# Patient Record
Sex: FEMALE | Race: WHITE | Employment: UNEMPLOYED | URBAN - METROPOLITAN AREA
[De-identification: names, ages, dates, MRNs, and addresses within clinical notes are randomized per-mention and may not be internally consistent; named-entity substitution may affect disease eponyms.]

---

## 2017-08-15 ENCOUNTER — APPOINTMENT (OUTPATIENT)
Dept: CT IMAGING | Age: 22
End: 2017-08-15
Payer: COMMERCIAL

## 2017-08-15 ENCOUNTER — HOSPITAL ENCOUNTER (EMERGENCY)
Age: 22
Discharge: HOME | End: 2017-08-16
Attending: EMERGENCY MEDICINE
Payer: COMMERCIAL

## 2017-08-15 VITALS
WEIGHT: 150 LBS | RESPIRATION RATE: 12 BRPM | BODY MASS INDEX: 26.58 KG/M2 | SYSTOLIC BLOOD PRESSURE: 108 MMHG | OXYGEN SATURATION: 100 % | DIASTOLIC BLOOD PRESSURE: 72 MMHG | HEIGHT: 63 IN | TEMPERATURE: 98.7 F | HEART RATE: 102 BPM

## 2017-08-15 DIAGNOSIS — N39.0 URINARY TRACT INFECTION WITHOUT HEMATURIA, SITE UNSPECIFIED: Primary | ICD-10-CM

## 2017-08-15 LAB
-: ABNORMAL
ABSOLUTE EOS #: 0.5 K/UL (ref 0–0.4)
ABSOLUTE LYMPH #: 3 K/UL (ref 1–4.8)
ABSOLUTE MONO #: 1 K/UL (ref 0.2–0.8)
AMORPHOUS: ABNORMAL
ANION GAP SERPL CALCULATED.3IONS-SCNC: 15 MMOL/L (ref 9–17)
BACTERIA: ABNORMAL
BASOPHILS # BLD: 1 %
BASOPHILS ABSOLUTE: 0.1 K/UL (ref 0–0.2)
BILIRUBIN URINE: NEGATIVE
BUN BLDV-MCNC: 18 MG/DL (ref 6–20)
BUN/CREAT BLD: 21 (ref 9–20)
CALCIUM SERPL-MCNC: 9.2 MG/DL (ref 8.6–10.4)
CASTS UA: ABNORMAL /LPF
CHLORIDE BLD-SCNC: 96 MMOL/L (ref 98–107)
CO2: 26 MMOL/L (ref 20–31)
COLOR: YELLOW
COMMENT UA: ABNORMAL
CREAT SERPL-MCNC: 0.86 MG/DL (ref 0.5–0.9)
CRYSTALS, UA: ABNORMAL /HPF
DIFFERENTIAL TYPE: ABNORMAL
EOSINOPHILS RELATIVE PERCENT: 3 %
EPITHELIAL CELLS UA: ABNORMAL /HPF
GFR AFRICAN AMERICAN: >60 ML/MIN
GFR NON-AFRICAN AMERICAN: >60 ML/MIN
GFR SERPL CREATININE-BSD FRML MDRD: ABNORMAL ML/MIN/{1.73_M2}
GFR SERPL CREATININE-BSD FRML MDRD: ABNORMAL ML/MIN/{1.73_M2}
GLUCOSE BLD-MCNC: 101 MG/DL (ref 70–99)
GLUCOSE URINE: NEGATIVE
HCT VFR BLD CALC: 40.4 % (ref 36–46)
HEMOGLOBIN: 13.5 G/DL (ref 12–16)
KETONES, URINE: NEGATIVE
LEUKOCYTE ESTERASE, URINE: ABNORMAL
LYMPHOCYTES # BLD: 15 %
MCH RBC QN AUTO: 27.8 PG (ref 26–34)
MCHC RBC AUTO-ENTMCNC: 33.5 G/DL (ref 31–37)
MCV RBC AUTO: 83 FL (ref 80–100)
MONOCYTES # BLD: 5 %
MUCUS: ABNORMAL
NITRITE, URINE: NEGATIVE
OTHER OBSERVATIONS UA: ABNORMAL
PDW BLD-RTO: 13.4 % (ref 11.5–14.5)
PH UA: 5 (ref 5–8)
PLATELET # BLD: 209 K/UL (ref 130–400)
PLATELET ESTIMATE: ABNORMAL
PMV BLD AUTO: 9.5 FL (ref 6–12)
POTASSIUM SERPL-SCNC: 3.8 MMOL/L (ref 3.7–5.3)
PROTEIN UA: NEGATIVE
RBC # BLD: 4.87 M/UL (ref 4–5.2)
RBC # BLD: ABNORMAL 10*6/UL
RBC UA: ABNORMAL /HPF (ref 0–2)
RENAL EPITHELIAL, UA: ABNORMAL /HPF
SEG NEUTROPHILS: 76 %
SEGMENTED NEUTROPHILS ABSOLUTE COUNT: 15 K/UL (ref 1.8–7.7)
SODIUM BLD-SCNC: 137 MMOL/L (ref 135–144)
SPECIFIC GRAVITY UA: 1.01 (ref 1–1.03)
TRICHOMONAS: ABNORMAL
TURBIDITY: ABNORMAL
URINE HGB: ABNORMAL
UROBILINOGEN, URINE: NORMAL
WBC # BLD: 19.5 K/UL (ref 3.5–11)
WBC # BLD: ABNORMAL 10*3/UL
WBC UA: ABNORMAL /HPF (ref 0–5)
YEAST: ABNORMAL

## 2017-08-15 PROCEDURE — 85025 COMPLETE CBC W/AUTO DIFF WBC: CPT

## 2017-08-15 PROCEDURE — 99283 EMERGENCY DEPT VISIT LOW MDM: CPT

## 2017-08-15 PROCEDURE — 87086 URINE CULTURE/COLONY COUNT: CPT

## 2017-08-15 PROCEDURE — 80048 BASIC METABOLIC PNL TOTAL CA: CPT

## 2017-08-15 PROCEDURE — 6360000004 HC RX CONTRAST MEDICATION: Performed by: EMERGENCY MEDICINE

## 2017-08-15 PROCEDURE — 81001 URINALYSIS AUTO W/SCOPE: CPT

## 2017-08-15 PROCEDURE — 74177 CT ABD & PELVIS W/CONTRAST: CPT

## 2017-08-15 RX ORDER — ARIPIPRAZOLE 5 MG/1
5 TABLET ORAL DAILY
Status: ON HOLD | COMMUNITY
End: 2019-02-28

## 2017-08-15 RX ORDER — VENLAFAXINE HYDROCHLORIDE 150 MG/1
150 CAPSULE, EXTENDED RELEASE ORAL DAILY
COMMUNITY
End: 2019-02-26

## 2017-08-15 RX ADMIN — IOVERSOL 125 ML: 741 INJECTION INTRA-ARTERIAL; INTRAVENOUS at 23:46

## 2017-08-15 ASSESSMENT — PAIN DESCRIPTION - LOCATION: LOCATION: ABDOMEN;FLANK

## 2017-08-15 ASSESSMENT — PAIN SCALES - GENERAL: PAINLEVEL_OUTOF10: 10

## 2017-08-15 ASSESSMENT — PAIN DESCRIPTION - PAIN TYPE: TYPE: ACUTE PAIN

## 2017-08-15 ASSESSMENT — PAIN DESCRIPTION - ORIENTATION: ORIENTATION: RIGHT

## 2017-08-16 PROCEDURE — 2580000003 HC RX 258: Performed by: EMERGENCY MEDICINE

## 2017-08-16 PROCEDURE — 6360000002 HC RX W HCPCS: Performed by: EMERGENCY MEDICINE

## 2017-08-16 PROCEDURE — 96365 THER/PROPH/DIAG IV INF INIT: CPT

## 2017-08-16 RX ORDER — CEPHALEXIN 500 MG/1
500 CAPSULE ORAL 4 TIMES DAILY
Qty: 40 CAPSULE | Refills: 0 | Status: SHIPPED | OUTPATIENT
Start: 2017-08-16 | End: 2017-08-26

## 2017-08-16 RX ORDER — ONDANSETRON 4 MG/1
4 TABLET, FILM COATED ORAL EVERY 6 HOURS PRN
Qty: 10 TABLET | Refills: 0 | Status: SHIPPED | OUTPATIENT
Start: 2017-08-16 | End: 2021-07-20

## 2017-08-16 RX ORDER — 0.9 % SODIUM CHLORIDE 0.9 %
1000 INTRAVENOUS SOLUTION INTRAVENOUS ONCE
Status: COMPLETED | OUTPATIENT
Start: 2017-08-16 | End: 2017-08-16

## 2017-08-16 RX ADMIN — SODIUM CHLORIDE 1000 ML: 9 INJECTION, SOLUTION INTRAVENOUS at 01:07

## 2017-08-16 RX ADMIN — CEFTRIAXONE 1 G: 1 INJECTION, POWDER, FOR SOLUTION INTRAMUSCULAR; INTRAVENOUS at 01:07

## 2017-08-17 LAB
CULTURE: NORMAL
CULTURE: NORMAL
Lab: NORMAL
SPECIMEN DESCRIPTION: NORMAL
SPECIMEN DESCRIPTION: NORMAL
STATUS: NORMAL

## 2019-02-26 ENCOUNTER — HOSPITAL ENCOUNTER (INPATIENT)
Age: 24
LOS: 2 days | Discharge: HOME | DRG: 885 | End: 2019-02-28
Attending: EMERGENCY MEDICINE | Admitting: PSYCHIATRY & NEUROLOGY
Payer: COMMERCIAL

## 2019-02-26 DIAGNOSIS — R45.851 SUICIDAL IDEATION: Primary | ICD-10-CM

## 2019-02-26 PROBLEM — F33.9 RECURRENT DEPRESSION (HCC): Status: ACTIVE | Noted: 2019-02-26

## 2019-02-26 PROBLEM — F31.9 BIPOLAR 1 DISORDER (HCC): Status: ACTIVE | Noted: 2019-02-26

## 2019-02-26 LAB
ACETAMINOPHEN LEVEL: <5 UG/ML (ref 10–30)
AMPHETAMINE SCREEN URINE: NEGATIVE
BARBITURATE SCREEN URINE: NEGATIVE
BENZODIAZEPINE SCREEN, URINE: NEGATIVE
BUPRENORPHINE URINE: ABNORMAL
CANNABINOID SCREEN URINE: POSITIVE
COCAINE METABOLITE, URINE: NEGATIVE
MDMA URINE: ABNORMAL
METHADONE SCREEN, URINE: NEGATIVE
METHAMPHETAMINE, URINE: ABNORMAL
OPIATES, URINE: NEGATIVE
OXYCODONE SCREEN URINE: POSITIVE
PHENCYCLIDINE, URINE: NEGATIVE
PROPOXYPHENE, URINE: ABNORMAL
SALICYLATE LEVEL: <1 MG/DL (ref 3–10)
TEST INFORMATION: ABNORMAL
TRICYCLIC ANTIDEPRESSANTS, UR: ABNORMAL

## 2019-02-26 PROCEDURE — 99284 EMERGENCY DEPT VISIT MOD MDM: CPT

## 2019-02-26 PROCEDURE — 80307 DRUG TEST PRSMV CHEM ANLYZR: CPT

## 2019-02-26 PROCEDURE — 1240000000 HC EMOTIONAL WELLNESS R&B

## 2019-02-26 PROCEDURE — 6370000000 HC RX 637 (ALT 250 FOR IP): Performed by: PSYCHIATRY & NEUROLOGY

## 2019-02-26 PROCEDURE — 36415 COLL VENOUS BLD VENIPUNCTURE: CPT

## 2019-02-26 RX ORDER — IBUPROFEN 800 MG/1
800 TABLET ORAL EVERY 6 HOURS PRN
Status: DISCONTINUED | OUTPATIENT
Start: 2019-02-26 | End: 2019-02-28 | Stop reason: HOSPADM

## 2019-02-26 RX ORDER — OXYCODONE HYDROCHLORIDE AND ACETAMINOPHEN 5; 325 MG/1; MG/1
1 TABLET ORAL EVERY 8 HOURS PRN
Status: ON HOLD | COMMUNITY
End: 2020-01-10 | Stop reason: HOSPADM

## 2019-02-26 RX ORDER — DIPHENHYDRAMINE HCL 25 MG
25 TABLET ORAL NIGHTLY PRN
Status: DISCONTINUED | OUTPATIENT
Start: 2019-02-26 | End: 2019-02-28 | Stop reason: HOSPADM

## 2019-02-26 RX ORDER — MAGNESIUM HYDROXIDE/ALUMINUM HYDROXICE/SIMETHICONE 120; 1200; 1200 MG/30ML; MG/30ML; MG/30ML
30 SUSPENSION ORAL PRN
Status: DISCONTINUED | OUTPATIENT
Start: 2019-02-26 | End: 2019-02-28 | Stop reason: HOSPADM

## 2019-02-26 RX ORDER — ONDANSETRON 4 MG/1
4 TABLET, FILM COATED ORAL EVERY 6 HOURS PRN
Status: DISCONTINUED | OUTPATIENT
Start: 2019-02-26 | End: 2019-02-28 | Stop reason: HOSPADM

## 2019-02-26 RX ORDER — OXYCODONE HYDROCHLORIDE AND ACETAMINOPHEN 5; 325 MG/1; MG/1
1 TABLET ORAL EVERY 8 HOURS PRN
Status: DISCONTINUED | OUTPATIENT
Start: 2019-02-26 | End: 2019-02-28 | Stop reason: HOSPADM

## 2019-02-26 RX ORDER — SULFAMETHOXAZOLE AND TRIMETHOPRIM 800; 160 MG/1; MG/1
1 TABLET ORAL 2 TIMES DAILY
COMMUNITY
Start: 2019-02-22 | End: 2019-02-28

## 2019-02-26 RX ORDER — ACETAMINOPHEN 325 MG/1
650 TABLET ORAL EVERY 4 HOURS PRN
Status: DISCONTINUED | OUTPATIENT
Start: 2019-02-26 | End: 2019-02-28 | Stop reason: HOSPADM

## 2019-02-26 RX ORDER — BENZTROPINE MESYLATE 1 MG/ML
2 INJECTION INTRAMUSCULAR; INTRAVENOUS 2 TIMES DAILY PRN
Status: DISCONTINUED | OUTPATIENT
Start: 2019-02-26 | End: 2019-02-28 | Stop reason: HOSPADM

## 2019-02-26 RX ORDER — SULFAMETHOXAZOLE AND TRIMETHOPRIM 800; 160 MG/1; MG/1
1 TABLET ORAL 2 TIMES DAILY
Status: DISCONTINUED | OUTPATIENT
Start: 2019-02-26 | End: 2019-02-27

## 2019-02-26 RX ORDER — TRAZODONE HYDROCHLORIDE 50 MG/1
50 TABLET ORAL NIGHTLY PRN
Status: DISCONTINUED | OUTPATIENT
Start: 2019-02-26 | End: 2019-02-28 | Stop reason: HOSPADM

## 2019-02-26 RX ORDER — ARIPIPRAZOLE 5 MG/1
5 TABLET ORAL DAILY
Status: DISCONTINUED | OUTPATIENT
Start: 2019-02-26 | End: 2019-02-27

## 2019-02-26 RX ORDER — DOCUSATE SODIUM 100 MG/1
100 CAPSULE, LIQUID FILLED ORAL 2 TIMES DAILY PRN
Status: DISCONTINUED | OUTPATIENT
Start: 2019-02-26 | End: 2019-02-28 | Stop reason: HOSPADM

## 2019-02-26 RX ORDER — DOCUSATE SODIUM 100 MG/1
100 CAPSULE, LIQUID FILLED ORAL 2 TIMES DAILY PRN
Status: ON HOLD | COMMUNITY
End: 2022-06-21

## 2019-02-26 RX ORDER — IBUPROFEN 800 MG/1
800 TABLET ORAL EVERY 6 HOURS PRN
Status: ON HOLD | COMMUNITY
End: 2020-01-10

## 2019-02-26 RX ORDER — HYDROXYZINE HYDROCHLORIDE 25 MG/1
25 TABLET, FILM COATED ORAL 3 TIMES DAILY PRN
Status: DISCONTINUED | OUTPATIENT
Start: 2019-02-26 | End: 2019-02-28 | Stop reason: HOSPADM

## 2019-02-26 RX ADMIN — TRAZODONE HYDROCHLORIDE 50 MG: 50 TABLET ORAL at 23:03

## 2019-02-26 RX ADMIN — ARIPIPRAZOLE 5 MG: 5 TABLET ORAL at 20:45

## 2019-02-26 RX ADMIN — OXYCODONE HYDROCHLORIDE AND ACETAMINOPHEN 1 TABLET: 5; 325 TABLET ORAL at 20:45

## 2019-02-26 RX ADMIN — NICOTINE POLACRILEX 2 MG: 2 GUM, CHEWING BUCCAL at 21:22

## 2019-02-26 RX ADMIN — DIPHENHYDRAMINE HCL 25 MG: 25 TABLET ORAL at 20:44

## 2019-02-26 RX ADMIN — HYDROXYZINE HYDROCHLORIDE 25 MG: 25 TABLET, FILM COATED ORAL at 20:45

## 2019-02-26 RX ADMIN — SULFAMETHOXAZOLE AND TRIMETHOPRIM 1 TABLET: 800; 160 TABLET ORAL at 20:48

## 2019-02-26 ASSESSMENT — PAIN DESCRIPTION - LOCATION: LOCATION: ABDOMEN

## 2019-02-26 ASSESSMENT — SLEEP AND FATIGUE QUESTIONNAIRES
AVERAGE NUMBER OF SLEEP HOURS: 4
DIFFICULTY STAYING ASLEEP: YES
SLEEP PATTERN: DIFFICULTY FALLING ASLEEP
DIFFICULTY ARISING: NO
DIFFICULTY FALLING ASLEEP: YES
DO YOU USE A SLEEP AID: NO
RESTFUL SLEEP: NO
DO YOU HAVE DIFFICULTY SLEEPING: YES

## 2019-02-26 ASSESSMENT — ENCOUNTER SYMPTOMS
DIARRHEA: 0
NAUSEA: 0
CONSTIPATION: 0
SHORTNESS OF BREATH: 0
CHEST TIGHTNESS: 0
VOMITING: 0
EYE PAIN: 0
COUGH: 0
SORE THROAT: 0
BACK PAIN: 0
ABDOMINAL PAIN: 0
EYE REDNESS: 0

## 2019-02-26 ASSESSMENT — PAIN DESCRIPTION - PAIN TYPE: TYPE: ACUTE PAIN;SURGICAL PAIN

## 2019-02-26 ASSESSMENT — LIFESTYLE VARIABLES: HISTORY_ALCOHOL_USE: NO

## 2019-02-26 ASSESSMENT — PAIN SCALES - GENERAL
PAINLEVEL_OUTOF10: 10
PAINLEVEL_OUTOF10: 0

## 2019-02-26 ASSESSMENT — PATIENT HEALTH QUESTIONNAIRE - PHQ9: SUM OF ALL RESPONSES TO PHQ QUESTIONS 1-9: 10

## 2019-02-27 LAB
ABSOLUTE EOS #: 0.12 K/UL (ref 0–0.4)
ABSOLUTE IMMATURE GRANULOCYTE: ABNORMAL K/UL (ref 0–0.3)
ABSOLUTE LYMPH #: 5.12 K/UL (ref 1–4.8)
ABSOLUTE MONO #: 0.83 K/UL (ref 0.1–1.3)
ALBUMIN SERPL-MCNC: 4.2 G/DL (ref 3.5–5.2)
ALBUMIN/GLOBULIN RATIO: ABNORMAL (ref 1–2.5)
ALP BLD-CCNC: 245 U/L (ref 35–104)
ALT SERPL-CCNC: 75 U/L (ref 5–33)
ANION GAP SERPL CALCULATED.3IONS-SCNC: 13 MMOL/L (ref 9–17)
AST SERPL-CCNC: 31 U/L
BASOPHILS # BLD: 0 % (ref 0–2)
BASOPHILS ABSOLUTE: 0 K/UL (ref 0–0.2)
BILIRUB SERPL-MCNC: 0.21 MG/DL (ref 0.3–1.2)
BUN BLDV-MCNC: 24 MG/DL (ref 6–20)
BUN/CREAT BLD: ABNORMAL (ref 9–20)
CALCIUM SERPL-MCNC: 9.9 MG/DL (ref 8.6–10.4)
CHLORIDE BLD-SCNC: 102 MMOL/L (ref 98–107)
CHOLESTEROL/HDL RATIO: 6.7
CHOLESTEROL: 228 MG/DL
CO2: 25 MMOL/L (ref 20–31)
CREAT SERPL-MCNC: 1.21 MG/DL (ref 0.5–0.9)
DIFFERENTIAL TYPE: ABNORMAL
EOSINOPHILS RELATIVE PERCENT: 1 % (ref 0–4)
GFR AFRICAN AMERICAN: >60 ML/MIN
GFR NON-AFRICAN AMERICAN: 54 ML/MIN
GFR SERPL CREATININE-BSD FRML MDRD: ABNORMAL ML/MIN/{1.73_M2}
GFR SERPL CREATININE-BSD FRML MDRD: ABNORMAL ML/MIN/{1.73_M2}
GLUCOSE BLD-MCNC: 124 MG/DL (ref 70–99)
HCG(URINE) PREGNANCY TEST: NEGATIVE
HCT VFR BLD CALC: 41.7 % (ref 36–46)
HDLC SERPL-MCNC: 34 MG/DL
HEMOGLOBIN: 13.7 G/DL (ref 12–16)
IMMATURE GRANULOCYTES: ABNORMAL %
LDL CHOLESTEROL: 132 MG/DL (ref 0–130)
LYMPHOCYTES # BLD: 43 % (ref 24–44)
MCH RBC QN AUTO: 27 PG (ref 26–34)
MCHC RBC AUTO-ENTMCNC: 32.8 G/DL (ref 31–37)
MCV RBC AUTO: 82.5 FL (ref 80–100)
MONOCYTES # BLD: 7 % (ref 1–7)
MORPHOLOGY: NORMAL
NRBC AUTOMATED: ABNORMAL PER 100 WBC
PDW BLD-RTO: 14 % (ref 11.5–14.9)
PLATELET # BLD: 271 K/UL (ref 150–450)
PLATELET ESTIMATE: ABNORMAL
PMV BLD AUTO: 9.1 FL (ref 6–12)
POTASSIUM SERPL-SCNC: 4 MMOL/L (ref 3.7–5.3)
RBC # BLD: 5.06 M/UL (ref 4–5.2)
RBC # BLD: ABNORMAL 10*6/UL
SEG NEUTROPHILS: 49 % (ref 36–66)
SEGMENTED NEUTROPHILS ABSOLUTE COUNT: 5.83 K/UL (ref 1.3–9.1)
SODIUM BLD-SCNC: 140 MMOL/L (ref 135–144)
TOTAL PROTEIN: 7.4 G/DL (ref 6.4–8.3)
TRIGL SERPL-MCNC: 312 MG/DL
VLDLC SERPL CALC-MCNC: ABNORMAL MG/DL (ref 1–30)
WBC # BLD: 11.9 K/UL (ref 3.5–11)
WBC # BLD: ABNORMAL 10*3/UL

## 2019-02-27 PROCEDURE — 6370000000 HC RX 637 (ALT 250 FOR IP): Performed by: PSYCHIATRY & NEUROLOGY

## 2019-02-27 PROCEDURE — 80061 LIPID PANEL: CPT

## 2019-02-27 PROCEDURE — 80053 COMPREHEN METABOLIC PANEL: CPT

## 2019-02-27 PROCEDURE — 36415 COLL VENOUS BLD VENIPUNCTURE: CPT

## 2019-02-27 PROCEDURE — 1240000000 HC EMOTIONAL WELLNESS R&B

## 2019-02-27 PROCEDURE — 85025 COMPLETE CBC W/AUTO DIFF WBC: CPT

## 2019-02-27 PROCEDURE — 84703 CHORIONIC GONADOTROPIN ASSAY: CPT

## 2019-02-27 RX ORDER — ARIPIPRAZOLE 5 MG/1
5 TABLET ORAL ONCE
Status: COMPLETED | OUTPATIENT
Start: 2019-02-27 | End: 2019-02-27

## 2019-02-27 RX ORDER — ARIPIPRAZOLE 10 MG/1
10 TABLET ORAL DAILY
Status: DISCONTINUED | OUTPATIENT
Start: 2019-02-28 | End: 2019-02-28 | Stop reason: HOSPADM

## 2019-02-27 RX ORDER — SULFAMETHOXAZOLE AND TRIMETHOPRIM 800; 160 MG/1; MG/1
1 TABLET ORAL 2 TIMES DAILY
Status: DISCONTINUED | OUTPATIENT
Start: 2019-02-27 | End: 2019-02-28 | Stop reason: HOSPADM

## 2019-02-27 RX ADMIN — TRAZODONE HYDROCHLORIDE 50 MG: 50 TABLET ORAL at 21:49

## 2019-02-27 RX ADMIN — OXYCODONE HYDROCHLORIDE AND ACETAMINOPHEN 1 TABLET: 5; 325 TABLET ORAL at 20:14

## 2019-02-27 RX ADMIN — ARIPIPRAZOLE 5 MG: 5 TABLET ORAL at 17:02

## 2019-02-27 RX ADMIN — SULFAMETHOXAZOLE AND TRIMETHOPRIM 1 TABLET: 800; 160 TABLET ORAL at 08:03

## 2019-02-27 RX ADMIN — HYDROXYZINE HYDROCHLORIDE 25 MG: 25 TABLET, FILM COATED ORAL at 12:09

## 2019-02-27 RX ADMIN — SULFAMETHOXAZOLE AND TRIMETHOPRIM 1 TABLET: 800; 160 TABLET ORAL at 21:49

## 2019-02-27 RX ADMIN — ARIPIPRAZOLE 5 MG: 5 TABLET ORAL at 08:03

## 2019-02-27 RX ADMIN — DIPHENHYDRAMINE HCL 25 MG: 25 TABLET ORAL at 21:49

## 2019-02-27 RX ADMIN — OXYCODONE HYDROCHLORIDE AND ACETAMINOPHEN 1 TABLET: 5; 325 TABLET ORAL at 12:09

## 2019-02-27 RX ADMIN — HYDROXYZINE HYDROCHLORIDE 25 MG: 25 TABLET, FILM COATED ORAL at 21:49

## 2019-02-27 ASSESSMENT — SLEEP AND FATIGUE QUESTIONNAIRES
DO YOU HAVE DIFFICULTY SLEEPING: YES
AVERAGE NUMBER OF SLEEP HOURS: 4
SLEEP PATTERN: DIFFICULTY FALLING ASLEEP;DISTURBED/INTERRUPTED SLEEP;RESTLESSNESS
RESTFUL SLEEP: NO
DIFFICULTY FALLING ASLEEP: YES
DIFFICULTY ARISING: NO
DO YOU USE A SLEEP AID: NO
DIFFICULTY STAYING ASLEEP: YES

## 2019-02-27 ASSESSMENT — PAIN SCALES - GENERAL
PAINLEVEL_OUTOF10: 9
PAINLEVEL_OUTOF10: 10
PAINLEVEL_OUTOF10: 0
PAINLEVEL_OUTOF10: 1

## 2019-02-27 ASSESSMENT — PATIENT HEALTH QUESTIONNAIRE - PHQ9: SUM OF ALL RESPONSES TO PHQ QUESTIONS 1-9: 7

## 2019-02-27 ASSESSMENT — LIFESTYLE VARIABLES: HISTORY_ALCOHOL_USE: NO

## 2019-02-28 VITALS
WEIGHT: 162 LBS | RESPIRATION RATE: 14 BRPM | HEART RATE: 94 BPM | HEIGHT: 63 IN | BODY MASS INDEX: 28.7 KG/M2 | DIASTOLIC BLOOD PRESSURE: 75 MMHG | OXYGEN SATURATION: 99 % | TEMPERATURE: 97.9 F | SYSTOLIC BLOOD PRESSURE: 123 MMHG

## 2019-02-28 PROCEDURE — 6370000000 HC RX 637 (ALT 250 FOR IP): Performed by: PSYCHIATRY & NEUROLOGY

## 2019-02-28 PROCEDURE — 5130000000 HC BRIDGE APPOINTMENT

## 2019-02-28 RX ORDER — ARIPIPRAZOLE 10 MG/1
10 TABLET ORAL DAILY
Qty: 30 TABLET | Refills: 0 | Status: ON HOLD | OUTPATIENT
Start: 2019-02-28 | End: 2020-01-10

## 2019-02-28 RX ORDER — DIPHENHYDRAMINE HCL 25 MG
25 TABLET ORAL NIGHTLY PRN
Qty: 30 TABLET | Refills: 0 | Status: SHIPPED | OUTPATIENT
Start: 2019-02-28 | End: 2019-03-30

## 2019-02-28 RX ORDER — HYDROXYZINE HYDROCHLORIDE 25 MG/1
25 TABLET, FILM COATED ORAL 2 TIMES DAILY PRN
Qty: 60 TABLET | Refills: 0 | Status: ON HOLD | OUTPATIENT
Start: 2019-02-28 | End: 2020-01-10 | Stop reason: ALTCHOICE

## 2019-02-28 RX ORDER — TRAZODONE HYDROCHLORIDE 50 MG/1
50 TABLET ORAL NIGHTLY PRN
Qty: 30 TABLET | Refills: 0 | Status: ON HOLD | OUTPATIENT
Start: 2019-02-28 | End: 2020-01-10 | Stop reason: ALTCHOICE

## 2019-02-28 RX ADMIN — SULFAMETHOXAZOLE AND TRIMETHOPRIM 1 TABLET: 800; 160 TABLET ORAL at 08:58

## 2019-02-28 RX ADMIN — OXYCODONE HYDROCHLORIDE AND ACETAMINOPHEN 1 TABLET: 5; 325 TABLET ORAL at 15:46

## 2019-02-28 RX ADMIN — ARIPIPRAZOLE 10 MG: 10 TABLET ORAL at 08:58

## 2019-02-28 RX ADMIN — HYDROXYZINE HYDROCHLORIDE 25 MG: 25 TABLET, FILM COATED ORAL at 15:47

## 2019-02-28 ASSESSMENT — PAIN DESCRIPTION - LOCATION: LOCATION: ABDOMEN

## 2019-02-28 ASSESSMENT — PAIN DESCRIPTION - PAIN TYPE: TYPE: ACUTE PAIN

## 2019-02-28 ASSESSMENT — PAIN SCALES - GENERAL
PAINLEVEL_OUTOF10: 9
PAINLEVEL_OUTOF10: 0

## 2019-02-28 ASSESSMENT — PAIN DESCRIPTION - DESCRIPTORS: DESCRIPTORS: ACHING

## 2020-01-04 ENCOUNTER — APPOINTMENT (OUTPATIENT)
Dept: CT IMAGING | Age: 25
DRG: 021 | End: 2020-01-04
Payer: MEDICARE

## 2020-01-04 ENCOUNTER — HOSPITAL ENCOUNTER (INPATIENT)
Age: 25
LOS: 6 days | Discharge: HOME HEALTH | DRG: 021 | End: 2020-01-10
Attending: EMERGENCY MEDICINE | Admitting: PSYCHIATRY & NEUROLOGY
Payer: MEDICARE

## 2020-01-04 ENCOUNTER — APPOINTMENT (OUTPATIENT)
Dept: GENERAL RADIOLOGY | Age: 25
DRG: 021 | End: 2020-01-04
Payer: MEDICARE

## 2020-01-04 PROBLEM — I60.9 SAH (SUBARACHNOID HEMORRHAGE) (HCC): Status: ACTIVE | Noted: 2020-01-04

## 2020-01-04 LAB
-: ABNORMAL
ABSOLUTE EOS #: 0.58 K/UL (ref 0–0.44)
ABSOLUTE IMMATURE GRANULOCYTE: 0.03 K/UL (ref 0–0.3)
ABSOLUTE LYMPH #: 3.94 K/UL (ref 1.1–3.7)
ABSOLUTE MONO #: 0.73 K/UL (ref 0.1–1.2)
AMORPHOUS: ABNORMAL
AMPHETAMINE SCREEN URINE: NEGATIVE
ANION GAP SERPL CALCULATED.3IONS-SCNC: 17 MMOL/L (ref 9–17)
BACTERIA: ABNORMAL
BARBITURATE SCREEN URINE: NEGATIVE
BASOPHILS # BLD: 1 % (ref 0–2)
BASOPHILS ABSOLUTE: 0.09 K/UL (ref 0–0.2)
BENZODIAZEPINE SCREEN, URINE: NEGATIVE
BILIRUBIN URINE: NEGATIVE
BUN BLDV-MCNC: 22 MG/DL (ref 6–20)
BUN/CREAT BLD: ABNORMAL (ref 9–20)
BUPRENORPHINE URINE: ABNORMAL
CALCIUM SERPL-MCNC: 10.5 MG/DL (ref 8.6–10.4)
CANNABINOID SCREEN URINE: POSITIVE
CASTS UA: ABNORMAL /LPF (ref 0–8)
CHLORIDE BLD-SCNC: 96 MMOL/L (ref 98–107)
CO2: 24 MMOL/L (ref 20–31)
COCAINE METABOLITE, URINE: NEGATIVE
COLOR: YELLOW
COMMENT UA: ABNORMAL
CREAT SERPL-MCNC: 0.73 MG/DL (ref 0.5–0.9)
CRYSTALS, UA: ABNORMAL /HPF
DIFFERENTIAL TYPE: ABNORMAL
DIRECT EXAM: NORMAL
EOSINOPHILS RELATIVE PERCENT: 5 % (ref 1–4)
EPITHELIAL CELLS UA: ABNORMAL /HPF (ref 0–5)
GFR AFRICAN AMERICAN: >60 ML/MIN
GFR NON-AFRICAN AMERICAN: >60 ML/MIN
GFR SERPL CREATININE-BSD FRML MDRD: ABNORMAL ML/MIN/{1.73_M2}
GFR SERPL CREATININE-BSD FRML MDRD: ABNORMAL ML/MIN/{1.73_M2}
GLUCOSE BLD-MCNC: 164 MG/DL (ref 70–99)
GLUCOSE URINE: ABNORMAL
HCT VFR BLD CALC: 45.4 % (ref 36.3–47.1)
HEMOGLOBIN: 15 G/DL (ref 11.9–15.1)
IMMATURE GRANULOCYTES: 0 %
KETONES, URINE: NEGATIVE
LEUKOCYTE ESTERASE, URINE: ABNORMAL
LYMPHOCYTES # BLD: 31 % (ref 24–43)
Lab: NORMAL
MCH RBC QN AUTO: 27.1 PG (ref 25.2–33.5)
MCHC RBC AUTO-ENTMCNC: 33 G/DL (ref 28.4–34.8)
MCV RBC AUTO: 81.9 FL (ref 82.6–102.9)
MDMA URINE: ABNORMAL
METHADONE SCREEN, URINE: NEGATIVE
METHAMPHETAMINE, URINE: ABNORMAL
MONOCYTES # BLD: 6 % (ref 3–12)
MUCUS: ABNORMAL
NITRITE, URINE: NEGATIVE
NRBC AUTOMATED: 0 PER 100 WBC
OPIATES, URINE: NEGATIVE
OTHER OBSERVATIONS UA: ABNORMAL
OXYCODONE SCREEN URINE: NEGATIVE
PDW BLD-RTO: 12.4 % (ref 11.8–14.4)
PH UA: 5.5 (ref 5–8)
PHENCYCLIDINE, URINE: NEGATIVE
PLATELET # BLD: 202 K/UL (ref 138–453)
PLATELET ESTIMATE: ABNORMAL
PMV BLD AUTO: 11.3 FL (ref 8.1–13.5)
POTASSIUM SERPL-SCNC: 3.8 MMOL/L (ref 3.7–5.3)
PROPOXYPHENE, URINE: ABNORMAL
PROTEIN UA: ABNORMAL
RBC # BLD: 5.54 M/UL (ref 3.95–5.11)
RBC # BLD: ABNORMAL 10*6/UL
RBC UA: ABNORMAL /HPF (ref 0–4)
RENAL EPITHELIAL, UA: ABNORMAL /HPF
SEG NEUTROPHILS: 57 % (ref 36–65)
SEGMENTED NEUTROPHILS ABSOLUTE COUNT: 7.4 K/UL (ref 1.5–8.1)
SODIUM BLD-SCNC: 137 MMOL/L (ref 135–144)
SPECIFIC GRAVITY UA: 1.02 (ref 1–1.03)
SPECIMEN DESCRIPTION: NORMAL
TEST INFORMATION: ABNORMAL
TRICHOMONAS: ABNORMAL
TRICYCLIC ANTIDEPRESSANTS, UR: ABNORMAL
TURBIDITY: ABNORMAL
URINE HGB: ABNORMAL
UROBILINOGEN, URINE: NORMAL
WBC # BLD: 12.8 K/UL (ref 3.5–11.3)
WBC # BLD: ABNORMAL 10*3/UL
WBC UA: ABNORMAL /HPF (ref 0–5)
YEAST: ABNORMAL

## 2020-01-04 PROCEDURE — 93005 ELECTROCARDIOGRAM TRACING: CPT | Performed by: STUDENT IN AN ORGANIZED HEALTH CARE EDUCATION/TRAINING PROGRAM

## 2020-01-04 PROCEDURE — 87804 INFLUENZA ASSAY W/OPTIC: CPT

## 2020-01-04 PROCEDURE — 2580000003 HC RX 258: Performed by: STUDENT IN AN ORGANIZED HEALTH CARE EDUCATION/TRAINING PROGRAM

## 2020-01-04 PROCEDURE — 70498 CT ANGIOGRAPHY NECK: CPT

## 2020-01-04 PROCEDURE — 6360000004 HC RX CONTRAST MEDICATION: Performed by: STUDENT IN AN ORGANIZED HEALTH CARE EDUCATION/TRAINING PROGRAM

## 2020-01-04 PROCEDURE — 6370000000 HC RX 637 (ALT 250 FOR IP): Performed by: STUDENT IN AN ORGANIZED HEALTH CARE EDUCATION/TRAINING PROGRAM

## 2020-01-04 PROCEDURE — 81001 URINALYSIS AUTO W/SCOPE: CPT

## 2020-01-04 PROCEDURE — 99222 1ST HOSP IP/OBS MODERATE 55: CPT | Performed by: PSYCHIATRY & NEUROLOGY

## 2020-01-04 PROCEDURE — 96375 TX/PRO/DX INJ NEW DRUG ADDON: CPT

## 2020-01-04 PROCEDURE — 96374 THER/PROPH/DIAG INJ IV PUSH: CPT

## 2020-01-04 PROCEDURE — 80307 DRUG TEST PRSMV CHEM ANLYZR: CPT

## 2020-01-04 PROCEDURE — 71046 X-RAY EXAM CHEST 2 VIEWS: CPT

## 2020-01-04 PROCEDURE — 6360000002 HC RX W HCPCS: Performed by: STUDENT IN AN ORGANIZED HEALTH CARE EDUCATION/TRAINING PROGRAM

## 2020-01-04 PROCEDURE — 99285 EMERGENCY DEPT VISIT HI MDM: CPT

## 2020-01-04 PROCEDURE — 70450 CT HEAD/BRAIN W/O DYE: CPT

## 2020-01-04 PROCEDURE — 80048 BASIC METABOLIC PNL TOTAL CA: CPT

## 2020-01-04 PROCEDURE — 85025 COMPLETE CBC W/AUTO DIFF WBC: CPT

## 2020-01-04 PROCEDURE — 2000000003 HC NEURO ICU R&B

## 2020-01-04 PROCEDURE — 87641 MR-STAPH DNA AMP PROBE: CPT

## 2020-01-04 PROCEDURE — 87086 URINE CULTURE/COLONY COUNT: CPT

## 2020-01-04 RX ORDER — LABETALOL 20 MG/4 ML (5 MG/ML) INTRAVENOUS SYRINGE
10 EVERY 10 MIN PRN
Status: DISCONTINUED | OUTPATIENT
Start: 2020-01-04 | End: 2020-01-08

## 2020-01-04 RX ORDER — PROCHLORPERAZINE EDISYLATE 5 MG/ML
10 INJECTION INTRAMUSCULAR; INTRAVENOUS ONCE
Status: COMPLETED | OUTPATIENT
Start: 2020-01-04 | End: 2020-01-04

## 2020-01-04 RX ORDER — MORPHINE SULFATE 2 MG/ML
1 INJECTION, SOLUTION INTRAMUSCULAR; INTRAVENOUS EVERY 4 HOURS PRN
Status: DISCONTINUED | OUTPATIENT
Start: 2020-01-04 | End: 2020-01-05

## 2020-01-04 RX ORDER — SODIUM CHLORIDE 0.9 % (FLUSH) 0.9 %
10 SYRINGE (ML) INJECTION PRN
Status: DISCONTINUED | OUTPATIENT
Start: 2020-01-04 | End: 2020-01-08

## 2020-01-04 RX ORDER — ACETAMINOPHEN 325 MG/1
650 TABLET ORAL EVERY 4 HOURS PRN
Status: DISCONTINUED | OUTPATIENT
Start: 2020-01-04 | End: 2020-01-08

## 2020-01-04 RX ORDER — SENNA AND DOCUSATE SODIUM 50; 8.6 MG/1; MG/1
1 TABLET, FILM COATED ORAL 2 TIMES DAILY
Status: DISCONTINUED | OUTPATIENT
Start: 2020-01-04 | End: 2020-01-08

## 2020-01-04 RX ORDER — MAGNESIUM SULFATE 1 G/100ML
1 INJECTION INTRAVENOUS PRN
Status: DISCONTINUED | OUTPATIENT
Start: 2020-01-04 | End: 2020-01-10 | Stop reason: HOSPADM

## 2020-01-04 RX ORDER — BUSPIRONE HYDROCHLORIDE 10 MG/1
10 TABLET ORAL 3 TIMES DAILY
Status: DISCONTINUED | OUTPATIENT
Start: 2020-01-04 | End: 2020-01-08

## 2020-01-04 RX ORDER — DIPHENHYDRAMINE HYDROCHLORIDE 50 MG/ML
25 INJECTION INTRAMUSCULAR; INTRAVENOUS ONCE
Status: COMPLETED | OUTPATIENT
Start: 2020-01-04 | End: 2020-01-04

## 2020-01-04 RX ORDER — DOCUSATE SODIUM 100 MG/1
100 CAPSULE, LIQUID FILLED ORAL 3 TIMES DAILY
Status: DISCONTINUED | OUTPATIENT
Start: 2020-01-04 | End: 2020-01-08

## 2020-01-04 RX ORDER — ONDANSETRON 4 MG/1
4 TABLET, ORALLY DISINTEGRATING ORAL ONCE
Status: COMPLETED | OUTPATIENT
Start: 2020-01-04 | End: 2020-01-04

## 2020-01-04 RX ORDER — NIMODIPINE 30 MG/1
60 CAPSULE, LIQUID FILLED ORAL
Status: DISCONTINUED | OUTPATIENT
Start: 2020-01-05 | End: 2020-01-04

## 2020-01-04 RX ORDER — ACETAMINOPHEN 325 MG/1
650 TABLET ORAL ONCE
Status: COMPLETED | OUTPATIENT
Start: 2020-01-04 | End: 2020-01-04

## 2020-01-04 RX ORDER — SODIUM CHLORIDE 9 MG/ML
INJECTION, SOLUTION INTRAVENOUS CONTINUOUS
Status: DISCONTINUED | OUTPATIENT
Start: 2020-01-04 | End: 2020-01-06

## 2020-01-04 RX ORDER — 0.9 % SODIUM CHLORIDE 0.9 %
1000 INTRAVENOUS SOLUTION INTRAVENOUS ONCE
Status: COMPLETED | OUTPATIENT
Start: 2020-01-04 | End: 2020-01-04

## 2020-01-04 RX ORDER — SIMVASTATIN 40 MG
80 TABLET ORAL NIGHTLY
Status: DISCONTINUED | OUTPATIENT
Start: 2020-01-04 | End: 2020-01-05

## 2020-01-04 RX ORDER — BUSPIRONE HYDROCHLORIDE 10 MG/1
10 TABLET ORAL 3 TIMES DAILY
Status: ON HOLD | COMMUNITY
End: 2022-06-21

## 2020-01-04 RX ORDER — SODIUM CHLORIDE 0.9 % (FLUSH) 0.9 %
10 SYRINGE (ML) INJECTION EVERY 12 HOURS SCHEDULED
Status: DISCONTINUED | OUTPATIENT
Start: 2020-01-04 | End: 2020-01-08

## 2020-01-04 RX ORDER — LEVETIRACETAM 500 MG/1
500 TABLET ORAL 2 TIMES DAILY
Status: DISCONTINUED | OUTPATIENT
Start: 2020-01-04 | End: 2020-01-08

## 2020-01-04 RX ORDER — LAMOTRIGINE 25 MG/1
25 TABLET ORAL DAILY
Status: ON HOLD | COMMUNITY
End: 2022-06-25 | Stop reason: HOSPADM

## 2020-01-04 RX ORDER — ROSUVASTATIN CALCIUM 20 MG/1
40 TABLET, COATED ORAL NIGHTLY
Status: CANCELLED | OUTPATIENT
Start: 2020-01-04

## 2020-01-04 RX ORDER — LEVETIRACETAM 10 MG/ML
1000 INJECTION INTRAVASCULAR ONCE
Status: COMPLETED | OUTPATIENT
Start: 2020-01-04 | End: 2020-01-04

## 2020-01-04 RX ORDER — ONDANSETRON 2 MG/ML
4 INJECTION INTRAMUSCULAR; INTRAVENOUS EVERY 6 HOURS PRN
Status: DISCONTINUED | OUTPATIENT
Start: 2020-01-04 | End: 2020-01-08

## 2020-01-04 RX ORDER — LAMOTRIGINE 25 MG/1
25 TABLET ORAL DAILY
Status: DISCONTINUED | OUTPATIENT
Start: 2020-01-05 | End: 2020-01-08

## 2020-01-04 RX ADMIN — SODIUM CHLORIDE 1000 ML: 9 INJECTION, SOLUTION INTRAVENOUS at 16:53

## 2020-01-04 RX ADMIN — IOHEXOL 90 ML: 350 INJECTION, SOLUTION INTRAVENOUS at 20:28

## 2020-01-04 RX ADMIN — ACETAMINOPHEN 650 MG: 325 TABLET ORAL at 16:51

## 2020-01-04 RX ADMIN — BUSPIRONE HYDROCHLORIDE 10 MG: 10 TABLET ORAL at 23:18

## 2020-01-04 RX ADMIN — ONDANSETRON 4 MG: 4 TABLET, ORALLY DISINTEGRATING ORAL at 16:52

## 2020-01-04 RX ADMIN — MORPHINE SULFATE 1 MG: 2 INJECTION, SOLUTION INTRAMUSCULAR; INTRAVENOUS at 23:11

## 2020-01-04 RX ADMIN — DIPHENHYDRAMINE HYDROCHLORIDE 25 MG: 50 INJECTION, SOLUTION INTRAMUSCULAR; INTRAVENOUS at 17:24

## 2020-01-04 RX ADMIN — SIMVASTATIN 80 MG: 40 TABLET, FILM COATED ORAL at 23:18

## 2020-01-04 RX ADMIN — SODIUM CHLORIDE: 9 INJECTION, SOLUTION INTRAVENOUS at 23:11

## 2020-01-04 RX ADMIN — LEVETIRACETAM 500 MG: 500 TABLET, FILM COATED ORAL at 23:18

## 2020-01-04 RX ADMIN — SODIUM CHLORIDE, PRESERVATIVE FREE 10 ML: 5 INJECTION INTRAVENOUS at 23:18

## 2020-01-04 RX ADMIN — LEVETIRACETAM 1000 MG: 10 INJECTION INTRAVENOUS at 20:55

## 2020-01-04 RX ADMIN — PROCHLORPERAZINE EDISYLATE 10 MG: 5 INJECTION INTRAMUSCULAR; INTRAVENOUS at 17:24

## 2020-01-04 ASSESSMENT — PAIN DESCRIPTION - DESCRIPTORS
DESCRIPTORS: THROBBING
DESCRIPTORS: ACHING;CONSTANT;DISCOMFORT;HEADACHE

## 2020-01-04 ASSESSMENT — PAIN DESCRIPTION - PROGRESSION: CLINICAL_PROGRESSION: NOT CHANGED

## 2020-01-04 ASSESSMENT — PAIN SCALES - GENERAL
PAINLEVEL_OUTOF10: 4
PAINLEVEL_OUTOF10: 9
PAINLEVEL_OUTOF10: 10
PAINLEVEL_OUTOF10: 10

## 2020-01-04 ASSESSMENT — ENCOUNTER SYMPTOMS
COUGH: 1
VOMITING: 1
SHORTNESS OF BREATH: 1
SORE THROAT: 0
ABDOMINAL PAIN: 0
DIARRHEA: 0
RHINORRHEA: 1
NAUSEA: 1

## 2020-01-04 ASSESSMENT — PAIN DESCRIPTION - FREQUENCY
FREQUENCY: CONTINUOUS

## 2020-01-04 ASSESSMENT — PAIN DESCRIPTION - LOCATION
LOCATION: HEAD;ABDOMEN
LOCATION: HEAD
LOCATION: HEAD

## 2020-01-04 ASSESSMENT — PAIN DESCRIPTION - ONSET: ONSET: ON-GOING

## 2020-01-04 ASSESSMENT — PAIN DESCRIPTION - ORIENTATION: ORIENTATION: LOWER;POSTERIOR

## 2020-01-04 NOTE — ED PROVIDER NOTES
[address]  ED  Emergency Department Encounter  EmergencyMedicine Resident     Pt Meena Saavedra  MRN: [id number]  [place] 1995  Date of evaluation: 1/4/20  PCP:  Ana Ch       Chief Complaint   Patient presents with    Headache    Generalized Body Aches    Emesis       HISTORY OF PRESENT ILLNESS  (Location/Symptom, Timing/Onset, Context/Setting, Quality, Duration, Modifying Factors, Severity.)      Pratibha Garrido is a 30 y.o. female who presents with headaches, body aches, subjective fevers, chills, nausea, vomiting for the past day. Patient's headache started this morning when she awoke was 10 out of 10 maximal at onset associated with dizziness, nausea and vomiting. She denies any focal weakness sensory changes or visual changes. She does remark that she feels globally weak. No known sick contacts, she did not receive her flu vaccine this year. She is not been able to keep down solids or liquids for the past day. Also complains of neck stiffness and pain. She denies any rashes. Denies history of meningitis. 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 PAST MEDICAL / SURGICAL / SOCIAL / FAMILY HISTORY      has a past medical history of Cerebral vascular malformation, Diabetes mellitus ([facility] [address] Seizure ([facility] [address] and Traumatic hemorrhage of left cerebrum without loss of consciousness ([facility] [address] has no past surgical history on file.     Social History     Socioeconomic History    Marital status: Single     Spouse name: Not on file    Number of children: Not on file    Years of education: Not on file    Highest education level: Not on file   Occupational History    Not on file   Social Needs    Financial resource strain: Not on file    Food insecurity:     Worry: Not on file     Inability: Not on file    Transportation needs:     Medical: Not on file Non-medical: Not on file   Tobacco Use    Smoking status: Current Some Day Smoker     Packs/day: 0.30     Types: Cigarettes    Smokeless tobacco: Never Used   Substance and Sexual Activity    Alcohol use: No    Drug use: Yes     Types: Marijuana    Sexual activity: Yes     Partners: Male   Lifestyle    Physical activity:     Days per week: Not on file     Minutes per session: Not on file    Stress: Not on file   Relationships    Social connections:     Talks on phone: Not on file     Gets together: Not on file     Attends Caodaism service: Not on file     Active member of club or organization: Not on file     Attends meetings of clubs or organizations: Not on file     Relationship status: Not on file    Intimate partner violence:     Fear of current or ex partner: Not on file     Emotionally abused: Not on file     Physically abused: Not on file     Forced sexual activity: Not on file   Other Topics Concern    Not on file   Social History Narrative    Not on file       History reviewed. No pertinent family history. Allergies:  Latex and Prozac [fluoxetine hcl]    Home Medications:  Prior to Admission medications    Medication Sig Start Date End Date Taking?  Authorizing Provider   busPIRone (BUSPAR) 10 MG tablet Take 10 mg by mouth 3 times daily   Yes Historical Provider, MD   lamoTRIgine (LAMICTAL) 25 MG tablet Take 25 mg by mouth daily   Yes Historical Provider, MD   hydrOXYzine (ATARAX) 25 MG tablet Take 1 tablet by mouth 2 times daily as needed for Anxiety (Can have sooner than every 8 hours as long as max 3 doses per day) 2/28/19   Gi Pastrana MD   traZODone (DESYREL) 50 MG tablet Take 1 tablet by mouth nightly as needed (Difficulty staying asleep) 2/28/19   Gi Pastrana MD   ARIPiprazole (ABILIFY) 10 MG tablet Take 1 tablet by mouth daily 2/28/19   Gi Pastrana MD   oxyCODONE-acetaminophen (PERCOCET) 5-325 MG per tablet Take 1 tablet by mouth every 8 hours as needed for Pain (max of 3 tablets per day). .    Historical Provider, MD   ibuprofen (ADVIL;MOTRIN) 800 MG tablet Take 800 mg by mouth every 6 hours as needed for Pain     Historical Provider, MD   docusate sodium (COLACE) 100 MG capsule Take 100 mg by mouth 2 times daily as needed for Constipation    Historical Provider, MD   etonogestrel (NEXPLANON) 68 MG implant 68 mg by Subdermal route once    Historical Provider, MD   ondansetron (ZOFRAN) 4 MG tablet Take 1 tablet by mouth every 6 hours as needed for Nausea or Vomiting 8/16/17   Boone Stevens MD       REVIEW OF SYSTEMS    (2-9 systems for level 4, 10 or more for level 5)      Review of Systems   Constitutional: Positive for appetite change, chills, fatigue and fever. HENT: Positive for congestion and rhinorrhea. Negative for sore throat. Respiratory: Positive for cough and shortness of breath. Cardiovascular: Positive for chest pain. Gastrointestinal: Positive for nausea and vomiting. Negative for abdominal pain and diarrhea. Genitourinary: Negative for dysuria, frequency, vaginal discharge and vaginal pain. Musculoskeletal: Positive for myalgias, neck pain and neck stiffness. Skin: Negative for rash. Neurological: Positive for dizziness and headaches. Negative for weakness. Psychiatric/Behavioral: Negative for self-injury and suicidal ideas. PHYSICAL EXAM   (up to 7 for level 4, 8 or more for level 5)      INITIAL VITALS:   /74   Pulse 94   Temp 98.6 °F (37 °C) (Oral)   Resp 18   Ht 5' 1\" (1.549 m)   Wt 168 lb (76.2 kg)   SpO2 98%   BMI 31.74 kg/m²     Physical Exam  Constitutional:       General: She is not in acute distress. Appearance: She is well-developed. She is not ill-appearing or toxic-appearing. HENT:      Head: Normocephalic and atraumatic. Mouth/Throat:      Mouth: Mucous membranes are moist.      Pharynx: Posterior oropharyngeal erythema present. No oropharyngeal exudate.    Eyes:      Pupils: Pupils are equal, round, and reactive to light. Neck:      Musculoskeletal: Normal range of motion and neck supple. Muscular tenderness present. No neck rigidity. Comments: Kernig positive. Cardiovascular:      Rate and Rhythm: Regular rhythm. Tachycardia present. Pulmonary:      Effort: Pulmonary effort is normal.      Breath sounds: Normal breath sounds. Abdominal:      General: Bowel sounds are normal. There is no distension. Palpations: Abdomen is soft. Tenderness: There is no tenderness. Musculoskeletal: Normal range of motion. Right lower leg: No edema. Left lower leg: No edema. Skin:     General: Skin is warm and dry. Capillary Refill: Capillary refill takes less than 2 seconds. Findings: No rash. Neurological:      General: No focal deficit present. Mental Status: She is alert and oriented to person, place, and time. Cranial Nerves: No cranial nerve deficit. Sensory: No sensory deficit. Motor: No weakness. DIFFERENTIAL  DIAGNOSIS     PLAN (LABS / IMAGING / EKG):  Orders Placed This Encounter   Procedures    Rapid influenza A/B antigens    Urine Culture    CT HEAD WO CONTRAST    XR CHEST STANDARD (2 VW)    CBC Auto Differential    BASIC METABOLIC PANEL    Urinalysis Reflex to Culture    Microscopic Urinalysis    Misc nursing order (specify)    Inpatient consult to Neurosurgery    EKG 12 Lead       MEDICATIONS ORDERED:  Orders Placed This Encounter   Medications    0.9 % sodium chloride bolus    acetaminophen (TYLENOL) tablet 650 mg    ondansetron (ZOFRAN-ODT) disintegrating tablet 4 mg    prochlorperazine (COMPAZINE) injection 10 mg    diphenhydrAMINE (BENADRYL) injection 25 mg       DDX: Influenza, viral illness, subarachnoid hemorrhage, meningitis, arrhythmia, pneumothorax, pneumonia, MI, ACS, cerebral aneurysm, AV malformation, UTI.     DIAGNOSTIC RESULTS / EMERGENCY DEPARTMENT COURSE / MDM     LABS:  Results for orders placed or performed during the hospital encounter of 01/04/20   Rapid influenza A/B antigens   Result Value Ref Range    Specimen Description . NASOPHARYNGEAL SWAB     Special Requests NOT REPORTED     Direct Exam       PRESUMPTIVE NEGATIVE for Influenza A + B antigens. PCR testing to confirm this result is available upon request.  Specimen will be saved in the laboratory for 7 days. Please call [phone] if PCR testing is indicated.    CBC Auto Differential   Result Value Ref Range    WBC 12.8 (H) 3.5 - 11.3 k/uL    RBC 5.54 (H) 3.95 - 5.11 m/uL    Hemoglobin 15.0 11.9 - 15.1 g/dL    Hematocrit 45.4 36.3 - 47.1 %    MCV 81.9 (L) 82.6 - 102.9 fL    MCH 27.1 25.2 - 33.5 pg    MCHC 33.0 28.4 - 34.8 g/dL    RDW 12.4 11.8 - 14.4 %    Platelets 060 746 - 110 k/uL    MPV 11.3 8.1 - 13.5 fL    NRBC Automated 0.0 0.0 per 100 WBC    Differential Type NOT REPORTED     Seg Neutrophils 57 36 - 65 %    Lymphocytes 31 24 - 43 %    Monocytes 6 3 - 12 %    Eosinophils % 5 (H) 1 - 4 %    Basophils 1 0 - 2 %    Immature Granulocytes 0 0 %    Segs Absolute 7.40 1.50 - 8.10 k/uL    Absolute Lymph # 3.94 (H) 1.10 - 3.70 k/uL    Absolute Mono # 0.73 0.10 - 1.20 k/uL    Absolute Eos # 0.58 (H) 0.00 - 0.44 k/uL    Basophils Absolute 0.09 0.00 - 0.20 k/uL    Absolute Immature Granulocyte 0.03 0.00 - 0.30 k/uL    WBC Morphology NOT REPORTED     RBC Morphology MICROCYTOSIS PRESENT     Platelet Estimate NOT REPORTED    BASIC METABOLIC PANEL   Result Value Ref Range    Glucose 164 (H) 70 - 99 mg/dL    BUN 22 (H) 6 - 20 mg/dL    CREATININE 0.73 0.50 - 0.90 mg/dL    Bun/Cre Ratio NOT REPORTED 9 - 20    Calcium 10.5 (H) 8.6 - 10.4 mg/dL    Sodium 137 135 - 144 mmol/L    Potassium 3.8 3.7 - 5.3 mmol/L    Chloride 96 (L) 98 - 107 mmol/L    CO2 24 20 - 31 mmol/L    Anion Gap 17 9 - 17 mmol/L    GFR Non-African American >60 >60 mL/min    GFR African American >60 >60 mL/min    GFR Comment          GFR Staging NOT REPORTED    Urinalysis Reflex to Culture   Result Value Ref Range    Color, UA YELLOW YELLOW    Turbidity UA CLOUDY (A) CLEAR    Glucose, Ur 2+ (A) NEGATIVE    Bilirubin Urine NEGATIVE NEGATIVE    Ketones, Urine NEGATIVE NEGATIVE    Specific Gravity, UA 1.017 1.005 - 1.030    Urine Hgb SMALL (A) NEGATIVE    pH, UA 5.5 5.0 - 8.0    Protein, UA TRACE (A) NEGATIVE    Urobilinogen, Urine Normal Normal    Nitrite, Urine NEGATIVE NEGATIVE    Leukocyte Esterase, Urine SMALL (A) NEGATIVE    Urinalysis Comments NOT REPORTED    Microscopic Urinalysis   Result Value Ref Range    -          WBC, UA 10 TO 20 0 - 5 /HPF    RBC, UA 0 TO 2 0 - 4 /HPF    Casts UA  0 - 8 /LPF     2 TO 5 HYALINE Reference range defined for non-centrifuged specimen. Crystals UA NOT REPORTED None /HPF    Epithelial Cells UA 10 TO 20 0 - 5 /HPF    Renal Epithelial, Urine NOT REPORTED 0 /HPF    Bacteria, UA FEW (A) None    Mucus, UA NOT REPORTED None    Trichomonas, UA NOT REPORTED None    Amorphous, UA NOT REPORTED None    Other Observations UA NOT REPORTED NOT REQ. Yeast, UA NOT REPORTED None         RADIOLOGY:    Xr Chest Standard (2 Vw)    Result Date: 1/4/2020  EXAMINATION: TWO XRAY VIEWS OF THE CHEST 1/4/2020 5:12 pm COMPARISON: None. HISTORY: ORDERING SYSTEM PROVIDED HISTORY: CP; cough, PNA? TECHNOLOGIST PROVIDED HISTORY: CP; cough, PNA? Reason for Exam: rapid heart rate,nausea,cough,body aches,high bp, FINDINGS: The lungs are without acute focal process. There is no effusion or pneumothorax. The cardiomediastinal silhouette is without acute process. The osseous structures are without acute process. No acute process.      Ct Head Wo Contrast    Result Date: 1/4/2020  EXAMINATION: CT OF THE HEAD WITHOUT CONTRAST  1/4/2020 6:48 pm TECHNIQUE: CT of the head was performed without the administration of intravenous contrast. Dose modulation, iterative reconstruction, and/or weight based adjustment of the mA/kV was utilized to reduce the radiation dose to as low as

## 2020-01-05 ENCOUNTER — APPOINTMENT (OUTPATIENT)
Dept: MRI IMAGING | Age: 25
DRG: 021 | End: 2020-01-05
Payer: MEDICARE

## 2020-01-05 ENCOUNTER — APPOINTMENT (OUTPATIENT)
Dept: CT IMAGING | Age: 25
DRG: 021 | End: 2020-01-05
Payer: MEDICARE

## 2020-01-05 LAB
ALBUMIN SERPL-MCNC: 3.8 G/DL (ref 3.5–5.2)
ALBUMIN/GLOBULIN RATIO: 1.5 (ref 1–2.5)
ALP BLD-CCNC: 139 U/L (ref 35–104)
ALT SERPL-CCNC: 71 U/L (ref 5–33)
ANION GAP SERPL CALCULATED.3IONS-SCNC: 15 MMOL/L (ref 9–17)
AST SERPL-CCNC: 29 U/L
BILIRUB SERPL-MCNC: 0.35 MG/DL (ref 0.3–1.2)
BILIRUBIN DIRECT: 0.12 MG/DL
BILIRUBIN, INDIRECT: 0.23 MG/DL (ref 0–1)
BUN BLDV-MCNC: 17 MG/DL (ref 6–20)
BUN/CREAT BLD: ABNORMAL (ref 9–20)
CALCIUM SERPL-MCNC: 8.6 MG/DL (ref 8.6–10.4)
CHLORIDE BLD-SCNC: 100 MMOL/L (ref 98–107)
CHOLESTEROL/HDL RATIO: 10.2
CHOLESTEROL: 245 MG/DL
CO2: 22 MMOL/L (ref 20–31)
CREAT SERPL-MCNC: 0.77 MG/DL (ref 0.5–0.9)
CULTURE: NORMAL
ESTIMATED AVERAGE GLUCOSE: 203 MG/DL
GFR AFRICAN AMERICAN: >60 ML/MIN
GFR NON-AFRICAN AMERICAN: >60 ML/MIN
GFR SERPL CREATININE-BSD FRML MDRD: ABNORMAL ML/MIN/{1.73_M2}
GFR SERPL CREATININE-BSD FRML MDRD: ABNORMAL ML/MIN/{1.73_M2}
GLOBULIN: ABNORMAL G/DL (ref 1.5–3.8)
GLUCOSE BLD-MCNC: 148 MG/DL (ref 70–99)
HBA1C MFR BLD: 8.7 % (ref 4–6)
HCT VFR BLD CALC: 41.4 % (ref 36.3–47.1)
HDLC SERPL-MCNC: 24 MG/DL
HEMOGLOBIN: 13.6 G/DL (ref 11.9–15.1)
LDL CHOLESTEROL DIRECT: 104 MG/DL
LDL CHOLESTEROL: ABNORMAL MG/DL (ref 0–130)
Lab: NORMAL
MAGNESIUM: 1 MG/DL (ref 1.6–2.6)
MCH RBC QN AUTO: 27.3 PG (ref 25.2–33.5)
MCHC RBC AUTO-ENTMCNC: 32.9 G/DL (ref 28.4–34.8)
MCV RBC AUTO: 83 FL (ref 82.6–102.9)
MRSA, DNA, NASAL: NORMAL
NRBC AUTOMATED: 0 PER 100 WBC
PDW BLD-RTO: 12.7 % (ref 11.8–14.4)
PHOSPHORUS: 3.9 MG/DL (ref 2.6–4.5)
PLATELET # BLD: 201 K/UL (ref 138–453)
PMV BLD AUTO: 11.3 FL (ref 8.1–13.5)
POTASSIUM SERPL-SCNC: 3.8 MMOL/L (ref 3.7–5.3)
RBC # BLD: 4.99 M/UL (ref 3.95–5.11)
SODIUM BLD-SCNC: 137 MMOL/L (ref 135–144)
SPECIMEN DESCRIPTION: NORMAL
SPECIMEN DESCRIPTION: NORMAL
TOTAL PROTEIN: 6.4 G/DL (ref 6.4–8.3)
TRIGL SERPL-MCNC: 1015 MG/DL
TSH SERPL DL<=0.05 MIU/L-ACNC: 3.19 MIU/L (ref 0.3–5)
VLDLC SERPL CALC-MCNC: ABNORMAL MG/DL (ref 1–30)
WBC # BLD: 13.2 K/UL (ref 3.5–11.3)

## 2020-01-05 PROCEDURE — 99291 CRITICAL CARE FIRST HOUR: CPT | Performed by: PSYCHIATRY & NEUROLOGY

## 2020-01-05 PROCEDURE — APPNB180 APP NON BILLABLE TIME > 60 MINS: Performed by: NURSE PRACTITIONER

## 2020-01-05 PROCEDURE — 6360000004 HC RX CONTRAST MEDICATION: Performed by: STUDENT IN AN ORGANIZED HEALTH CARE EDUCATION/TRAINING PROGRAM

## 2020-01-05 PROCEDURE — 99233 SBSQ HOSP IP/OBS HIGH 50: CPT | Performed by: PSYCHIATRY & NEUROLOGY

## 2020-01-05 PROCEDURE — 80048 BASIC METABOLIC PNL TOTAL CA: CPT

## 2020-01-05 PROCEDURE — 2500000003 HC RX 250 WO HCPCS: Performed by: STUDENT IN AN ORGANIZED HEALTH CARE EDUCATION/TRAINING PROGRAM

## 2020-01-05 PROCEDURE — 83036 HEMOGLOBIN GLYCOSYLATED A1C: CPT

## 2020-01-05 PROCEDURE — 6370000000 HC RX 637 (ALT 250 FOR IP): Performed by: NURSE PRACTITIONER

## 2020-01-05 PROCEDURE — 6360000002 HC RX W HCPCS: Performed by: NURSE PRACTITIONER

## 2020-01-05 PROCEDURE — A9576 INJ PROHANCE MULTIPACK: HCPCS | Performed by: STUDENT IN AN ORGANIZED HEALTH CARE EDUCATION/TRAINING PROGRAM

## 2020-01-05 PROCEDURE — 2000000003 HC NEURO ICU R&B

## 2020-01-05 PROCEDURE — 83735 ASSAY OF MAGNESIUM: CPT

## 2020-01-05 PROCEDURE — 80076 HEPATIC FUNCTION PANEL: CPT

## 2020-01-05 PROCEDURE — 6370000000 HC RX 637 (ALT 250 FOR IP): Performed by: STUDENT IN AN ORGANIZED HEALTH CARE EDUCATION/TRAINING PROGRAM

## 2020-01-05 PROCEDURE — 70553 MRI BRAIN STEM W/O & W/DYE: CPT

## 2020-01-05 PROCEDURE — 36415 COLL VENOUS BLD VENIPUNCTURE: CPT

## 2020-01-05 PROCEDURE — 2580000003 HC RX 258: Performed by: STUDENT IN AN ORGANIZED HEALTH CARE EDUCATION/TRAINING PROGRAM

## 2020-01-05 PROCEDURE — 80061 LIPID PANEL: CPT

## 2020-01-05 PROCEDURE — 83721 ASSAY OF BLOOD LIPOPROTEIN: CPT

## 2020-01-05 PROCEDURE — 70450 CT HEAD/BRAIN W/O DYE: CPT

## 2020-01-05 PROCEDURE — 99222 1ST HOSP IP/OBS MODERATE 55: CPT | Performed by: NEUROLOGICAL SURGERY

## 2020-01-05 PROCEDURE — 84100 ASSAY OF PHOSPHORUS: CPT

## 2020-01-05 PROCEDURE — 6360000002 HC RX W HCPCS: Performed by: STUDENT IN AN ORGANIZED HEALTH CARE EDUCATION/TRAINING PROGRAM

## 2020-01-05 PROCEDURE — 85027 COMPLETE CBC AUTOMATED: CPT

## 2020-01-05 PROCEDURE — 70546 MR ANGIOGRAPH HEAD W/O&W/DYE: CPT

## 2020-01-05 PROCEDURE — 84443 ASSAY THYROID STIM HORMONE: CPT

## 2020-01-05 RX ORDER — SODIUM CHLORIDE 0.9 % (FLUSH) 0.9 %
10 SYRINGE (ML) INJECTION PRN
Status: DISCONTINUED | OUTPATIENT
Start: 2020-01-05 | End: 2020-01-08

## 2020-01-05 RX ORDER — MAGNESIUM SULFATE 1 G/100ML
1 INJECTION INTRAVENOUS
Status: DISPENSED | OUTPATIENT
Start: 2020-01-05 | End: 2020-01-05

## 2020-01-05 RX ORDER — ARIPIPRAZOLE 10 MG/1
10 TABLET, ORALLY DISINTEGRATING ORAL DAILY
Status: DISCONTINUED | OUTPATIENT
Start: 2020-01-05 | End: 2020-01-06

## 2020-01-05 RX ORDER — OXYCODONE HYDROCHLORIDE AND ACETAMINOPHEN 5; 325 MG/1; MG/1
1 TABLET ORAL EVERY 4 HOURS PRN
Status: DISCONTINUED | OUTPATIENT
Start: 2020-01-05 | End: 2020-01-08

## 2020-01-05 RX ORDER — FENOFIBRATE 160 MG/1
160 TABLET ORAL DAILY
Status: DISCONTINUED | OUTPATIENT
Start: 2020-01-05 | End: 2020-01-08

## 2020-01-05 RX ORDER — SIMVASTATIN 20 MG
20 TABLET ORAL NIGHTLY
Status: DISCONTINUED | OUTPATIENT
Start: 2020-01-05 | End: 2020-01-08

## 2020-01-05 RX ADMIN — OXYCODONE HYDROCHLORIDE AND ACETAMINOPHEN 1 TABLET: 5; 325 TABLET ORAL at 19:59

## 2020-01-05 RX ADMIN — LEVETIRACETAM 500 MG: 500 TABLET, FILM COATED ORAL at 09:30

## 2020-01-05 RX ADMIN — FENOFIBRATE 160 MG: 160 TABLET ORAL at 10:00

## 2020-01-05 RX ADMIN — ONDANSETRON 4 MG: 2 INJECTION INTRAMUSCULAR; INTRAVENOUS at 17:57

## 2020-01-05 RX ADMIN — MAGNESIUM SULFATE HEPTAHYDRATE 1 G: 1 INJECTION, SOLUTION INTRAVENOUS at 15:01

## 2020-01-05 RX ADMIN — BUSPIRONE HYDROCHLORIDE 10 MG: 10 TABLET ORAL at 15:00

## 2020-01-05 RX ADMIN — MAGNESIUM SULFATE HEPTAHYDRATE 1 G: 1 INJECTION, SOLUTION INTRAVENOUS at 14:00

## 2020-01-05 RX ADMIN — Medication 10 MG: at 22:12

## 2020-01-05 RX ADMIN — LEVETIRACETAM 500 MG: 500 TABLET, FILM COATED ORAL at 20:03

## 2020-01-05 RX ADMIN — SODIUM CHLORIDE, PRESERVATIVE FREE 10 ML: 5 INJECTION INTRAVENOUS at 20:04

## 2020-01-05 RX ADMIN — Medication 10 MG: at 02:37

## 2020-01-05 RX ADMIN — MORPHINE SULFATE 1 MG: 2 INJECTION, SOLUTION INTRAMUSCULAR; INTRAVENOUS at 03:07

## 2020-01-05 RX ADMIN — SIMVASTATIN 20 MG: 20 TABLET, FILM COATED ORAL at 20:03

## 2020-01-05 RX ADMIN — DOCUSATE SODIUM 100 MG: 100 CAPSULE, LIQUID FILLED ORAL at 13:00

## 2020-01-05 RX ADMIN — MAGNESIUM SULFATE HEPTAHYDRATE 1 G: 1 INJECTION, SOLUTION INTRAVENOUS at 10:45

## 2020-01-05 RX ADMIN — MAGNESIUM SULFATE HEPTAHYDRATE 1 G: 1 INJECTION, SOLUTION INTRAVENOUS at 13:00

## 2020-01-05 RX ADMIN — BUSPIRONE HYDROCHLORIDE 10 MG: 10 TABLET ORAL at 20:03

## 2020-01-05 RX ADMIN — ACETAMINOPHEN 650 MG: 325 TABLET ORAL at 15:48

## 2020-01-05 RX ADMIN — MORPHINE SULFATE 1 MG: 2 INJECTION, SOLUTION INTRAMUSCULAR; INTRAVENOUS at 17:56

## 2020-01-05 RX ADMIN — LAMOTRIGINE 25 MG: 25 TABLET ORAL at 09:30

## 2020-01-05 RX ADMIN — BUSPIRONE HYDROCHLORIDE 10 MG: 10 TABLET ORAL at 10:00

## 2020-01-05 RX ADMIN — GADOTERIDOL 16 ML: 279.3 INJECTION, SOLUTION INTRAVENOUS at 11:30

## 2020-01-05 RX ADMIN — MORPHINE SULFATE 1 MG: 2 INJECTION, SOLUTION INTRAMUSCULAR; INTRAVENOUS at 07:45

## 2020-01-05 RX ADMIN — MAGNESIUM SULFATE HEPTAHYDRATE 1 G: 1 INJECTION, SOLUTION INTRAVENOUS at 12:00

## 2020-01-05 ASSESSMENT — PAIN DESCRIPTION - PAIN TYPE
TYPE: ACUTE PAIN

## 2020-01-05 ASSESSMENT — PAIN DESCRIPTION - ONSET
ONSET: ON-GOING

## 2020-01-05 ASSESSMENT — PAIN DESCRIPTION - FREQUENCY
FREQUENCY: CONTINUOUS

## 2020-01-05 ASSESSMENT — PAIN DESCRIPTION - LOCATION
LOCATION: HEAD

## 2020-01-05 ASSESSMENT — PAIN DESCRIPTION - PROGRESSION
CLINICAL_PROGRESSION: RAPIDLY IMPROVING
CLINICAL_PROGRESSION: NOT CHANGED
CLINICAL_PROGRESSION: RAPIDLY IMPROVING
CLINICAL_PROGRESSION: RAPIDLY IMPROVING

## 2020-01-05 ASSESSMENT — PAIN SCALES - GENERAL
PAINLEVEL_OUTOF10: 3
PAINLEVEL_OUTOF10: 10
PAINLEVEL_OUTOF10: 4
PAINLEVEL_OUTOF10: 4
PAINLEVEL_OUTOF10: 8
PAINLEVEL_OUTOF10: 9

## 2020-01-05 ASSESSMENT — PAIN DESCRIPTION - DESCRIPTORS
DESCRIPTORS: ACHING

## 2020-01-05 ASSESSMENT — PAIN DESCRIPTION - ORIENTATION
ORIENTATION: POSTERIOR

## 2020-01-05 NOTE — PROGRESS NOTES
Daily Progress Note  Neuro Critical Care    Patient Name: Ozzie Diaz  Patient : 1995  Room/Bed: 0521/0521-01  Code Status: Full  Allergies: Allergies   Allergen Reactions    Latex     Prozac [Fluoxetine Hcl] Other (See Comments)     Seizures       CHIEF COMPLAINT:     Headache     INTERVAL HISTORY    Initial Presentation (Admitted 19): The patient is a 29 yo female with history of seizures as a child, DM, bipolar, and previous left occipital IPH/SAH who presents with thunderclap headache and nausea/vomiting. She reports that the headache started approximately two days ago and has been waxing and waning since. Has a h/o IPH of unknown etiology first identified 2019 on CT head and was admitted for CTA head/neck,  Since then has had x2 CTA H/N, MRI w wo contrast, and diagnostic angiogram negative for any vascular malformation. She has continued to follow with neurosurgery and neurology outpatient for her continued headaches. She also reports history of seizures up until age 1 and then one provoked seizure related to Prozac use. She is not on any AEDs currently at home. Last 24h:   No acute events overnight. SBP parameters , received Labetalol x 1 overnight. CT head this morning is stable. Awaiting MRI brain w wo contrast and MRV. Neurosurgery following patient. Hypertriglyceridemia, 1,015 noted on labs, started on fenofibrate 135 mg daily.      CURRENT MEDICATIONS:  SCHEDULED MEDICATIONS:   ARIPiprazole  10 mg Oral Daily    busPIRone  10 mg Oral TID    lamoTRIgine  25 mg Oral Daily    sodium chloride flush  10 mL Intravenous 2 times per day    levETIRAcetam  500 mg Oral BID    simvastatin  80 mg Oral Nightly    sennosides-docusate sodium  1 tablet Oral BID    docusate sodium  100 mg Oral TID     CONTINUOUS INFUSIONS:   sodium chloride 50 mL/hr at 20 2311     PRN MEDICATIONS:   sodium chloride flush, acetaminophen, ondansetron, labetalol, magnesium sulfate, WBC 13.2 (H) 01/05/2020    HGB 13.6 01/05/2020    HCT 41.4 01/05/2020     01/05/2020    CHOL 245 (H) 01/05/2020    TRIG 1,015 (H) 01/05/2020    HDL 24 (L) 01/05/2020    LDLDIRECT 104 (H) 01/05/2020    ALT 75 (H) 02/27/2019    AST 31 02/27/2019     01/05/2020    K 3.8 01/05/2020     01/05/2020    CREATININE 0.77 01/05/2020    BUN 17 01/05/2020    CO2 22 01/05/2020     24 HOUR INTAKE/OUTPUT:    Intake/Output Summary (Last 24 hours) at 1/5/2020 0714  Last data filed at 1/5/2020 0618  Gross per 24 hour   Intake 397 ml   Output 700 ml   Net -303 ml       IMAGING:   CT head 1/4:  Small amount of acute left occipital subarachnoid hemorrhage. CTA  Head/neck 1/4:  Unremarkable CTA of the head and neck. CT head 1/5:  Stable volume in extent of focal left occipital acute subarachnoid plus or   minus acute intraparenchymal hemorrhage.       Otherwise, unremarkable noncontrast CT head examination. MRI brain w wo contrast 1/5:  Left occipital 7 mm cavernoma with surrounding hemosiderin staining   consistent with sequela of remote hemorrhage.  This hemosiderin deposition   likely accounts for hyperdensity on recent head CT as there is no adjacent   FLAIR signal abnormality to suggest acute subarachnoid hemorrhage, however   small amount of acute subarachnoid hemorrhage canal at be completely excluded. MRV head w wo contrast 1/5:  1. No dural venous sinus thrombosis. 2. Left occipital intraparenchymal susceptibility artifact consistent with   residua of prior hemorrhage within adjacent developmental venous anomaly   suggesting an underlying cavernoma better evaluated on separate brain MRI.             Labs and Images reviewed with:  [x] Dr. Reema Elias. Minnie    [] Dr. Efrain Hidalgo  [] Dr. Lydia Magallon  [] There are no new interval images to review. PHYSICAL EXAM       CONSTITUTIONAL:  Well developed, well nourished, alert and oriented x 3, in no acute distress. GCS 15. Nontoxic. No dysarthria. No aphasia. HEAD:  normocephalic, atraumatic    EYES:  PERRLA, EOMI.   ENT:  moist mucous membranes   NECK:  supple, symmetric   LUNGS:  Equal air entry bilaterally   CARDIOVASCULAR:  normal s1 / s2, RRR, distal pulses intact   ABDOMEN:  Soft, no rigidity   NEUROLOGIC:  Mental Status:  A & O x3,awake             Cranial Nerves:    cranial nerves II-XII are grossly intact    Motor Exam:    Drift:  absent  Tone:  normal    Motor exam is symmetrical 5 out of 5 all extremities bilaterally    Sensory:    Touch:    Right Upper Extremity:  normal  Left Upper Extremity:  normal  Right Lower Extremity:  normal  Left Lower Extremity:  normal    Gait:  normal       DRAINS:  [x] There are no drains for Neuro Critical Care to monitor at this time. ASSESSMENT AND PLAN:       This is a 30 y.o. female with maximal onset headache this morning preceded by nausea and emesis with a h/o of left occipital IPH in same location at [address] 08/2019 - with negative CTA, MRI, and diagnostic angiogram     PLAN/MEDICAL DECISION MAKING:     NEUROLOGIC:  - Small acute L occipital SAH  - CTA H/N -unremarkable   - MRI brain w wo contrast consistent with cavernoma  - Neurosurgery following, appreciate recs  - Strict SBP goal  per neuro endovascular recommendations  - Load Keppra 1000 mg IV, continue with maintenance Keppra 500 BID  - Neuro checks per protocol     CARDIOVASCULAR:  - Goal SBP   - Lipid panel: Cholesterol 245, , triglycerides 1,015  - Start Fenofibrate 135 mg QD, Zocor 20 mg QHS  - Echo 8/2019: EF 55-60%  - Continue telemetry     PULMONARY:  - Satting well ORA     RENAL/FLUID/ELECTROLYTE:  - BUN 17/ Creatinine 0.77  - Normal Saline @ 50 mL/hr  - Monitor I&Os  - Replace electrolytes PRN  - Daily BMP  - Hypomagnesemia, 1.0; replaced with 4 grams magnesium sulfate     GI/NUTRITION:  NUTRITION:  General Diet  - Bowel regimen:  MoM scheduled  - GI prophylaxis: Not indicated     ID:  - Tmax 37.0  - Mild leukocytosis, WBC 13.2  - No concerns for infection at this time  - UA negative  - Continue to monitor for fevers  - Daily CBC     HEME:   - H&H 13.6/41.4  - Platelets 294  - Daily CBC     ENDOCRINE:  - Continue to monitor blood glucose, goal <180     OTHER:  - PT/OT/ST   - Code Status: FULL  - History of Bipolar: continue buspar 10 mg TID, abilify 10 mg QD, and Lamictal 25 mg QD  - Outpatient, consider seizure risk with Buspar use and adjust as able     PROPHYLAXIS:  Stress ulcer: H2 blocker     DVT PROPHYLAXIS:  - SCD sleeves - Thigh High   - No chemoprophylaxis anticoagulation at this time. DISPOSITION:  [x] To remain ICU: close neurological monitoring  [] OK for out of ICU from Neuro Critical Care standpoint    We will continue to follow along. For any changes in exam or patient status please contact Neuro Critical Care.       WILLIAM Del Rosario - CNP  Neuro Critical Care  Pager [phone]  1/5/2020     7:14 AM

## 2020-01-05 NOTE — PROGRESS NOTES
[address] [employer] [employer]  Speech Language Pathology    Date: 1/5/2020  Patient Name: Naya Grijalva  YOB: 1995   AGE: 30 y.o. MRN: [id number]        Patient Not Available for Speech Therapy     Due to:  [] Testing  [] Hemodialysis  [] Cancelled by RN  [] Surgery   [] Intubation/Sedation/Pain Medication  [] Medical instability  [x] Other: Attempted ST evaluation, however pt off the floor for an MRI. ST to continue to follow. Next scheduled treatment: 1/6    Completed by:  Juan Antonio Rosas M.S. [address] [facility]

## 2020-01-05 NOTE — PROGRESS NOTES
ENDOVASCULAR NEUROSURGERY PROGRESS NOTE  1/5/2020 4:11 PM  Subjective:   Admit Date: 1/4/2020  PCP: Candace Valle    No events, MRI and MRV done    Objective:   Vitals: BP (!) 99/58   Pulse 90   Temp 98.6 °F (37 °C) (Oral)   Resp 15   Ht 5' 1\" (1.549 m)   Wt 176 lb 5.9 oz (80 kg)   SpO2 97%   BMI 33.32 kg/m²   G  General appearance: Lying in bed, NAD,  Lungs: CTAB  Heart: RRR  Abdomen: soft, NTND, bowel sounds normal    Neuro exam: Follows simple commands. CN II-XII: no gross facial assymmetry, pupils 2 mm reactive to light bilaterally, EOMI, VFF. Kraig spontaneously against gravity. Medications and labs:   Scheduled Meds:   ARIPiprazole  10 mg Oral Daily    fenofibrate  160 mg Oral Daily    simvastatin  20 mg Oral Nightly    busPIRone  10 mg Oral TID    lamoTRIgine  25 mg Oral Daily    sodium chloride flush  10 mL Intravenous 2 times per day    levETIRAcetam  500 mg Oral BID    sennosides-docusate sodium  1 tablet Oral BID    docusate sodium  100 mg Oral TID     Continuous Infusions:   sodium chloride 50 mL/hr at 01/04/20 2311     CBC:   Recent Labs     01/04/20  1700 01/05/20  0451   WBC 12.8* 13.2*   HGB 15.0 13.6    201     BMP:    Recent Labs     01/04/20  1700 01/05/20  0451    137   K 3.8 3.8   CL 96* 100   CO2 24 22   BUN 22* 17   CREATININE 0.73 0.77   GLUCOSE 164* 148*     Hepatic:   Recent Labs     01/05/20  0451   AST 29   ALT 71*   BILITOT 0.35   ALKPHOS 139*     Troponin: No results for input(s): TROPONINI in the last 72 hours. BNP: No results for input(s): BNP in the last 72 hours. Lipids:   Recent Labs     01/05/20  0451   CHOL 245*   HDL 24*     INR: No results for input(s): INR in the last 72 hours.     Assessment and Recommendations:   30years old female with hx of prior left occipital lobe hemorrhage, was seen in [facility] and had angiogram 8/2019 that was negative for aneurysm who presented with left occipital lobe mild SAH, MRI and MRV confirmed cavernoma. 1. Neurosurgery onboard for further management  2.  No further recommendation from neuroendovascular team.     Mick Kim MD  Stroke, [facility]  [address] [place]  Electronically signed 1/5/2020 at 4:11 PM

## 2020-01-05 NOTE — ED PROVIDER NOTES
[facility] ED  Emergency Department  Emergency Medicine Resident Sign-out     Care of Ryan Mccracken was assumed from Dr. Ruthie Holstein and is being seen for Headache; Generalized Body Aches; and Emesis  . The patient's initial evaluation and plan have been discussed with the prior provider who initially evaluated the patient. EMERGENCY DEPARTMENT COURSE / MEDICAL DECISION MAKING:       MEDICATIONS GIVEN:  Orders Placed This Encounter   Medications    0.9 % sodium chloride bolus    acetaminophen (TYLENOL) tablet 650 mg    ondansetron (ZOFRAN-ODT) disintegrating tablet 4 mg    prochlorperazine (COMPAZINE) injection 10 mg    diphenhydrAMINE (BENADRYL) injection 25 mg       LABS / RADIOLOGY:     Labs Reviewed   CBC WITH AUTO DIFFERENTIAL - Abnormal; Notable for the following components:       Result Value    WBC 12.8 (*)     RBC 5.54 (*)     MCV 81.9 (*)     Eosinophils % 5 (*)     Absolute Lymph # 3.94 (*)     Absolute Eos # 0.58 (*)     All other components within normal limits   BASIC METABOLIC PANEL - Abnormal; Notable for the following components:    Glucose 164 (*)     BUN 22 (*)     Calcium 10.5 (*)     Chloride 96 (*)     All other components within normal limits   URINE RT REFLEX TO CULTURE - Abnormal; Notable for the following components:    Turbidity UA CLOUDY (*)     Glucose, Ur 2+ (*)     Urine Hgb SMALL (*)     Protein, UA TRACE (*)     Leukocyte Esterase, Urine SMALL (*)     All other components within normal limits   MICROSCOPIC URINALYSIS - Abnormal; Notable for the following components:    Bacteria, UA FEW (*)     All other components within normal limits   RAPID INFLUENZA A/B ANTIGENS   URINE CULTURE       All forms of imaging other than ED bedside ultrasounds are viewed and interpreted by a radiologist and their interpretations are displayed below. Xr Chest Standard (2 Vw)    Result Date: 1/4/2020  EXAMINATION: TWO XRAY VIEWS OF THE CHEST 1/4/2020 5:12 pm COMPARISON: None. consulted, recommending CTA head and neck. Awaiting neurosurgery Recs for admission           OUTSTANDING TASKS / RECOMMENDATIONS:    1. Follow-up CTA head and neck  2. Neurosurgery recommendations for admission     FINAL IMPRESSION:     1. SAH (subarachnoid hemorrhage) ([facility] [address]    2. Urinary tract infection without hematuria, site unspecified        DISPOSITION:         DISPOSITION:  []  Discharge   []  Transfer -    [x]  Admission -     []  Against Medical Advice   []  Eloped   FOLLOW-UP: No follow-up provider specified.    DISCHARGE MEDICATIONS: New Prescriptions    No medications on file          Chandni Blevins DO  Emergency Medicine Resident  [address] [place]  Resident  01/04/20 2007

## 2020-01-05 NOTE — H&P
Neuro ICU History & Physical    Patient Name: Roly Aguiar  Patient : 1995  Room/Bed: [address] [place] [address]  Allergies: Allergies   Allergen Reactions    Latex     Prozac [Fluoxetine Hcl] Other (See Comments)     Seizures     Problem List:   Patient Active Problem List   Diagnosis    Bipolar 1 disorder ([facility]    [facility]    SAH (subarachnoid hemorrhage) ([facility]       CHIEF COMPLAINT     SAH    HPI    History Obtained From: EMR, patient    The patient is a 30 y.o. female presented with severe HA that was maximal at onset this morning when she woke up. It did not wake her up. Woke up and had nausea followed by x2 episodes of emesis with sudden 10/10 thunderclap headache. No vision changes, focal weakness or seizures. Not on AC. Has a h/o AVM wit [facility] first identified 2019 on MRI according to [address] [place] [address] records, attributed at that time to HTN. Since then has had x2 CTA H/N negative for AVM. Patient denies a history of HTN. On admission today SBP 110s. Follows with a neurologist however missed her last appointment. Endorses migraines and sees Tomas Mckinney PA-C neurology @ [address] [place] [address] last seen 19. Diagnostic cerebral angiogram performed 2019 showed no evidence of AVM, dural aVF, or tumor blush performed by Dr. Vickye Collet at [address] [place] [address]. X2 CTA head and neck both unremarkable for AVMs. Hunt&Ventura: 1  Modified Martin: Grade I  ICH score: 0 (0.60 cc volume)          Admitted to ICU From: ED   Reason for ICU Admission: AVM       PATIENT HISTORY   Past Medical History:        Diagnosis Date    Cerebral vascular malformation     [email or web address]    Diabetes mellitus ([facility]     Seizure [facility]     Traumatic hemorrhage of left cerebrum without loss of consciousness ([facility]     @ [facility]       Past Surgical History:    History reviewed. No pertinent surgical history.     Social History:   Social History     Socioeconomic History    Marital status: Single     Spouse name: Not on file  Number of children: Not on file    Years of education: Not on file    Highest education level: Not on file   Occupational History    Not on file   Social Needs    Financial resource strain: Not on file    Food insecurity:     Worry: Not on file     Inability: Not on file    Transportation needs:     Medical: Not on file     Non-medical: Not on file   Tobacco Use    Smoking status: Current Some Day Smoker     Packs/day: 0.30     Types: Cigarettes    Smokeless tobacco: Never Used   Substance and Sexual Activity    Alcohol use: No    Drug use: Yes     Types: Marijuana    Sexual activity: Yes     Partners: Male   Lifestyle    Physical activity:     Days per week: Not on file     Minutes per session: Not on file    Stress: Not on file   Relationships    Social connections:     Talks on phone: Not on file     Gets together: Not on file     Attends [employer] service: Not on file     Active member of club or organization: Not on file     Attends meetings of clubs or organizations: Not on file     Relationship status: Not on file    Intimate partner violence:     Fear of current or ex partner: Not on file     Emotionally abused: Not on file     Physically abused: Not on file     Forced sexual activity: Not on file   Other Topics Concern    Not on file   Social History Narrative    Not on file       Family History:   History reviewed. No pertinent family history.     Allergies:    Latex and Prozac [fluoxetine hcl]    Medications Prior to Admission:    Medications Prior to Admission: busPIRone (BUSPAR) 10 MG tablet, Take 10 mg by mouth 3 times daily  lamoTRIgine (LAMICTAL) 25 MG tablet, Take 25 mg by mouth daily  hydrOXYzine (ATARAX) 25 MG tablet, Take 1 tablet by mouth 2 times daily as needed for Anxiety (Can have sooner than every 8 hours as long as max 3 doses per day)  traZODone (DESYREL) 50 MG tablet, Take 1 tablet by mouth nightly as needed (Difficulty staying asleep)  ARIPiprazole (ABILIFY) 10 MG Extremity:  normal  Left Upper Extremity:  normal  Right Lower Extremity:  normal  Left Lower Extremity:  normal    Deep Tendon Reflexes:    Right Bicep:  2+  Left Bicep:  2+  Right Knee:  2+  Left Knee:  2+    Plantar Response:  Right:  downgoing  Left:  downgoing    Clonus:  N/A  Garcia's:  N/A    Coordination/Dysmetria:    Finger to Nose:   Right:  normal  Left:  normal   Dysdiadochokinesia:  N/A     SKIN No obvious ecchymosis, rashes, or lesions      NIH Stroke Scale Total (if not done complete detailed one below):    1a.  Level of consciousness:  0 - alert; keenly responsive  1b. Level of consciousness questions:  0 - answers both questions correctly  1c. Level of consciousness questions:  0 - performs both tasks correctly  2. Best Gaze:  0 - normal  3. Visual:  0 - no visual loss  4. Facial Palsy:  0 - normal symmetric movement  5a. Motor left arm:  0 - no drift, limb holds 90 (or 45) degrees for full 10 seconds  5b. Motor right arm:  0 - no drift, limb holds 90 (or 45) degrees for full 10 seconds  6a. Motor left le - no drift; leg holds 30 degree position for full 5 seconds  6b. Motor right le - no drift; leg holds 30 degree position for full 5 seconds  7. Limb Ataxia:  0 - absent  8. Sensory:  0 - normal; no sensory loss  9. Best Language:  0 - no aphasia, normal  10. Dysarthria:  0 - normal  11.   Extinction and Inattention:  0 - no abnormality  NIHSS 0    LABS AND IMAGING:     RECENT LABS:  CBC with Differential:    Lab Results   Component Value Date    WBC 12.8 2020    RBC 5.54 2020    HGB 15.0 2020    HCT 45.4 2020     2020    MCV 81.9 2020    MCH 27.1 2020    MCHC 33.0 2020    RDW 12.4 2020    LYMPHOPCT 31 2020    MONOPCT 6 2020    BASOPCT 1 2020    MONOSABS 0.73 2020    LYMPHSABS 3.94 2020    EOSABS 0.58 2020    BASOSABS 0.09 2020    DIFFTYPE NOT REPORTED 2020 BMP:    Lab Results   Component Value Date     01/04/2020    K 3.8 01/04/2020    CL 96 01/04/2020    CO2 24 01/04/2020    BUN 22 01/04/2020    LABALBU 4.2 02/27/2019    CREATININE 0.73 01/04/2020    CALCIUM 10.5 01/04/2020    GFRAA >60 01/04/2020    LABGLOM >60 01/04/2020    GLUCOSE 164 01/04/2020       RADIOLOGY:     Xr Chest Standard (2 Vw)    Result Date: 1/4/2020  No acute process. Ct Head Wo Contrast    Result Date: 1/4/2020  Small amount of acute left occipital subarachnoid hemorrhage. Findings were discussed with LAY HACKETT at 7:17 p.m. on 1/4/2020. Cta Head Neck W Contrast    Result Date: 1/4/2020  Unremarkable CTA of the head and neck. Labs and Images reviewed with:    [x] King Mario MD    [] Lydia Westbrook MD  [] Cristofer Hinojosa MD  --[] there are no new interval images to review. ASSESSMENT AND PLAN:         ASSESSMENT:     This is a 30 y.o. female with maximal onset headache this morning preceded by nausea and emesis with a h/o of possible AVM seen on MRI 8/2019 [address] who follows with a neurology PA @ [address]. Admitted to the NICU for small acute L occipital SAH. Patient care will be discussed with attending, will reevaluate patient along with attending. PLAN/MEDICAL DECISION MAKING:        NEUROLOGIC:  - Imaging CTH showing small acute L occipital SAH  - Repeat CTH in AM  - CTA H/N -unremarkable for AVM  - Strict SBP goal  per neuro endovascular recommendations  - Load Keppra 1000 mg IV, continue with maintenance Keppra 500 BID  - MRI with and without contrast  - MRV with and without contrast  - NPO at midnight  - Diagnostic cerebral angiogram in the morning per neuro endovascular surgery  - Nimodipine 60 mg every 6 hours  - 1 g IV magnesium sulfate  - Zocor 80 mg nightly  - Neuro checks per protocol  -Tylenol PRN for headaches, migraine cocktail seems to have not worked in the ED.   May give IV morphine 1 mg if Tylenol is not

## 2020-01-05 NOTE — CARE COORDINATION
Case Management Initial Discharge Plan  Ryan Ahn,             Met with:patient to discuss discharge plans. Information verified: address, contacts, phone number, , insurance Yes  PCP: Candace Valle  Date of last visit: 2 months ago    Insurance Provider: [employer]    Discharge Planning    Living Arrangements:  Parent   Support Systems:  Parent    Home has 2 stories  3 stairs to climb to get into front door, 1 flight stairs to climb to reach second floor  Location of bedroom/bathroom in home upstairs    Patient able to perform ADL's:Independent    Current Services (outpatient & in home) none  DME equipment: none  DME provider: n/a      Potential Assistance Needed:  Durable Medical Equipment    Patient agreeable to home care: No  [place] of choice provided:  n/a    Prior SNF/Rehab Placement and Facility: none  Agreeable to SNF/Rehab: No  [place] of choice provided: n/a   Evaluation: n/a    Expected Discharge date:     Patient expects to be discharged to:  home  Follow Up Appointment: Best Day/ Time:      Transportation provider: mom  Transportation arrangements needed for discharge: No    Readmission Risk              Risk of Unplanned Readmission:        8             Does patient have a readmission risk score greater than 14?: No  If yes, follow-up appointment must be made within 7 days of discharge. Goals of Care: improve balance and decrease      Discharge Plan: home with mom, will need Wheeled walker provided freedom of choice and chose [facility]. Will need Face to face and DME order.            Electronically signed by Vaishali Perez RN on 20 at 9:45 AM

## 2020-01-05 NOTE — PROGRESS NOTES
Physical Therapy  DATE: 2020    NAME: Leo Green  MRN: [id number]   : 1995    Patient not seen this date for Physical Therapy due to:  [] Blood transfusion in progress  [] Hemodialysis  []  Patient Declined  [] Spine Precautions   [] Strict Bedrest  [] Surgery/ Procedure  [x] Testing-at MRI-[facility]ah       [] Other        [] PT being discontinued at this time. Patient independent. No further needs. [] PT being discontinued at this time as the patient has been transferred to palliative care. No further needs.     Sindhu Crespo, PT

## 2020-01-05 NOTE — CONSULTS
Endovascular Neurosurgery Consult    Pt Name: Jose A Simon  MRN: [id number]  [place]: 1995  Date of evaluation: 1/4/2020  Primary Care Physician: June Moritz Matus  Patient evaluated at the request of  Dr. Lukas Menendez  Reason for evaluation: SAH    SUBJECTIVE:   History of Chief Complaint:    The patient is a 24 y.o. female presented with severe HA that was maximal at onset this morning when she woke up. It did not wake her up. Woke up and had nausea followed by x2 episodes of emesis with sudden 10/10 thunderclap headache. No vision changes, focal weakness or seizures. Not on [facility].      Has a h/o AVM wit [facility] first identified 8/21/2019 on MRI according to [address] [place] [address] records, attributed at that time to HTN. Since then has had x2 CTA H/N negative for AVM.  Patient denies a history of HTN. On admission today SBP 110s. Follows with a neurologist however missed her last appointment. Endorses migraines and sees Oralia Lee PA-C neurology @ [address] [place] [address] last seen 11/20/19.       Diagnostic cerebral angiogram performed 8/7/2019 showed no evidence of AVM, dural aVF, or tumor blush performed by Dr. Robert Mariano at [facility] CTA head and neck both unremarkable for AVMs.     Hunt&Ventura: 1  Modified Martin: Grade I  ICH score: 0 (0.60 cc volume)         Allergies  is allergic to latex and prozac [fluoxetine hcl]. Medications  Prior to Admission medications    Medication Sig Start Date End Date Taking?  Authorizing Provider   busPIRone (BUSPAR) 10 MG tablet Take 10 mg by mouth 3 times daily   Yes Historical Provider, MD   lamoTRIgine (LAMICTAL) 25 MG tablet Take 25 mg by mouth daily   Yes Historical Provider, MD   hydrOXYzine (ATARAX) 25 MG tablet Take 1 tablet by mouth 2 times daily as needed for Anxiety (Can have sooner than every 8 hours as long as max 3 doses per day) 2/28/19   David Soliman MD   traZODone (DESYREL) 50 MG tablet Take 1 tablet by mouth nightly as needed (Difficulty staying asleep) 2/28/19   David Soliman MD ARIPiprazole (ABILIFY) 10 MG tablet Take 1 tablet by mouth daily 2/28/19   Sol Morales MD   oxyCODONE-acetaminophen (PERCOCET) 5-325 MG per tablet Take 1 tablet by mouth every 8 hours as needed for Pain (max of 3 tablets per day). .    Historical Provider, MD   ibuprofen (ADVIL;MOTRIN) 800 MG tablet Take 800 mg by mouth every 6 hours as needed for Pain     Historical Provider, MD   docusate sodium (COLACE) 100 MG capsule Take 100 mg by mouth 2 times daily as needed for Constipation    Historical Provider, MD   etonogestrel (NEXPLANON) 68 MG implant 68 mg by Subdermal route once    Historical Provider, MD   ondansetron (ZOFRAN) 4 MG tablet Take 1 tablet by mouth every 6 hours as needed for Nausea or Vomiting 8/16/17   Bekah Conte MD    Scheduled Meds:   busPIRone  10 mg Oral TID   Jonoritu Garza [START ON 1/5/2020] lamoTRIgine  25 mg Oral Daily    sodium chloride flush  10 mL Intravenous 2 times per day    levETIRAcetam  500 mg Oral BID    simvastatin  80 mg Oral Nightly    [START ON 1/5/2020] niMODipine  60 mg Oral 6 times per day     Continuous Infusions:  PRN Meds:.sodium chloride flush, acetaminophen, ondansetron, labetalol, magnesium sulfate  Past Medical History   has a past medical history of Cerebral vascular malformation, Diabetes mellitus ([facility] Seizure ([facility] and 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 Past Surgical History   has no past surgical history on file. Social History   reports that she has been smoking cigarettes. She has been smoking about 0.30 packs per day. She has never used smokeless tobacco.   reports no history of alcohol use. reports current drug use. Drug: Marijuana. Family History  family history is not on file.     Review of Systems:  CONSTITUTIONAL:  negative for fevers, chills, fatigue and malaise    EYES:  negative for double vision, blurred vision and positive for photophobia     HEENT:  negative for tinnitus, epistaxis and sore throat RESPIRATORY:  negative for cough, shortness of breath, wheezing    CARDIOVASCULAR:  negative for chest pain, palpitations, syncope, edema    GASTROINTESTINAL:  negative for nausea, vomiting    GENITOURINARY:  negative for incontinence    MUSCULOSKELETAL:  negative for back pain, positive for neck pain   NEUROLOGICAL:  Negative for weakness and tingling  negative for headaches and dizziness    PSYCHIATRIC:  negative for anxiety      Review of systems otherwise negative. OBJECTIVE:     CONSTITUTIONAL:  Well developed, well nourished, alert and oriented x 3, in no acute distress. GCS 15. Nontoxic. No dysarthria. No aphasia. HEAD:  normocephalic, atraumatic    EYES:  PERRLA, EOMI.   ENT:  moist mucous membranes   LUNGS:  Equal air entry bilaterally   CARDIOVASCULAR:  normal s1 / s2   ABDOMEN:  Soft, no rigidity   NECK supple, symmetric, no midline tenderness to palpation , pain on flexion.     BACK without midline tenderness, step-offs or deformities    EXTREMITIES Normal ROM with no deformities   NEUROLOGIC:  Mental Status:  A & O x3 date,awake     Cranial Nerves:    cranial nerves II-XII are grossly intact     Motor Exam:    Drift:  absent  Tone:  normal     Motor exam is symmetrical 5 out of 5 all extremities bilaterally     Sensory:    Touch:    Right Upper Extremity:  normal  Left Upper Extremity:  normal  Right Lower Extremity:  normal  Left Lower Extremity:  normal     Deep Tendon Reflexes:    Right Bicep:  2+  Left Bicep:  2+  Right Knee:  2+  Left Knee:  2+     Plantar Response:  Right:  downgoing  Left:  downgoing     Clonus:  N/A  Garcia's:  N/A     Coordination/Dysmetria:     Finger to Nose:   Right:  normal  Left:  normal   Dysdiadochokinesia:  N/A      SKIN No obvious ecchymosis, rashes, or lesions        LABS:     Recent Labs     01/04/20  1700 01/04/20  1821   WBC 12.8*  --    HGB 15.0  --    HCT 45.4  --      --      --    K 3.8  --    CL 96*  --    CO2 24  --    BUN 22*  --

## 2020-01-05 NOTE — ED NOTES
Attempted report, charge nurse stated they are still waiting on nurse from cardiac ICU to come over and take this pt.    Charge nurse stated she will call me back when the nurse is available      Marcin Ruvalcaba RN  01/04/20 2049

## 2020-01-05 NOTE — CONSULTS
Gemma Jane MD   ARIPiprazole (ABILIFY) 10 MG tablet Take 1 tablet by mouth daily 2/28/19   Gemma Jane MD   oxyCODONE-acetaminophen (PERCOCET) 5-325 MG per tablet Take 1 tablet by mouth every 8 hours as needed for Pain (max of 3 tablets per day).  .    Historical Provider, MD   ibuprofen (ADVIL;MOTRIN) 800 MG tablet Take 800 mg by mouth every 6 hours as needed for Pain     Historical Provider, MD   docusate sodium (COLACE) 100 MG capsule Take 100 mg by mouth 2 times daily as needed for Constipation    Historical Provider, MD   etonogestrel (NEXPLANON) 68 MG implant 68 mg by Subdermal route once    Historical Provider, MD   ondansetron (ZOFRAN) 4 MG tablet Take 1 tablet by mouth every 6 hours as needed for Nausea or Vomiting 8/16/17   Manuel Alamo MD       Current Medications:   Current Facility-Administered Medications: busPIRone (BUSPAR) tablet 10 mg, 10 mg, Oral, TID  [START ON 1/5/2020] lamoTRIgine (LAMICTAL) tablet 25 mg, 25 mg, Oral, Daily  sodium chloride flush 0.9 % injection 10 mL, 10 mL, Intravenous, 2 times per day  sodium chloride flush 0.9 % injection 10 mL, 10 mL, Intravenous, PRN  acetaminophen (TYLENOL) tablet 650 mg, 650 mg, Oral, Q4H PRN  ondansetron (ZOFRAN) injection 4 mg, 4 mg, Intravenous, Q6H PRN  labetalol (NORMODYNE;TRANDATE) injection syringe 10 mg, 10 mg, Intravenous, Q10 Min PRN  levETIRAcetam (KEPPRA) tablet 500 mg, 500 mg, Oral, BID  magnesium sulfate 1 g in dextrose 5% 100 mL IVPB, 1 g, Intravenous, PRN  simvastatin (ZOCOR) tablet 80 mg, 80 mg, Oral, Nightly  [START ON 1/5/2020] niMODipine (NIMOTOP) capsule 60 mg, 60 mg, Oral, 6 times per day    REVIEW OF SYSTEMS:       General ROS - No fevers, no chills, positive for HA  Ophthalmic ROS - No changes in vision from baseline  ENT ROS -  No sore throat, no rhinorrhea  Respiratory ROS - no cough, no shortness of breath  Cardiovascular ROS - No chest pain  Gastrointestinal ROS - No abdominal pain, no nausea, no loss  4. Facial Palsy:  0 - normal symmetric movement  5a. Motor left arm:  0 - no drift, limb holds 90 (or 45) degrees for full 10 seconds  5b. Motor right arm:  0 - no drift, limb holds 90 (or 45) degrees for full 10 seconds  6a. Motor left le - no drift; leg holds 30 degree position for full 5 seconds  6b. Motor right le - no drift; leg holds 30 degree position for full 5 seconds  7. Limb Ataxia:  0 - absent  8. Sensory:  0 - normal; no sensory loss  9. Best Language:  0 - no aphasia, normal  10. Dysarthria:  0 - normal  11. Extinction and Inattention:  0 - no abnormality  NIHSS 0      LABS AND IMAGING:     Labs:  CBC with Differential:    Lab Results   Component Value Date    WBC 12.8 2020    RBC 5.54 2020    HGB 15.0 2020    HCT 45.4 2020     2020    MCV 81.9 2020    MCH 27.1 2020    MCHC 33.0 2020    RDW 12.4 2020    LYMPHOPCT 31 2020    MONOPCT 6 2020    BASOPCT 1 2020    MONOSABS 0.73 2020    LYMPHSABS 3.94 2020    EOSABS 0.58 2020    BASOSABS 0.09 2020    DIFFTYPE NOT REPORTED 2020     BMP:    Lab Results   Component Value Date     2020    K 3.8 2020    CL 96 2020    CO2 24 2020    BUN 22 2020    LABALBU 4.2 2019    CREATININE 0.73 2020    CALCIUM 10.5 2020    GFRAA >60 2020    LABGLOM >60 2020    GLUCOSE 164 2020       Radiology Review:      Xr Chest Standard (2 Vw)    Result Date: 2020  No acute process. Ct Head Wo Contrast    Result Date: 2020  Small amount of acute left occipital subarachnoid hemorrhage. Findings were discussed with LAY HACKETT at 7:17 p.m. on 2020. Cta Head Neck W Contrast    Result Date: 2020  Unremarkable CTA of the head and neck.        ASSESSMENT AND PLAN:       Patient Active Problem List   Diagnosis    Bipolar 1 disorder ([facility]    Recurrent depression ([address]    SAH (subarachnoid hemorrhage) ([facility])       A/P:  This is a 30 y.o. female with maximal onset headache this morning preceded by nausea and emesis with a h/o of possible AVM seen on MRI 8/2019 [address] who follows with a neurology PA @ [address]. Admitted to the NICU for small acute L occipital SAH. AVAM vs [place]. Patient care will be discussed with attending, will reevaluate patient along with attending     - No neurosurgical interventions planned for now  - CTLS recommendations:   - HOB: 30 degrees  - MRI Brain W WO   - Obtain CT head in AM   - Neuro checks per protocol  - Hold all antiplatelets and anticoagulants  - We recommend SBP <140   - Determine the lower limit of SBP clinically based on mentation      Additional recommendations may follow    Please contact neurosurgery with any changes in patient's neurologic status. Thank you for your consult. Sofia Us MD, SELWYN  Neurosurgery Service  1/4/2020 at 9:59 PM         This note is created with the assistance of a speech recognition program.  While intending to generate a document that actually reflects the content of the visit, the document can still have some errors including those of syntax and sound like substitutions which may escape proof reading. In such instances, actual meaning can be extrapolated by contextual diversion. Please note that this chart was generated using voice recognition Dragon dictation software. Although every effort was made to ensure the accuracy of this automated transcription, some errors in transcription may have occurred.

## 2020-01-05 NOTE — PROGRESS NOTES
Dr Sydnee Herrera notified of headache rating 8/10 on pain scale with no relief from ER meds. New orders received for morphine, maintenance fluids, morphine and ok to ambulate with assist to bathroom. Will continue to closely monitor.

## 2020-01-06 ENCOUNTER — APPOINTMENT (OUTPATIENT)
Dept: MRI IMAGING | Age: 25
DRG: 021 | End: 2020-01-06
Payer: MEDICARE

## 2020-01-06 ENCOUNTER — APPOINTMENT (OUTPATIENT)
Dept: ULTRASOUND IMAGING | Age: 25
DRG: 021 | End: 2020-01-06
Payer: MEDICARE

## 2020-01-06 LAB
ABSOLUTE EOS #: 0.41 K/UL (ref 0–0.44)
ABSOLUTE IMMATURE GRANULOCYTE: 0.03 K/UL (ref 0–0.3)
ABSOLUTE LYMPH #: 3.66 K/UL (ref 1.1–3.7)
ABSOLUTE MONO #: 0.62 K/UL (ref 0.1–1.2)
ANION GAP SERPL CALCULATED.3IONS-SCNC: 17 MMOL/L (ref 9–17)
BASOPHILS # BLD: 1 % (ref 0–2)
BASOPHILS ABSOLUTE: 0.09 K/UL (ref 0–0.2)
BUN BLDV-MCNC: 14 MG/DL (ref 6–20)
BUN/CREAT BLD: ABNORMAL (ref 9–20)
CALCIUM SERPL-MCNC: 9 MG/DL (ref 8.6–10.4)
CHLORIDE BLD-SCNC: 99 MMOL/L (ref 98–107)
CO2: 21 MMOL/L (ref 20–31)
CREAT SERPL-MCNC: 0.84 MG/DL (ref 0.5–0.9)
DIFFERENTIAL TYPE: ABNORMAL
EKG ATRIAL RATE: 100 BPM
EKG ATRIAL RATE: 97 BPM
EKG P AXIS: 16 DEGREES
EKG P AXIS: 33 DEGREES
EKG P-R INTERVAL: 126 MS
EKG P-R INTERVAL: 134 MS
EKG Q-T INTERVAL: 338 MS
EKG Q-T INTERVAL: 344 MS
EKG QRS DURATION: 80 MS
EKG QRS DURATION: 82 MS
EKG QTC CALCULATION (BAZETT): 429 MS
EKG QTC CALCULATION (BAZETT): 443 MS
EKG R AXIS: 63 DEGREES
EKG R AXIS: 65 DEGREES
EKG T AXIS: 39 DEGREES
EKG T AXIS: 42 DEGREES
EKG VENTRICULAR RATE: 100 BPM
EKG VENTRICULAR RATE: 97 BPM
EOSINOPHILS RELATIVE PERCENT: 4 % (ref 1–4)
GFR AFRICAN AMERICAN: >60 ML/MIN
GFR NON-AFRICAN AMERICAN: >60 ML/MIN
GFR SERPL CREATININE-BSD FRML MDRD: ABNORMAL ML/MIN/{1.73_M2}
GFR SERPL CREATININE-BSD FRML MDRD: ABNORMAL ML/MIN/{1.73_M2}
GLUCOSE BLD-MCNC: 155 MG/DL (ref 70–99)
HCT VFR BLD CALC: 43.9 % (ref 36.3–47.1)
HEMOGLOBIN: 14.4 G/DL (ref 11.9–15.1)
IMMATURE GRANULOCYTES: 0 %
LYMPHOCYTES # BLD: 37 % (ref 24–43)
MAGNESIUM: 1.6 MG/DL (ref 1.6–2.6)
MCH RBC QN AUTO: 27.6 PG (ref 25.2–33.5)
MCHC RBC AUTO-ENTMCNC: 32.8 G/DL (ref 28.4–34.8)
MCV RBC AUTO: 84.3 FL (ref 82.6–102.9)
MONOCYTES # BLD: 6 % (ref 3–12)
NRBC AUTOMATED: 0 PER 100 WBC
PDW BLD-RTO: 12.4 % (ref 11.8–14.4)
PLATELET # BLD: 238 K/UL (ref 138–453)
PLATELET ESTIMATE: ABNORMAL
PMV BLD AUTO: 11.1 FL (ref 8.1–13.5)
POTASSIUM SERPL-SCNC: 3.7 MMOL/L (ref 3.7–5.3)
RBC # BLD: 5.21 M/UL (ref 3.95–5.11)
RBC # BLD: ABNORMAL 10*6/UL
SEG NEUTROPHILS: 52 % (ref 36–65)
SEGMENTED NEUTROPHILS ABSOLUTE COUNT: 5.15 K/UL (ref 1.5–8.1)
SODIUM BLD-SCNC: 137 MMOL/L (ref 135–144)
WBC # BLD: 10 K/UL (ref 3.5–11.3)
WBC # BLD: ABNORMAL 10*3/UL

## 2020-01-06 PROCEDURE — 2060000000 HC ICU INTERMEDIATE R&B

## 2020-01-06 PROCEDURE — 97165 OT EVAL LOW COMPLEX 30 MIN: CPT

## 2020-01-06 PROCEDURE — 93010 ELECTROCARDIOGRAM REPORT: CPT | Performed by: INTERNAL MEDICINE

## 2020-01-06 PROCEDURE — 70551 MRI BRAIN STEM W/O DYE: CPT

## 2020-01-06 PROCEDURE — 76705 ECHO EXAM OF ABDOMEN: CPT

## 2020-01-06 PROCEDURE — 6370000000 HC RX 637 (ALT 250 FOR IP): Performed by: NURSE PRACTITIONER

## 2020-01-06 PROCEDURE — 99233 SBSQ HOSP IP/OBS HIGH 50: CPT | Performed by: PSYCHIATRY & NEUROLOGY

## 2020-01-06 PROCEDURE — 80048 BASIC METABOLIC PNL TOTAL CA: CPT

## 2020-01-06 PROCEDURE — APPNB30 APP NON BILLABLE TIME 0-30 MINS: Performed by: REGISTERED NURSE

## 2020-01-06 PROCEDURE — 99232 SBSQ HOSP IP/OBS MODERATE 35: CPT | Performed by: NEUROLOGICAL SURGERY

## 2020-01-06 PROCEDURE — 6370000000 HC RX 637 (ALT 250 FOR IP): Performed by: STUDENT IN AN ORGANIZED HEALTH CARE EDUCATION/TRAINING PROGRAM

## 2020-01-06 PROCEDURE — 85025 COMPLETE CBC W/AUTO DIFF WBC: CPT

## 2020-01-06 PROCEDURE — 6360000002 HC RX W HCPCS: Performed by: NURSE PRACTITIONER

## 2020-01-06 PROCEDURE — 36415 COLL VENOUS BLD VENIPUNCTURE: CPT

## 2020-01-06 PROCEDURE — 97535 SELF CARE MNGMENT TRAINING: CPT

## 2020-01-06 PROCEDURE — 83735 ASSAY OF MAGNESIUM: CPT

## 2020-01-06 PROCEDURE — APPNB60 APP NON BILLABLE TIME 46-60 MINS: Performed by: NURSE PRACTITIONER

## 2020-01-06 PROCEDURE — 6360000002 HC RX W HCPCS: Performed by: STUDENT IN AN ORGANIZED HEALTH CARE EDUCATION/TRAINING PROGRAM

## 2020-01-06 PROCEDURE — 92523 SPEECH SOUND LANG COMPREHEN: CPT

## 2020-01-06 RX ORDER — DEXTROSE MONOHYDRATE 50 MG/ML
100 INJECTION, SOLUTION INTRAVENOUS PRN
Status: DISCONTINUED | OUTPATIENT
Start: 2020-01-06 | End: 2020-01-08

## 2020-01-06 RX ORDER — DEXTROSE MONOHYDRATE 25 G/50ML
12.5 INJECTION, SOLUTION INTRAVENOUS PRN
Status: DISCONTINUED | OUTPATIENT
Start: 2020-01-06 | End: 2020-01-08

## 2020-01-06 RX ORDER — NICOTINE 21 MG/24HR
1 PATCH, TRANSDERMAL 24 HOURS TRANSDERMAL DAILY
Status: DISCONTINUED | OUTPATIENT
Start: 2020-01-06 | End: 2020-01-07

## 2020-01-06 RX ORDER — FAMOTIDINE 20 MG/1
20 TABLET, FILM COATED ORAL 2 TIMES DAILY
Status: DISCONTINUED | OUTPATIENT
Start: 2020-01-07 | End: 2020-01-08

## 2020-01-06 RX ORDER — NICOTINE POLACRILEX 4 MG
15 LOZENGE BUCCAL PRN
Status: DISCONTINUED | OUTPATIENT
Start: 2020-01-06 | End: 2020-01-08

## 2020-01-06 RX ADMIN — MAGNESIUM SULFATE HEPTAHYDRATE 1 G: 1 INJECTION, SOLUTION INTRAVENOUS at 09:00

## 2020-01-06 RX ADMIN — LAMOTRIGINE 25 MG: 25 TABLET ORAL at 08:30

## 2020-01-06 RX ADMIN — OXYCODONE HYDROCHLORIDE AND ACETAMINOPHEN 1 TABLET: 5; 325 TABLET ORAL at 18:37

## 2020-01-06 RX ADMIN — BUSPIRONE HYDROCHLORIDE 10 MG: 10 TABLET ORAL at 20:00

## 2020-01-06 RX ADMIN — OXYCODONE HYDROCHLORIDE AND ACETAMINOPHEN 1 TABLET: 5; 325 TABLET ORAL at 01:10

## 2020-01-06 RX ADMIN — DOCUSATE SODIUM 100 MG: 100 CAPSULE, LIQUID FILLED ORAL at 20:00

## 2020-01-06 RX ADMIN — ONDANSETRON 4 MG: 2 INJECTION INTRAMUSCULAR; INTRAVENOUS at 09:34

## 2020-01-06 RX ADMIN — BUSPIRONE HYDROCHLORIDE 10 MG: 10 TABLET ORAL at 14:47

## 2020-01-06 RX ADMIN — DOCUSATE SODIUM 100 MG: 100 CAPSULE, LIQUID FILLED ORAL at 14:47

## 2020-01-06 RX ADMIN — ENOXAPARIN SODIUM 40 MG: 40 INJECTION SUBCUTANEOUS at 09:43

## 2020-01-06 RX ADMIN — OXYCODONE HYDROCHLORIDE AND ACETAMINOPHEN 1 TABLET: 5; 325 TABLET ORAL at 05:04

## 2020-01-06 RX ADMIN — BUSPIRONE HYDROCHLORIDE 10 MG: 10 TABLET ORAL at 08:13

## 2020-01-06 RX ADMIN — SIMVASTATIN 20 MG: 20 TABLET, FILM COATED ORAL at 20:00

## 2020-01-06 RX ADMIN — OXYCODONE HYDROCHLORIDE AND ACETAMINOPHEN 1 TABLET: 5; 325 TABLET ORAL at 11:47

## 2020-01-06 RX ADMIN — LEVETIRACETAM 500 MG: 500 TABLET, FILM COATED ORAL at 08:30

## 2020-01-06 RX ADMIN — LEVETIRACETAM 500 MG: 500 TABLET, FILM COATED ORAL at 20:30

## 2020-01-06 RX ADMIN — DOCUSATE SODIUM 100 MG: 100 CAPSULE, LIQUID FILLED ORAL at 08:13

## 2020-01-06 RX ADMIN — FENOFIBRATE 160 MG: 160 TABLET ORAL at 08:13

## 2020-01-06 ASSESSMENT — PAIN SCALES - GENERAL
PAINLEVEL_OUTOF10: 8
PAINLEVEL_OUTOF10: 10
PAINLEVEL_OUTOF10: 10
PAINLEVEL_OUTOF10: 8
PAINLEVEL_OUTOF10: 7
PAINLEVEL_OUTOF10: 4
PAINLEVEL_OUTOF10: 6

## 2020-01-06 ASSESSMENT — PAIN DESCRIPTION - ORIENTATION
ORIENTATION: POSTERIOR
ORIENTATION: POSTERIOR

## 2020-01-06 ASSESSMENT — PAIN DESCRIPTION - PAIN TYPE
TYPE: ACUTE PAIN

## 2020-01-06 ASSESSMENT — PAIN DESCRIPTION - FREQUENCY
FREQUENCY: CONTINUOUS

## 2020-01-06 ASSESSMENT — PAIN DESCRIPTION - LOCATION
LOCATION: HEAD
LOCATION: ABDOMEN;HEAD

## 2020-01-06 ASSESSMENT — PAIN DESCRIPTION - PROGRESSION
CLINICAL_PROGRESSION: GRADUALLY IMPROVING
CLINICAL_PROGRESSION: RAPIDLY IMPROVING

## 2020-01-06 ASSESSMENT — PAIN DESCRIPTION - DESCRIPTORS
DESCRIPTORS: ACHING
DESCRIPTORS: ACHING;TENDER;DISCOMFORT;SORE
DESCRIPTORS: ACHING

## 2020-01-06 ASSESSMENT — PAIN DESCRIPTION - ONSET
ONSET: ON-GOING

## 2020-01-06 NOTE — PROGRESS NOTES
Occupational Therapy   Occupational Therapy Initial Assessment  Date: 2020   Patient Name: Anni Field  MRN: [id number]     : 1995    Date of Service: 2020    Discharge Recommendations: No therapy recommended at discharge. OT Equipment Recommendations  Equipment Needed: Yes  Mobility Devices: Canes  Cane: Straight Cane    Assessment   Performance deficits / Impairments: Decreased functional mobility ; Decreased high-level IADLs;Decreased balance  Prognosis: Good  Decision Making: Low Complexity  Patient Education: Safety awareness, OTPOC-good return  REQUIRES OT FOLLOW UP: Yes  Activity Tolerance  Activity Tolerance: Patient Tolerated treatment well  Safety Devices  Safety Devices in place: Yes  Type of devices: All fall risk precautions in place; Left in bed;Call light within reach;Nurse notified;Gait belt  Restraints  Initially in place: No        Patient Diagnosis(es): The primary encounter diagnosis was SAH (subarachnoid hemorrhage) ([facility] A diagnosis of Urinary tract infection without hematuria, site unspecified was also pertinent to this visit. has a past medical history of Cerebral vascular malformation, Diabetes mellitus ([facility] Seizure ([facility] and Traumatic hemorrhage of left cerebrum without loss of consciousness ([facility] has no past surgical history on file. Restrictions  Restrictions/Precautions  Restrictions/Precautions: General Precautions, Seizure  Required Braces or Orthoses?: No  Position Activity Restriction  Other position/activity restrictions: Goal SBP     Subjective   General  Patient assessed for rehabilitation services?: Yes  Family / Caregiver Present: No  Diagnosis: Seizures, brain bleed, cavernoma  Patient Currently in Pain: Yes  Pain Assessment  Pain Assessment: 0-10  Pain Level: 6  Pain Type: Acute pain  Pain Location: Abdomen; Head  Pain Descriptors: Aching;Tender;Discomfort; Sore  Pain Frequency: Continuous  Non-Pharmaceutical Pain Intervention(s):

## 2020-01-06 NOTE — PROGRESS NOTES
Neurosurgery ANA PAULA/Resident    Daily Progress Note     1/6/2020  11:41 AM    Chart reviewed. No acute events overnight. No new complaints. Vitals:    01/06/20 0304 01/06/20 0402 01/06/20 0605 01/06/20 0702   BP: (!) 95/59 108/76 105/66 107/63   Pulse: 85 86 81 92   Resp: 18 14 14 16   Temp:       TempSrc:       SpO2: 98% 95% 97% 98%   Weight:       Height:           PE:   AOx3   PERRL, EOMI   Motor   Moving all extremities equally          Lab Results   Component Value Date    WBC 10.0 01/06/2020    HGB 14.4 01/06/2020    HCT 43.9 01/06/2020     01/06/2020    CHOL 245 (H) 01/05/2020    TRIG 1,015 (H) 01/05/2020    HDL 24 (L) 01/05/2020    LDLDIRECT 104 (H) 01/05/2020    ALT 71 (H) 01/05/2020    AST 29 01/05/2020     01/06/2020    K 3.7 01/06/2020    CL 99 01/06/2020    CREATININE 0.84 01/06/2020    BUN 14 01/06/2020    CO2 21 01/06/2020    TSH 3.19 01/05/2020    LABA1C 8.7 (H) 01/05/2020       Radiology   Mrv Head W Wo Contrast    Result Date: 1/5/2020  EXAMINATION: MRV OF THE HEAD WITH AND WITHOUT CONTRAST  1/5/2020: TECHNIQUE: Multiplanar multisequence MRV of the head was performed with and without the administration of intravenous contrast. COMPARISON: None. HISTORY: ORDERING SYSTEM PROVIDED HISTORY: sah, avm:? TECHNOLOGIST PROVIDED HISTORY: sah, avm:? Is the patient pregnant?->No FINDINGS: No focal stenosis or thrombus is seen of the major dural venous sinuses. Left occipital intraparenchymal susceptibility artifact is noted and better evaluated on separate MRI of the brain performed concurrently. There is a small left occipital developmental venous anomaly. 1. No dural venous sinus thrombosis. 2. Left occipital intraparenchymal susceptibility artifact consistent with residua of prior hemorrhage within adjacent developmental venous anomaly suggesting an underlying cavernoma better evaluated on separate brain MRI.      Mri Brain W Wo Contrast    Result Date: 1/5/2020  EXAMINATION: MRI OF THE BRAIN WITHOUT AND WITH CONTRAST  1/5/2020 8:19 am TECHNIQUE: Multiplanar multisequence MRI of the head/brain was performed without and with the administration of intravenous contrast. COMPARISON: CT head earlier today. HISTORY: ORDERING SYSTEM PROVIDED HISTORY: [employer] since 8/2019 TECHNOLOGIST PROVIDED HISTORY: [employer] since 8/2019 Is the patient pregnant?->No FINDINGS: INTRACRANIAL STRUCTURES/VENTRICLES:  There is a 7 mm faintly enhancing focus of heterogeneous T2/FLAIR hyperintensity in the left occipital subcortical white matter with surrounding hemosiderin staining and an adjacent small developmental venous anomaly. There is no acute infarct. No mass effect or midline shift. The ventricles and sulci are normal in size and configuration. The sellar/suprasellar regions appear unremarkable. The normal signal voids within the major intracranial vessels appear maintained. ORBITS: The visualized portion of the orbits demonstrate no acute abnormality. SINUSES: The visualized paranasal sinuses and mastoid air cells are well aerated. BONES/SOFT TISSUES: The bone marrow signal intensity appears normal. The soft tissues demonstrate no acute abnormality. Left occipital 7 mm cavernoma with surrounding hemosiderin staining consistent with sequela of remote hemorrhage. This hemosiderin deposition likely accounts for hyperdensity on recent head CT as there is no adjacent FLAIR signal abnormality to suggest acute subarachnoid hemorrhage, however small amount of acute subarachnoid hemorrhage canal at be completely excluded. A/P  Left occipital intraparenchymal hemorrhage  MRI brain shows cavernoma    -MRI brain with stealth protocol T2 sequence for surgical planning  -Imaging being obtained from [facility] from previous bleed in August      Please contact neurosurgery with any changes in patients neurologic status.        Megha Rodriguez CNP  NS pager [phone]  1/6/20  11:41 AM

## 2020-01-06 NOTE — PROGRESS NOTES
Daily Progress Note  Neuro Critical Care    Patient Name: Meredith Carpio  Patient : 1995  Room/Bed: 0521/0521-01  Code Status: Full  Allergies: Allergies   Allergen Reactions    Latex     Prozac [Fluoxetine Hcl] Other (See Comments)     Seizures       CHIEF COMPLAINT:     Headache     INTERVAL HISTORY    Initial Presentation (Admitted 19): The patient is a 31 yo female with history of seizures as a child, DM, bipolar, and previous left occipital IPH/SAH who presents with thunderclap headache and nausea/vomiting. She reports that the headache started approximately two days ago and has been waxing and waning since. Has a h/o IPH of unknown etiology first identified 2019 on CT head and was admitted for CTA head/neck,  Since then has had x2 CTA H/N, MRI w wo contrast, and diagnostic angiogram negative for any vascular malformation. She has continued to follow with neurosurgery and neurology outpatient for her continued headaches. She also reports history of seizures up until age 1 and then one provoked seizure related to Prozac use. She is not on any AEDs currently at home. Hospital Course:  : CT head this morning is stable. MRI brain consistent with cavernoma and recent hemorrhage. MRV negative for venous thrombosis. Last 24h:   No acute events overnight. Neurosurgery discussed MRI findings with patient and recommendation for resection. MRI brain with stealth protocol ordered. LFTs obtained revealed elevated alk phos and ALT, will obtain Liver ultrasound and repeat triglycerides tomorrow.      CURRENT MEDICATIONS:  SCHEDULED MEDICATIONS:   enoxaparin  40 mg Subcutaneous Daily    insulin lispro  0-6 Units Subcutaneous TID WC    insulin lispro  0-3 Units Subcutaneous Nightly    fenofibrate  160 mg Oral Daily    simvastatin  20 mg Oral Nightly    busPIRone  10 mg Oral TID    lamoTRIgine  25 mg Oral Daily    sodium chloride flush  10 mL Intravenous 2 times per day    levETIRAcetam  500 mg Oral BID    sennosides-docusate sodium  1 tablet Oral BID    docusate sodium  100 mg Oral TID     CONTINUOUS INFUSIONS:   dextrose       PRN MEDICATIONS:   glucose, dextrose, glucagon (rDNA), dextrose, sodium chloride flush, oxyCODONE-acetaminophen, sodium chloride flush, acetaminophen, ondansetron, labetalol, magnesium sulfate, magnesium hydroxide    VITALS:  Temperature Range: Temp: 98.1 °F (36.7 °C) Temp  Av.3 °F (36.8 °C)  Min: 98 °F (36.7 °C)  Max: 98.9 °F (37.2 °C)  BP Range: Systolic (71YWQ), YWL:976 , Min:95 , AYR:260     Diastolic (81MOX), KGZ:52, Min:47, Max:98    Pulse Range: Pulse  Av.2  Min: 75  Max: 99  Respiration Range: Resp  Av.4  Min: 14  Max: 22  Current Pulse Ox: SpO2: 98 %  24HR Pulse Ox Range: SpO2  Av.1 %  Min: 95 %  Max: 99 %  Patient Vitals for the past 12 hrs:   BP Temp Pulse Resp SpO2   20 0702 107/63 -- 92 16 98 %   20 0605 105/66 -- 81 14 97 %   20 0402 108/76 -- 86 14 95 %   20 0304 (!) 95/59 -- 85 18 98 %   20 0202 122/73 -- 91 19 96 %   20 0102 (!) 101/56 -- 79 15 97 %   20 0002 (!) 99/47 98.1 °F (36.7 °C) 80 18 96 %   20 2302 103/64 -- 90 18 98 %     Estimated body mass index is 33.32 kg/m² as calculated from the following:    Height as of this encounter: 5' 1\" (1.549 m).     Weight as of this encounter: 176 lb 5.9 oz (80 kg).  []<16 Severe malnutrition  []16-16.99 Moderate malnutrition  []17-18.49 Mild malnutrition  []18.5-24.9 Normal  []25-29.9 Overweight (not obese)  [x]30-34.9 Obese class 1 (Low Risk)  []35-39.9 Obese class 2 (Moderate Risk)  []?40 Obese class 3 (High Risk)    RECENT LABS:   Lab Results   Component Value Date    WBC 10.0 2020    HGB 14.4 2020    HCT 43.9 2020     2020    CHOL 245 (H) 2020    TRIG 1,015 (H) 2020    HDL 24 (L) 2020    LDLDIRECT 104 (H) 2020    ALT 71 (H) 2020    AST 29 2020     01/06/2020    K 3.7 01/06/2020    CL 99 01/06/2020    CREATININE 0.84 01/06/2020    BUN 14 01/06/2020    CO2 21 01/06/2020    TSH 3.19 01/05/2020    LABA1C 8.7 (H) 01/05/2020     24 HOUR INTAKE/OUTPUT:    Intake/Output Summary (Last 24 hours) at 1/6/2020 1055  Last data filed at 1/6/2020 0610  Gross per 24 hour   Intake 250 ml   Output 750 ml   Net -500 ml       IMAGING:   CT head 1/4:  Small amount of acute left occipital subarachnoid hemorrhage. CTA  Head/neck 1/4:  Unremarkable CTA of the head and neck. CT head 1/5:  Stable volume in extent of focal left occipital acute subarachnoid plus or   minus acute intraparenchymal hemorrhage.       Otherwise, unremarkable noncontrast CT head examination. MRI brain w wo contrast 1/5:  Left occipital 7 mm cavernoma with surrounding hemosiderin staining   consistent with sequela of remote hemorrhage.  This hemosiderin deposition   likely accounts for hyperdensity on recent head CT as there is no adjacent   FLAIR signal abnormality to suggest acute subarachnoid hemorrhage, however   small amount of acute subarachnoid hemorrhage canal at be completely excluded. MRV head w wo contrast 1/5:  1. No dural venous sinus thrombosis. 2. Left occipital intraparenchymal susceptibility artifact consistent with   residua of prior hemorrhage within adjacent developmental venous anomaly   suggesting an underlying cavernoma better evaluated on separate brain MRI.             Labs and Images reviewed with:  [x] Dr. Joseph Hartman    [] Dr. Richard Montgomery  [] Dr. Robby Mast  [] There are no new interval images to review. PHYSICAL EXAM       CONSTITUTIONAL:  Well developed, well nourished, alert and oriented x 3, in no acute distress. GCS 15. Nontoxic. No dysarthria. No aphasia.    HEAD:  normocephalic, atraumatic    EYES:  PERRLA, EOMI.   ENT:  moist mucous membranes   NECK:  supple, symmetric   LUNGS:  Equal air entry bilaterally   CARDIOVASCULAR:  normal s1 / s2, RRR, distal pulses intact   ABDOMEN:  Soft, no rigidity   NEUROLOGIC:  Mental Status:  A & O x3,awake             Cranial Nerves:    cranial nerves II-XII are grossly intact    Motor Exam:    Drift:  absent  Tone:  normal    Motor exam is symmetrical 5 out of 5 all extremities bilaterally    Sensory:    Touch:    Right Upper Extremity:  normal  Left Upper Extremity:  normal  Right Lower Extremity:  normal  Left Lower Extremity:  normal    Gait:  normal       DRAINS:  [x] There are no drains for Neuro Critical Care to monitor at this time.      ASSESSMENT AND PLAN:       This is a 30 y.o. female with maximal onset headache this morning preceded by nausea and emesis with a h/o of left occipital IPH in same location at [facility] 08/2019 - with negative CTA, MRI, and diagnostic angiogram     PLAN/MEDICAL DECISION MAKING:     NEUROLOGIC:  - Small acute L occipital SAH  - CTA H/N -unremarkable   - MRI brain w wo contrast consistent with cavernoma  - MRV unremarkable, follow up MRI yemi with stealth  - Neurosurgery following, appreciate recs  - Strict SBP goal  per neuro endovascular recommendations  - Load Keppra 1000 mg IV, continue with maintenance Keppra 500 BID  - Neuro checks per protocol     CARDIOVASCULAR:  - Goal SBP   - Lipid panel: Cholesterol 245, , triglycerides 1,015  - Fenofibrate 135 mg QD, Zocor 20 mg QHS  - Follow up triglycerides tomorrow AM  - Echo 8/2019: EF 55-60%  - Continue telemetry     PULMONARY:  - Satting well ORA     RENAL/FLUID/ELECTROLYTE:  - BUN 14/ Creatinine 0.84  - IVF discontinued  - Monitor I&Os  - Replace electrolytes PRN  - Daily BMP  - Hypomagnesemia, 1.6; replaced with 1 gramsmagnesium sulfate     GI/NUTRITION:  NUTRITION:  General Diet  - Bowel regimen: senna-s daily  - GI prophylaxis: Not indicated  - Elevated Alk Phos and ALT, follow up Liver US     ID:  - Tmax 37.2  - Mild leukocytosis resolved, WBC 10  - UA negative  - Continue to monitor for fevers  - Daily

## 2020-01-06 NOTE — PROGRESS NOTES
Speech Language Pathology  [address]  Initial Speech/Language/Cognitive Assessment    NAME: Anni Field  : 1995   MRN: [id number]  ADMISSION DATE: 2020  ADMITTING DIAGNOSIS: has Bipolar 1 disorder ([address] Recurrent depression ([address] SAH (subarachnoid hemorrhage) ([address] Intraparenchymal hematoma of brain ([address] Cerebral hemorrhage ([address] History of seizures; and Cavernoma on their problem list.      Date of Eval: 2020   Evaluating Therapist: YOGESH Paniagua    RECENT RESULTS  CT OF HEAD/MRI:   Impression   Left occipital 7 mm cavernoma with surrounding hemosiderin staining   consistent with sequela of remote hemorrhage.  This hemosiderin deposition   likely accounts for hyperdensity on recent head CT as there is no adjacent   FLAIR signal abnormality to suggest acute subarachnoid hemorrhage, however   small amount of acute subarachnoid hemorrhage canal at be completely excluded. Primary Complaint:  66-year-old patient admitted to  neuro ICU for management of acute left occipital SAH/ICH with clinical manifestation of thunderclap type headache  Left occipital small ICH - SAH , Hx of small hemorrhage in same area back in 2019- no vascular abnormality found on cerebral angiogram at outside hospital   Reported history of childhood seizures by the age of 1, no spells afterwards until a provoked seizure likely by SSRI about 7 years ago. She is not on antiseizure medications.    History of bipolar disease  MRI brain with and without to be done today along with MR venogram study to rule out venous thrombosis  Cerebral angiogram later today   Target SBP 90 - 120   Continue prophylactic antiseizure medication  Headache is better controlled overnight, no focal deficits on exam.  SCDs for DVT prophylaxis  Hypertriglyceridemia-fibrates started, continue statins  Close neuro and hemodynamic monitoring in neuro ICU for acute ICH/SAH    Pain:  Pain Assessment  Pain SLP  1/6/2020 9:51 AM

## 2020-01-06 NOTE — FLOWSHEET NOTE
Assessment:   made initial visit. Pt was lying in bed, alert and oriented. She has had a stroke and faces surgery Wednesday with the possibility of losing part of her sight. In addition, she has three children, whom she has lost custody of and is struggling with anxiety, fear and anger regarding her situation. Intervention:   provided a listening presence as patient shared her feelings and struggles. Patient has mercy, but is not active in a [employer]. Patient was open to spiritual support and  shared several mercy-building Scriptures which gave patient hope. Patient accepted 's offer of prayer. Outcome:  Patient was more relaxed at the end of the visit. Her words were, \"I'm mellow now. \"  She expressed her appreciation for 's visit and encouragement. Chaplains will remain available for further support as needed. Apoorva Dubon     01/06/20 8600   Encounter Summary   Services provided to: Patient   Referral/Consult From: [address] [place] [address] Children;Family members   Place of [employer] none   Continue Visiting   (1/06/20)   Complexity of Encounter Moderate   Length of Encounter 15 minutes   Spiritual Assessment Completed Yes   Routine   Type Initial   Spiritual/Faith   Type Spiritual struggle   Assessment Approachable;Tearful; Anxious; Fearful; Angry   Intervention Active listening;Explored feelings, thoughts, concerns;Explored coping resources;Nurtured hope;Prayer;Scripture; Discussed relationship with God   Outcome Comfort;Expressed gratitude;Engaged in conversation;Expressed feelings/needs/concerns; Less anxious, less agitated;Receptive

## 2020-01-06 NOTE — PLAN OF CARE
Patient states understanding of pain scale and interventions. Pain assessed with hourly rounding and PRN. Patient states pain level is controlled with percocet. Will continue to monitor.

## 2020-01-06 NOTE — PROGRESS NOTES
Physical Therapy  DATE: 2020    NAME: Jose A Simon  MRN: [id number]   : 1995    Patient not seen this date for Physical Therapy due to:  [] Blood transfusion in progress  [] Hemodialysis  [x]  Patient Declined  PM: Pt just finished with OT and wants to rest.  [] Spine Precautions   [] Strict Bedrest  [] Surgery/ Procedure  [] Testing      [x] Other AM: RN check PM       [] PT being discontinued at this time. Patient independent. No further needs. [] PT being discontinued at this time as the patient has been transferred to palliative care. No further needs.     Darreld Najjar, PT

## 2020-01-07 ENCOUNTER — ANESTHESIA EVENT (OUTPATIENT)
Dept: OPERATING ROOM | Age: 25
DRG: 021 | End: 2020-01-07
Payer: MEDICARE

## 2020-01-07 LAB
ABSOLUTE EOS #: 0.41 K/UL (ref 0–0.44)
ABSOLUTE IMMATURE GRANULOCYTE: 0.04 K/UL (ref 0–0.3)
ABSOLUTE LYMPH #: 3.94 K/UL (ref 1.1–3.7)
ABSOLUTE MONO #: 0.72 K/UL (ref 0.1–1.2)
ANION GAP SERPL CALCULATED.3IONS-SCNC: 17 MMOL/L (ref 9–17)
BASOPHILS # BLD: 1 % (ref 0–2)
BASOPHILS ABSOLUTE: 0.07 K/UL (ref 0–0.2)
BUN BLDV-MCNC: 15 MG/DL (ref 6–20)
BUN/CREAT BLD: ABNORMAL (ref 9–20)
CALCIUM SERPL-MCNC: 8.9 MG/DL (ref 8.6–10.4)
CHLORIDE BLD-SCNC: 100 MMOL/L (ref 98–107)
CO2: 22 MMOL/L (ref 20–31)
CREAT SERPL-MCNC: 0.9 MG/DL (ref 0.5–0.9)
DIFFERENTIAL TYPE: ABNORMAL
EOSINOPHILS RELATIVE PERCENT: 4 % (ref 1–4)
GFR AFRICAN AMERICAN: >60 ML/MIN
GFR NON-AFRICAN AMERICAN: >60 ML/MIN
GFR SERPL CREATININE-BSD FRML MDRD: ABNORMAL ML/MIN/{1.73_M2}
GFR SERPL CREATININE-BSD FRML MDRD: ABNORMAL ML/MIN/{1.73_M2}
GLUCOSE BLD-MCNC: 136 MG/DL (ref 70–99)
GLUCOSE BLD-MCNC: 166 MG/DL (ref 65–105)
GLUCOSE BLD-MCNC: 183 MG/DL (ref 65–105)
GLUCOSE BLD-MCNC: 225 MG/DL (ref 65–105)
GLUCOSE BLD-MCNC: 259 MG/DL (ref 65–105)
HCT VFR BLD CALC: 43.9 % (ref 36.3–47.1)
HEMOGLOBIN: 14.2 G/DL (ref 11.9–15.1)
IMMATURE GRANULOCYTES: 0 %
LYMPHOCYTES # BLD: 38 % (ref 24–43)
MAGNESIUM: 1.6 MG/DL (ref 1.6–2.6)
MCH RBC QN AUTO: 27.1 PG (ref 25.2–33.5)
MCHC RBC AUTO-ENTMCNC: 32.3 G/DL (ref 28.4–34.8)
MCV RBC AUTO: 83.8 FL (ref 82.6–102.9)
MONOCYTES # BLD: 7 % (ref 3–12)
NRBC AUTOMATED: 0 PER 100 WBC
PDW BLD-RTO: 12.3 % (ref 11.8–14.4)
PLATELET # BLD: 201 K/UL (ref 138–453)
PLATELET ESTIMATE: ABNORMAL
PMV BLD AUTO: 11.7 FL (ref 8.1–13.5)
POTASSIUM SERPL-SCNC: 3.6 MMOL/L (ref 3.7–5.3)
RBC # BLD: 5.24 M/UL (ref 3.95–5.11)
RBC # BLD: ABNORMAL 10*6/UL
SEG NEUTROPHILS: 50 % (ref 36–65)
SEGMENTED NEUTROPHILS ABSOLUTE COUNT: 5.27 K/UL (ref 1.5–8.1)
SODIUM BLD-SCNC: 139 MMOL/L (ref 135–144)
TRIGL SERPL-MCNC: 420 MG/DL
WBC # BLD: 10.5 K/UL (ref 3.5–11.3)
WBC # BLD: ABNORMAL 10*3/UL

## 2020-01-07 PROCEDURE — 6370000000 HC RX 637 (ALT 250 FOR IP): Performed by: STUDENT IN AN ORGANIZED HEALTH CARE EDUCATION/TRAINING PROGRAM

## 2020-01-07 PROCEDURE — 99232 SBSQ HOSP IP/OBS MODERATE 35: CPT | Performed by: NEUROLOGICAL SURGERY

## 2020-01-07 PROCEDURE — 6370000000 HC RX 637 (ALT 250 FOR IP): Performed by: PSYCHIATRY & NEUROLOGY

## 2020-01-07 PROCEDURE — 36415 COLL VENOUS BLD VENIPUNCTURE: CPT

## 2020-01-07 PROCEDURE — 85025 COMPLETE CBC W/AUTO DIFF WBC: CPT

## 2020-01-07 PROCEDURE — 82947 ASSAY GLUCOSE BLOOD QUANT: CPT

## 2020-01-07 PROCEDURE — 6360000002 HC RX W HCPCS: Performed by: STUDENT IN AN ORGANIZED HEALTH CARE EDUCATION/TRAINING PROGRAM

## 2020-01-07 PROCEDURE — 6360000002 HC RX W HCPCS: Performed by: NURSE PRACTITIONER

## 2020-01-07 PROCEDURE — 2580000003 HC RX 258: Performed by: STUDENT IN AN ORGANIZED HEALTH CARE EDUCATION/TRAINING PROGRAM

## 2020-01-07 PROCEDURE — 2060000000 HC ICU INTERMEDIATE R&B

## 2020-01-07 PROCEDURE — 6370000000 HC RX 637 (ALT 250 FOR IP): Performed by: NURSE PRACTITIONER

## 2020-01-07 PROCEDURE — 84478 ASSAY OF TRIGLYCERIDES: CPT

## 2020-01-07 PROCEDURE — 80048 BASIC METABOLIC PNL TOTAL CA: CPT

## 2020-01-07 PROCEDURE — 2500000003 HC RX 250 WO HCPCS: Performed by: STUDENT IN AN ORGANIZED HEALTH CARE EDUCATION/TRAINING PROGRAM

## 2020-01-07 PROCEDURE — 83735 ASSAY OF MAGNESIUM: CPT

## 2020-01-07 RX ORDER — NICOTINE 21 MG/24HR
1 PATCH, TRANSDERMAL 24 HOURS TRANSDERMAL DAILY
Status: DISCONTINUED | OUTPATIENT
Start: 2020-01-07 | End: 2020-01-08

## 2020-01-07 RX ORDER — MAGNESIUM SULFATE 1 G/100ML
1 INJECTION INTRAVENOUS
Status: COMPLETED | OUTPATIENT
Start: 2020-01-07 | End: 2020-01-07

## 2020-01-07 RX ORDER — POTASSIUM CHLORIDE 20 MEQ/1
40 TABLET, EXTENDED RELEASE ORAL ONCE
Status: COMPLETED | OUTPATIENT
Start: 2020-01-07 | End: 2020-01-07

## 2020-01-07 RX ADMIN — INSULIN LISPRO 3 UNITS: 100 INJECTION, SOLUTION INTRAVENOUS; SUBCUTANEOUS at 12:34

## 2020-01-07 RX ADMIN — SODIUM CHLORIDE, PRESERVATIVE FREE 10 ML: 5 INJECTION INTRAVENOUS at 22:35

## 2020-01-07 RX ADMIN — SODIUM CHLORIDE, PRESERVATIVE FREE 10 ML: 5 INJECTION INTRAVENOUS at 08:31

## 2020-01-07 RX ADMIN — FAMOTIDINE 20 MG: 20 TABLET, FILM COATED ORAL at 00:07

## 2020-01-07 RX ADMIN — FAMOTIDINE 20 MG: 20 TABLET, FILM COATED ORAL at 22:29

## 2020-01-07 RX ADMIN — Medication 10 ML: at 12:09

## 2020-01-07 RX ADMIN — OXYCODONE HYDROCHLORIDE AND ACETAMINOPHEN 1 TABLET: 5; 325 TABLET ORAL at 22:29

## 2020-01-07 RX ADMIN — MAGNESIUM SULFATE HEPTAHYDRATE 1 G: 1 INJECTION, SOLUTION INTRAVENOUS at 09:56

## 2020-01-07 RX ADMIN — ONDANSETRON 4 MG: 2 INJECTION INTRAMUSCULAR; INTRAVENOUS at 19:49

## 2020-01-07 RX ADMIN — MAGNESIUM SULFATE HEPTAHYDRATE 1 G: 1 INJECTION, SOLUTION INTRAVENOUS at 08:29

## 2020-01-07 RX ADMIN — Medication 10 MG: at 22:40

## 2020-01-07 RX ADMIN — ENOXAPARIN SODIUM 40 MG: 40 INJECTION SUBCUTANEOUS at 08:29

## 2020-01-07 RX ADMIN — FENOFIBRATE 160 MG: 160 TABLET ORAL at 08:28

## 2020-01-07 RX ADMIN — BUSPIRONE HYDROCHLORIDE 10 MG: 10 TABLET ORAL at 14:58

## 2020-01-07 RX ADMIN — ONDANSETRON 4 MG: 2 INJECTION INTRAMUSCULAR; INTRAVENOUS at 12:09

## 2020-01-07 RX ADMIN — BUSPIRONE HYDROCHLORIDE 10 MG: 10 TABLET ORAL at 08:29

## 2020-01-07 RX ADMIN — LEVETIRACETAM 500 MG: 500 TABLET, FILM COATED ORAL at 22:29

## 2020-01-07 RX ADMIN — INSULIN LISPRO 1 UNITS: 100 INJECTION, SOLUTION INTRAVENOUS; SUBCUTANEOUS at 08:37

## 2020-01-07 RX ADMIN — FAMOTIDINE 20 MG: 20 TABLET, FILM COATED ORAL at 08:28

## 2020-01-07 RX ADMIN — OXYCODONE HYDROCHLORIDE AND ACETAMINOPHEN 1 TABLET: 5; 325 TABLET ORAL at 00:07

## 2020-01-07 RX ADMIN — POTASSIUM CHLORIDE 40 MEQ: 1500 TABLET, EXTENDED RELEASE ORAL at 08:29

## 2020-01-07 RX ADMIN — BUSPIRONE HYDROCHLORIDE 10 MG: 10 TABLET ORAL at 22:29

## 2020-01-07 RX ADMIN — LAMOTRIGINE 25 MG: 25 TABLET ORAL at 08:28

## 2020-01-07 RX ADMIN — INSULIN LISPRO 1 UNITS: 100 INJECTION, SOLUTION INTRAVENOUS; SUBCUTANEOUS at 22:31

## 2020-01-07 RX ADMIN — INSULIN LISPRO 1 UNITS: 100 INJECTION, SOLUTION INTRAVENOUS; SUBCUTANEOUS at 17:42

## 2020-01-07 RX ADMIN — SIMVASTATIN 20 MG: 20 TABLET, FILM COATED ORAL at 22:29

## 2020-01-07 RX ADMIN — LEVETIRACETAM 500 MG: 500 TABLET, FILM COATED ORAL at 08:28

## 2020-01-07 RX ADMIN — DOCUSATE SODIUM 100 MG: 100 CAPSULE, LIQUID FILLED ORAL at 14:58

## 2020-01-07 RX ADMIN — OXYCODONE HYDROCHLORIDE AND ACETAMINOPHEN 1 TABLET: 5; 325 TABLET ORAL at 08:31

## 2020-01-07 RX ADMIN — OXYCODONE HYDROCHLORIDE AND ACETAMINOPHEN 1 TABLET: 5; 325 TABLET ORAL at 12:34

## 2020-01-07 RX ADMIN — OXYCODONE HYDROCHLORIDE AND ACETAMINOPHEN 1 TABLET: 5; 325 TABLET ORAL at 04:18

## 2020-01-07 ASSESSMENT — PAIN DESCRIPTION - DESCRIPTORS: DESCRIPTORS: ACHING

## 2020-01-07 ASSESSMENT — PAIN SCALES - GENERAL
PAINLEVEL_OUTOF10: 10
PAINLEVEL_OUTOF10: 3
PAINLEVEL_OUTOF10: 4
PAINLEVEL_OUTOF10: 0
PAINLEVEL_OUTOF10: 10
PAINLEVEL_OUTOF10: 7
PAINLEVEL_OUTOF10: 10
PAINLEVEL_OUTOF10: 8

## 2020-01-07 ASSESSMENT — PAIN DESCRIPTION - LOCATION
LOCATION: HEAD

## 2020-01-07 ASSESSMENT — PAIN DESCRIPTION - ONSET: ONSET: ON-GOING

## 2020-01-07 ASSESSMENT — PAIN DESCRIPTION - PAIN TYPE
TYPE: ACUTE PAIN
TYPE: ACUTE PAIN

## 2020-01-07 ASSESSMENT — PAIN - FUNCTIONAL ASSESSMENT: PAIN_FUNCTIONAL_ASSESSMENT: ACTIVITIES ARE NOT PREVENTED

## 2020-01-07 ASSESSMENT — PAIN DESCRIPTION - FREQUENCY: FREQUENCY: CONTINUOUS

## 2020-01-07 ASSESSMENT — PAIN DESCRIPTION - ORIENTATION: ORIENTATION: POSTERIOR

## 2020-01-07 ASSESSMENT — PAIN DESCRIPTION - PROGRESSION: CLINICAL_PROGRESSION: NOT CHANGED

## 2020-01-07 NOTE — CARE COORDINATION
Transition planning:    Surgery planned for tomorrow. Plan remains to return home with mother, will need wheeled walker.  Will monitor for needs after surgery

## 2020-01-07 NOTE — ANESTHESIA PRE PROCEDURE
acetaminophen (TYLENOL) tablet 650 mg  650 mg Oral Q4H PRN Dayami Lindsay MD   650 mg at 01/05/20 1548    ondansetron (ZOFRAN) injection 4 mg  4 mg Intravenous Q6H PRN Dayami Lindsay MD   4 mg at 01/07/20 1209    labetalol (NORMODYNE;TRANDATE) injection syringe 10 mg  10 mg Intravenous Q10 Min PRN Dayami Lindsay MD   10 mg at 01/05/20 2212    levETIRAcetam (KEPPRA) tablet 500 mg  500 mg Oral BID Dayami Lindsay MD   500 mg at 01/07/20 2837    magnesium sulfate 1 g in dextrose 5% 100 mL IVPB  1 g Intravenous PRN Dayami Lindsay MD   Stopped at 01/06/20 1000    magnesium hydroxide (MILK OF MAGNESIA) 400 MG/5ML suspension 30 mL  30 mL Oral Daily PRN Dayami Lindsay MD        sennosides-docusate sodium (SENOKOT-S) 8.6-50 MG tablet 1 tablet  1 tablet Oral BID Dayami Lindsay MD        docusate sodium (COLACE) capsule 100 mg  100 mg Oral TID Dayami Lindsay MD   100 mg at 01/06/20 2000       Allergies: Allergies   Allergen Reactions    Latex     Prozac [Fluoxetine Hcl] Other (See Comments)     Seizures       Problem List:    Patient Active Problem List   Diagnosis Code    Bipolar 1 disorder ([facility] F31.9    Recurrent depression ([facility] F33.9    SAH (subarachnoid hemorrhage) ([address] I60.9    Intraparenchymal hematoma of brain ([address] S06.360A    Cerebral hemorrhage ([address] I61.9    History of seizures Z87.898    Cavernoma D18.00       Past Medical History:        Diagnosis Date    Cerebral vascular malformation     [email or web address]    Diabetes mellitus ([address]     Seizure ([address]     Traumatic hemorrhage of left cerebrum without loss of consciousness ([address]     @ [facility]       Past Surgical History:  History reviewed. No pertinent surgical history. Social History:    Social History     Tobacco Use    Smoking status: Current Some Day Smoker     Packs/day: 0.30     Types: Cigarettes    Smokeless tobacco: Never Used   Substance Use Topics    Alcohol use:  No                                Ready to quit: full   Dental:          Pulmonary:   (+) current smoker                          ROS comment: .5 ppd   Cardiovascular:Negative CV ROS          ECG reviewed                        Neuro/Psych:   (+) seizures:, CVA:, headaches: migraine headaches, psychiatric history:depression/anxiety              ROS comment: SAH  bipolar GI/Hepatic/Renal:             Endo/Other:    (+) Diabetes, . Abdominal:           Vascular:                                 Normal sinus rhythm  Possible Left atrial enlargement  Borderline ECG  When compared with ECG of 04-JAN-2020 16:40,  No significant change was found     Anesthesia Plan      general     ASA 4     (Asa 4)  Induction: intravenous.                 chart review only         Otilia Amaya, APRN - CRNA   1/7/2020

## 2020-01-07 NOTE — PROGRESS NOTES
Neurosurgery ANA PAULA/Resident    Daily Progress Note     1/7/2020  8:15 AM    Chart reviewed. No acute events overnight. No new complaints. Vitals:    01/06/20 2144 01/06/20 2348 01/07/20 0415 01/07/20 0723   BP: 117/71 (!) 129/93 116/72 (!) 138/95   Pulse: 105 102 111 100   Resp: 15 18 16 13   Temp: 98 °F (36.7 °C) 97.6 °F (36.4 °C) 98.5 °F (36.9 °C) 97.5 °F (36.4 °C)   TempSrc: Oral Oral Oral Oral   SpO2:       Weight:       Height:           PE:   General: AOx3   Cardiovascular: Regular rate, no dependent edema, distal pulses 2+  Respiratory: Chest symmetric, no accessory muscle use, normal respirations  Motor intact. Ambulates to rest room without issues or difficulties. Lab Results   Component Value Date    WBC 10.5 01/07/2020    HGB 14.2 01/07/2020    HCT 43.9 01/07/2020     01/07/2020    CHOL 245 (H) 01/05/2020    TRIG 420 (H) 01/07/2020    HDL 24 (L) 01/05/2020    LDLDIRECT 104 (H) 01/05/2020    ALT 71 (H) 01/05/2020    AST 29 01/05/2020     01/07/2020    K 3.6 (L) 01/07/2020     01/07/2020    CREATININE 0.90 01/07/2020    BUN 15 01/07/2020    CO2 22 01/07/2020    TSH 3.19 01/05/2020    LABA1C 8.7 (H) 01/05/2020         A/P  31 yo female with Left occipital intraparenchymal hemorrhage and L occipital cavernoma    -Had lengthy discussion with patient about current pathology and need for operative intervention  -Risks, benefits, and alternatives discussed  -Will discuss further when patient's mother arrives per patient's request  -Plan for OR tomorrow  -NPO midnight. Abx SAM Maldonado, [address], [facility]

## 2020-01-07 NOTE — PROGRESS NOTES
Daily Progress Note  Neuro Critical Care    Patient Name: Al Norman  Patient : 1995  Room/Bed: 2112/1427-57  Code Status: Full  Allergies: Allergies   Allergen Reactions    Latex     Prozac [Fluoxetine Hcl] Other (See Comments)     Seizures       CHIEF COMPLAINT:     Headache     INTERVAL HISTORY    Initial Presentation (Admitted 19): The patient is a 29 yo female with history of seizures as a child, DM, bipolar, and previous left occipital IPH/SAH who presents with thunderclap headache and nausea/vomiting. She reports that the headache started approximately two days ago and has been waxing and waning since. Has a h/o IPH of unknown etiology first identified 2019 on CT head and was admitted for CTA head/neck,  Since then has had x2 CTA H/N, MRI w wo contrast, and diagnostic angiogram negative for any vascular malformation. She has continued to follow with neurosurgery and neurology outpatient for her continued headaches. She also reports history of seizures up until age 1 and then one provoked seizure related to Prozac use. She is not on any AEDs currently at home. Hospital Course:  : CT head this morning is stable. MRI brain consistent with cavernoma and recent hemorrhage. MRV negative for venous thrombosis. Neurosurgery discussed MRI findings with patient and recommendation for resection. MRI brain with stealth protocol ordered. LFTs obtained revealed elevated alk phos and ALT, will obtain Liver ultrasound and repeat triglycerides tomorrow. Last 24h:   No acute events overnight. Pt with plan for mass resection tomorrow with NSG. Some ongoing h/a. Afebrile. Liver U/S WNL. BP's largely within goal parameters.     CURRENT MEDICATIONS:  SCHEDULED MEDICATIONS:   enoxaparin  40 mg Subcutaneous Daily    insulin lispro  0-6 Units Subcutaneous TID     insulin lispro  0-3 Units Subcutaneous Nightly    nicotine  1 patch Transdermal Daily    famotidine  20 mg Oral BID    fenofibrate  160 mg Oral Daily    simvastatin  20 mg Oral Nightly    busPIRone  10 mg Oral TID    lamoTRIgine  25 mg Oral Daily    sodium chloride flush  10 mL Intravenous 2 times per day    levETIRAcetam  500 mg Oral BID    sennosides-docusate sodium  1 tablet Oral BID    docusate sodium  100 mg Oral TID     CONTINUOUS INFUSIONS:   dextrose       PRN MEDICATIONS:   glucose, dextrose, glucagon (rDNA), dextrose, sodium chloride flush, oxyCODONE-acetaminophen, sodium chloride flush, acetaminophen, ondansetron, labetalol, magnesium sulfate, magnesium hydroxide    VITALS:  Temperature Range: Temp: 98.5 °F (36.9 °C) Temp  Av.2 °F (36.8 °C)  Min: 97.6 °F (36.4 °C)  Max: 99 °F (37.2 °C)  BP Range: Systolic (08EWS), EKK:144 , Min:107 , LZA:899     Diastolic (39WZH), YGE:32, Min:50, Max:94    Pulse Range: Pulse  Av  Min: 92  Max: 111  Respiration Range: Resp  Avg: 15.8  Min: 15  Max: 18  Current Pulse Ox: SpO2: 97 %  24HR Pulse Ox Range: SpO2  Av.3 %  Min: 97 %  Max: 98 %  Patient Vitals for the past 12 hrs:   BP Temp Temp src Pulse Resp   20 0415 116/72 98.5 °F (36.9 °C) Oral 111 16   20 2348 (!) 129/93 97.6 °F (36.4 °C) Oral 102 18   20 2144 117/71 98 °F (36.7 °C) Oral 105 15     Estimated body mass index is 33.32 kg/m² as calculated from the following:    Height as of this encounter: 5' 1\" (1.549 m).     Weight as of this encounter: 176 lb 5.9 oz (80 kg).  []<16 Severe malnutrition  []16-16.99 Moderate malnutrition  []17-18.49 Mild malnutrition  []18.5-24.9 Normal  []25-29.9 Overweight (not obese)  [x]30-34.9 Obese class 1 (Low Risk)  []35-39.9 Obese class 2 (Moderate Risk)  []?40 Obese class 3 (High Risk)    RECENT LABS:   Lab Results   Component Value Date    WBC 10.5 2020    HGB 14.2 2020    HCT 43.9 2020     2020    CHOL 245 (H) 2020    TRIG 1,015 (H) 2020    HDL 24 (L) 2020    LDLDIRECT 104 (H) 2020    ALT 71 (H) 01/05/2020    AST 29 01/05/2020     01/07/2020    K 3.6 (L) 01/07/2020     01/07/2020    CREATININE 0.90 01/07/2020    BUN 15 01/07/2020    CO2 22 01/07/2020    TSH 3.19 01/05/2020    LABA1C 8.7 (H) 01/05/2020     24 HOUR INTAKE/OUTPUT:  No intake or output data in the 24 hours ending 01/07/20 4536    IMAGING:   Us Liver    Result Date: 1/6/2020  EXAMINATION: RIGHT UPPER QUADRANT ULTRASOUND 1/6/2020 3:17 pm COMPARISON: None. HISTORY: ORDERING SYSTEM PROVIDED HISTORY: elevated LFTs FINDINGS: LIVER:  The liver demonstrates normal echogenicity without evidence of intrahepatic biliary ductal dilatation. Liver length is 16.4 cm. BILIARY SYSTEM:  Gallbladder is unremarkable without evidence of significant pericholecystic fluid, wall thickening or stones. Negative sonographic Márquez's sign. Common bile duct is within normal limits measuring 4 mm. RIGHT KIDNEY: The right kidney is grossly unremarkable without evidence of hydronephrosis. Right renal length is 8.3 cm. PANCREAS:  Visualized portions of the pancreas are unremarkable. OTHER: No evidence of right upper quadrant ascites. Negative right upper quadrant ultrasound. Mrv Head W Wo Contrast    Result Date: 1/5/2020  EXAMINATION: MRV OF THE HEAD WITH AND WITHOUT CONTRAST  1/5/2020: TECHNIQUE: Multiplanar multisequence MRV of the head was performed with and without the administration of intravenous contrast. COMPARISON: None. HISTORY: ORDERING SYSTEM PROVIDED HISTORY: sah, avm:? TECHNOLOGIST PROVIDED HISTORY: sah, avm:? Is the patient pregnant?->No FINDINGS: No focal stenosis or thrombus is seen of the major dural venous sinuses. Left occipital intraparenchymal susceptibility artifact is noted and better evaluated on separate MRI of the brain performed concurrently. There is a small left occipital developmental venous anomaly. 1. No dural venous sinus thrombosis.  2. Left occipital intraparenchymal susceptibility artifact consistent with residua performed without the administration of intravenous contrast. COMPARISON: MRI brain from 01/05/2020. HISTORY: ORDERING SYSTEM PROVIDED HISTORY: STEALTH PROTOCOL T2 ONLY FOR SURGICAL PLANNING TECHNOLOGIST PROVIDED HISTORY: STEALTH PROTOCOL T2 ONLY FOR SURGICAL PLANNING Is the patient pregnant?->No FINDINGS: Noncontrast stealth images of the brain were obtained for surgical neuronavigation. There is redemonstration of a left occipital lobe lesion with surrounding T2 signal hypointensity. No other brain masses are visualized. Stealth images obtained for surgical neuronavigation. Labs and Images reviewed with:  [x] Dr. Lou Thurman. Minnie    [] Dr. Karla Villegas  [] Dr. Deny Moe  [] There are no new interval images to review. PHYSICAL EXAM       CONSTITUTIONAL:  Well developed, well nourished, alert and oriented x 3, in no acute distress. GCS 15. Nontoxic. No dysarthria. No aphasia. HEAD:  normocephalic, atraumatic    EYES:  PERRLA, EOMI.   ENT:  moist mucous membranes   NECK:  supple, symmetric   LUNGS:  Equal air entry bilaterally   CARDIOVASCULAR:  normal s1 / s2, RRR, distal pulses intact   ABDOMEN:  Soft, no rigidity   NEUROLOGIC:  Mental Status:  A & O x3,awake             Cranial Nerves:    cranial nerves II-XII are grossly intact    Motor Exam:    Drift:  absent  Tone:  normal    Motor exam is symmetrical 5 out of 5 all extremities bilaterally    Sensory:    Touch:    Right Upper Extremity:  normal  Left Upper Extremity:  normal  Right Lower Extremity:  normal  Left Lower Extremity:  normal    Gait:  normal       DRAINS:  [x] There are no drains for Neuro Critical Care to monitor at this time.      ASSESSMENT AND PLAN:       This is a 30 y.o. female with maximal onset headache this morning preceded by nausea and emesis with a h/o of left occipital IPH in same location at [facility] 08/2019 - with negative CTA, MRI, and diagnostic angiogram.  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     PLAN/MEDICAL DECISION MAKING:     NEUROLOGIC:  - Small acute L occipital SAH  - CTA H/N -unremarkable   - MRI brain w wo contrast consistent with cavernoma  - MRV unremarkable, follow up MRI yemi with stealth  - Neurosurgery following, appreciate recs  - Strict SBP goal  per neuro endovascular recommendations  - Load Keppra 1000 mg IV, continue with maintenance Keppra 500 BID  - Neuro checks per protocol  -Headache treated with Percocet     CARDIOVASCULAR:  - Goal SBP   - Lipid panel: Cholesterol 245, , triglycerides 1,015  - Fenofibrate 135 mg QD, Zocor 20 mg QHS  - Follow up triglycerides tomorrow AM  - Echo 8/2019: EF 55-60%  - Continue telemetry     PULMONARY:  - Satting well ORA     RENAL/FLUID/ELECTROLYTE:  - BUN 15/ Creatinine 0.9  - IVF discontinued  - Monitor I&Os  - Replace electrolytes PRN  - Daily BMP  - Hypomagnesemia, 1.6; replaced with 2 grams magnesium sulfate --unchanged after treatment yesterday.      GI/NUTRITION:  NUTRITION:  General Diet  - Bowel regimen: senna-s daily  - GI prophylaxis: Not indicated  - Mildly elevated Alk Phos and ALT, Liver U/S WNL     ID:  - Tmax afebrile  - Mild leukocytosis unchanged, WBC 10.5  - UA negative  - Continue to monitor for fevers  - Daily CBC     HEME:   - H&H 14.4/43.9  - Platelets 488  - Daily CBC     ENDOCRINE:  - Continue to monitor blood glucose, goal <180  - Hemoglobin A1C 8.7  - Start low insulin sliding scale     OTHER:  - PT/OT/ST   - OT to document visual field exam with therapy  - Code Status: FULL  - History of Bipolar: continue buspar 10 mg TID and Lamictal 25 mg QD  - Outpatient, consider seizure risk with Buspar use and adjust as able     PROPHYLAXIS:  Stress ulcer: H2 blocker     DVT PROPHYLAXIS:  - SCD sleeves - Thigh High   - Start Lovenox 40 mg QD    DISPOSITION:  [x] OK for out of ICU from Neuro Critical Care standpoint - We will continue to follow along.      For any changes in exam or patient status please contact Neuro Critical Care.       Joelle Montgomery MD  Neuro Critical Care  Pager [phone]  1/7/2020     6:37 AM

## 2020-01-08 ENCOUNTER — APPOINTMENT (OUTPATIENT)
Dept: CT IMAGING | Age: 25
DRG: 021 | End: 2020-01-08
Payer: MEDICARE

## 2020-01-08 ENCOUNTER — ANESTHESIA (OUTPATIENT)
Dept: OPERATING ROOM | Age: 25
DRG: 021 | End: 2020-01-08
Payer: MEDICARE

## 2020-01-08 VITALS — OXYGEN SATURATION: 100 % | TEMPERATURE: 100 F | SYSTOLIC BLOOD PRESSURE: 120 MMHG | DIASTOLIC BLOOD PRESSURE: 56 MMHG

## 2020-01-08 LAB
ABSOLUTE EOS #: 0.36 K/UL (ref 0–0.44)
ABSOLUTE IMMATURE GRANULOCYTE: 0.04 K/UL (ref 0–0.3)
ABSOLUTE LYMPH #: 3.71 K/UL (ref 1.1–3.7)
ABSOLUTE MONO #: 0.89 K/UL (ref 0.1–1.2)
ANION GAP SERPL CALCULATED.3IONS-SCNC: 16 MMOL/L (ref 9–17)
BASOPHILS # BLD: 1 % (ref 0–2)
BASOPHILS ABSOLUTE: 0.1 K/UL (ref 0–0.2)
BUN BLDV-MCNC: 17 MG/DL (ref 6–20)
BUN/CREAT BLD: ABNORMAL (ref 9–20)
CALCIUM SERPL-MCNC: 10 MG/DL (ref 8.6–10.4)
CHLORIDE BLD-SCNC: 99 MMOL/L (ref 98–107)
CO2: 23 MMOL/L (ref 20–31)
CREAT SERPL-MCNC: 0.9 MG/DL (ref 0.5–0.9)
DIFFERENTIAL TYPE: ABNORMAL
EOSINOPHILS RELATIVE PERCENT: 3 % (ref 1–4)
GFR AFRICAN AMERICAN: >60 ML/MIN
GFR NON-AFRICAN AMERICAN: >60 ML/MIN
GFR SERPL CREATININE-BSD FRML MDRD: ABNORMAL ML/MIN/{1.73_M2}
GFR SERPL CREATININE-BSD FRML MDRD: ABNORMAL ML/MIN/{1.73_M2}
GLUCOSE BLD-MCNC: 145 MG/DL (ref 70–99)
GLUCOSE BLD-MCNC: 163 MG/DL (ref 65–105)
GLUCOSE BLD-MCNC: 166 MG/DL (ref 65–105)
GLUCOSE BLD-MCNC: 226 MG/DL (ref 65–105)
GLUCOSE BLD-MCNC: 251 MG/DL (ref 65–105)
HCT VFR BLD CALC: 43.5 % (ref 36.3–47.1)
HEMOGLOBIN: 14.6 G/DL (ref 11.9–15.1)
IMMATURE GRANULOCYTES: 0 %
LYMPHOCYTES # BLD: 32 % (ref 24–43)
MAGNESIUM: 1.3 MG/DL (ref 1.6–2.6)
MCH RBC QN AUTO: 27.8 PG (ref 25.2–33.5)
MCHC RBC AUTO-ENTMCNC: 33.6 G/DL (ref 28.4–34.8)
MCV RBC AUTO: 82.9 FL (ref 82.6–102.9)
MONOCYTES # BLD: 8 % (ref 3–12)
NRBC AUTOMATED: 0 PER 100 WBC
PDW BLD-RTO: 12.3 % (ref 11.8–14.4)
PLATELET # BLD: 226 K/UL (ref 138–453)
PLATELET ESTIMATE: ABNORMAL
PMV BLD AUTO: 11.5 FL (ref 8.1–13.5)
POTASSIUM SERPL-SCNC: 3.8 MMOL/L (ref 3.7–5.3)
RBC # BLD: 5.25 M/UL (ref 3.95–5.11)
RBC # BLD: ABNORMAL 10*6/UL
SEG NEUTROPHILS: 56 % (ref 36–65)
SEGMENTED NEUTROPHILS ABSOLUTE COUNT: 6.66 K/UL (ref 1.5–8.1)
SODIUM BLD-SCNC: 138 MMOL/L (ref 135–144)
WBC # BLD: 11.8 K/UL (ref 3.5–11.3)
WBC # BLD: ABNORMAL 10*3/UL

## 2020-01-08 PROCEDURE — 2580000003 HC RX 258: Performed by: STUDENT IN AN ORGANIZED HEALTH CARE EDUCATION/TRAINING PROGRAM

## 2020-01-08 PROCEDURE — 6360000002 HC RX W HCPCS: Performed by: REGISTERED NURSE

## 2020-01-08 PROCEDURE — 3600000004 HC SURGERY LEVEL 4 BASE: Performed by: NEUROLOGICAL SURGERY

## 2020-01-08 PROCEDURE — 2580000003 HC RX 258: Performed by: NURSE ANESTHETIST, CERTIFIED REGISTERED

## 2020-01-08 PROCEDURE — 86850 RBC ANTIBODY SCREEN: CPT

## 2020-01-08 PROCEDURE — C1729 CATH, DRAINAGE: HCPCS | Performed by: NEUROLOGICAL SURGERY

## 2020-01-08 PROCEDURE — 2500000003 HC RX 250 WO HCPCS: Performed by: NURSE ANESTHETIST, CERTIFIED REGISTERED

## 2020-01-08 PROCEDURE — 3700000001 HC ADD 15 MINUTES (ANESTHESIA): Performed by: NEUROLOGICAL SURGERY

## 2020-01-08 PROCEDURE — 85025 COMPLETE CBC W/AUTO DIFF WBC: CPT

## 2020-01-08 PROCEDURE — 2780000010 HC IMPLANT OTHER: Performed by: NEUROLOGICAL SURGERY

## 2020-01-08 PROCEDURE — 3700000000 HC ANESTHESIA ATTENDED CARE: Performed by: NEUROLOGICAL SURGERY

## 2020-01-08 PROCEDURE — 6360000002 HC RX W HCPCS: Performed by: STUDENT IN AN ORGANIZED HEALTH CARE EDUCATION/TRAINING PROGRAM

## 2020-01-08 PROCEDURE — 86901 BLOOD TYPING SEROLOGIC RH(D): CPT

## 2020-01-08 PROCEDURE — 6360000002 HC RX W HCPCS: Performed by: ANESTHESIOLOGY

## 2020-01-08 PROCEDURE — 2500000003 HC RX 250 WO HCPCS: Performed by: STUDENT IN AN ORGANIZED HEALTH CARE EDUCATION/TRAINING PROGRAM

## 2020-01-08 PROCEDURE — 82947 ASSAY GLUCOSE BLOOD QUANT: CPT

## 2020-01-08 PROCEDURE — 87086 URINE CULTURE/COLONY COUNT: CPT

## 2020-01-08 PROCEDURE — 6370000000 HC RX 637 (ALT 250 FOR IP): Performed by: PSYCHIATRY & NEUROLOGY

## 2020-01-08 PROCEDURE — 86900 BLOOD TYPING SEROLOGIC ABO: CPT

## 2020-01-08 PROCEDURE — 2580000003 HC RX 258: Performed by: ANESTHESIOLOGY

## 2020-01-08 PROCEDURE — 61608 RESECT/EXCISE CRANIAL LESION: CPT | Performed by: NEUROLOGICAL SURGERY

## 2020-01-08 PROCEDURE — 83735 ASSAY OF MAGNESIUM: CPT

## 2020-01-08 PROCEDURE — APPSS45 APP SPLIT SHARED TIME 31-45 MINUTES: Performed by: REGISTERED NURSE

## 2020-01-08 PROCEDURE — 2500000003 HC RX 250 WO HCPCS: Performed by: NEUROLOGICAL SURGERY

## 2020-01-08 PROCEDURE — 7100000001 HC PACU RECOVERY - ADDTL 15 MIN: Performed by: NEUROLOGICAL SURGERY

## 2020-01-08 PROCEDURE — 2720000010 HC SURG SUPPLY STERILE: Performed by: NEUROLOGICAL SURGERY

## 2020-01-08 PROCEDURE — 00500ZZ DESTRUCTION OF BRAIN, OPEN APPROACH: ICD-10-PCS | Performed by: NEUROLOGICAL SURGERY

## 2020-01-08 PROCEDURE — 6370000000 HC RX 637 (ALT 250 FOR IP): Performed by: STUDENT IN AN ORGANIZED HEALTH CARE EDUCATION/TRAINING PROGRAM

## 2020-01-08 PROCEDURE — 36415 COLL VENOUS BLD VENIPUNCTURE: CPT

## 2020-01-08 PROCEDURE — 70450 CT HEAD/BRAIN W/O DYE: CPT

## 2020-01-08 PROCEDURE — C1713 ANCHOR/SCREW BN/BN,TIS/BN: HCPCS | Performed by: NEUROLOGICAL SURGERY

## 2020-01-08 PROCEDURE — 3600000014 HC SURGERY LEVEL 4 ADDTL 15MIN: Performed by: NEUROLOGICAL SURGERY

## 2020-01-08 PROCEDURE — 80048 BASIC METABOLIC PNL TOTAL CA: CPT

## 2020-01-08 PROCEDURE — 7100000000 HC PACU RECOVERY - FIRST 15 MIN: Performed by: NEUROLOGICAL SURGERY

## 2020-01-08 PROCEDURE — 6360000002 HC RX W HCPCS: Performed by: NURSE ANESTHETIST, CERTIFIED REGISTERED

## 2020-01-08 PROCEDURE — 86920 COMPATIBILITY TEST SPIN: CPT

## 2020-01-08 PROCEDURE — 61781 SCAN PROC CRANIAL INTRA: CPT | Performed by: NEUROLOGICAL SURGERY

## 2020-01-08 PROCEDURE — 6370000000 HC RX 637 (ALT 250 FOR IP): Performed by: NURSE PRACTITIONER

## 2020-01-08 PROCEDURE — 6370000000 HC RX 637 (ALT 250 FOR IP): Performed by: NEUROLOGICAL SURGERY

## 2020-01-08 PROCEDURE — 2580000003 HC RX 258: Performed by: NEUROLOGICAL SURGERY

## 2020-01-08 PROCEDURE — 2000000003 HC NEURO ICU R&B

## 2020-01-08 PROCEDURE — 2700000000 HC OXYGEN THERAPY PER DAY

## 2020-01-08 PROCEDURE — 2709999900 HC NON-CHARGEABLE SUPPLY: Performed by: NEUROLOGICAL SURGERY

## 2020-01-08 DEVICE — NEURO SCREWS, CROSS-PIN, SELF-DRILLING: Type: IMPLANTABLE DEVICE | Site: CRANIAL | Status: FUNCTIONAL

## 2020-01-08 DEVICE — COLLAGEN DURAL REGENERATION MEMBRANE 2IN X 2IN (5CM X 5CM)
Type: IMPLANTABLE DEVICE | Site: CRANIAL | Status: FUNCTIONAL
Brand: DURAMATRIX-ONLAY PLUS

## 2020-01-08 DEVICE — LOW PROFILE BURR HOLE COVER, W/TAB, 14MM
Type: IMPLANTABLE DEVICE | Site: CRANIAL | Status: FUNCTIONAL
Brand: UNIVERSAL NEURO 2

## 2020-01-08 DEVICE — LOW PROFILE PLATE
Type: IMPLANTABLE DEVICE | Site: CRANIAL | Status: FUNCTIONAL
Brand: UNIVERSAL NEURO 2

## 2020-01-08 RX ORDER — FENOFIBRATE 160 MG/1
160 TABLET ORAL DAILY
Status: DISCONTINUED | OUTPATIENT
Start: 2020-01-09 | End: 2020-01-09

## 2020-01-08 RX ORDER — SODIUM CHLORIDE 9 MG/ML
INJECTION, SOLUTION INTRAVENOUS CONTINUOUS PRN
Status: DISCONTINUED | OUTPATIENT
Start: 2020-01-08 | End: 2020-01-08 | Stop reason: SDUPTHER

## 2020-01-08 RX ORDER — DEXAMETHASONE SODIUM PHOSPHATE 4 MG/ML
4 INJECTION, SOLUTION INTRA-ARTICULAR; INTRALESIONAL; INTRAMUSCULAR; INTRAVENOUS; SOFT TISSUE EVERY 6 HOURS
Status: DISCONTINUED | OUTPATIENT
Start: 2020-01-08 | End: 2020-01-10 | Stop reason: HOSPADM

## 2020-01-08 RX ORDER — ROCURONIUM BROMIDE 10 MG/ML
INJECTION, SOLUTION INTRAVENOUS PRN
Status: DISCONTINUED | OUTPATIENT
Start: 2020-01-08 | End: 2020-01-08 | Stop reason: SDUPTHER

## 2020-01-08 RX ORDER — LABETALOL HYDROCHLORIDE 5 MG/ML
5 INJECTION, SOLUTION INTRAVENOUS EVERY 10 MIN PRN
Status: DISCONTINUED | OUTPATIENT
Start: 2020-01-08 | End: 2020-01-08 | Stop reason: HOSPADM

## 2020-01-08 RX ORDER — LIDOCAINE HYDROCHLORIDE 10 MG/ML
INJECTION, SOLUTION EPIDURAL; INFILTRATION; INTRACAUDAL; PERINEURAL PRN
Status: DISCONTINUED | OUTPATIENT
Start: 2020-01-08 | End: 2020-01-08 | Stop reason: SDUPTHER

## 2020-01-08 RX ORDER — ONDANSETRON 2 MG/ML
4 INJECTION INTRAMUSCULAR; INTRAVENOUS
Status: DISCONTINUED | OUTPATIENT
Start: 2020-01-08 | End: 2020-01-08 | Stop reason: HOSPADM

## 2020-01-08 RX ORDER — PROMETHAZINE HYDROCHLORIDE 25 MG/ML
12.5 INJECTION, SOLUTION INTRAMUSCULAR; INTRAVENOUS ONCE
Status: COMPLETED | OUTPATIENT
Start: 2020-01-08 | End: 2020-01-08

## 2020-01-08 RX ORDER — MIDAZOLAM HYDROCHLORIDE 1 MG/ML
0.5 INJECTION INTRAMUSCULAR; INTRAVENOUS 4 TIMES DAILY PRN
Status: DISCONTINUED | OUTPATIENT
Start: 2020-01-08 | End: 2020-01-09

## 2020-01-08 RX ORDER — LAMOTRIGINE 25 MG/1
25 TABLET ORAL DAILY
Status: DISCONTINUED | OUTPATIENT
Start: 2020-01-09 | End: 2020-01-10 | Stop reason: HOSPADM

## 2020-01-08 RX ORDER — MANNITOL 250 MG/ML
INJECTION, SOLUTION INTRAVENOUS PRN
Status: DISCONTINUED | OUTPATIENT
Start: 2020-01-08 | End: 2020-01-08 | Stop reason: SDUPTHER

## 2020-01-08 RX ORDER — 0.9 % SODIUM CHLORIDE 0.9 %
250 INTRAVENOUS SOLUTION INTRAVENOUS ONCE
Status: DISCONTINUED | OUTPATIENT
Start: 2020-01-08 | End: 2020-01-10 | Stop reason: HOSPADM

## 2020-01-08 RX ORDER — DOCUSATE SODIUM 100 MG/1
100 CAPSULE, LIQUID FILLED ORAL 3 TIMES DAILY
Status: DISCONTINUED | OUTPATIENT
Start: 2020-01-08 | End: 2020-01-09

## 2020-01-08 RX ORDER — HYDRALAZINE HYDROCHLORIDE 20 MG/ML
10 INJECTION INTRAMUSCULAR; INTRAVENOUS
Status: DISCONTINUED | OUTPATIENT
Start: 2020-01-08 | End: 2020-01-08

## 2020-01-08 RX ORDER — OXYCODONE HYDROCHLORIDE AND ACETAMINOPHEN 5; 325 MG/1; MG/1
1 TABLET ORAL PRN
Status: DISCONTINUED | OUTPATIENT
Start: 2020-01-08 | End: 2020-01-08 | Stop reason: HOSPADM

## 2020-01-08 RX ORDER — MAGNESIUM SULFATE 1 G/100ML
2 INJECTION INTRAVENOUS ONCE
Status: COMPLETED | OUTPATIENT
Start: 2020-01-08 | End: 2020-01-08

## 2020-01-08 RX ORDER — FENTANYL CITRATE 50 UG/ML
25 INJECTION, SOLUTION INTRAMUSCULAR; INTRAVENOUS EVERY 5 MIN PRN
Status: DISCONTINUED | OUTPATIENT
Start: 2020-01-08 | End: 2020-01-08 | Stop reason: HOSPADM

## 2020-01-08 RX ORDER — FENTANYL CITRATE 50 UG/ML
INJECTION, SOLUTION INTRAMUSCULAR; INTRAVENOUS PRN
Status: DISCONTINUED | OUTPATIENT
Start: 2020-01-08 | End: 2020-01-08 | Stop reason: SDUPTHER

## 2020-01-08 RX ORDER — MAGNESIUM HYDROXIDE 1200 MG/15ML
LIQUID ORAL CONTINUOUS PRN
Status: COMPLETED | OUTPATIENT
Start: 2020-01-08 | End: 2020-01-08

## 2020-01-08 RX ORDER — MORPHINE SULFATE 2 MG/ML
2 INJECTION, SOLUTION INTRAMUSCULAR; INTRAVENOUS EVERY 5 MIN PRN
Status: DISCONTINUED | OUTPATIENT
Start: 2020-01-08 | End: 2020-01-08 | Stop reason: HOSPADM

## 2020-01-08 RX ORDER — LEVETIRACETAM 500 MG/1
500 TABLET ORAL 2 TIMES DAILY
Status: DISCONTINUED | OUTPATIENT
Start: 2020-01-08 | End: 2020-01-10 | Stop reason: HOSPADM

## 2020-01-08 RX ORDER — OXYCODONE HYDROCHLORIDE AND ACETAMINOPHEN 5; 325 MG/1; MG/1
2 TABLET ORAL PRN
Status: DISCONTINUED | OUTPATIENT
Start: 2020-01-08 | End: 2020-01-08 | Stop reason: HOSPADM

## 2020-01-08 RX ORDER — PHENYLEPHRINE HYDROCHLORIDE 10 MG/ML
INJECTION INTRAVENOUS PRN
Status: DISCONTINUED | OUTPATIENT
Start: 2020-01-08 | End: 2020-01-08 | Stop reason: SDUPTHER

## 2020-01-08 RX ORDER — SIMVASTATIN 20 MG
20 TABLET ORAL NIGHTLY
Status: DISCONTINUED | OUTPATIENT
Start: 2020-01-08 | End: 2020-01-09

## 2020-01-08 RX ORDER — GINSENG 100 MG
CAPSULE ORAL PRN
Status: DISCONTINUED | OUTPATIENT
Start: 2020-01-08 | End: 2020-01-08 | Stop reason: ALTCHOICE

## 2020-01-08 RX ORDER — ONDANSETRON 2 MG/ML
4 INJECTION INTRAMUSCULAR; INTRAVENOUS ONCE
Status: COMPLETED | OUTPATIENT
Start: 2020-01-08 | End: 2020-01-08

## 2020-01-08 RX ORDER — PROPOFOL 10 MG/ML
INJECTION, EMULSION INTRAVENOUS PRN
Status: DISCONTINUED | OUTPATIENT
Start: 2020-01-08 | End: 2020-01-08 | Stop reason: SDUPTHER

## 2020-01-08 RX ORDER — BUSPIRONE HYDROCHLORIDE 10 MG/1
10 TABLET ORAL 3 TIMES DAILY
Status: DISCONTINUED | OUTPATIENT
Start: 2020-01-08 | End: 2020-01-10 | Stop reason: HOSPADM

## 2020-01-08 RX ORDER — ESMOLOL HYDROCHLORIDE 10 MG/ML
INJECTION INTRAVENOUS PRN
Status: DISCONTINUED | OUTPATIENT
Start: 2020-01-08 | End: 2020-01-08 | Stop reason: SDUPTHER

## 2020-01-08 RX ORDER — LIDOCAINE HYDROCHLORIDE AND EPINEPHRINE 10; 10 MG/ML; UG/ML
INJECTION, SOLUTION INFILTRATION; PERINEURAL PRN
Status: DISCONTINUED | OUTPATIENT
Start: 2020-01-08 | End: 2020-01-08 | Stop reason: ALTCHOICE

## 2020-01-08 RX ORDER — ONDANSETRON 2 MG/ML
8 INJECTION INTRAMUSCULAR; INTRAVENOUS ONCE
Status: DISCONTINUED | OUTPATIENT
Start: 2020-01-08 | End: 2020-01-10 | Stop reason: HOSPADM

## 2020-01-08 RX ORDER — ONDANSETRON 2 MG/ML
INJECTION INTRAMUSCULAR; INTRAVENOUS PRN
Status: DISCONTINUED | OUTPATIENT
Start: 2020-01-08 | End: 2020-01-08 | Stop reason: SDUPTHER

## 2020-01-08 RX ORDER — DIPHENHYDRAMINE HYDROCHLORIDE 50 MG/ML
12.5 INJECTION INTRAMUSCULAR; INTRAVENOUS
Status: DISCONTINUED | OUTPATIENT
Start: 2020-01-08 | End: 2020-01-08 | Stop reason: HOSPADM

## 2020-01-08 RX ORDER — ONDANSETRON 2 MG/ML
4 INJECTION INTRAMUSCULAR; INTRAVENOUS EVERY 6 HOURS PRN
Status: DISCONTINUED | OUTPATIENT
Start: 2020-01-08 | End: 2020-01-09

## 2020-01-08 RX ORDER — FENTANYL CITRATE 50 UG/ML
50 INJECTION, SOLUTION INTRAMUSCULAR; INTRAVENOUS PRN
Status: DISCONTINUED | OUTPATIENT
Start: 2020-01-08 | End: 2020-01-09

## 2020-01-08 RX ORDER — OXYCODONE HYDROCHLORIDE 5 MG/1
5 TABLET ORAL EVERY 4 HOURS PRN
Status: DISCONTINUED | OUTPATIENT
Start: 2020-01-08 | End: 2020-01-10 | Stop reason: HOSPADM

## 2020-01-08 RX ADMIN — FAMOTIDINE 20 MG: 20 TABLET, FILM COATED ORAL at 08:37

## 2020-01-08 RX ADMIN — DOCUSATE SODIUM 100 MG: 100 CAPSULE, LIQUID FILLED ORAL at 08:37

## 2020-01-08 RX ADMIN — OXYCODONE HYDROCHLORIDE AND ACETAMINOPHEN 1 TABLET: 5; 325 TABLET ORAL at 03:04

## 2020-01-08 RX ADMIN — MANNITOL 50 G: 12.5 INJECTION, SOLUTION INTRAVENOUS at 15:11

## 2020-01-08 RX ADMIN — ONDANSETRON 4 MG: 2 INJECTION INTRAMUSCULAR; INTRAVENOUS at 12:17

## 2020-01-08 RX ADMIN — PHENYLEPHRINE HYDROCHLORIDE 100 MCG: 10 INJECTION INTRAVENOUS at 15:37

## 2020-01-08 RX ADMIN — PHENYLEPHRINE HYDROCHLORIDE 100 MCG: 10 INJECTION INTRAVENOUS at 15:41

## 2020-01-08 RX ADMIN — FENTANYL CITRATE 150 MCG: 50 INJECTION, SOLUTION INTRAMUSCULAR; INTRAVENOUS at 13:11

## 2020-01-08 RX ADMIN — ROCURONIUM BROMIDE 20 MG: 10 INJECTION INTRAVENOUS at 15:42

## 2020-01-08 RX ADMIN — OXYCODONE HYDROCHLORIDE AND ACETAMINOPHEN 1 TABLET: 5; 325 TABLET ORAL at 08:36

## 2020-01-08 RX ADMIN — ESMOLOL HYDROCHLORIDE 10 MG: 10 INJECTION, SOLUTION INTRAVENOUS at 18:04

## 2020-01-08 RX ADMIN — ROCURONIUM BROMIDE 10 MG: 10 INJECTION INTRAVENOUS at 17:24

## 2020-01-08 RX ADMIN — HYDRALAZINE HYDROCHLORIDE 10 MG: 20 INJECTION INTRAMUSCULAR; INTRAVENOUS at 19:47

## 2020-01-08 RX ADMIN — HYDRALAZINE HYDROCHLORIDE 10 MG: 20 INJECTION INTRAMUSCULAR; INTRAVENOUS at 03:04

## 2020-01-08 RX ADMIN — FENTANYL CITRATE 100 MCG: 50 INJECTION, SOLUTION INTRAMUSCULAR; INTRAVENOUS at 14:37

## 2020-01-08 RX ADMIN — LAMOTRIGINE 25 MG: 25 TABLET ORAL at 08:37

## 2020-01-08 RX ADMIN — OXYCODONE HYDROCHLORIDE 5 MG: 5 TABLET ORAL at 23:08

## 2020-01-08 RX ADMIN — SODIUM CHLORIDE: 9 INJECTION, SOLUTION INTRAVENOUS at 15:35

## 2020-01-08 RX ADMIN — ESMOLOL HYDROCHLORIDE 10 MG: 10 INJECTION, SOLUTION INTRAVENOUS at 18:12

## 2020-01-08 RX ADMIN — CEFAZOLIN SODIUM 2 G: 10 INJECTION, POWDER, FOR SOLUTION INTRAVENOUS at 14:23

## 2020-01-08 RX ADMIN — BUSPIRONE HYDROCHLORIDE 10 MG: 10 TABLET ORAL at 08:37

## 2020-01-08 RX ADMIN — FENTANYL CITRATE 50 MCG: 50 INJECTION, SOLUTION INTRAMUSCULAR; INTRAVENOUS at 18:17

## 2020-01-08 RX ADMIN — ONDANSETRON 4 MG: 2 INJECTION INTRAMUSCULAR; INTRAVENOUS at 09:10

## 2020-01-08 RX ADMIN — SENNOSIDES AND DOCUSATE SODIUM 1 TABLET: 8.6; 5 TABLET ORAL at 08:36

## 2020-01-08 RX ADMIN — FENOFIBRATE 160 MG: 160 TABLET ORAL at 08:37

## 2020-01-08 RX ADMIN — DEXAMETHASONE SODIUM PHOSPHATE 4 MG: 4 INJECTION, SOLUTION INTRAMUSCULAR; INTRAVENOUS at 23:08

## 2020-01-08 RX ADMIN — PHENYLEPHRINE HYDROCHLORIDE 100 MCG: 10 INJECTION INTRAVENOUS at 15:26

## 2020-01-08 RX ADMIN — PROMETHAZINE HYDROCHLORIDE 12.5 MG: 25 INJECTION INTRAMUSCULAR; INTRAVENOUS at 21:15

## 2020-01-08 RX ADMIN — SODIUM CHLORIDE, PRESERVATIVE FREE 10 ML: 5 INJECTION INTRAVENOUS at 08:49

## 2020-01-08 RX ADMIN — INSULIN LISPRO 3 UNITS: 100 INJECTION, SOLUTION INTRAVENOUS; SUBCUTANEOUS at 23:09

## 2020-01-08 RX ADMIN — CEFAZOLIN SODIUM 2 G: 10 INJECTION, POWDER, FOR SOLUTION INTRAVENOUS at 17:28

## 2020-01-08 RX ADMIN — FENTANYL CITRATE 50 MCG: 50 INJECTION, SOLUTION INTRAMUSCULAR; INTRAVENOUS at 20:06

## 2020-01-08 RX ADMIN — PROPOFOL 200 MG: 10 INJECTION, EMULSION INTRAVENOUS at 13:11

## 2020-01-08 RX ADMIN — ROCURONIUM BROMIDE 50 MG: 10 INJECTION INTRAVENOUS at 13:11

## 2020-01-08 RX ADMIN — LIDOCAINE HYDROCHLORIDE 50 MG: 10 INJECTION, SOLUTION EPIDURAL; INFILTRATION; INTRACAUDAL; PERINEURAL at 13:11

## 2020-01-08 RX ADMIN — PHENYLEPHRINE HYDROCHLORIDE 100 MCG: 10 INJECTION INTRAVENOUS at 14:51

## 2020-01-08 RX ADMIN — Medication 10 MG: at 00:23

## 2020-01-08 RX ADMIN — PHENYLEPHRINE HYDROCHLORIDE 100 MCG: 10 INJECTION INTRAVENOUS at 14:54

## 2020-01-08 RX ADMIN — PHENYLEPHRINE HYDROCHLORIDE 200 MCG: 10 INJECTION INTRAVENOUS at 15:01

## 2020-01-08 RX ADMIN — MIDAZOLAM HYDROCHLORIDE 0.5 MG: 1 INJECTION, SOLUTION INTRAMUSCULAR; INTRAVENOUS at 12:53

## 2020-01-08 RX ADMIN — SODIUM CHLORIDE: 9 INJECTION, SOLUTION INTRAVENOUS at 12:40

## 2020-01-08 RX ADMIN — PHENYLEPHRINE HYDROCHLORIDE 100 MCG: 10 INJECTION INTRAVENOUS at 15:30

## 2020-01-08 RX ADMIN — ONDANSETRON 4 MG: 2 INJECTION, SOLUTION INTRAMUSCULAR; INTRAVENOUS at 17:59

## 2020-01-08 RX ADMIN — PHENYLEPHRINE HYDROCHLORIDE 25 MCG/MIN: 10 INJECTION INTRAVENOUS at 15:35

## 2020-01-08 RX ADMIN — PHENYLEPHRINE HYDROCHLORIDE 100 MCG: 10 INJECTION INTRAVENOUS at 14:42

## 2020-01-08 RX ADMIN — PHENYLEPHRINE HYDROCHLORIDE 100 MCG: 10 INJECTION INTRAVENOUS at 15:33

## 2020-01-08 RX ADMIN — LEVETIRACETAM 500 MG: 500 TABLET, FILM COATED ORAL at 23:08

## 2020-01-08 RX ADMIN — MAGNESIUM SULFATE HEPTAHYDRATE 2 G: 1 INJECTION, SOLUTION INTRAVENOUS at 08:40

## 2020-01-08 RX ADMIN — MIDAZOLAM HYDROCHLORIDE 0.5 MG: 1 INJECTION, SOLUTION INTRAMUSCULAR; INTRAVENOUS at 12:55

## 2020-01-08 RX ADMIN — HYDRALAZINE HYDROCHLORIDE 10 MG: 20 INJECTION INTRAMUSCULAR; INTRAVENOUS at 11:52

## 2020-01-08 RX ADMIN — ROCURONIUM BROMIDE 20 MG: 10 INJECTION INTRAVENOUS at 14:37

## 2020-01-08 RX ADMIN — SODIUM CHLORIDE: 9 INJECTION, SOLUTION INTRAVENOUS at 15:41

## 2020-01-08 RX ADMIN — PHENYLEPHRINE HYDROCHLORIDE 200 MCG: 10 INJECTION INTRAVENOUS at 15:13

## 2020-01-08 RX ADMIN — FENTANYL CITRATE 50 MCG: 50 INJECTION, SOLUTION INTRAMUSCULAR; INTRAVENOUS at 17:30

## 2020-01-08 RX ADMIN — ROCURONIUM BROMIDE 30 MG: 10 INJECTION INTRAVENOUS at 14:28

## 2020-01-08 ASSESSMENT — PULMONARY FUNCTION TESTS
PIF_VALUE: 24
PIF_VALUE: 27
PIF_VALUE: 19
PIF_VALUE: 20
PIF_VALUE: 20
PIF_VALUE: 21
PIF_VALUE: 20
PIF_VALUE: 20
PIF_VALUE: 22
PIF_VALUE: 18
PIF_VALUE: 25
PIF_VALUE: 21
PIF_VALUE: 20
PIF_VALUE: 20
PIF_VALUE: 18
PIF_VALUE: 21
PIF_VALUE: 21
PIF_VALUE: 20
PIF_VALUE: 20
PIF_VALUE: 0
PIF_VALUE: 16
PIF_VALUE: 20
PIF_VALUE: 22
PIF_VALUE: 20
PIF_VALUE: 25
PIF_VALUE: 21
PIF_VALUE: 20
PIF_VALUE: 21
PIF_VALUE: 22
PIF_VALUE: 23
PIF_VALUE: 27
PIF_VALUE: 21
PIF_VALUE: 18
PIF_VALUE: 18
PIF_VALUE: 21
PIF_VALUE: 20
PIF_VALUE: 20
PIF_VALUE: 22
PIF_VALUE: 20
PIF_VALUE: 27
PIF_VALUE: 22
PIF_VALUE: 20
PIF_VALUE: 1
PIF_VALUE: 20
PIF_VALUE: 21
PIF_VALUE: 21
PIF_VALUE: 23
PIF_VALUE: 9
PIF_VALUE: 23
PIF_VALUE: 7
PIF_VALUE: 20
PIF_VALUE: 20
PIF_VALUE: 21
PIF_VALUE: 22
PIF_VALUE: 21
PIF_VALUE: 21
PIF_VALUE: 27
PIF_VALUE: 23
PIF_VALUE: 21
PIF_VALUE: 19
PIF_VALUE: 2
PIF_VALUE: 18
PIF_VALUE: 18
PIF_VALUE: 24
PIF_VALUE: 20
PIF_VALUE: 19
PIF_VALUE: 21
PIF_VALUE: 20
PIF_VALUE: 20
PIF_VALUE: 18
PIF_VALUE: 20
PIF_VALUE: 23
PIF_VALUE: 23
PIF_VALUE: 21
PIF_VALUE: 22
PIF_VALUE: 25
PIF_VALUE: 20
PIF_VALUE: 18
PIF_VALUE: 20
PIF_VALUE: 20
PIF_VALUE: 21
PIF_VALUE: 21
PIF_VALUE: 22
PIF_VALUE: 19
PIF_VALUE: 21
PIF_VALUE: 21
PIF_VALUE: 27
PIF_VALUE: 21
PIF_VALUE: 21
PIF_VALUE: 22
PIF_VALUE: 23
PIF_VALUE: 5
PIF_VALUE: 21
PIF_VALUE: 18
PIF_VALUE: 21
PIF_VALUE: 22
PIF_VALUE: 20
PIF_VALUE: 20
PIF_VALUE: 9
PIF_VALUE: 0
PIF_VALUE: 20
PIF_VALUE: 0
PIF_VALUE: 20
PIF_VALUE: 20
PIF_VALUE: 21
PIF_VALUE: 20
PIF_VALUE: 23
PIF_VALUE: 22
PIF_VALUE: 21
PIF_VALUE: 21
PIF_VALUE: 20
PIF_VALUE: 28
PIF_VALUE: 20
PIF_VALUE: 22
PIF_VALUE: 21
PIF_VALUE: 20
PIF_VALUE: 0
PIF_VALUE: 21
PIF_VALUE: 20
PIF_VALUE: 23
PIF_VALUE: 20
PIF_VALUE: 20
PIF_VALUE: 28
PIF_VALUE: 23
PIF_VALUE: 20
PIF_VALUE: 21
PIF_VALUE: 20
PIF_VALUE: 17
PIF_VALUE: 20
PIF_VALUE: 23
PIF_VALUE: 21
PIF_VALUE: 18
PIF_VALUE: 29
PIF_VALUE: 20
PIF_VALUE: 29
PIF_VALUE: 1
PIF_VALUE: 21
PIF_VALUE: 18
PIF_VALUE: 20
PIF_VALUE: 18
PIF_VALUE: 22
PIF_VALUE: 18
PIF_VALUE: 23
PIF_VALUE: 20
PIF_VALUE: 21
PIF_VALUE: 20
PIF_VALUE: 21
PIF_VALUE: 23
PIF_VALUE: 17
PIF_VALUE: 18
PIF_VALUE: 20
PIF_VALUE: 21
PIF_VALUE: 21
PIF_VALUE: 20
PIF_VALUE: 18
PIF_VALUE: 20
PIF_VALUE: 21
PIF_VALUE: 20
PIF_VALUE: 21
PIF_VALUE: 1
PIF_VALUE: 22
PIF_VALUE: 20
PIF_VALUE: 21
PIF_VALUE: 22
PIF_VALUE: 23
PIF_VALUE: 18
PIF_VALUE: 19
PIF_VALUE: 22
PIF_VALUE: 22
PIF_VALUE: 19
PIF_VALUE: 21
PIF_VALUE: 25
PIF_VALUE: 22
PIF_VALUE: 21
PIF_VALUE: 20
PIF_VALUE: 21
PIF_VALUE: 22
PIF_VALUE: 21
PIF_VALUE: 28
PIF_VALUE: 18
PIF_VALUE: 18
PIF_VALUE: 22
PIF_VALUE: 21
PIF_VALUE: 18
PIF_VALUE: 22
PIF_VALUE: 28
PIF_VALUE: 20
PIF_VALUE: 25
PIF_VALUE: 16
PIF_VALUE: 2
PIF_VALUE: 20
PIF_VALUE: 22
PIF_VALUE: 22
PIF_VALUE: 20
PIF_VALUE: 28
PIF_VALUE: 0
PIF_VALUE: 20
PIF_VALUE: 0
PIF_VALUE: 17
PIF_VALUE: 20
PIF_VALUE: 23
PIF_VALUE: 23
PIF_VALUE: 22
PIF_VALUE: 18
PIF_VALUE: 28
PIF_VALUE: 21
PIF_VALUE: 20
PIF_VALUE: 21
PIF_VALUE: 20
PIF_VALUE: 20
PIF_VALUE: 21
PIF_VALUE: 21
PIF_VALUE: 18
PIF_VALUE: 20
PIF_VALUE: 22
PIF_VALUE: 18
PIF_VALUE: 21
PIF_VALUE: 20
PIF_VALUE: 21
PIF_VALUE: 22
PIF_VALUE: 18
PIF_VALUE: 20
PIF_VALUE: 25
PIF_VALUE: 20
PIF_VALUE: 26
PIF_VALUE: 20
PIF_VALUE: 21
PIF_VALUE: 23
PIF_VALUE: 21
PIF_VALUE: 21
PIF_VALUE: 8
PIF_VALUE: 21
PIF_VALUE: 20
PIF_VALUE: 21
PIF_VALUE: 21
PIF_VALUE: 4
PIF_VALUE: 20
PIF_VALUE: 21
PIF_VALUE: 5
PIF_VALUE: 18
PIF_VALUE: 20
PIF_VALUE: 20
PIF_VALUE: 21
PIF_VALUE: 29
PIF_VALUE: 0
PIF_VALUE: 21
PIF_VALUE: 18
PIF_VALUE: 20
PIF_VALUE: 23
PIF_VALUE: 18
PIF_VALUE: 21
PIF_VALUE: 20
PIF_VALUE: 24
PIF_VALUE: 22
PIF_VALUE: 19
PIF_VALUE: 2
PIF_VALUE: 22
PIF_VALUE: 21
PIF_VALUE: 18
PIF_VALUE: 22
PIF_VALUE: 20
PIF_VALUE: 18
PIF_VALUE: 21
PIF_VALUE: 19
PIF_VALUE: 17
PIF_VALUE: 29
PIF_VALUE: 19
PIF_VALUE: 18
PIF_VALUE: 22
PIF_VALUE: 22
PIF_VALUE: 20
PIF_VALUE: 20
PIF_VALUE: 22
PIF_VALUE: 20
PIF_VALUE: 20
PIF_VALUE: 18
PIF_VALUE: 3
PIF_VALUE: 22
PIF_VALUE: 20
PIF_VALUE: 20
PIF_VALUE: 21
PIF_VALUE: 2
PIF_VALUE: 20
PIF_VALUE: 21
PIF_VALUE: 22
PIF_VALUE: 21
PIF_VALUE: 21
PIF_VALUE: 20
PIF_VALUE: 20
PIF_VALUE: 21
PIF_VALUE: 21
PIF_VALUE: 20
PIF_VALUE: 20
PIF_VALUE: 19
PIF_VALUE: 18
PIF_VALUE: 20

## 2020-01-08 ASSESSMENT — PAIN DESCRIPTION - LOCATION
LOCATION: HIP;HEAD
LOCATION: HEAD
LOCATION: HEAD

## 2020-01-08 ASSESSMENT — PAIN DESCRIPTION - DESCRIPTORS: DESCRIPTORS: HEADACHE;THROBBING

## 2020-01-08 ASSESSMENT — PAIN DESCRIPTION - PAIN TYPE
TYPE: ACUTE PAIN

## 2020-01-08 ASSESSMENT — LIFESTYLE VARIABLES: SMOKING_STATUS: 1

## 2020-01-08 ASSESSMENT — PAIN SCALES - GENERAL
PAINLEVEL_OUTOF10: 8
PAINLEVEL_OUTOF10: 10
PAINLEVEL_OUTOF10: 9
PAINLEVEL_OUTOF10: 0
PAINLEVEL_OUTOF10: 7
PAINLEVEL_OUTOF10: 10

## 2020-01-08 ASSESSMENT — PAIN DESCRIPTION - ORIENTATION: ORIENTATION: LEFT

## 2020-01-08 ASSESSMENT — PAIN - FUNCTIONAL ASSESSMENT: PAIN_FUNCTIONAL_ASSESSMENT: 0-10

## 2020-01-08 NOTE — FLOWSHEET NOTE
Assessment:   made follow-up/pre-surgery visit with patient while rounding. Patient had shared feelings and struggles last night while rounding and knew then that she would be having surgery on Wednesday. Patient was much more relaxed and at ease tonight. She said that her doctor came in and explained everything to her, so she is ready for surgery. Her parents are being very supportive and patient is more optimistic about everything. Intervention:   provided active listening as patient shared above information. Patient is more at peace tonight.  shared words of encouragement and support and then offered prayer for God's protection and blessing over her. Outcome:  Patient was appreciative of 's visit and she is anxious to have the surgery and is much more positive about being able to see and be with her children again. Chaplains will remain available for further support as needed. Serafin Angelic     01/07/20 2117   Encounter Summary   Services provided to: Patient   Referral/Consult From: [facility] Parent; Family members   Place of Church none   Continue Visiting   (1/07/2020)   Complexity of Encounter Low   Length of Encounter 15 minutes   Routine   Type Pre-procedure   Assessment Calm; Approachable;Coping;Peaceful   Intervention Active listening;Explored feelings, thoughts, concerns;Explored coping resources;Nurtured hope;Prayer   Outcome Acceptance;Expressed gratitude;Engaged in conversation;Expressed feelings/needs/concerns;Coping;Less anxious, less agitated;Encouraged; Hopeful;Receptive

## 2020-01-08 NOTE — PROGRESS NOTES
Daily Progress Note  Neuro Critical Care    Patient Name: Nedra Srivastava  Patient : 1995  Room/Bed: 0350/9992-51  Code Status: Full  Allergies: Allergies   Allergen Reactions    Latex     Prozac [Fluoxetine Hcl] Other (See Comments)     Seizures     CHIEF COMPLAINT:     Headache   INTERVAL HISTORY    Initial Presentation (Admitted 19): The patient is a 31 yo female with history of seizures as a child, DM, bipolar, and previous left occipital IPH/SAH who presents with thunderclap headache and nausea/vomiting. She reports that the headache started approximately two days ago and has been waxing and waning since. Has a h/o IPH of unknown etiology first identified 2019 on CT head and was admitted for CTA head/neck,  Since then has had x2 CTA H/N, MRI w wo contrast, and diagnostic angiogram negative for any vascular malformation. She has continued to follow with neurosurgery and neurology outpatient for her continued headaches. She also reports history of seizures up until age 1 and then one provoked seizure related to Prozac use. She is not on any AEDs currently at home. Hospital Course:  : CT head this morning is stable. MRI brain consistent with cavernoma and recent hemorrhage. MRV negative for venous thrombosis. Neurosurgery discussed MRI findings with patient and recommendation for resection. MRI brain with stealth protocol ordered. LFTs obtained revealed elevated alk phos and ALT, will obtain Liver ultrasound and repeat triglycerides tomorrow. : No acute events overnight. Pt with plan for mass resection tomorrow with NSG. Some ongoing h/a. Afebrile. Liver U/S WNL. BP's largely within goal parameters. Last 24h: Patient did well overnight. White blood cell count 11.8, hemoglobin 14.6, platelets within normal limits -- magnesium down to 1.3 associated with ongoing headache. Repleted. Plan for mass resection today with return to neuro ICU.   Perioperative Ancef on-call to the OR. CURRENT MEDICATIONS:  SCHEDULED MEDICATIONS:   magnesium sulfate  2 g Intravenous Once    ceFAZolin  2 g Intravenous On Call to OR    nicotine  1 patch Transdermal Daily    [Held by provider] enoxaparin  40 mg Subcutaneous Daily    insulin lispro  0-6 Units Subcutaneous TID     insulin lispro  0-3 Units Subcutaneous Nightly    famotidine  20 mg Oral BID    fenofibrate  160 mg Oral Daily    simvastatin  20 mg Oral Nightly    busPIRone  10 mg Oral TID    lamoTRIgine  25 mg Oral Daily    sodium chloride flush  10 mL Intravenous 2 times per day    levETIRAcetam  500 mg Oral BID    sennosides-docusate sodium  1 tablet Oral BID    docusate sodium  100 mg Oral TID     CONTINUOUS INFUSIONS:   dextrose       PRN MEDICATIONS:   hydrALAZINE, glucose, dextrose, glucagon (rDNA), dextrose, sodium chloride flush, oxyCODONE-acetaminophen, sodium chloride flush, acetaminophen, ondansetron, labetalol, magnesium sulfate, magnesium hydroxide    VITALS:  Temperature Range: Temp: 98.9 °F (37.2 °C) Temp  Av °F (36.7 °C)  Min: 97 °F (36.1 °C)  Max: 98.9 °F (37.2 °C)  BP Range: Systolic (31ACT), DESTINI:696 , Min:111 , YPJ:591     Diastolic (79ZQJ), MTH:24, Min:64, Max:104    Pulse Range: Pulse  Av  Min: 90  Max: 106  Respiration Range: Resp  Avg: 15.1  Min: 10  Max: 20  Current Pulse Ox: SpO2: 97 %  24HR Pulse Ox Range: No data recorded  Patient Vitals for the past 12 hrs:   BP Temp Temp src Pulse Resp   20 0600 111/64 -- -- 103 20   20 0400 117/68 98.9 °F (37.2 °C) Oral 98 17   20 0304 (!) 133/104 -- -- -- --   20 0000 (!) 140/97 98.5 °F (36.9 °C) Oral 99 17     Estimated body mass index is 33.32 kg/m² as calculated from the following:    Height as of this encounter: 5' 1\" (1.549 m).     Weight as of this encounter: 176 lb 5.9 oz (80 kg).  []<16 Severe malnutrition  []16-16.99 Moderate malnutrition  []17-18.49 Mild malnutrition  []18.5-24.9 Normal  []25-29.9 Overweight (not obese)  [x]30-34.9 Obese class 1 (Low Risk)  []35-39.9 Obese class 2 (Moderate Risk)  []?40 Obese class 3 (High Risk)    RECENT LABS:   Lab Results   Component Value Date    WBC 11.8 (H) 01/08/2020    HGB 14.6 01/08/2020    HCT 43.5 01/08/2020     01/08/2020    CHOL 245 (H) 01/05/2020    TRIG 420 (H) 01/07/2020    HDL 24 (L) 01/05/2020    LDLDIRECT 104 (H) 01/05/2020    ALT 71 (H) 01/05/2020    AST 29 01/05/2020     01/08/2020    K 3.8 01/08/2020    CL 99 01/08/2020    CREATININE 0.90 01/08/2020    BUN 17 01/08/2020    CO2 23 01/08/2020    TSH 3.19 01/05/2020    LABA1C 8.7 (H) 01/05/2020     24 HOUR INTAKE/OUTPUT:  No intake or output data in the 24 hours ending 01/08/20 0727    IMAGING:   Us Liver    Result Date: 1/6/2020  EXAMINATION: RIGHT UPPER QUADRANT ULTRASOUND 1/6/2020 3:17 pm COMPARISON: None. HISTORY: ORDERING SYSTEM PROVIDED HISTORY: elevated LFTs FINDINGS: LIVER:  The liver demonstrates normal echogenicity without evidence of intrahepatic biliary ductal dilatation. Liver length is 16.4 cm. BILIARY SYSTEM:  Gallbladder is unremarkable without evidence of significant pericholecystic fluid, wall thickening or stones. Negative sonographic Márquez's sign. Common bile duct is within normal limits measuring 4 mm. RIGHT KIDNEY: The right kidney is grossly unremarkable without evidence of hydronephrosis. Right renal length is 8.3 cm. PANCREAS:  Visualized portions of the pancreas are unremarkable. OTHER: No evidence of right upper quadrant ascites. Negative right upper quadrant ultrasound. Mrv Head W Wo Contrast    Result Date: 1/5/2020  EXAMINATION: MRV OF THE HEAD WITH AND WITHOUT CONTRAST  1/5/2020: TECHNIQUE: Multiplanar multisequence MRV of the head was performed with and without the administration of intravenous contrast. COMPARISON: None.  HISTORY: ORDERING SYSTEM PROVIDED HISTORY: sah, avm:? TECHNOLOGIST PROVIDED HISTORY: sah, avm:? Is the patient pregnant?->No FINDINGS: No focal stenosis or thrombus is seen of the major dural venous sinuses. Left occipital intraparenchymal susceptibility artifact is noted and better evaluated on separate MRI of the brain performed concurrently. There is a small left occipital developmental venous anomaly. 1. No dural venous sinus thrombosis. 2. Left occipital intraparenchymal susceptibility artifact consistent with residua of prior hemorrhage within adjacent developmental venous anomaly suggesting an underlying cavernoma better evaluated on separate brain MRI. Mri Brain W Wo Contrast    Result Date: 1/5/2020  EXAMINATION: MRI OF THE BRAIN WITHOUT AND WITH CONTRAST  1/5/2020 8:19 am TECHNIQUE: Multiplanar multisequence MRI of the head/brain was performed without and with the administration of intravenous contrast. COMPARISON: CT head earlier today. HISTORY: ORDERING SYSTEM PROVIDED HISTORY: second [address] since 8/2019 TECHNOLOGIST PROVIDED HISTORY: second [address] since 8/2019 Is the patient pregnant?->No FINDINGS: INTRACRANIAL STRUCTURES/VENTRICLES:  There is a 7 mm faintly enhancing focus of heterogeneous T2/FLAIR hyperintensity in the left occipital subcortical white matter with surrounding hemosiderin staining and an adjacent small developmental venous anomaly. There is no acute infarct. No mass effect or midline shift. The ventricles and sulci are normal in size and configuration. The sellar/suprasellar regions appear unremarkable. The normal signal voids within the major intracranial vessels appear maintained. ORBITS: The visualized portion of the orbits demonstrate no acute abnormality. SINUSES: The visualized paranasal sinuses and mastoid air cells are well aerated. BONES/SOFT TISSUES: The bone marrow signal intensity appears normal. The soft tissues demonstrate no acute abnormality. Left occipital 7 mm cavernoma with surrounding hemosiderin staining consistent with sequela of remote hemorrhage.   This hemosiderin deposition likely accounts DRAINS:  [x] There are no drains for Neuro Critical Care to monitor at this time. ASSESSMENT AND PLAN:       This is a 30 y.o. female with maximal onset headache this morning preceded by nausea and emesis with a h/o of left occipital IPH in same location at [address] 08/2019 - with negative CTA, MRI, and diagnostic angiogram.  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     PLAN/MEDICAL DECISION MAKING:  Patient did well overnight. White blood cell count 11.8, hemoglobin 14.6, platelets within normal limits -- magnesium down to 1.3 associated with ongoing headache. Repleted. Plan for mass resection today with return to neuro ICU. Perioperative Ancef on-call to the OR. Resume DVT prophylaxis per neurosurgery recommendations.     NEUROLOGIC:  - Small acute L occipital SAH  Postop day 0 left occipital mass resection---will follow up NSG recommendations.    - CTA H/N -unremarkable   - MRI brain w wo contrast consistent with cavernoma  - MRV unremarkable, follow up MRI yemi with stealth  - Neurosurgery following, appreciate recs  - Strict SBP goal  per neuro endovascular recommendations  - Load Keppra 1000 mg IV, continue with maintenance Keppra 500 BID  - Neuro checks per protocol  -Headache treated with Percocet     CARDIOVASCULAR:  - Goal SBP   - Lipid panel: Cholesterol 245, , triglycerides 1,015  - Fenofibrate 135 mg QD, Zocor 20 mg QHS  - Follow up triglycerides tomorrow AM  - Echo 8/2019: EF 55-60%  - Continue telemetry     PULMONARY:  - Satting well ORA     RENAL/FLUID/ELECTROLYTE:  - BUN/CR WNL  - IVF discontinued  - Monitor I&Os  - Replace electrolytes PRN  - Daily BMP  - Hypomagnesemia, 1.3; replaced with 2 grams magnesium sulfate -- continues to drop despite treatments 2g for 2 days.       GI/NUTRITION:  NUTRITION:  General Diet  - Bowel regimen: senna-s daily  - GI prophylaxis: Not indicated  - Mildly elevated Alk Phos and ALT, Liver U/S WNL     ID:  - Tmax afebrile  - Mild leukocytosis unchanged, WBC increased to 11.8  - UA negative  - Continue to monitor for fevers  - Daily CBC  -Perioperative cefazolin     HEME:   - H&H 14.6  - Platelets 746  - Daily CBC     ENDOCRINE:  - Continue to monitor blood glucose, goal <180  - Hemoglobin A1C 8.7  - Start low insulin sliding scale     OTHER:  - PT/OT/ST   - OT to document visual field exam with therapy  - Code Status: FULL  - History of Bipolar: continue buspar 10 mg TID and Lamictal 25 mg QD  - Outpatient, consider seizure risk with Buspar use and adjust as able     PROPHYLAXIS:  Stress ulcer: H2 blocker     DVT PROPHYLAXIS:  - SCD sleeves - Thigh High   - Start Lovenox 40 mg QD    DISPOSITION:  [x] OK for out of ICU from Neuro Critical Care standpoint - We will continue to follow along. For any changes in exam or patient status please contact Neuro Critical Care.       Arlet Howe MD  Neuro Critical Care  Pager [phone]  1/8/2020     7:27 AM

## 2020-01-08 NOTE — PROGRESS NOTES
Neurosurgery ANA PAULA/Resident    Daily Progress Note     1/8/2020  12:21 PM    Chart reviewed. No acute events overnight. No new complaints. Minimal headache. Nausea due to anxiety about surgery today. Vitals:    01/08/20 0304 01/08/20 0400 01/08/20 0600 01/08/20 0733   BP: (!) 133/104 117/68 111/64 122/84   Pulse:  98 103 110   Resp:  17 20 16   Temp:  98.9 °F (37.2 °C)  97.6 °F (36.4 °C)   TempSrc:  Oral  Oral   SpO2:       Weight:       Height:           PE:   AOx3   PERRL, EOMI, no visual defcitis  CVS: normal s2/s1  Resp: equal air entry bilaterally   Motor   Moving all extremities equally      Lab Results   Component Value Date    WBC 11.8 (H) 01/08/2020    HGB 14.6 01/08/2020    HCT 43.5 01/08/2020     01/08/2020    CHOL 245 (H) 01/05/2020    TRIG 420 (H) 01/07/2020    HDL 24 (L) 01/05/2020    LDLDIRECT 104 (H) 01/05/2020    ALT 71 (H) 01/05/2020    AST 29 01/05/2020     01/08/2020    K 3.8 01/08/2020    CL 99 01/08/2020    CREATININE 0.90 01/08/2020    BUN 17 01/08/2020    CO2 23 01/08/2020    TSH 3.19 01/05/2020    LABA1C 8.7 (H) 01/05/2020         A/P  Cavernoma with hemorrhage    -Surgical consent obtained. Risks and benefits discussed. All questions answered. - NPO since midnight for surgery today  -Neuro ICU post op      Please contact neurosurgery with any changes in patients neurologic status.        Nona Morataya CNP  NS pager [phone]  1/8/20  12:21 PM

## 2020-01-09 ENCOUNTER — APPOINTMENT (OUTPATIENT)
Dept: CT IMAGING | Age: 25
DRG: 021 | End: 2020-01-09
Payer: MEDICARE

## 2020-01-09 LAB
ABO/RH: NORMAL
ABSOLUTE EOS #: <0.03 K/UL (ref 0–0.44)
ABSOLUTE IMMATURE GRANULOCYTE: 0.09 K/UL (ref 0–0.3)
ABSOLUTE LYMPH #: 1.46 K/UL (ref 1.1–3.7)
ABSOLUTE MONO #: 0.62 K/UL (ref 0.1–1.2)
ANION GAP SERPL CALCULATED.3IONS-SCNC: 18 MMOL/L (ref 9–17)
ANTIBODY SCREEN: NEGATIVE
ARM BAND NUMBER: NORMAL
BASOPHILS # BLD: 0 % (ref 0–2)
BASOPHILS ABSOLUTE: 0.03 K/UL (ref 0–0.2)
BLD PROD TYP BPU: NORMAL
BLD PROD TYP BPU: NORMAL
BUN BLDV-MCNC: 14 MG/DL (ref 6–20)
BUN/CREAT BLD: ABNORMAL (ref 9–20)
CALCIUM SERPL-MCNC: 9.6 MG/DL (ref 8.6–10.4)
CHLORIDE BLD-SCNC: 102 MMOL/L (ref 98–107)
CO2: 21 MMOL/L (ref 20–31)
CREAT SERPL-MCNC: 0.96 MG/DL (ref 0.5–0.9)
CROSSMATCH RESULT: NORMAL
CROSSMATCH RESULT: NORMAL
CULTURE: NORMAL
DIFFERENTIAL TYPE: ABNORMAL
DISPENSE STATUS BLOOD BANK: NORMAL
DISPENSE STATUS BLOOD BANK: NORMAL
EOSINOPHILS RELATIVE PERCENT: 0 % (ref 1–4)
EXPIRATION DATE: NORMAL
GFR AFRICAN AMERICAN: >60 ML/MIN
GFR NON-AFRICAN AMERICAN: >60 ML/MIN
GFR SERPL CREATININE-BSD FRML MDRD: ABNORMAL ML/MIN/{1.73_M2}
GFR SERPL CREATININE-BSD FRML MDRD: ABNORMAL ML/MIN/{1.73_M2}
GLUCOSE BLD-MCNC: 227 MG/DL (ref 70–99)
GLUCOSE BLD-MCNC: 234 MG/DL (ref 65–105)
GLUCOSE BLD-MCNC: 241 MG/DL (ref 65–105)
GLUCOSE BLD-MCNC: 269 MG/DL (ref 65–105)
GLUCOSE BLD-MCNC: 362 MG/DL (ref 65–105)
HCT VFR BLD CALC: 43 % (ref 36.3–47.1)
HEMOGLOBIN: 14.4 G/DL (ref 11.9–15.1)
IMMATURE GRANULOCYTES: 1 %
LYMPHOCYTES # BLD: 10 % (ref 24–43)
Lab: NORMAL
MAGNESIUM: 1.3 MG/DL (ref 1.6–2.6)
MCH RBC QN AUTO: 27.5 PG (ref 25.2–33.5)
MCHC RBC AUTO-ENTMCNC: 33.5 G/DL (ref 28.4–34.8)
MCV RBC AUTO: 82.1 FL (ref 82.6–102.9)
MONOCYTES # BLD: 4 % (ref 3–12)
NRBC AUTOMATED: 0 PER 100 WBC
PDW BLD-RTO: 12.5 % (ref 11.8–14.4)
PLATELET # BLD: 242 K/UL (ref 138–453)
PLATELET ESTIMATE: ABNORMAL
PMV BLD AUTO: 11.2 FL (ref 8.1–13.5)
POTASSIUM SERPL-SCNC: 3.3 MMOL/L (ref 3.7–5.3)
RBC # BLD: 5.24 M/UL (ref 3.95–5.11)
RBC # BLD: ABNORMAL 10*6/UL
SEG NEUTROPHILS: 85 % (ref 36–65)
SEGMENTED NEUTROPHILS ABSOLUTE COUNT: 12.81 K/UL (ref 1.5–8.1)
SODIUM BLD-SCNC: 141 MMOL/L (ref 135–144)
SPECIMEN DESCRIPTION: NORMAL
TRANSFUSION STATUS: NORMAL
TRANSFUSION STATUS: NORMAL
UNIT DIVISION: 0
UNIT DIVISION: 0
UNIT NUMBER: NORMAL
UNIT NUMBER: NORMAL
WBC # BLD: 15 K/UL (ref 3.5–11.3)
WBC # BLD: ABNORMAL 10*3/UL

## 2020-01-09 PROCEDURE — 99291 CRITICAL CARE FIRST HOUR: CPT | Performed by: PSYCHIATRY & NEUROLOGY

## 2020-01-09 PROCEDURE — 2500000003 HC RX 250 WO HCPCS: Performed by: NURSE PRACTITIONER

## 2020-01-09 PROCEDURE — 6370000000 HC RX 637 (ALT 250 FOR IP): Performed by: STUDENT IN AN ORGANIZED HEALTH CARE EDUCATION/TRAINING PROGRAM

## 2020-01-09 PROCEDURE — 80048 BASIC METABOLIC PNL TOTAL CA: CPT

## 2020-01-09 PROCEDURE — APPSS30 APP SPLIT SHARED TIME 16-30 MINUTES: Performed by: REGISTERED NURSE

## 2020-01-09 PROCEDURE — 97530 THERAPEUTIC ACTIVITIES: CPT

## 2020-01-09 PROCEDURE — 83735 ASSAY OF MAGNESIUM: CPT

## 2020-01-09 PROCEDURE — 6360000002 HC RX W HCPCS: Performed by: STUDENT IN AN ORGANIZED HEALTH CARE EDUCATION/TRAINING PROGRAM

## 2020-01-09 PROCEDURE — 99024 POSTOP FOLLOW-UP VISIT: CPT | Performed by: NEUROLOGICAL SURGERY

## 2020-01-09 PROCEDURE — 82947 ASSAY GLUCOSE BLOOD QUANT: CPT

## 2020-01-09 PROCEDURE — 97535 SELF CARE MNGMENT TRAINING: CPT

## 2020-01-09 PROCEDURE — 85025 COMPLETE CBC W/AUTO DIFF WBC: CPT

## 2020-01-09 PROCEDURE — 70450 CT HEAD/BRAIN W/O DYE: CPT

## 2020-01-09 PROCEDURE — 2000000003 HC NEURO ICU R&B

## 2020-01-09 PROCEDURE — 2580000003 HC RX 258: Performed by: NURSE PRACTITIONER

## 2020-01-09 PROCEDURE — 97162 PT EVAL MOD COMPLEX 30 MIN: CPT

## 2020-01-09 PROCEDURE — 6360000002 HC RX W HCPCS: Performed by: NURSE PRACTITIONER

## 2020-01-09 PROCEDURE — 6370000000 HC RX 637 (ALT 250 FOR IP): Performed by: NURSE PRACTITIONER

## 2020-01-09 PROCEDURE — 76937 US GUIDE VASCULAR ACCESS: CPT

## 2020-01-09 PROCEDURE — 6360000002 HC RX W HCPCS: Performed by: ANESTHESIOLOGY

## 2020-01-09 PROCEDURE — 2580000003 HC RX 258: Performed by: STUDENT IN AN ORGANIZED HEALTH CARE EDUCATION/TRAINING PROGRAM

## 2020-01-09 RX ORDER — POTASSIUM CHLORIDE 20 MEQ/1
40 TABLET, EXTENDED RELEASE ORAL PRN
Status: DISCONTINUED | OUTPATIENT
Start: 2020-01-09 | End: 2020-01-10 | Stop reason: HOSPADM

## 2020-01-09 RX ORDER — SENNA AND DOCUSATE SODIUM 50; 8.6 MG/1; MG/1
2 TABLET, FILM COATED ORAL DAILY
Status: DISCONTINUED | OUTPATIENT
Start: 2020-01-09 | End: 2020-01-10 | Stop reason: HOSPADM

## 2020-01-09 RX ORDER — POTASSIUM CHLORIDE 7.45 MG/ML
10 INJECTION INTRAVENOUS
Status: DISCONTINUED | OUTPATIENT
Start: 2020-01-09 | End: 2020-01-09

## 2020-01-09 RX ORDER — SIMVASTATIN 40 MG
80 TABLET ORAL NIGHTLY
Status: DISCONTINUED | OUTPATIENT
Start: 2020-01-09 | End: 2020-01-09 | Stop reason: ALTCHOICE

## 2020-01-09 RX ORDER — SODIUM CHLORIDE, SODIUM LACTATE, POTASSIUM CHLORIDE, AND CALCIUM CHLORIDE .6; .31; .03; .02 G/100ML; G/100ML; G/100ML; G/100ML
500 INJECTION, SOLUTION INTRAVENOUS ONCE
Status: COMPLETED | OUTPATIENT
Start: 2020-01-09 | End: 2020-01-09

## 2020-01-09 RX ORDER — ATORVASTATIN CALCIUM 40 MG/1
40 TABLET, FILM COATED ORAL NIGHTLY
Status: DISCONTINUED | OUTPATIENT
Start: 2020-01-09 | End: 2020-01-10 | Stop reason: HOSPADM

## 2020-01-09 RX ORDER — POTASSIUM CHLORIDE 7.45 MG/ML
10 INJECTION INTRAVENOUS PRN
Status: DISCONTINUED | OUTPATIENT
Start: 2020-01-09 | End: 2020-01-10 | Stop reason: HOSPADM

## 2020-01-09 RX ORDER — ONDANSETRON 2 MG/ML
4 INJECTION INTRAMUSCULAR; INTRAVENOUS EVERY 6 HOURS PRN
Status: DISCONTINUED | OUTPATIENT
Start: 2020-01-09 | End: 2020-01-10 | Stop reason: HOSPADM

## 2020-01-09 RX ORDER — POTASSIUM CHLORIDE 20 MEQ/1
40 TABLET, EXTENDED RELEASE ORAL 2 TIMES DAILY WITH MEALS
Status: DISPENSED | OUTPATIENT
Start: 2020-01-09 | End: 2020-01-10

## 2020-01-09 RX ORDER — FENTANYL CITRATE 50 UG/ML
50 INJECTION, SOLUTION INTRAMUSCULAR; INTRAVENOUS
Status: DISCONTINUED | OUTPATIENT
Start: 2020-01-09 | End: 2020-01-10 | Stop reason: HOSPADM

## 2020-01-09 RX ORDER — MAGNESIUM SULFATE 1 G/100ML
4 INJECTION INTRAVENOUS ONCE
Status: COMPLETED | OUTPATIENT
Start: 2020-01-09 | End: 2020-01-09

## 2020-01-09 RX ADMIN — OXYCODONE HYDROCHLORIDE 5 MG: 5 TABLET ORAL at 05:35

## 2020-01-09 RX ADMIN — POTASSIUM CHLORIDE 40 MEQ: 1500 TABLET, EXTENDED RELEASE ORAL at 09:20

## 2020-01-09 RX ADMIN — LAMOTRIGINE 25 MG: 25 TABLET ORAL at 09:22

## 2020-01-09 RX ADMIN — POTASSIUM CHLORIDE 40 MEQ: 1500 TABLET, EXTENDED RELEASE ORAL at 10:36

## 2020-01-09 RX ADMIN — DESMOPRESSIN ACETATE 40 MG: 0.2 TABLET ORAL at 19:58

## 2020-01-09 RX ADMIN — FENOFIBRATE 160 MG: 160 TABLET ORAL at 09:22

## 2020-01-09 RX ADMIN — BUSPIRONE HYDROCHLORIDE 10 MG: 10 TABLET ORAL at 19:58

## 2020-01-09 RX ADMIN — BUSPIRONE HYDROCHLORIDE 10 MG: 10 TABLET ORAL at 16:47

## 2020-01-09 RX ADMIN — BUSPIRONE HYDROCHLORIDE 10 MG: 10 TABLET ORAL at 09:22

## 2020-01-09 RX ADMIN — DEXTROSE MONOHYDRATE 2 G: 50 INJECTION, SOLUTION INTRAVENOUS at 08:01

## 2020-01-09 RX ADMIN — INSULIN LISPRO 6 UNITS: 100 INJECTION, SOLUTION INTRAVENOUS; SUBCUTANEOUS at 08:13

## 2020-01-09 RX ADMIN — ONDANSETRON 4 MG: 2 INJECTION INTRAMUSCULAR; INTRAVENOUS at 01:41

## 2020-01-09 RX ADMIN — OXYCODONE HYDROCHLORIDE 5 MG: 5 TABLET ORAL at 09:20

## 2020-01-09 RX ADMIN — DEXTROSE MONOHYDRATE 2 G: 50 INJECTION, SOLUTION INTRAVENOUS at 16:37

## 2020-01-09 RX ADMIN — LEVETIRACETAM 500 MG: 500 TABLET, FILM COATED ORAL at 09:22

## 2020-01-09 RX ADMIN — SODIUM CHLORIDE, POTASSIUM CHLORIDE, SODIUM LACTATE AND CALCIUM CHLORIDE 500 ML: 600; 310; 30; 20 INJECTION, SOLUTION INTRAVENOUS at 10:11

## 2020-01-09 RX ADMIN — ENOXAPARIN SODIUM 40 MG: 40 INJECTION SUBCUTANEOUS at 19:58

## 2020-01-09 RX ADMIN — INSULIN LISPRO 10 UNITS: 100 INJECTION, SOLUTION INTRAVENOUS; SUBCUTANEOUS at 12:33

## 2020-01-09 RX ADMIN — INSULIN LISPRO 4 UNITS: 100 INJECTION, SOLUTION INTRAVENOUS; SUBCUTANEOUS at 16:48

## 2020-01-09 RX ADMIN — DEXAMETHASONE SODIUM PHOSPHATE 4 MG: 4 INJECTION, SOLUTION INTRAMUSCULAR; INTRAVENOUS at 10:36

## 2020-01-09 RX ADMIN — FAMOTIDINE 20 MG: 10 INJECTION, SOLUTION INTRAVENOUS at 10:36

## 2020-01-09 RX ADMIN — MAGNESIUM SULFATE HEPTAHYDRATE 4 G: 1 INJECTION, SOLUTION INTRAVENOUS at 07:56

## 2020-01-09 RX ADMIN — DEXAMETHASONE SODIUM PHOSPHATE 4 MG: 4 INJECTION, SOLUTION INTRAMUSCULAR; INTRAVENOUS at 16:48

## 2020-01-09 RX ADMIN — OXYCODONE HYDROCHLORIDE 5 MG: 5 TABLET ORAL at 19:58

## 2020-01-09 RX ADMIN — FAMOTIDINE 20 MG: 10 INJECTION, SOLUTION INTRAVENOUS at 19:59

## 2020-01-09 RX ADMIN — LEVETIRACETAM 500 MG: 500 TABLET, FILM COATED ORAL at 19:58

## 2020-01-09 RX ADMIN — DEXTROSE MONOHYDRATE 2 G: 50 INJECTION, SOLUTION INTRAVENOUS at 01:41

## 2020-01-09 RX ADMIN — DEXAMETHASONE SODIUM PHOSPHATE 4 MG: 4 INJECTION, SOLUTION INTRAMUSCULAR; INTRAVENOUS at 05:29

## 2020-01-09 RX ADMIN — SODIUM CHLORIDE, POTASSIUM CHLORIDE, SODIUM LACTATE AND CALCIUM CHLORIDE: 600; 310; 30; 20 INJECTION, SOLUTION INTRAVENOUS at 11:00

## 2020-01-09 RX ADMIN — OXYCODONE HYDROCHLORIDE 5 MG: 5 TABLET ORAL at 16:27

## 2020-01-09 ASSESSMENT — PAIN DESCRIPTION - ONSET
ONSET: ON-GOING
ONSET: ON-GOING
ONSET: PROGRESSIVE
ONSET: ON-GOING

## 2020-01-09 ASSESSMENT — PAIN DESCRIPTION - LOCATION
LOCATION: HEAD
LOCATION: HAND
LOCATION: HEAD

## 2020-01-09 ASSESSMENT — PAIN DESCRIPTION - DESCRIPTORS
DESCRIPTORS: HEADACHE
DESCRIPTORS: ACHING
DESCRIPTORS: HEADACHE
DESCRIPTORS: ACHING
DESCRIPTORS: ACHING

## 2020-01-09 ASSESSMENT — PAIN DESCRIPTION - FREQUENCY
FREQUENCY: INTERMITTENT
FREQUENCY: CONTINUOUS
FREQUENCY: INTERMITTENT
FREQUENCY: CONTINUOUS
FREQUENCY: CONTINUOUS

## 2020-01-09 ASSESSMENT — PAIN SCALES - GENERAL
PAINLEVEL_OUTOF10: 10
PAINLEVEL_OUTOF10: 2
PAINLEVEL_OUTOF10: 10
PAINLEVEL_OUTOF10: 0
PAINLEVEL_OUTOF10: 10
PAINLEVEL_OUTOF10: 1
PAINLEVEL_OUTOF10: 6
PAINLEVEL_OUTOF10: 9
PAINLEVEL_OUTOF10: 2
PAINLEVEL_OUTOF10: 10
PAINLEVEL_OUTOF10: 3

## 2020-01-09 ASSESSMENT — PAIN DESCRIPTION - PAIN TYPE
TYPE: ACUTE PAIN

## 2020-01-09 ASSESSMENT — PAIN DESCRIPTION - ORIENTATION
ORIENTATION: LEFT
ORIENTATION: LEFT

## 2020-01-09 ASSESSMENT — PAIN - FUNCTIONAL ASSESSMENT: PAIN_FUNCTIONAL_ASSESSMENT: PREVENTS OR INTERFERES SOME ACTIVE ACTIVITIES AND ADLS

## 2020-01-09 ASSESSMENT — PAIN DESCRIPTION - PROGRESSION
CLINICAL_PROGRESSION: GRADUALLY IMPROVING
CLINICAL_PROGRESSION: GRADUALLY IMPROVING

## 2020-01-09 NOTE — PROGRESS NOTES
Attempted to perform post op exam on patient. She continued to refuse due to nausea and dizziness.  Will provide zofran phenergan and reassess

## 2020-01-09 NOTE — ANESTHESIA POSTPROCEDURE EVALUATION
Department of Anesthesiology  Postprocedure Note    Patient: Terence Cabrera  MRN: [id number]  [place]: 1995  Date of evaluation: 1/8/2020  Time:  7:40 PM     Procedure Summary     Date:  01/08/20 Room / Location:  [address]    Anesthesia Start:  1300 Anesthesia Stop:  1832    Procedure:  LEFT OCCIPITAL CRANIOTOMY, 1ST VASCULAR LESION WITH URBAN NAVIGATION (LATERAL WITH ROMERO BAG, HOLLINGSWORTH HEADHOLDER, MICROSCOPE) (Left ) Diagnosis:  (LEFT OCCIPITAL CHYMAL HEMORRHAGE)    Surgeon:  Cherelle Major DO Responsible Provider:  Sylvester Luke MD    Anesthesia Type:  general ASA Status:  4          Anesthesia Type: general    Bev Phase I: Bev Score: 8    Bev Phase II:      Last vitals: Reviewed and per EMR flowsheets.        Anesthesia Post Evaluation    Patient location during evaluation: PACU  Patient participation: complete - patient participated  Level of consciousness: awake and alert  Pain score: 3  Airway patency: patent  Nausea & Vomiting: no nausea and no vomiting  Complications: no  Cardiovascular status: hemodynamically stable  Respiratory status: acceptable  Hydration status: euvolemic

## 2020-01-09 NOTE — PROGRESS NOTES
Physical Therapy    Facility/Department: [address]  Initial Assessment    NAME: Mary Aldridge  : 1995  MRN: [id number]    Date of Service: 2020  CHIEF COMPLAINT      SAH     HPI    History Obtained From: EMR, patient     The patient is a 30 y.o. female presented with severe HA that was maximal at onset this morning when she woke up. It did not wake her up. Woke up and had nausea followed by x2 episodes of emesis with sudden 10/10 thunderclap headache. No vision changes, focal weakness or seizures. Not on [facility].      Has a h/o AVM wit [facility] first identified 2019 on MRI according to [address] [place] [address] records, attributed at that time to HTN. Since then has had x2 CTA H/N negative for AVM. Patient denies a history of HTN. On admission today SBP 110s. Follows with a neurologist however missed her last appointment. Endorses migraines and sees Ludivina Maurer PA-C neurology @ [address] [place] [address] last seen 19.       Diagnostic cerebral angiogram performed 2019 showed no evidence of AVM, dural aVF, or tumor blush performed by Dr. Donna Rangel at [address] [place] [address]. X2 CTA head and neck both unremarkable for AVMs. S/p Left occipital craniotomy   Discharge Recommendations: Further therapy recommended at discharge. Patient would benefit from continued therapy after discharge   PT Equipment Recommendations  Equipment Needed: (To be determined; may require rolling walker; will continue to assess)    Assessment   Body structures, Functions, Activity limitations: Decreased safe awareness;Decreased balance;Decreased functional mobility ; Decreased endurance;Decreased strength  Assessment: Pt performed bed mobility with stand by assist. Pt performed transfers with contact guard assist. Pt ambulated 200ft with stand by assist and RW. Pt ambulated 300 ft with contact guard assist and no device. Pt with supportive family and will likely require their continued care after discharge.   Pt would benefit from continued skilled physical signing SPT. Signing PT agrees with treatment and documentation.

## 2020-01-09 NOTE — PROGRESS NOTES
Discussed post op plan with Dr. Екатерина Forte:    MRI brain with and without contrast in the morning  CT head without contrast tonight  Ancef 2 g every 8x4 doses  Decadron 4 mg every 6 hours  Systolic blood pressure goals between 100-150

## 2020-01-09 NOTE — CARE COORDINATION
Spoke with Delores Bejarano at [facility]. She informs they have received the referral and are able to accept pt when d/c'd home. JANUSZ initiated will need RN and MD to complete, also need HC order.

## 2020-01-09 NOTE — PLAN OF CARE
TODAY:      AWAKE & FOLLOWING COMMANDS:  [] No   [x] Yes    INTUBATED:   [x] No   [] Yes    SEDATION/ANALGESIA:    [] Propofol gtt  [] Versed gtt  [] Ativan gtt   [x] No Sedation  Pain medications: Roxicodone 5 mg q4h prn, fentanyl 50 mcg q2h prn    FEEDING: Able to take PO?  [] No:    [x] Yes:  Diet: General     DVT Prophylaxis:  [x] Yes:  Lovenox        [x] No rationale    Stress Ulcer Prophylaxis: [x] Yes:  Pepcid 20 mg BID while on steroids [] Not indicated    VASOPRESSORS:  [x] No    [] Yes    CENTRAL/ARTERIAL LINES:  [x] No   [] Yes    WRAY CATHETER: [x] No   [] Yes    DRAINS: [x] No   [] Yes    Head of Bed: [x] Elevated:          [] Flat    Glucose management:  [x] Sliding Scale: medium sliding scale             Saadia Ramirez  1/9/2020  9:51 AM

## 2020-01-09 NOTE — PROGRESS NOTES
Level: 1  Pain Type: Acute pain  Pain Location: Head  Pain Orientation: Left  Pain Descriptors: Headache  Pain Frequency: Continuous  Non-Pharmaceutical Pain Intervention(s): Ambulation/Increased Activity; Distraction; Emotional support; Therapeutic presence  Response to Pain Intervention: Patient Satisfied  Vital Signs  Patient Currently in Pain: Yes   Orientation  Orientation  Overall Orientation Status: Within Functional Limits  Objective    ADL  Grooming: Modified independent ; Increased time to complete  UE Dressing: Supervision(Pt donned/doffed gown to back while sitting EOB this date)  Toileting: Contact guard assistance; Increased time to complete(Pt transferred to [facility] at bedside, pt unaware of wires, impulsive at that time)     Balance  Sitting Balance: Modified independent   Standing Balance: Supervision  Standing Balance  Time: 14 min  Activity: Pt stood bedside, sinkside, func mob for household distances  Functional Mobility  Functional - Mobility Device: No device  Activity: To/from bathroom  Assist Level: Supervision  Functional Mobility Comments:  Mod I when using RW, doing well overall  Toilet Transfers  Toilet - Technique: Ambulating  Equipment Used: Standard bedside commode  Toilet Transfer: Supervision  Bed mobility  Supine to Sit: Supervision  Sit to Supine: Supervision  Scooting: Modified independent  Transfers  Sit to stand: Supervision  Stand to sit: Modified independent         Cognition  Overall Cognitive Status: Exceptions  Cognition Comment: Pt demo'd 1 moment of word-finding difficulty when asking mother to bring her glasses from home, overall pt doing well, pt was agitated when writer assisted pt and 30 min later pt was acting pleasant and motivated for therapy      Plan   Plan  Times per week: 2-3x  Current Treatment Recommendations: Balance Training, Functional Mobility Training, Home Management Training, Equipment Evaluation, Education, & procurement          AM-PAC Inpatient Daily Activity

## 2020-01-09 NOTE — DISCHARGE INSTR - COC
 History of seizures Z87.898    Cavernoma D18.00       Isolation/Infection:   Isolation          No Isolation        Patient Infection Status     None to display          Nurse Assessment:  Last Vital Signs: /72   Pulse 110   Temp 98.3 °F (36.8 °C) (Oral)   Resp 16   Ht 5' 1\" (1.549 m)   Wt 176 lb 5.9 oz (80 kg)   SpO2 96%   BMI 33.32 kg/m²     Last documented pain score (0-10 scale): Pain Level: 1  Last Weight:   Wt Readings from Last 1 Encounters:   01/04/20 176 lb 5.9 oz (80 kg)     Mental Status:  oriented, alert, coherent, logical and thought processes intact    IV Access:  - None    Nursing Mobility/ADLs:  Walking   Independent  Transfer  Independent  Bathing  Independent  Dressing  Independent  [address]  Med Delivery   whole    Wound Care Documentation and Therapy:        Elimination:  Continence:   · Bowel: Yes  · Bladder: Yes  Urinary Catheter: None   Colostomy/Ileostomy/Ileal Conduit: No       Date of Last BM:     Intake/Output Summary (Last 24 hours) at 1/9/2020 1524  Last data filed at 1/9/2020 1230  Gross per 24 hour   Intake 1365 ml   Output 3525 ml   Net -2160 ml     I/O last 3 completed shifts: In: 0010 [P.O.:365; I.V.:1000]  Out: 3725 [Urine:3625; Blood:100]    Safety Concerns:     None    Impairments/Disabilities:      None    Nutrition Therapy:  Current Nutrition Therapy:   - Oral Diet:  General    Routes of Feeding: Oral  Liquids: Thin Liquids  Daily Fluid Restriction: no  Last Modified Barium Swallow with Video (Video Swallowing Test): not done    Treatments at the Time of Hospital Discharge:   Respiratory Treatments:   Oxygen Therapy:  is not on home oxygen therapy. Ventilator:    - No ventilator support    Rehab Therapies: Home care PT/OT  Weight Bearing Status/Restrictions: No weight bearing restirctions  Other Medical Equipment (for information only, NOT a DME order):     Other Treatments:    Patient's personal belongings (please select all that are sent with patient):  None    RN SIGNATURE:  Electronically signed by Alexandria Campos RN on 1/10/20 at 4:27 PM    CASE MANAGEMENT/SOCIAL WORK SECTION    Inpatient Status Date: 01/04/2020    Readmission Risk Assessment Score:  Readmission Risk              Risk of Unplanned Readmission:        11           Discharging to Facility/ Agency   · Name: [facility]  · Address:  · Phone: [phone]  · Fax:    Dialysis Facility (if applicable)   · Name:  · Address:  · Dialysis Schedule:  · Phone:  · Fax:    / signature: Electronically signed by Chayito Winslow RN on 1/9/20 at 3:26 PM    PHYSICIAN SECTION    Prognosis: Fair    Condition at Discharge: Stable    Rehab Potential (if transferring to Rehab): {Prognosis:[id number]}    Recommended Labs or Other Treatments After Discharge: na    Physician Certification: I certify the above information and transfer of Nasir Hale  is necessary for the continuing treatment of the diagnosis listed and that she requires home care for TBD days.      Update Admission H&P: No change in H&P    PHYSICIAN SIGNATURE:  Electronically signed by Iliana Orosco MD on 1/10/20 at 4:43 PM

## 2020-01-09 NOTE — PROGRESS NOTES
triglycerides tomorrow. : No acute events overnight. Pt with plan for mass resection tomorrow with NSG. Some ongoing h/a. Afebrile. Liver U/S WNL. BP's largely within goal parameters. : Cavernoma resection       Last 24h:    No acute events overnight.       CURRENT MEDICATIONS:  SCHEDULED MEDICATIONS:   0.9 % sodium chloride  250 mL Intravenous Once    ceFAZolin  2 g Intravenous Q8H    busPIRone  10 mg Oral TID    docusate sodium  100 mg Oral TID    fenofibrate  160 mg Oral Daily    insulin lispro  0-12 Units Subcutaneous TID WC    insulin lispro  0-6 Units Subcutaneous Nightly    lamoTRIgine  25 mg Oral Daily    levETIRAcetam  500 mg Oral BID    simvastatin  20 mg Oral Nightly    dexamethasone  4 mg Intravenous Q6H    ondansetron  8 mg Intravenous Once     CONTINUOUS INFUSIONS:   phenylephrine (SYLVIA-SYNEPHRINE) 50mg/250mL infusion Stopped (20 1802)     PRN MEDICATIONS:   midazolam, fentanNYL, ondansetron, oxyCODONE, [MAR Hold] magnesium sulfate    VITALS:  Temperature Range: Temp: 98.5 °F (36.9 °C) Temp  Av.6 °F (36.4 °C)  Min: 96 °F (35.6 °C)  Max: 100 °F (37.8 °C)  BP Range: Systolic (20VTX), YXM:280 , Min:87 , ISX:042     Diastolic (66CQX), OIK:00, Min:40, Max:113    Pulse Range: Pulse  Av.6  Min: 110  Max: 134  Respiration Range: Resp  Av.9  Min: 0  Max: 35  Current Pulse Ox: SpO2: 94 %  24HR Pulse Ox Range: SpO2  Av.2 %  Min: 86 %  Max: 100 %  Patient Vitals for the past 12 hrs:   BP Temp Pulse Resp SpO2   20 0502 137/85 -- 131 19 94 %   20 0402 132/81 98.5 °F (36.9 °C) 134 22 94 %   20 0302 134/81 -- 126 21 95 %   20 0202 138/88 -- 126 24 96 %   20 0102 (!) 151/96 -- 127 28 97 %   20 0002 (!) 142/94 98.9 °F (37.2 °C) 128 24 96 %   20 2302 (!) 150/113 -- 132 (!) 32 97 %   20 2202 127/70 -- 118 23 96 %   20 2102 (!) 142/90 -- 124 21 96 %   20 (!) 143/89 98 °F (36.7 °C) 127 (!) 35 97 %   20 1950 SINUSES: The visualized paranasal sinuses and mastoid air cells demonstrate no acute abnormality. SOFT TISSUES/SKULL:  No acute abnormality of the visualized skull or soft tissues. Stable volume in extent of focal left occipital acute subarachnoid plus or minus acute intraparenchymal hemorrhage. Otherwise, unremarkable noncontrast CT head examination. Ct Head Wo Contrast    Result Date: 1/4/2020  EXAMINATION: CT OF THE HEAD WITHOUT CONTRAST  1/4/2020 6:48 pm TECHNIQUE: CT of the head was performed without the administration of intravenous contrast. Dose modulation, iterative reconstruction, and/or weight based adjustment of the mA/kV was utilized to reduce the radiation dose to as low as reasonably achievable. COMPARISON: None. HISTORY: ORDERING SYSTEM PROVIDED HISTORY: Headache; eval for SAH. TECHNOLOGIST PROVIDED HISTORY: Headache; eval for SAH. Is the patient pregnant?->No Reason for Exam: Headache; eval for [facility]. Acuity: Acute Type of Exam: Initial FINDINGS: BRAIN/VENTRICLES: Small focus of platelike left occipital hyperdense hemorrhage likely within a deep sulcus. No evidence of acute ischemia, hydrocephalus, or extra-axial fluid collection. Midline maintained. Basal cisterns patent. ORBITS: The visualized portion of the orbits demonstrate no acute abnormality. SINUSES: The visualized paranasal sinuses and mastoid air cells demonstrate no acute abnormality. SOFT TISSUES/SKULL:  No acute abnormality of the visualized skull or soft tissues. Small amount of acute left occipital subarachnoid hemorrhage. Findings were discussed with LAY HACKETT at 7:17 p.m. on 1/4/2020. Us Liver    Result Date: 1/6/2020  EXAMINATION: RIGHT UPPER QUADRANT ULTRASOUND 1/6/2020 3:17 pm COMPARISON: None. HISTORY: ORDERING SYSTEM PROVIDED HISTORY: elevated LFTs FINDINGS: LIVER:  The liver demonstrates normal echogenicity without evidence of intrahepatic biliary ductal dilatation. Liver length is 16.4 cm. focus of heterogeneous T2/FLAIR hyperintensity in the left occipital subcortical white matter with surrounding hemosiderin staining and an adjacent small developmental venous anomaly. There is no acute infarct. No mass effect or midline shift. The ventricles and sulci are normal in size and configuration. The sellar/suprasellar regions appear unremarkable. The normal signal voids within the major intracranial vessels appear maintained. ORBITS: The visualized portion of the orbits demonstrate no acute abnormality. SINUSES: The visualized paranasal sinuses and mastoid air cells are well aerated. BONES/SOFT TISSUES: The bone marrow signal intensity appears normal. The soft tissues demonstrate no acute abnormality. Left occipital 7 mm cavernoma with surrounding hemosiderin staining consistent with sequela of remote hemorrhage. This hemosiderin deposition likely accounts for hyperdensity on recent head CT as there is no adjacent FLAIR signal abnormality to suggest acute subarachnoid hemorrhage, however small amount of acute subarachnoid hemorrhage canal at be completely excluded. Mri Brain Wo Contrast    Result Date: 1/6/2020  EXAMINATION: MRI OF THE BRAIN WITHOUT CONTRAST  1/6/2020 11:54 am TECHNIQUE: Multiplanar multisequence MRI of the brain was performed without the administration of intravenous contrast. COMPARISON: MRI brain from 01/05/2020. HISTORY: ORDERING SYSTEM PROVIDED HISTORY: STEALTH PROTOCOL T2 ONLY FOR SURGICAL PLANNING TECHNOLOGIST PROVIDED HISTORY: STEALTH PROTOCOL T2 ONLY FOR SURGICAL PLANNING Is the patient pregnant?->No FINDINGS: Noncontrast stealth images of the brain were obtained for surgical neuronavigation. There is redemonstration of a left occipital lobe lesion with surrounding T2 signal hypointensity. No other brain masses are visualized. Stealth images obtained for surgical neuronavigation. Labs and Images reviewed with:  [x] Dr. Shawn Blunt.  [facility]    [] Dr. Cece Smith Daniel  [] Dr. Tameka Ventura  [] There are no new interval images to review. PHYSICAL EXAM       CONSTITUTIONAL:  Well developed, well nourished, alert and oriented x 3, in no acute distress. GCS 15. Nontoxic. No dysarthria. No aphasia. HEAD:  normocephalic, atraumatic    EYES:  PERRLA, EOMI.   ENT:  moist mucous membranes   NECK:  supple, symmetric   LUNGS:  Equal air entry bilaterally   CARDIOVASCULAR:  normal s1 / s2, RRR, distal pulses intact   ABDOMEN:  Soft, no rigidity   NEUROLOGIC:  Mental Status:  A & O x3,awake             Cranial Nerves:    cranial nerves II-XII are grossly intact    Motor Exam:    Drift:  absent  Tone:  normal    Motor exam is symmetrical 5 out of 5 all extremities bilaterally    Sensory:    Touch:    Right Upper Extremity:  normal  Left Upper Extremity:  normal  Right Lower Extremity:  normal  Left Lower Extremity:  normal    Deep Tendon Reflexes:    Right Bicep:  2+  Left Bicep:  2+  Right Knee:  2+  Left Knee:  2+    Plantar Response:  Right:  downgoing  Left:  downgoing    Clonus:  N/A  Garcia's:  N/A    Coordination/Dysmetria:  Finger to Nose:   Right:  normal  Left:  normal   Dysdiadochokinesia:  N/A       DRAINS:  [x] There are no drains for Neuro Critical Care to monitor at this time.      ASSESSMENT AND PLAN:     This is a 24 y.o. female with maximal onset headache this morning preceded by nausea and emesis with a h/o of left occipital IPH in same location at [facility] 08/2019 - with negative CTA, MRI, and diagnostic angiogram.  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       - Small acute L occipital SAH  Postop day 1 left occipital cavernoma resection   - CTA H/N -unremarkable   - MRI brain w wo contrast consistent with cavernoma   - f/u MRI Brain W WO post op  - MRV unremarkable  - Strict SBP goal  per neuro endovascular recommendations  - Load Keppra 1000 mg IV, continue with maintenance Keppra 500 BID  - Neuro checks

## 2020-01-09 NOTE — PLAN OF CARE
Problem: HEMODYNAMIC STATUS  Goal: Patient has stable vital signs and fluid balance  Outcome: Ongoing     Problem: Pain:  Goal: Pain level will decrease  Description  Pain level will decrease  Outcome: Ongoing  Goal: Control of acute pain  Description  Control of acute pain  Outcome: Ongoing  Goal: Control of chronic pain  Description  Control of chronic pain  Outcome: Ongoing     Problem: Falls - Risk of:  Goal: Will remain free from falls  Description  Will remain free from falls  Outcome: Ongoing  Goal: Absence of physical injury  Description  Absence of physical injury  Outcome: Ongoing

## 2020-01-09 NOTE — PROGRESS NOTES
Neurosurgery ANA PAULA/Resident    Daily Progress Note     1/9/2020  12:35 PM    Chart reviewed. No acute events overnight. Complains of tenderness over incision site. Mild headache. Denies nausea. Vitals:    01/09/20 1100 01/09/20 1105 01/09/20 1205 01/09/20 1207   BP:  118/73 135/76 128/72   Pulse: 114 112 114 117   Resp:  20  16   Temp:    98.3 °F (36.8 °C)   TempSrc:    Oral   SpO2: 94% 94% 96% 97%   Weight:       Height:           PE:   AOx3   Right homonymous hemianopsia  PERRL, EOMI   Motor   Moving all extremities equally  Incision left occipital site dressing removed and left open to air      Lab Results   Component Value Date    WBC 15.0 (H) 01/09/2020    HGB 14.4 01/09/2020    HCT 43.0 01/09/2020     01/09/2020    CHOL 245 (H) 01/05/2020    TRIG 420 (H) 01/07/2020    HDL 24 (L) 01/05/2020    LDLDIRECT 104 (H) 01/05/2020    ALT 71 (H) 01/05/2020    AST 29 01/05/2020     01/09/2020    K 3.3 (L) 01/09/2020     01/09/2020    CREATININE 0.96 (H) 01/09/2020    BUN 14 01/09/2020    CO2 21 01/09/2020    TSH 3.19 01/05/2020    LABA1C 8.7 (H) 01/05/2020       Radiology   Xr Chest Standard (2 Vw)    Result Date: 1/4/2020  EXAMINATION: TWO XRAY VIEWS OF THE CHEST 1/4/2020 5:12 pm COMPARISON: None. HISTORY: ORDERING SYSTEM PROVIDED HISTORY: CP; cough, PNA? TECHNOLOGIST PROVIDED HISTORY: CP; cough, PNA? Reason for Exam: rapid heart rate,nausea,cough,body aches,high bp, FINDINGS: The lungs are without acute focal process. There is no effusion or pneumothorax. The cardiomediastinal silhouette is without acute process. The osseous structures are without acute process. No acute process.      Ct Head Wo Contrast    Result Date: 1/9/2020  EXAMINATION: CT OF THE HEAD WITHOUT CONTRAST  1/9/2020 2:34 am TECHNIQUE: CT of the head was performed without the administration of intravenous contrast. Dose modulation, iterative reconstruction, and/or weight based adjustment of the mA/kV was utilized to reduce

## 2020-01-10 ENCOUNTER — APPOINTMENT (OUTPATIENT)
Dept: MRI IMAGING | Age: 25
DRG: 021 | End: 2020-01-10
Payer: MEDICARE

## 2020-01-10 VITALS
TEMPERATURE: 97.7 F | DIASTOLIC BLOOD PRESSURE: 66 MMHG | WEIGHT: 176.37 LBS | HEART RATE: 71 BPM | HEIGHT: 61 IN | BODY MASS INDEX: 33.3 KG/M2 | SYSTOLIC BLOOD PRESSURE: 110 MMHG | OXYGEN SATURATION: 96 % | RESPIRATION RATE: 16 BRPM

## 2020-01-10 LAB
ABSOLUTE EOS #: <0.03 K/UL (ref 0–0.44)
ABSOLUTE IMMATURE GRANULOCYTE: 0.06 K/UL (ref 0–0.3)
ABSOLUTE LYMPH #: 1.58 K/UL (ref 1.1–3.7)
ABSOLUTE MONO #: 0.63 K/UL (ref 0.1–1.2)
ANION GAP SERPL CALCULATED.3IONS-SCNC: 19 MMOL/L (ref 9–17)
BASOPHILS # BLD: 0 % (ref 0–2)
BASOPHILS ABSOLUTE: <0.03 K/UL (ref 0–0.2)
BUN BLDV-MCNC: 16 MG/DL (ref 6–20)
BUN/CREAT BLD: ABNORMAL (ref 9–20)
CALCIUM SERPL-MCNC: 9.5 MG/DL (ref 8.6–10.4)
CHLORIDE BLD-SCNC: 94 MMOL/L (ref 98–107)
CO2: 24 MMOL/L (ref 20–31)
CREAT SERPL-MCNC: 1.11 MG/DL (ref 0.5–0.9)
DIFFERENTIAL TYPE: ABNORMAL
EOSINOPHILS RELATIVE PERCENT: 0 % (ref 1–4)
GFR AFRICAN AMERICAN: >60 ML/MIN
GFR NON-AFRICAN AMERICAN: 59 ML/MIN
GFR SERPL CREATININE-BSD FRML MDRD: ABNORMAL ML/MIN/{1.73_M2}
GFR SERPL CREATININE-BSD FRML MDRD: ABNORMAL ML/MIN/{1.73_M2}
GLUCOSE BLD-MCNC: 272 MG/DL (ref 65–105)
GLUCOSE BLD-MCNC: 305 MG/DL (ref 65–105)
GLUCOSE BLD-MCNC: 315 MG/DL (ref 70–99)
GLUCOSE BLD-MCNC: 383 MG/DL (ref 65–105)
HCT VFR BLD CALC: 41.8 % (ref 36.3–47.1)
HEMOGLOBIN: 13.3 G/DL (ref 11.9–15.1)
IMMATURE GRANULOCYTES: 0 %
LYMPHOCYTES # BLD: 11 % (ref 24–43)
MAGNESIUM: 1.5 MG/DL (ref 1.6–2.6)
MCH RBC QN AUTO: 27.4 PG (ref 25.2–33.5)
MCHC RBC AUTO-ENTMCNC: 31.8 G/DL (ref 28.4–34.8)
MCV RBC AUTO: 86.2 FL (ref 82.6–102.9)
MONOCYTES # BLD: 5 % (ref 3–12)
NRBC AUTOMATED: 0 PER 100 WBC
PDW BLD-RTO: 12.6 % (ref 11.8–14.4)
PLATELET # BLD: 201 K/UL (ref 138–453)
PLATELET ESTIMATE: ABNORMAL
PMV BLD AUTO: 11.3 FL (ref 8.1–13.5)
POTASSIUM SERPL-SCNC: 4 MMOL/L (ref 3.7–5.3)
RBC # BLD: 4.85 M/UL (ref 3.95–5.11)
RBC # BLD: ABNORMAL 10*6/UL
SEG NEUTROPHILS: 84 % (ref 36–65)
SEGMENTED NEUTROPHILS ABSOLUTE COUNT: 11.67 K/UL (ref 1.5–8.1)
SODIUM BLD-SCNC: 137 MMOL/L (ref 135–144)
WBC # BLD: 14 K/UL (ref 3.5–11.3)
WBC # BLD: ABNORMAL 10*3/UL

## 2020-01-10 PROCEDURE — 97116 GAIT TRAINING THERAPY: CPT

## 2020-01-10 PROCEDURE — 85025 COMPLETE CBC W/AUTO DIFF WBC: CPT

## 2020-01-10 PROCEDURE — 92523 SPEECH SOUND LANG COMPREHEN: CPT

## 2020-01-10 PROCEDURE — 6360000002 HC RX W HCPCS: Performed by: NURSE PRACTITIONER

## 2020-01-10 PROCEDURE — 36415 COLL VENOUS BLD VENIPUNCTURE: CPT

## 2020-01-10 PROCEDURE — A9579 GAD-BASE MR CONTRAST NOS,1ML: HCPCS | Performed by: STUDENT IN AN ORGANIZED HEALTH CARE EDUCATION/TRAINING PROGRAM

## 2020-01-10 PROCEDURE — 6360000002 HC RX W HCPCS: Performed by: STUDENT IN AN ORGANIZED HEALTH CARE EDUCATION/TRAINING PROGRAM

## 2020-01-10 PROCEDURE — 97110 THERAPEUTIC EXERCISES: CPT

## 2020-01-10 PROCEDURE — 83735 ASSAY OF MAGNESIUM: CPT

## 2020-01-10 PROCEDURE — 6360000004 HC RX CONTRAST MEDICATION: Performed by: STUDENT IN AN ORGANIZED HEALTH CARE EDUCATION/TRAINING PROGRAM

## 2020-01-10 PROCEDURE — 2580000003 HC RX 258: Performed by: STUDENT IN AN ORGANIZED HEALTH CARE EDUCATION/TRAINING PROGRAM

## 2020-01-10 PROCEDURE — 2500000003 HC RX 250 WO HCPCS: Performed by: NURSE PRACTITIONER

## 2020-01-10 PROCEDURE — 6370000000 HC RX 637 (ALT 250 FOR IP): Performed by: NURSE PRACTITIONER

## 2020-01-10 PROCEDURE — 82947 ASSAY GLUCOSE BLOOD QUANT: CPT

## 2020-01-10 PROCEDURE — 99233 SBSQ HOSP IP/OBS HIGH 50: CPT | Performed by: PSYCHIATRY & NEUROLOGY

## 2020-01-10 PROCEDURE — APPSS30 APP SPLIT SHARED TIME 16-30 MINUTES: Performed by: REGISTERED NURSE

## 2020-01-10 PROCEDURE — 6370000000 HC RX 637 (ALT 250 FOR IP): Performed by: STUDENT IN AN ORGANIZED HEALTH CARE EDUCATION/TRAINING PROGRAM

## 2020-01-10 PROCEDURE — 80048 BASIC METABOLIC PNL TOTAL CA: CPT

## 2020-01-10 PROCEDURE — 70553 MRI BRAIN STEM W/O & W/DYE: CPT

## 2020-01-10 RX ORDER — SODIUM CHLORIDE 0.9 % (FLUSH) 0.9 %
10 SYRINGE (ML) INJECTION 2 TIMES DAILY
Status: DISCONTINUED | OUTPATIENT
Start: 2020-01-10 | End: 2020-01-10 | Stop reason: HOSPADM

## 2020-01-10 RX ORDER — 0.9 % SODIUM CHLORIDE 0.9 %
500 INTRAVENOUS SOLUTION INTRAVENOUS ONCE
Status: COMPLETED | OUTPATIENT
Start: 2020-01-10 | End: 2020-01-10

## 2020-01-10 RX ORDER — OXYCODONE HYDROCHLORIDE AND ACETAMINOPHEN 5; 325 MG/1; MG/1
1 TABLET ORAL EVERY 6 HOURS PRN
Qty: 28 TABLET | Refills: 0 | Status: SHIPPED | OUTPATIENT
Start: 2020-01-10 | End: 2020-01-17

## 2020-01-10 RX ORDER — LEVETIRACETAM 500 MG/1
500 TABLET ORAL 2 TIMES DAILY
Qty: 60 TABLET | Refills: 0 | Status: SHIPPED | OUTPATIENT
Start: 2020-01-10 | End: 2022-08-17 | Stop reason: SINTOL

## 2020-01-10 RX ORDER — ATORVASTATIN CALCIUM 40 MG/1
40 TABLET, FILM COATED ORAL NIGHTLY
Qty: 30 TABLET | Refills: 3 | Status: ON HOLD | OUTPATIENT
Start: 2020-01-10 | End: 2022-06-21

## 2020-01-10 RX ORDER — MAGNESIUM SULFATE 1 G/100ML
2 INJECTION INTRAVENOUS ONCE
Status: COMPLETED | OUTPATIENT
Start: 2020-01-10 | End: 2020-01-10

## 2020-01-10 RX ADMIN — INSULIN LISPRO 15 UNITS: 100 INJECTION, SOLUTION INTRAVENOUS; SUBCUTANEOUS at 12:37

## 2020-01-10 RX ADMIN — OXYCODONE HYDROCHLORIDE 5 MG: 5 TABLET ORAL at 08:35

## 2020-01-10 RX ADMIN — INSULIN LISPRO 3 UNITS: 100 INJECTION, SOLUTION INTRAVENOUS; SUBCUTANEOUS at 00:34

## 2020-01-10 RX ADMIN — DEXTROSE MONOHYDRATE 2 G: 50 INJECTION, SOLUTION INTRAVENOUS at 00:46

## 2020-01-10 RX ADMIN — Medication 10 ML: at 14:44

## 2020-01-10 RX ADMIN — OXYCODONE HYDROCHLORIDE 5 MG: 5 TABLET ORAL at 12:45

## 2020-01-10 RX ADMIN — DEXAMETHASONE SODIUM PHOSPHATE 4 MG: 4 INJECTION, SOLUTION INTRAMUSCULAR; INTRAVENOUS at 04:17

## 2020-01-10 RX ADMIN — BUSPIRONE HYDROCHLORIDE 10 MG: 10 TABLET ORAL at 08:27

## 2020-01-10 RX ADMIN — SENNOSIDES AND DOCUSATE SODIUM 2 TABLET: 8.6; 5 TABLET ORAL at 08:28

## 2020-01-10 RX ADMIN — DEXAMETHASONE SODIUM PHOSPHATE 4 MG: 4 INJECTION, SOLUTION INTRAMUSCULAR; INTRAVENOUS at 12:23

## 2020-01-10 RX ADMIN — OXYCODONE HYDROCHLORIDE 5 MG: 5 TABLET ORAL at 00:39

## 2020-01-10 RX ADMIN — DEXAMETHASONE SODIUM PHOSPHATE 4 MG: 4 INJECTION, SOLUTION INTRAMUSCULAR; INTRAVENOUS at 00:34

## 2020-01-10 RX ADMIN — BUSPIRONE HYDROCHLORIDE 10 MG: 10 TABLET ORAL at 14:44

## 2020-01-10 RX ADMIN — OXYCODONE HYDROCHLORIDE 5 MG: 5 TABLET ORAL at 04:45

## 2020-01-10 RX ADMIN — GADOTERIDOL 16 ML: 279.3 INJECTION, SOLUTION INTRAVENOUS at 13:38

## 2020-01-10 RX ADMIN — INSULIN LISPRO 12 UNITS: 100 INJECTION, SOLUTION INTRAVENOUS; SUBCUTANEOUS at 08:28

## 2020-01-10 RX ADMIN — ENOXAPARIN SODIUM 40 MG: 40 INJECTION SUBCUTANEOUS at 08:28

## 2020-01-10 RX ADMIN — MAGNESIUM SULFATE HEPTAHYDRATE 2 G: 1 INJECTION, SOLUTION INTRAVENOUS at 06:11

## 2020-01-10 RX ADMIN — LAMOTRIGINE 25 MG: 25 TABLET ORAL at 08:27

## 2020-01-10 RX ADMIN — FAMOTIDINE 20 MG: 10 INJECTION, SOLUTION INTRAVENOUS at 08:28

## 2020-01-10 RX ADMIN — SODIUM CHLORIDE 500 ML: 9 INJECTION, SOLUTION INTRAVENOUS at 08:36

## 2020-01-10 RX ADMIN — LEVETIRACETAM 500 MG: 500 TABLET, FILM COATED ORAL at 08:27

## 2020-01-10 ASSESSMENT — PAIN DESCRIPTION - FREQUENCY
FREQUENCY: CONTINUOUS
FREQUENCY: CONTINUOUS

## 2020-01-10 ASSESSMENT — PAIN DESCRIPTION - ORIENTATION
ORIENTATION: LEFT
ORIENTATION: LEFT

## 2020-01-10 ASSESSMENT — PAIN DESCRIPTION - PROGRESSION
CLINICAL_PROGRESSION: GRADUALLY WORSENING
CLINICAL_PROGRESSION: GRADUALLY WORSENING
CLINICAL_PROGRESSION: GRADUALLY IMPROVING

## 2020-01-10 ASSESSMENT — PAIN DESCRIPTION - LOCATION
LOCATION: HEAD
LOCATION: HEAD

## 2020-01-10 ASSESSMENT — PAIN SCALES - GENERAL
PAINLEVEL_OUTOF10: 1
PAINLEVEL_OUTOF10: 10
PAINLEVEL_OUTOF10: 10
PAINLEVEL_OUTOF10: 5
PAINLEVEL_OUTOF10: 8
PAINLEVEL_OUTOF10: 2
PAINLEVEL_OUTOF10: 8
PAINLEVEL_OUTOF10: 2

## 2020-01-10 ASSESSMENT — PAIN DESCRIPTION - ONSET: ONSET: ON-GOING

## 2020-01-10 ASSESSMENT — PAIN DESCRIPTION - DESCRIPTORS
DESCRIPTORS: HEADACHE
DESCRIPTORS: HEADACHE

## 2020-01-10 ASSESSMENT — PAIN DESCRIPTION - PAIN TYPE
TYPE: SURGICAL PAIN
TYPE: SURGICAL PAIN

## 2020-01-10 NOTE — PROGRESS NOTES
Neuro critical care:      Status post cavernoma surgery   MRI awaited   Wean decadron based on findings   Sliding scale Insulin   AEDs for a week   Follow up with her outpatient neurologist/ [facility] and f/u with neurosurgery outpatient. No driving due to hemianopia until outpatient evaluations done.    Therapy evaluations continued   Discharge plans     Clair Alanis MD

## 2020-01-10 NOTE — PROGRESS NOTES
Images reviewed with Dr. Jac Reynolds - Decadron can be discontinued and the patient patient can be discharged with 2-week follow-up. Amarilis Martin MD, SELWYN  Neurosurgery Service  1/10/2020 at 3:27 PM         This note is created with the assistance of a speech recognition program.  While intending to generate a document that actually reflects the content of the visit, the document can still have some errors including those of syntax and sound like substitutions which may escape proof reading. In such instances, actual meaning can be extrapolated by contextual diversion.

## 2020-01-10 NOTE — PROGRESS NOTES
Neurosurgery ANA PAULA/Resident    Daily Progress Note     1/10/2020  11:27 AM    Chart reviewed. No acute events overnight. Tenderness over incision site. Denies headache. Vitals:    01/10/20 0503 01/10/20 0603 01/10/20 0703 01/10/20 0800   BP: 132/83 124/81 (!) 105/59 106/64   Pulse: 82 79 80 87   Resp:    18   Temp:    98.2 °F (36.8 °C)   TempSrc:    Oral   SpO2: 95% 95% 95% 95%   Weight:       Height:         PE:   AOx3   Right homonymous hemianopsia  PERRL, EOMI   Motor   Moving all extremities equally  Incision left occipital site dressing removed and left open to air      Lab Results   Component Value Date    WBC 14.0 (H) 01/10/2020    HGB 13.3 01/10/2020    HCT 41.8 01/10/2020     01/10/2020    CHOL 245 (H) 01/05/2020    TRIG 420 (H) 01/07/2020    HDL 24 (L) 01/05/2020    LDLDIRECT 104 (H) 01/05/2020    ALT 71 (H) 01/05/2020    AST 29 01/05/2020     01/10/2020    K 4.0 01/10/2020    CL 94 (L) 01/10/2020    CREATININE 1.11 (H) 01/10/2020    BUN 16 01/10/2020    CO2 24 01/10/2020    TSH 3.19 01/05/2020    LABA1C 8.7 (H) 01/05/2020         A/P  Left occipital cavernoma  POD#2 left occipital craniotomy for vascular lesion    -Follow-up postop MRI brain  -PT and OT, activity as tolerated  -discharge after MRI brain completed  -will determine if need decadron after MRI completed      Please contact neurosurgery with any changes in patients neurologic status.        Javy Gallegos CNP  NS pager [phone]  1/10/20  11:27 AM

## 2020-01-10 NOTE — PROGRESS NOTES
Speech Language Pathology  [address]  Initial Speech/Language/Cognitive Assessment    NAME: Kenyetta Caldwell  : 1995   MRN: [id number]  ADMISSION DATE: 2020  ADMITTING DIAGNOSIS: has Bipolar 1 disorder ([address] Recurrent depression ([address] SAH (subarachnoid hemorrhage) ([address] Intraparenchymal hematoma of brain ([address] Cerebral hemorrhage ([address] History of seizures; Cavernoma; and Status post craniotomy on their problem list.    Date of Eval: 1/10/2020   Evaluating Therapist: YOGESH Fajardo    Primary Complaint: The patient is a 30 y.o. female presented with severe HA that was maximal at onset this morning when she woke up. It did not wake her up. Woke up and had nausea followed by x2 episodes of emesis with sudden 10/10 thunderclap headache. No vision changes, focal weakness or seizures. Not on [facility].      Has a h/o AVM wit [facility] first identified 2019 on MRI according to [address] [place] [address] records, attributed at that time to HTN. Since then has had x2 CTA H/N negative for AVM. Patient denies a history of HTN. On admission today SBP 110s. Follows with a neurologist however missed her last appointment. Endorses migraines and sees Charo New PA-C neurology @ [address] [place] [address] last seen 19.       Diagnostic cerebral angiogram performed 2019 showed no evidence of AVM, dural aVF, or tumor blush performed by Dr. Sarah [address] at [address] [place] [address]. X2 CTA head and neck both unremarkable for AVMs. Pain:  Pain Assessment  Pain Assessment: 0-10  Pain Level: 8    Assessment:  Pt presents with no apparent cognitive deficits at this time. No dysarthria noted, no oral motor deficits. No further ST is recommended. Verbal education provided. Recommendations:  Requires SLP Intervention: No  D/C Recommendations: No therapy recommended at discharge.     Subjective:   Previous level of function and limitations:   General  Chart Reviewed: Yes  Family / Caregiver Present: No     Vision  Vision:

## 2020-01-10 NOTE — DISCHARGE SUMMARY
tomorrow.   1/7: No acute events overnight. Pt with plan for mass resection tomorrow with NSG. Some ongoing h/a. Afebrile. Liver U/S WNL. BP's largely within goal parameters. 1/8: Cavernoma resection     1/10: pt with right-sided hemianopsia, pupils equal reactive moving all limbs without deficit to be discharged. Decadron discontinued. Labs and imaging were followed daily. At time of discharge, Jose A Simon was tolerating a DIET GENERAL;, having bowel movements, and is in stable condition to be discharged to home. PROCEDURES:    Mass resection    PHYSICAL EXAMINATION        Discharge Vitals:  height is 5' 1\" (1.549 m) and weight is 176 lb 5.9 oz (80 kg). Her oral temperature is 97.7 °F (36.5 °C). Her blood pressure is 110/66 and her pulse is 71. Her respiration is 16 and oxygen saturation is 96%. CONSTITUTIONAL:  Well developed, well nourished, alert and oriented x 3, in no acute distress. GCS 15. Nontoxic. No dysarthria. No aphasia.    HEAD:  normocephalic, staples in place, non bleeding   EYES:  PERRLA, EOMI.   ENT:  moist mucous membranes   NECK:  supple, symmetric   LUNGS:  Equal air entry bilaterally   CARDIOVASCULAR:  normal s1 / s2, RRR, distal pulses intact   ABDOMEN:  Soft, no rigidity   NEUROLOGIC:  Mental Status:  A & O x3,awake             Cranial Nerves:    cranial nerves II-XII are grossly intact with the exception of a right-sided hemianopsia    Motor Exam:    Drift:  absent  Tone:  normal    Motor exam is symmetrical 5 out of 5 all extremities bilaterally    Sensory:    Touch:    Right Upper Extremity:  normal  Left Upper Extremity:  normal  Right Lower Extremity:  normal  Left Lower Extremity:  normal    Deep Tendon Reflexes:    Right Bicep:  2+  Left Bicep:  2+  Right Knee:  2+  Left Knee:  2+           LABS/IMAGING     Recent Labs     01/08/20  0501 01/09/20  0544 01/10/20  0516   WBC 11.8* 15.0* 14.0*   HGB 14.6 14.4 13.3   HCT 43.5 43.0 41.8    242 201    141 137   K 3.8 3.3* 4.0   CL 99 102 94*   CO2 23 21 24   BUN 17 14 16   CREATININE 0.90 0.96* 1.11*       Xr Chest Standard (2 Vw)    Result Date: 1/4/2020  EXAMINATION: TWO XRAY VIEWS OF THE CHEST 1/4/2020 5:12 pm COMPARISON: None. HISTORY: ORDERING SYSTEM PROVIDED HISTORY: CP; cough, PNA? TECHNOLOGIST PROVIDED HISTORY: CP; cough, PNA? Reason for Exam: rapid heart rate,nausea,cough,body aches,high bp, FINDINGS: The lungs are without acute focal process. There is no effusion or pneumothorax. The cardiomediastinal silhouette is without acute process. The osseous structures are without acute process. No acute process. Ct Head Wo Contrast    Result Date: 1/9/2020  EXAMINATION: CT OF THE HEAD WITHOUT CONTRAST  1/9/2020 2:34 am TECHNIQUE: CT of the head was performed without the administration of intravenous contrast. Dose modulation, iterative reconstruction, and/or weight based adjustment of the mA/kV was utilized to reduce the radiation dose to as low as reasonably achievable. COMPARISON: None. HISTORY: ORDERING SYSTEM PROVIDED HISTORY: post op resection of cavernoma TECHNOLOGIST PROVIDED HISTORY: post op resection of cavernoma Is the patient pregnant?->No Reason for Exam: post op resection of cavernoma Acuity: Unknown Type of Exam: Unknown FINDINGS: BRAIN/VENTRICLES: There is no acute intracranial hemorrhage, mass effect or midline shift. No abnormal extra-axial fluid collection. The gray-white differentiation is maintained without evidence of an acute infarct. There is no evidence of hydrocephalus. Left temporal occipital mild encephalomalacia is seen in the align craniotomy postoperative change. ORBITS: The visualized portion of the orbits demonstrate no acute abnormality. SINUSES: The visualized paranasal sinuses and mastoid air cells demonstrate no acute abnormality.  SOFT TISSUES/SKULL:  Recent left occipital craniotomy postoperative changes seen with minimal extra-axial soft tissue air seen surrounding the reasonably achievable. COMPARISON: CT head without contrast January 4, 2020. CT angiogram head and neck with contrast January 4, 2020. HISTORY: ORDERING SYSTEM PROVIDED HISTORY: SAH TECHNOLOGIST PROVIDED HISTORY: SAH Is the patient pregnant?->No Reason for Exam: SAH Acuity: Acute Type of Exam: Subsequent/Follow-up FINDINGS: BRAIN/VENTRICLES: Stable volume of left occipital acute subarachnoid hemorrhage plus or minus acute intraparenchymal acute hemorrhage is noted in comparison with the prior study. No significant surrounding vasogenic edema is identified. No abnormal extra-axial fluid collection. The gray-white differentiation is maintained without evidence of an acute infarct. There is no evidence of hydrocephalus. ORBITS: The visualized portion of the orbits demonstrate no acute abnormality. SINUSES: The visualized paranasal sinuses and mastoid air cells demonstrate no acute abnormality. SOFT TISSUES/SKULL:  No acute abnormality of the visualized skull or soft tissues. Stable volume in extent of focal left occipital acute subarachnoid plus or minus acute intraparenchymal hemorrhage. Otherwise, unremarkable noncontrast CT head examination. Ct Head Wo Contrast    Result Date: 1/4/2020  EXAMINATION: CT OF THE HEAD WITHOUT CONTRAST  1/4/2020 6:48 pm TECHNIQUE: CT of the head was performed without the administration of intravenous contrast. Dose modulation, iterative reconstruction, and/or weight based adjustment of the mA/kV was utilized to reduce the radiation dose to as low as reasonably achievable. COMPARISON: None. HISTORY: ORDERING SYSTEM PROVIDED HISTORY: Headache; eval for SAH. TECHNOLOGIST PROVIDED HISTORY: Headache; eval for SAH. Is the patient pregnant?->No Reason for Exam: Headache; eval for [facility]. Acuity: Acute Type of Exam: Initial FINDINGS: BRAIN/VENTRICLES: Small focus of platelike left occipital hyperdense hemorrhage likely within a deep sulcus.   No evidence of acute ischemia, hydrocephalus, or extra-axial fluid collection. Midline maintained. Basal cisterns patent. ORBITS: The visualized portion of the orbits demonstrate no acute abnormality. SINUSES: The visualized paranasal sinuses and mastoid air cells demonstrate no acute abnormality. SOFT TISSUES/SKULL:  No acute abnormality of the visualized skull or soft tissues. Small amount of acute left occipital subarachnoid hemorrhage. Findings were discussed with LAY HACKETT at 7:17 p.m. on 1/4/2020. Us Liver    Result Date: 1/6/2020  EXAMINATION: RIGHT UPPER QUADRANT ULTRASOUND 1/6/2020 3:17 pm COMPARISON: None. HISTORY: ORDERING SYSTEM PROVIDED HISTORY: elevated LFTs FINDINGS: LIVER:  The liver demonstrates normal echogenicity without evidence of intrahepatic biliary ductal dilatation. Liver length is 16.4 cm. BILIARY SYSTEM:  Gallbladder is unremarkable without evidence of significant pericholecystic fluid, wall thickening or stones. Negative sonographic Márquez's sign. Common bile duct is within normal limits measuring 4 mm. RIGHT KIDNEY: The right kidney is grossly unremarkable without evidence of hydronephrosis. Right renal length is 8.3 cm. PANCREAS:  Visualized portions of the pancreas are unremarkable. OTHER: No evidence of right upper quadrant ascites. Negative right upper quadrant ultrasound. Cta Head Neck W Contrast    Result Date: 1/4/2020  EXAMINATION: CTA OF THE HEAD AND NECK WITH CONTRAST 1/4/2020 8:23 pm: TECHNIQUE: CTA of the head and neck was performed with the administration of intravenous contrast. Multiplanar reformatted images are provided for review. MIP images are provided for review. Stenosis of the internal carotid arteries measured using NASCET criteria. Dose modulation, iterative reconstruction, and/or weight based adjustment of the mA/kV was utilized to reduce the radiation dose to as low as reasonably achievable. COMPARISON: None.  HISTORY: ORDERING SYSTEM PROVIDED HISTORY: SAH, eval for cont. bleeding TECHNOLOGIST PROVIDED HISTORY: SAH, eval for cont. bleeding Reason for Exam: bleed seen on CT head WO Acuity: Acute Type of Exam: Initial FINDINGS: CTA NECK: AORTIC ARCH/ARCH VESSELS: No dissection or arterial injury. No significant stenosis of the brachiocephalic or subclavian arteries. CAROTID ARTERIES: No dissection, arterial injury, or hemodynamically significant stenosis by NASCET criteria. VERTEBRAL ARTERIES: No dissection, arterial injury, or significant stenosis. SOFT TISSUES: The lung apices are clear. No cervical or superior mediastinal lymphadenopathy. The larynx and pharynx are unremarkable. No acute abnormality of the salivary and thyroid glands. BONES: No acute osseous abnormality. CTA HEAD: ANTERIOR CIRCULATION: No significant stenosis of the intracranial internal carotid, anterior cerebral, or middle cerebral arteries. No aneurysm. POSTERIOR CIRCULATION: No significant stenosis of the vertebral, basilar, or posterior cerebral arteries. No aneurysm. Bilateral posterior communicating arteries are present. OTHER: No dural venous sinus thrombosis on this non-dedicated study. BRAIN: No mass effect or midline shift. No extra-axial fluid collection. The gray-white differentiation is maintained. Unremarkable CTA of the head and neck. Mrv Head W Wo Contrast    Result Date: 1/5/2020  EXAMINATION: MRV OF THE HEAD WITH AND WITHOUT CONTRAST  1/5/2020: TECHNIQUE: Multiplanar multisequence MRV of the head was performed with and without the administration of intravenous contrast. COMPARISON: None. HISTORY: ORDERING SYSTEM PROVIDED HISTORY: sah, avm:? TECHNOLOGIST PROVIDED HISTORY: sah, avm:? Is the patient pregnant?->No FINDINGS: No focal stenosis or thrombus is seen of the major dural venous sinuses. Left occipital intraparenchymal susceptibility artifact is noted and better evaluated on separate MRI of the brain performed concurrently.   There is a small left occipital developmental venous anomaly. 1. No dural venous sinus thrombosis. 2. Left occipital intraparenchymal susceptibility artifact consistent with residua of prior hemorrhage within adjacent developmental venous anomaly suggesting an underlying cavernoma better evaluated on separate brain MRI. Mri Brain W Wo Contrast    Result Date: 1/10/2020  EXAMINATION: MRI OF THE BRAIN WITHOUT AND WITH CONTRAST  1/10/2020 1:26 pm TECHNIQUE: Multiplanar multisequence MRI of the head/brain was performed without and with the administration of intravenous contrast. COMPARISON: MRI brain performed 01/06/2020. HISTORY: ORDERING SYSTEM PROVIDED HISTORY: s/p cavernoma resection TECHNOLOGIST PROVIDED HISTORY: s/p cavernoma resection Is the patient pregnant?->No FINDINGS: INTRACRANIAL STRUCTURES/VENTRICLES:  The sellar and suprasellar structures, optic chiasm, corpus callosum, pineal gland, tectum, and midline brainstem structures are unremarkable. The craniocervical junction is unremarkable. There is a small resection focus in the left temporal lobe posteriorly that contains hemorrhagic products. This measures approximately 1.9 x 1.5 cm. There is no mass effect or midline shift. The ventricular structures are symmetric and unremarkable. The infratentorial structures including the cerebellopontine angles and internal auditory canals are unremarkable. There is no abnormal postcontrast enhancement. There is no abnormal restricted diffusion. ORBITS: The visualized portion of the orbits demonstrate no acute abnormality. SINUSES: The visualized paranasal sinuses and mastoid air cells are well aerated. BONES/SOFT TISSUES: The bone marrow signal intensity appears normal. The soft tissues demonstrate no acute abnormality. Small resection focus in the posterior aspect of the left temporal lobe containing a mild amount of hemorrhagic products. No acute intracranial abnormality.      Mri Brain W Wo Contrast    Result Date: 1/5/2020  EXAMINATION: MRI prescription for each of these medications  · oxyCODONE-acetaminophen 5-325 MG per tablet       Diet: DIET GENERAL; diet as tolerated  Activity: as tolerated  Follow-up:  Follow up with neurology at [address] [place] [address] and Neurosurgery, as directed.    Time Spent for discharge: 30 minutes    Valarie Mojica MD  Neuro Critical Care  1/10/2020, 5:34 PM

## 2020-01-10 NOTE — PROGRESS NOTES
Discharge instructions given and pt given paper prescription for percocet due to not having any at home and meds to beds order filled already. Keppra and Lipitor prescriptions filled and given to patient. To front door per wheelchair with staff member.

## 2020-01-17 ENCOUNTER — TELEPHONE (OUTPATIENT)
Dept: NEUROSURGERY | Age: 25
End: 2020-01-17

## 2020-01-22 ENCOUNTER — OFFICE VISIT (OUTPATIENT)
Dept: NEUROSURGERY | Age: 25
End: 2020-01-22

## 2020-01-22 VITALS — RESPIRATION RATE: 16 BRPM | DIASTOLIC BLOOD PRESSURE: 92 MMHG | SYSTOLIC BLOOD PRESSURE: 130 MMHG | HEART RATE: 90 BPM

## 2020-01-22 PROCEDURE — 99024 POSTOP FOLLOW-UP VISIT: CPT | Performed by: NURSE PRACTITIONER

## 2020-01-22 NOTE — LETTER
[facility]  MOB # [id number]  Phone: [phone]  Fax: WILLIAM Johnson CNP        January 22, 2020     Patient: Ozzie Diaz   YOB: 1995   Date of Visit: 1/22/2020       To Whom it May Concern:    Ozzie Diaz was seen in my clinic on 1/22/2020. She will need to remain off of work until cleared by the neurosurgeon. Please excuse missed work starting the 1st of the year. If you have any questions or concerns, please don't hesitate to call.     Sincerely,         WILLIAM Burton CNP

## 2020-01-25 ENCOUNTER — APPOINTMENT (OUTPATIENT)
Dept: MRI IMAGING | Age: 25
DRG: 044 | End: 2020-01-25
Payer: MEDICARE

## 2020-01-25 ENCOUNTER — APPOINTMENT (OUTPATIENT)
Dept: CT IMAGING | Age: 25
DRG: 044 | End: 2020-01-25
Payer: MEDICARE

## 2020-01-25 ENCOUNTER — HOSPITAL ENCOUNTER (INPATIENT)
Age: 25
LOS: 2 days | Discharge: HOME HEALTH | DRG: 044 | End: 2020-01-27
Attending: EMERGENCY MEDICINE | Admitting: INTERNAL MEDICINE
Payer: MEDICARE

## 2020-01-25 PROBLEM — I62.00: Status: ACTIVE | Noted: 2020-01-25

## 2020-01-25 LAB
ABSOLUTE EOS #: 0.22 K/UL (ref 0–0.44)
ABSOLUTE IMMATURE GRANULOCYTE: 0.03 K/UL (ref 0–0.3)
ABSOLUTE LYMPH #: 3.7 K/UL (ref 1.1–3.7)
ABSOLUTE MONO #: 0.81 K/UL (ref 0.1–1.2)
ANION GAP SERPL CALCULATED.3IONS-SCNC: 16 MMOL/L (ref 9–17)
BASOPHILS # BLD: 1 % (ref 0–2)
BASOPHILS ABSOLUTE: 0.07 K/UL (ref 0–0.2)
BUN BLDV-MCNC: 21 MG/DL (ref 6–20)
BUN/CREAT BLD: ABNORMAL (ref 9–20)
C-REACTIVE PROTEIN: 3.8 MG/L (ref 0–5)
CALCIUM SERPL-MCNC: 9.1 MG/DL (ref 8.6–10.4)
CHLORIDE BLD-SCNC: 97 MMOL/L (ref 98–107)
CHP ED QC CHECK: YES
CO2: 23 MMOL/L (ref 20–31)
CREAT SERPL-MCNC: 0.84 MG/DL (ref 0.5–0.9)
DIFFERENTIAL TYPE: ABNORMAL
DIRECT EXAM: NORMAL
EOSINOPHILS RELATIVE PERCENT: 2 % (ref 1–4)
GFR AFRICAN AMERICAN: >60 ML/MIN
GFR NON-AFRICAN AMERICAN: >60 ML/MIN
GFR SERPL CREATININE-BSD FRML MDRD: ABNORMAL ML/MIN/{1.73_M2}
GFR SERPL CREATININE-BSD FRML MDRD: ABNORMAL ML/MIN/{1.73_M2}
GLUCOSE BLD-MCNC: 208 MG/DL
GLUCOSE BLD-MCNC: 208 MG/DL (ref 65–105)
GLUCOSE BLD-MCNC: 413 MG/DL (ref 70–99)
HCT VFR BLD CALC: 38.8 % (ref 36.3–47.1)
HEMOGLOBIN: 13.6 G/DL (ref 11.9–15.1)
IMMATURE GRANULOCYTES: 0 %
LYMPHOCYTES # BLD: 34 % (ref 24–43)
Lab: NORMAL
MCH RBC QN AUTO: 29 PG (ref 25.2–33.5)
MCHC RBC AUTO-ENTMCNC: 35.1 G/DL (ref 28.4–34.8)
MCV RBC AUTO: 82.7 FL (ref 82.6–102.9)
MONOCYTES # BLD: 7 % (ref 3–12)
NRBC AUTOMATED: 0 PER 100 WBC
PDW BLD-RTO: 12.4 % (ref 11.8–14.4)
PLATELET # BLD: 207 K/UL (ref 138–453)
PLATELET ESTIMATE: ABNORMAL
PMV BLD AUTO: 11.6 FL (ref 8.1–13.5)
POTASSIUM SERPL-SCNC: 3.7 MMOL/L (ref 3.7–5.3)
RBC # BLD: 4.69 M/UL (ref 3.95–5.11)
RBC # BLD: ABNORMAL 10*6/UL
SEG NEUTROPHILS: 56 % (ref 36–65)
SEGMENTED NEUTROPHILS ABSOLUTE COUNT: 6.11 K/UL (ref 1.5–8.1)
SODIUM BLD-SCNC: 136 MMOL/L (ref 135–144)
SPECIMEN DESCRIPTION: NORMAL
WBC # BLD: 10.9 K/UL (ref 3.5–11.3)
WBC # BLD: ABNORMAL 10*3/UL

## 2020-01-25 PROCEDURE — 2580000003 HC RX 258: Performed by: NURSE PRACTITIONER

## 2020-01-25 PROCEDURE — 6370000000 HC RX 637 (ALT 250 FOR IP): Performed by: NURSE PRACTITIONER

## 2020-01-25 PROCEDURE — 99222 1ST HOSP IP/OBS MODERATE 55: CPT | Performed by: INTERNAL MEDICINE

## 2020-01-25 PROCEDURE — 82947 ASSAY GLUCOSE BLOOD QUANT: CPT

## 2020-01-25 PROCEDURE — 2580000003 HC RX 258: Performed by: STUDENT IN AN ORGANIZED HEALTH CARE EDUCATION/TRAINING PROGRAM

## 2020-01-25 PROCEDURE — 86140 C-REACTIVE PROTEIN: CPT

## 2020-01-25 PROCEDURE — A9576 INJ PROHANCE MULTIPACK: HCPCS | Performed by: NEUROLOGICAL SURGERY

## 2020-01-25 PROCEDURE — 85025 COMPLETE CBC W/AUTO DIFF WBC: CPT

## 2020-01-25 PROCEDURE — 70553 MRI BRAIN STEM W/O & W/DYE: CPT

## 2020-01-25 PROCEDURE — 96375 TX/PRO/DX INJ NEW DRUG ADDON: CPT

## 2020-01-25 PROCEDURE — 96374 THER/PROPH/DIAG INJ IV PUSH: CPT

## 2020-01-25 PROCEDURE — 99285 EMERGENCY DEPT VISIT HI MDM: CPT

## 2020-01-25 PROCEDURE — 99024 POSTOP FOLLOW-UP VISIT: CPT | Performed by: NEUROLOGICAL SURGERY

## 2020-01-25 PROCEDURE — 70450 CT HEAD/BRAIN W/O DYE: CPT

## 2020-01-25 PROCEDURE — 80048 BASIC METABOLIC PNL TOTAL CA: CPT

## 2020-01-25 PROCEDURE — 2060000000 HC ICU INTERMEDIATE R&B

## 2020-01-25 PROCEDURE — 2500000003 HC RX 250 WO HCPCS: Performed by: STUDENT IN AN ORGANIZED HEALTH CARE EDUCATION/TRAINING PROGRAM

## 2020-01-25 PROCEDURE — 87804 INFLUENZA ASSAY W/OPTIC: CPT

## 2020-01-25 PROCEDURE — 6360000002 HC RX W HCPCS: Performed by: STUDENT IN AN ORGANIZED HEALTH CARE EDUCATION/TRAINING PROGRAM

## 2020-01-25 PROCEDURE — 6370000000 HC RX 637 (ALT 250 FOR IP): Performed by: INTERNAL MEDICINE

## 2020-01-25 PROCEDURE — 6360000004 HC RX CONTRAST MEDICATION: Performed by: NEUROLOGICAL SURGERY

## 2020-01-25 RX ORDER — 0.9 % SODIUM CHLORIDE 0.9 %
1000 INTRAVENOUS SOLUTION INTRAVENOUS ONCE
Status: COMPLETED | OUTPATIENT
Start: 2020-01-25 | End: 2020-01-25

## 2020-01-25 RX ORDER — NICOTINE 21 MG/24HR
1 PATCH, TRANSDERMAL 24 HOURS TRANSDERMAL DAILY PRN
Status: DISCONTINUED | OUTPATIENT
Start: 2020-01-25 | End: 2020-01-27 | Stop reason: HOSPADM

## 2020-01-25 RX ORDER — ACETAMINOPHEN 325 MG/1
650 TABLET ORAL EVERY 4 HOURS PRN
Status: DISCONTINUED | OUTPATIENT
Start: 2020-01-25 | End: 2020-01-27 | Stop reason: HOSPADM

## 2020-01-25 RX ORDER — DIPHENHYDRAMINE HYDROCHLORIDE 50 MG/ML
25 INJECTION INTRAMUSCULAR; INTRAVENOUS ONCE
Status: COMPLETED | OUTPATIENT
Start: 2020-01-25 | End: 2020-01-25

## 2020-01-25 RX ORDER — PROCHLORPERAZINE EDISYLATE 5 MG/ML
10 INJECTION INTRAMUSCULAR; INTRAVENOUS ONCE
Status: COMPLETED | OUTPATIENT
Start: 2020-01-25 | End: 2020-01-25

## 2020-01-25 RX ORDER — OXYCODONE HYDROCHLORIDE AND ACETAMINOPHEN 5; 325 MG/1; MG/1
1 TABLET ORAL EVERY 4 HOURS PRN
Status: DISCONTINUED | OUTPATIENT
Start: 2020-01-25 | End: 2020-01-27 | Stop reason: HOSPADM

## 2020-01-25 RX ORDER — SODIUM CHLORIDE 0.9 % (FLUSH) 0.9 %
10 SYRINGE (ML) INJECTION PRN
Status: DISCONTINUED | OUTPATIENT
Start: 2020-01-25 | End: 2020-01-27 | Stop reason: HOSPADM

## 2020-01-25 RX ORDER — MAGNESIUM SULFATE 1 G/100ML
1 INJECTION INTRAVENOUS ONCE
Status: COMPLETED | OUTPATIENT
Start: 2020-01-25 | End: 2020-01-25

## 2020-01-25 RX ORDER — SODIUM CHLORIDE 0.9 % (FLUSH) 0.9 %
10 SYRINGE (ML) INJECTION EVERY 12 HOURS SCHEDULED
Status: DISCONTINUED | OUTPATIENT
Start: 2020-01-25 | End: 2020-01-27 | Stop reason: HOSPADM

## 2020-01-25 RX ORDER — ONDANSETRON 2 MG/ML
4 INJECTION INTRAMUSCULAR; INTRAVENOUS EVERY 6 HOURS PRN
Status: DISCONTINUED | OUTPATIENT
Start: 2020-01-25 | End: 2020-01-27 | Stop reason: HOSPADM

## 2020-01-25 RX ORDER — LABETALOL 20 MG/4 ML (5 MG/ML) INTRAVENOUS SYRINGE
10 EVERY 4 HOURS PRN
Status: DISCONTINUED | OUTPATIENT
Start: 2020-01-25 | End: 2020-01-27 | Stop reason: HOSPADM

## 2020-01-25 RX ORDER — ONDANSETRON 2 MG/ML
4 INJECTION INTRAMUSCULAR; INTRAVENOUS ONCE
Status: COMPLETED | OUTPATIENT
Start: 2020-01-25 | End: 2020-01-25

## 2020-01-25 RX ORDER — OXYCODONE HYDROCHLORIDE AND ACETAMINOPHEN 5; 325 MG/1; MG/1
2 TABLET ORAL EVERY 4 HOURS PRN
Status: DISCONTINUED | OUTPATIENT
Start: 2020-01-25 | End: 2020-01-27 | Stop reason: HOSPADM

## 2020-01-25 RX ORDER — SODIUM CHLORIDE 9 MG/ML
INJECTION, SOLUTION INTRAVENOUS CONTINUOUS
Status: DISCONTINUED | OUTPATIENT
Start: 2020-01-25 | End: 2020-01-27 | Stop reason: HOSPADM

## 2020-01-25 RX ORDER — HYDRALAZINE HYDROCHLORIDE 20 MG/ML
20 INJECTION INTRAMUSCULAR; INTRAVENOUS EVERY 6 HOURS PRN
Status: DISCONTINUED | OUTPATIENT
Start: 2020-01-25 | End: 2020-01-27 | Stop reason: HOSPADM

## 2020-01-25 RX ORDER — HYDRALAZINE HYDROCHLORIDE 20 MG/ML
10 INJECTION INTRAMUSCULAR; INTRAVENOUS EVERY 6 HOURS PRN
Status: DISCONTINUED | OUTPATIENT
Start: 2020-01-25 | End: 2020-01-27 | Stop reason: HOSPADM

## 2020-01-25 RX ORDER — HYDRALAZINE HYDROCHLORIDE 20 MG/ML
20 INJECTION INTRAMUSCULAR; INTRAVENOUS EVERY 6 HOURS PRN
Status: DISCONTINUED | OUTPATIENT
Start: 2020-01-25 | End: 2020-01-25 | Stop reason: SDUPTHER

## 2020-01-25 RX ADMIN — OXYCODONE HYDROCHLORIDE AND ACETAMINOPHEN 2 TABLET: 5; 325 TABLET ORAL at 19:51

## 2020-01-25 RX ADMIN — SODIUM CHLORIDE 1000 ML: 9 INJECTION, SOLUTION INTRAVENOUS at 01:43

## 2020-01-25 RX ADMIN — GADOTERIDOL 15 ML: 279.3 INJECTION, SOLUTION INTRAVENOUS at 10:00

## 2020-01-25 RX ADMIN — PROCHLORPERAZINE EDISYLATE 10 MG: 5 INJECTION INTRAMUSCULAR; INTRAVENOUS at 01:43

## 2020-01-25 RX ADMIN — SODIUM CHLORIDE: 9 INJECTION, SOLUTION INTRAVENOUS at 11:00

## 2020-01-25 RX ADMIN — VALPROATE SODIUM 500 MG: 100 INJECTION, SOLUTION INTRAVENOUS at 03:05

## 2020-01-25 RX ADMIN — ONDANSETRON 4 MG: 2 INJECTION INTRAMUSCULAR; INTRAVENOUS at 02:16

## 2020-01-25 RX ADMIN — ACETAMINOPHEN 650 MG: 325 TABLET ORAL at 17:30

## 2020-01-25 RX ADMIN — MAGNESIUM SULFATE HEPTAHYDRATE 1 G: 1 INJECTION, SOLUTION INTRAVENOUS at 02:16

## 2020-01-25 RX ADMIN — DIPHENHYDRAMINE HYDROCHLORIDE 25 MG: 50 INJECTION, SOLUTION INTRAMUSCULAR; INTRAVENOUS at 01:42

## 2020-01-25 ASSESSMENT — ENCOUNTER SYMPTOMS
GASTROINTESTINAL NEGATIVE: 1
NAUSEA: 1
PHOTOPHOBIA: 1
SHORTNESS OF BREATH: 0
VOMITING: 1
ABDOMINAL PAIN: 0
BACK PAIN: 0
EYES NEGATIVE: 1
RESPIRATORY NEGATIVE: 1

## 2020-01-25 ASSESSMENT — PAIN SCALES - GENERAL
PAINLEVEL_OUTOF10: 10
PAINLEVEL_OUTOF10: 10
PAINLEVEL_OUTOF10: 3
PAINLEVEL_OUTOF10: 0
PAINLEVEL_OUTOF10: 3
PAINLEVEL_OUTOF10: 0
PAINLEVEL_OUTOF10: 0

## 2020-01-25 NOTE — ED NOTES
Report called to receiving RN  Questions/concerns addressed  Pt to floor with RN     Sepideh Randolph RN  01/25/20 [id number]

## 2020-01-25 NOTE — ED PROVIDER NOTES
I did not see, evaluate, or staff this patient.   Patient seen by Dr. Rebecca Jack MD  01/25/20 [id number]

## 2020-01-25 NOTE — PLAN OF CARE
Problem: Respiratory:  Goal: Ability to maintain adequate ventilation will improve  Description  Ability to maintain adequate ventilation will improve  Outcome: Ongoing     Problem: Self-Care:  Goal: Ability to perform activities of daily living will improve  Description  Ability to perform activities of daily living will improve  Outcome: Ongoing     Problem: Tissue Perfusion:  Goal: Signs of adequate cerebral perfusion will increase  Description  Signs of adequate cerebral perfusion will increase  Outcome: Ongoing  Goal: Ability to maintain intracranial pressure will improve  Description  Ability to maintain intracranial pressure will improve  Outcome: Ongoing     Problem: Falls - Risk of:  Goal: Will remain free from falls  Description  Will remain free from falls  Outcome: Ongoing  Goal: Absence of physical injury  Description  Absence of physical injury  Outcome: Ongoing     Problem: Pain:  Goal: Pain level will decrease  Description  Pain level will decrease  Outcome: Ongoing  Goal: Control of acute pain  Description  Control of acute pain  Outcome: Ongoing  Goal: Control of chronic pain  Description  Control of chronic pain  Outcome: Ongoing

## 2020-01-25 NOTE — ED PROVIDER NOTES
without loss of consciousness ([facility] [address] has a past surgical history that includes craniotomy (01/08/2020) and craniotomy (Left, 1/8/2020). Social History     Socioeconomic History    Marital status: Single     Spouse name: Not on file    Number of children: Not on file    Years of education: Not on file    Highest education level: Not on file   Occupational History    Not on file   Social Needs    Financial resource strain: Not on file    Food insecurity:     Worry: Not on file     Inability: Not on file    Transportation needs:     Medical: Not on file     Non-medical: Not on file   Tobacco Use    Smoking status: Current Some Day Smoker     Packs/day: 0.30     Types: Cigarettes    Smokeless tobacco: Never Used   Substance and Sexual Activity    Alcohol use: No    Drug use: Yes     Types: Marijuana    Sexual activity: Yes     Partners: Male   Lifestyle    Physical activity:     Days per week: Not on file     Minutes per session: Not on file    Stress: Not on file   Relationships    Social connections:     Talks on phone: Not on file     Gets together: Not on file     Attends Anabaptism service: Not on file     Active member of club or organization: Not on file     Attends meetings of clubs or organizations: Not on file     Relationship status: Not on file    Intimate partner violence:     Fear of current or ex partner: Not on file     Emotionally abused: Not on file     Physically abused: Not on file     Forced sexual activity: Not on file   Other Topics Concern    Not on file   Social History Narrative    Not on file         History reviewed. No pertinent family history. Portions of the past medical history, past surgical history, social history, and family history were discussed and reviewed with thepatient/family and is included in HPI if pertinent.     ALLERGIES / IMMUNIZATIONS / HOME MEDICATIONS     Allergies: Latex and Prozac [fluoxetine hcl]    IMMUNIZATIONS      There is no immunization history on file for this patient. Home Medications:  Prior to Admission medications    Medication Sig Start Date End Date Taking? Authorizing Provider   levETIRAcetam (KEPPRA) 500 MG tablet Take 1 tablet by mouth 2 times daily for 7 days 1/10/20 1/17/20  Arun Carbone MD   atorvastatin (LIPITOR) 40 MG tablet Take 1 tablet by mouth nightly 1/10/20   Arun Carbone MD   busPIRone (BUSPAR) 10 MG tablet Take 10 mg by mouth 3 times daily    Historical Provider, MD   lamoTRIgine (LAMICTAL) 25 MG tablet Take 25 mg by mouth daily    Historical Provider, MD   docusate sodium (COLACE) 100 MG capsule Take 100 mg by mouth 2 times daily as needed for Constipation    Historical Provider, MD   etonogestrel (NEXPLANON) 68 MG implant 68 mg by Subdermal route once    Historical Provider, MD   ondansetron (ZOFRAN) 4 MG tablet Take 1 tablet by mouth every 6 hours as needed for Nausea or Vomiting 8/16/17   Manuel Alamo MD       PHYSICAL EXAM   (up to 7 for level 4, 8 or more for level 5)      INITIAL VITALS:    height is 5' 1\" (1.549 m) and weight is 174 lb (78.9 kg). Her oral temperature is 98.1 °F (36.7 °C). Her blood pressure is 119/85 and her pulse is 88. Her respiration is 16 and oxygen saturation is 96%. Physical Exam  Vitals signs reviewed. Constitutional:       General: She is not in acute distress. Appearance: She is well-developed. She is not ill-appearing. Comments: Mildly uncomfortable appearing   HENT:      Head: Normocephalic. Comments: Well-healing surgical incision to left occiput with no erythema, no drainage, minimally tender to palpation     Mouth/Throat:      Mouth: Mucous membranes are moist.   Eyes:      Extraocular Movements: Extraocular movements intact. Pupils: Pupils are equal, round, and reactive to light. Neck:      Musculoskeletal: Neck supple. No neck rigidity. Cardiovascular:      Rate and Rhythm: Normal rate and regular rhythm.       Pulses: Normal pulses. Heart sounds: Normal heart sounds. Pulmonary:      Effort: Pulmonary effort is normal.      Breath sounds: Normal breath sounds. Abdominal:      Palpations: Abdomen is soft. Tenderness: There is no abdominal tenderness. Musculoskeletal:         General: No deformity. Skin:     General: Skin is warm and dry. Capillary Refill: Capillary refill takes less than 2 seconds. Neurological:      Mental Status: She is alert and oriented to person, place, and time. Comments: Equal strength in all extremities, sensation intact, cranial nerves intact, visual field intact on my assessment, somewhat slowed speech however no dysarthria or aphasia   Psychiatric:         Behavior: Behavior normal.       Vitals:    Vitals:    01/25/20 0901 01/25/20 1035 01/25/20 1602 01/25/20 1645   BP: (!) 135/97 (!) 127/96 (!) 136/97 119/85   Pulse: 85 86 108 88   Resp:  16 14 16   Temp:  98.2 °F (36.8 °C) 98.1 °F (36.7 °C)    TempSrc:  Oral Oral    SpO2: 100% 97% 96%    Weight:       Height:         DIFFERENTIAL  DIAGNOSIS     PLAN (LABS / IMAGING / EKG):  Orders Placed This Encounter   Procedures    RAPID INFLUENZA A/B ANTIGENS    CT HEAD WO CONTRAST    MRI BRAIN W WO CONTRAST    CBC WITH AUTO DIFFERENTIAL    Basic Metabolic Panel    C-REACTIVE PROTEIN    Comprehensive Metabolic Panel w/ Reflex to MG    CBC    Protime-INR    DIET PEDS CLEAR LIQUIDS;    Vital signs per unit routine    Telemetry monitoring    Notify physician    Bedrest with bathroom privileges    Neuro checks    Tobacco cessation education    Swallow assessment by nursing before diet and oral medications started.     Place intermittent pneumatic compression device    Neuro checks    Place INT pneumatic compression device    Full Code    Inpatient consult to Neurosurgery    Inpatient consult to Internal Medicine    Inpatient consult to Neurosurgery    OT eval and treat    PT evaluation and treat    Initiate Oxygen Therapy Protocol    Pulse oximetry, continuous    POCT Glucose    POC Glucose Fingerstick    PATIENT STATUS (FROM ED OR OR/PROCEDURAL) Inpatient     Plan of care is reviewed and discussed with the family/ patient when able. Family/ Patient consents to treatment and plan if able to do so. MEDICATIONS ORDERED:  Orders Placed This Encounter   Medications    0.9 % sodium chloride bolus    prochlorperazine (COMPAZINE) injection 10 mg    diphenhydrAMINE (BENADRYL) injection 25 mg    magnesium sulfate 1 g in dextrose 5% 100 mL IVPB    valproate (DEPACON) 500 mg in dextrose 5 % 100 mL IVPB    ondansetron (ZOFRAN) injection 4 mg    DISCONTD: hydrALAZINE (APRESOLINE) injection 20 mg    labetalol (NORMODYNE;TRANDATE) injection syringe 10 mg    0.9 % sodium chloride infusion    sodium chloride flush 0.9 % injection 10 mL    sodium chloride flush 0.9 % injection 10 mL    magnesium hydroxide (MILK OF MAGNESIA) 400 MG/5ML suspension 30 mL    ondansetron (ZOFRAN) injection 4 mg    nicotine (NICODERM CQ) 21 MG/24HR 1 patch    acetaminophen (TYLENOL) tablet 650 mg    hydrALAZINE (APRESOLINE) injection 10 mg    hydrALAZINE (APRESOLINE) injection 20 mg    sodium chloride flush 0.9 % injection 10 mL    gadoteridol (PROHANCE) injection 15 mL    OR Linked Order Group     oxyCODONE-acetaminophen (PERCOCET) 5-325 MG per tablet 1 tablet     oxyCODONE-acetaminophen (PERCOCET) 5-325 MG per tablet 2 tablet     DIAGNOSTIC RESULTS     LABS:  Results for orders placed or performed during the hospital encounter of 01/25/20   RAPID INFLUENZA A/B ANTIGENS   Result Value Ref Range    Specimen Description . NASOPHARYNGEAL SWAB     Special Requests NOT REPORTED     Direct Exam       PRESUMPTIVE NEGATIVE for Influenza A + B antigens. PCR testing to confirm this result is available upon request.  Specimen will be saved in the laboratory for 7 days.   Please call [phone] if PCR testing is indicated. CBC WITH AUTO DIFFERENTIAL   Result Value Ref Range    WBC 10.9 3.5 - 11.3 k/uL    RBC 4.69 3.95 - 5.11 m/uL    Hemoglobin 13.6 11.9 - 15.1 g/dL    Hematocrit 38.8 36.3 - 47.1 %    MCV 82.7 82.6 - 102.9 fL    MCH 29.0 25.2 - 33.5 pg    MCHC 35.1 (H) 28.4 - 34.8 g/dL    RDW 12.4 11.8 - 14.4 %    Platelets 516 607 - 227 k/uL    MPV 11.6 8.1 - 13.5 fL    NRBC Automated 0.0 0.0 per 100 WBC    Differential Type NOT REPORTED     Seg Neutrophils 56 36 - 65 %    Lymphocytes 34 24 - 43 %    Monocytes 7 3 - 12 %    Eosinophils % 2 1 - 4 %    Basophils 1 0 - 2 %    Immature Granulocytes 0 0 %    Segs Absolute 6.11 1.50 - 8.10 k/uL    Absolute Lymph # 3.70 1.10 - 3.70 k/uL    Absolute Mono # 0.81 0.10 - 1.20 k/uL    Absolute Eos # 0.22 0.00 - 0.44 k/uL    Basophils Absolute 0.07 0.00 - 0.20 k/uL    Absolute Immature Granulocyte 0.03 0.00 - 0.30 k/uL    WBC Morphology NOT REPORTED     RBC Morphology NOT REPORTED     Platelet Estimate NOT REPORTED    Basic Metabolic Panel   Result Value Ref Range    Glucose 413 (HH) 70 - 99 mg/dL    BUN 21 (H) 6 - 20 mg/dL    CREATININE 0.84 0.50 - 0.90 mg/dL    Bun/Cre Ratio NOT REPORTED 9 - 20    Calcium 9.1 8.6 - 10.4 mg/dL    Sodium 136 135 - 144 mmol/L    Potassium 3.7 3.7 - 5.3 mmol/L    Chloride 97 (L) 98 - 107 mmol/L    CO2 23 20 - 31 mmol/L    Anion Gap 16 9 - 17 mmol/L    GFR Non-African American >60 >60 mL/min    GFR African American >60 >60 mL/min    GFR Comment          GFR Staging NOT REPORTED    C-REACTIVE PROTEIN   Result Value Ref Range    CRP 3.8 0.0 - 5.0 mg/L   POCT Glucose   Result Value Ref Range    Glucose 208 mg/dL    QC OK?  yes    POC Glucose Fingerstick   Result Value Ref Range    POC Glucose 208 (H) 65 - 105 mg/dL     Labs Reviewed   CBC WITH AUTO DIFFERENTIAL - Abnormal; Notable for the following components:       Result Value    MCHC 35.1 (*)     All other components within normal limits   BASIC METABOLIC PANEL - Abnormal; Notable for the following components:    Glucose 413 (*)     BUN 21 (*)     Chloride 97 (*)     All other components within normal limits   POC GLUCOSE FINGERSTICK - Abnormal; Notable for the following components:    POC Glucose 208 (*)     All other components within normal limits   POCT GLUCOSE - Normal   RAPID INFLUENZA A/B ANTIGENS   C-REACTIVE PROTEIN   COMPREHENSIVE METABOLIC PANEL W/ REFLEX TO MG FOR LOW K   CBC   PROTIME-INR       RADIOLOGY:  Ct Head Wo Contrast    Result Date: 1/25/2020  EXAMINATION: CT OF THE HEAD WITHOUT CONTRAST  1/25/2020 2:22 am TECHNIQUE: CT of the head was performed without the administration of intravenous contrast. Dose modulation, iterative reconstruction, and/or weight based adjustment of the mA/kV was utilized to reduce the radiation dose to as low as reasonably achievable. COMPARISON: MRI brain without and with contrast January 10, 2020. CT head scan without contrast January 9, 2020. HISTORY: ORDERING SYSTEM PROVIDED HISTORY: headache, vomiting TECHNOLOGIST PROVIDED HISTORY: headache, vomiting Is the patient pregnant?->No Reason for Exam: headache, vomiting Acuity: Acute Type of Exam: Initial FINDINGS: BRAIN/VENTRICLES: There is no acute intracranial hemorrhage, mass effect or midline shift. No abnormal extra-axial fluid collection. The gray-white differentiation is maintained without evidence of an acute infarct. There is no evidence of hydrocephalus. Left temporal occipital resection cavity with acute blood is again visualized with mild increased volume of hemorrhage which measures up to 21 mm in diameter. Surrounding vasogenic edema persists. Left-sided choroid fissure cyst is present which measures up to 8 mm in diameter. ORBITS: The visualized portion of the orbits demonstrate no acute abnormality. SINUSES: The visualized paranasal sinuses and mastoid air cells demonstrate no acute abnormality.  SOFT TISSUES/SKULL:  Postoperative findings associated with left temporal occipital craniotomy is placed by the admitting team(s) and were auto populated above when I refreshed my note prior to signing it. If there is any question, please check for the responsible provider for individual orders in the EMR system. If discharged, the patient was instructed to return to the emergency department with any worsening symptoms, if new symptoms arise, or if they have any other concerns. Patient was instructed not to drive home if discharged today and received pain medications or other mind-altering medications while here.     PATIENT REFERRED TO:  Jaida Ballesteros  [address] [facility] [address]  [phone]            DISCHARGE MEDICATIONS:  Current Discharge Medication List          Jozef Pelayo DO  Emergency Medicine Resident    (Please note that portions of this note were completed with a voice recognition program.  Efforts were made to edit the dictations but occasionally words are mis-transcribed.)      Jozef Pelayo DO  01/25/20 1950

## 2020-01-25 NOTE — ED TRIAGE NOTES
pt c/o headache +light and +sound sensitivity. pt recently admittied for [facility] on the 8th.

## 2020-01-25 NOTE — H&P
[address] [facility]    HISTORY AND PHYSICAL EXAMINATION            Date:   1/25/2020  Patient name:  Rae Andersen  Date of admission:  1/25/2020  1:01 AM  MRN:   [id number]  Account:  [de-identified]  YOB: 1995  PCP:    Benny Valle  Room:   19/19  Code Status:    Prior    Chief Complaint:     Chief Complaint   Patient presents with    Headache     pt c/o headache +light and +sound sensitivity. pt recently admittied for [facility] on the 8th. History Obtained From:     patient, electronic medical record    History of Present Illness:     Rae Andersen is a 30 y.o. Non-/non  female who presents with Headache (pt c/o headache +light and +sound sensitivity. pt recently admittied for [facility] on the 8th.)   and is admitted to the hospital for the management of Occipital subdural bleed ([facility] [address]     30years old female with a history of subarachnoid hemorrhage patient presented to the ER complaining of headache in the occipital area worsening aggravated with light associated with photophobia radiation to the neck throbbing in nature patient also complained of nausea and vomiting multiple times patient had recent craniotomy for cavernous hemangioma CT scan of the head showed increased bleeding and the area of the bed neurosurgery was consulted patient will be admitted for further evaluation and treatment      Past Medical History:     Past Medical History:   Diagnosis Date    Cerebral vascular malformation     @[email or web address]    Diabetes mellitus ([address]     Seizure ([address]     Traumatic hemorrhage of left cerebrum without loss of consciousness ([address]     @ [employer]        Past Surgical History:     Past Surgical History:   Procedure Laterality Date    CRANIOTOMY  01/08/2020    LEFT OCCIPITAL CRANIOTOMY    CRANIOTOMY Left 1/8/2020    LEFT OCCIPITAL CRANIOTOMY, 1ST VASCULAR LESION WITH [employer] NAVIGATION (LATERAL WITH ROMERO BAG, [employer] HEADHOLDER, MICROSCOPE) performed by Ruperto Dela Cruz DO at [place] [address]        Medications Prior to Admission:     Prior to Admission medications    Medication Sig Start Date End Date Taking? Authorizing Provider   levETIRAcetam (KEPPRA) 500 MG tablet Take 1 tablet by mouth 2 times daily for 7 days 1/10/20 1/17/20  Shoshana Reeves MD   atorvastatin (LIPITOR) 40 MG tablet Take 1 tablet by mouth nightly 1/10/20   Shoshana Reeves MD   busPIRone (BUSPAR) 10 MG tablet Take 10 mg by mouth 3 times daily    Historical Provider, MD   lamoTRIgine (LAMICTAL) 25 MG tablet Take 25 mg by mouth daily    Historical Provider, MD   docusate sodium (COLACE) 100 MG capsule Take 100 mg by mouth 2 times daily as needed for Constipation    Historical Provider, MD   etonogestrel (NEXPLANON) 68 MG implant 68 mg by Subdermal route once    Historical Provider, MD   ondansetron (ZOFRAN) 4 MG tablet Take 1 tablet by mouth every 6 hours as needed for Nausea or Vomiting 8/16/17   Stef Allen MD        Allergies:     Latex and Prozac [fluoxetine hcl]    Social History:     Tobacco:    reports that she has been smoking cigarettes. She has been smoking about 0.30 packs per day. She has never used smokeless tobacco.  Alcohol:      reports no history of alcohol use. Drug Use:  reports current drug use. Drug: Marijuana. Family History:     History reviewed. No pertinent family history. Review of Systems:     Positive and Negative as described in HPI.     CONSTITUTIONAL:  negative for fevers, chills, sweats, fatigue, weight loss  HEENT:  negative for vision, hearing changes, runny nose, throat pain  RESPIRATORY:  negative for shortness of breath, cough, congestion, wheezing  CARDIOVASCULAR:  negative for chest pain, palpitations  GASTROINTESTINAL:  negative for nausea, vomiting, diarrhea, constipation, change in bowel habits, abdominal pain   GENITOURINARY:  negative for difficulty of urination, burning with urination, frequency   INTEGUMENT:  negative for confirm this result is available upon request.  Specimen will be saved in the laboratory for 7 days. Please call [phone] if PCR testing is indicated. POC Glucose Fingerstick    Collection Time: 01/25/20  6:00 AM   Result Value Ref Range    POC Glucose 208 (H) 65 - 105 mg/dL   POCT Glucose    Collection Time: 01/25/20  6:02 AM   Result Value Ref Range    Glucose 208 mg/dL    QC OK? yes        Imaging/Diagnostics:  Ct Head Wo Contrast    Result Date: 1/25/2020  Interval increase/development of acute hemorrhage within left temporal occipital surgical resection bed underlying craniotomy postoperative change, as discussed above. Assessment :      Hospital Problems           Last Modified POA    * (Principal) Occipital subdural bleed ([facility] [address] 1/25/2020 Yes    Bipolar 1 disorder ([facility] [address] 1/25/2020 Yes    History of seizures 1/25/2020 Yes    Status post craniotomy 1/25/2020 Yes          Plan:     Patient status inpatient in the Progressive Unit/Step down  Headache associated with abnormal CT scan as below neurosurgery consult pending MRI  Interval increase/development of acute hemorrhage within left temporal   occipital surgical resection bed underlying craniotomy      Probable migraine pain control    History of seizure resume home medication      Avoid antiplatelet      DVT prophylaxis SCD no chemical prophylaxis high risk for bleeding  Consultations:   IP CONSULT TO NEUROSURGERY  IP CONSULT TO INTERNAL MEDICINE  IP CONSULT TO NEUROSURGERY     Patient is admitted as inpatient status because of co-morbidities listed above, severity of signs and symptoms as outlined, requirement for current medical therapies and most importantly because of direct risk to patient if care not provided in a hospital setting.     Chichi Ray MD  1/25/2020  9:41 AM    Copy sent to Dr. Cori Don

## 2020-01-25 NOTE — CONSULTS
Neurosurgery Consult Note    Reason for Consult:  Headache, recent craniectomy   Attending Physician: Leida Glass DO    History Obtained From:  patient, electronic medical record    CHIEF COMPLAINT:       headache    HISTORY OF PRESENT ILLNESS:       The patient is a 30 y.o. female who presents with recurrent headache, left occipital, 8 out of 9, throbbing, associated nausea, vomiting, photophobia, no phonophobia, radiating to the neck. He had recent craniotomy and resection of left temporal lobe cavernous hemangioma. She recently presented to the neurosurgery clinic and staples were removed. He does have previous history of migraine headaches. She took only Tylenol without improvement of the headache. No fever or chills. No visual disturbances. She reports weakness on the right upper extremity that is new onset from today.        PAST MEDICAL HISTORY :       Past Medical History:        Diagnosis Date    Cerebral vascular malformation     [email or web address]    Diabetes mellitus ([address]     Seizure ([address]     Traumatic hemorrhage of left cerebrum without loss of consciousness ([address]     @ [facility]       Past Surgical History:        Procedure Laterality Date    CRANIOTOMY  01/08/2020    LEFT OCCIPITAL CRANIOTOMY    CRANIOTOMY Left 1/8/2020    LEFT OCCIPITAL CRANIOTOMY, 1ST VASCULAR LESION WITH URBAN NAVIGATION (LATERAL WITH ROMERO BAG, HOLLINGSWORTH HEADHOLDER, MICROSCOPE) performed by Leida Glass DO at [address] History:   Social History     Socioeconomic History    Marital status: Single     Spouse name: Not on file    Number of children: Not on file    Years of education: Not on file    Highest education level: Not on file   Occupational History    Not on file   Social Needs    Financial resource strain: Not on file    Food insecurity:     Worry: Not on file     Inability: Not on file    Transportation needs:     Medical: Not on file Non-medical: Not on file   Tobacco Use    Smoking status: Current Some Day Smoker     Packs/day: 0.30     Types: Cigarettes    Smokeless tobacco: Never Used   Substance and Sexual Activity    Alcohol use: No    Drug use: Yes     Types: Marijuana    Sexual activity: Yes     Partners: Male   Lifestyle    Physical activity:     Days per week: Not on file     Minutes per session: Not on file    Stress: Not on file   Relationships    Social connections:     Talks on phone: Not on file     Gets together: Not on file     Attends L[employer] service: Not on file     Active member of club or organization: Not on file     Attends meetings of clubs or organizations: Not on file     Relationship status: Not on file    Intimate partner violence:     Fear of current or ex partner: Not on file     Emotionally abused: Not on file     Physically abused: Not on file     Forced sexual activity: Not on file   Other Topics Concern    Not on file   Social History Narrative    Not on file       Family History:   History reviewed. No pertinent family history. Allergies:  Latex and Prozac [fluoxetine hcl]    Home Medications:  Prior to Admission medications    Medication Sig Start Date End Date Taking?  Authorizing Provider   levETIRAcetam (KEPPRA) 500 MG tablet Take 1 tablet by mouth 2 times daily for 7 days 1/10/20 1/17/20  Lucille Abdi MD   atorvastatin (LIPITOR) 40 MG tablet Take 1 tablet by mouth nightly 1/10/20   Lucille Abdi MD   busPIRone (BUSPAR) 10 MG tablet Take 10 mg by mouth 3 times daily    Historical Provider, MD   lamoTRIgine (LAMICTAL) 25 MG tablet Take 25 mg by mouth daily    Historical Provider, MD   docusate sodium (COLACE) 100 MG capsule Take 100 mg by mouth 2 times daily as needed for Constipation    Historical Provider, MD   etonogestrel (NEXPLANON) 68 MG implant 68 mg by Subdermal route once    Historical Provider, MD   ondansetron (ZOFRAN) 4 MG tablet Take 1 tablet by mouth every 6 hours as needed for Nausea or Vomiting 8/16/17   Tal Melendez MD       Current Medications:   Current Facility-Administered Medications: 0.9 % sodium chloride bolus, 1,000 mL, Intravenous, Once    REVIEW OF SYSTEMS:       Review of Systems   Constitutional: Negative. HENT: Negative. Eyes: Negative. Respiratory: Negative. Gastrointestinal: Negative. Musculoskeletal: Positive for neck pain. Negative for arthralgias, back pain, gait problem, joint swelling, myalgias and neck stiffness. Skin: Negative. Neurological: Positive for weakness and headaches. Negative for dizziness, tremors, seizures, syncope, facial asymmetry, speech difficulty, light-headedness and numbness. Hematological: Negative. PHYSICAL EXAM:       BP (!) 156/107   Pulse 95   Temp 98.7 °F (37.1 °C) (Oral)   Resp 16   Ht 5' 1\" (1.549 m)   Wt 174 lb (78.9 kg)   SpO2 100%   BMI 32.88 kg/m²       Physical Exam  HENT:      Head: Normocephalic and atraumatic. Eyes:      Extraocular Movements: Extraocular movements intact. Pupils: Pupils are equal, round, and reactive to light. Neck:      Musculoskeletal: Normal range of motion and neck supple. Cardiovascular:      Rate and Rhythm: Normal rate and regular rhythm. Pulmonary:      Effort: Pulmonary effort is normal.      Breath sounds: Normal breath sounds. Abdominal:      General: Abdomen is flat. Palpations: Abdomen is soft. Skin:     General: Skin is warm and dry. Neurological:      General: No focal deficit present. Mental Status: She is alert. Mental status is at baseline. She is disoriented. Cranial Nerves: No cranial nerve deficit. Sensory: No sensory deficit. Motor: No weakness. Coordination: Coordination normal.        Neurologic Exam     Cranial Nerves     CN III, IV, VI   Pupils are equal, round, and reactive to light. Tenderness over the left occipital area, mostly around the surgical incision.   Clean incision with no signs of infection           LABS AND IMAGING:     CBC with Differential:    Lab Results   Component Value Date    WBC 14.0 01/10/2020    RBC 4.85 01/10/2020    HGB 13.3 01/10/2020    HCT 41.8 01/10/2020     01/10/2020    MCV 86.2 01/10/2020    MCH 27.4 01/10/2020    MCHC 31.8 01/10/2020    RDW 12.6 01/10/2020    LYMPHOPCT 11 01/10/2020    MONOPCT 5 01/10/2020    BASOPCT 0 01/10/2020    MONOSABS 0.63 01/10/2020    LYMPHSABS 1.58 01/10/2020    EOSABS <0.03 01/10/2020    BASOSABS <0.03 01/10/2020    DIFFTYPE NOT REPORTED 01/10/2020     BMP:    Lab Results   Component Value Date     01/10/2020    K 4.0 01/10/2020    CL 94 01/10/2020    CO2 24 01/10/2020    BUN 16 01/10/2020    LABALBU 3.8 01/05/2020    CREATININE 1.11 01/10/2020    CALCIUM 9.5 01/10/2020    GFRAA >60 01/10/2020    LABGLOM 59 01/10/2020    GLUCOSE 315 01/10/2020       Radiology Review:      MRI Brain 1/20/2020  The sellar and suprasellar structures, optic chiasm, corpus callosum, pineal gland, tectum, and midline brainstem structures are unremarkable. The craniocervical junction is unremarkable. There is a small resection focus in the left temporal lobe posteriorly that contains hemorrhagic products. This measures approximately 1.9 x 1.5 cm. There is no mass effect or midline shift. The ventricular structures are symmetric and unremarkable. The infratentorial structures including the cerebellopontine angles and internal auditory canals are unremarkable. There is no abnormal postcontrast enhancement. There is no abnormal restricted diffusion. [facility] 1/25/2020  There is no acute intracranial hemorrhage, mass effect or midline shift. No abnormal extra-axial fluid collection. The gray-white differentiation is maintained without evidence of an acute infarct. There is no evidence of hydrocephalus.  Left temporal occipital resection cavity with acute blood is again visualized with mild increased volume of hemorrhage which measures up to

## 2020-01-25 NOTE — ED PROVIDER NOTES
without acute process. The osseous structures are without acute process. No acute process. Ct Head Wo Contrast    Result Date: 1/25/2020  EXAMINATION: CT OF THE HEAD WITHOUT CONTRAST  1/25/2020 2:22 am TECHNIQUE: CT of the head was performed without the administration of intravenous contrast. Dose modulation, iterative reconstruction, and/or weight based adjustment of the mA/kV was utilized to reduce the radiation dose to as low as reasonably achievable. COMPARISON: MRI brain without and with contrast January 10, 2020. CT head scan without contrast January 9, 2020. HISTORY: ORDERING SYSTEM PROVIDED HISTORY: headache, vomiting TECHNOLOGIST PROVIDED HISTORY: headache, vomiting Is the patient pregnant?->No Reason for Exam: headache, vomiting Acuity: Acute Type of Exam: Initial FINDINGS: BRAIN/VENTRICLES: There is no acute intracranial hemorrhage, mass effect or midline shift. No abnormal extra-axial fluid collection. The gray-white differentiation is maintained without evidence of an acute infarct. There is no evidence of hydrocephalus. Left temporal occipital resection cavity with acute blood is again visualized with mild increased volume of hemorrhage which measures up to 21 mm in diameter. Surrounding vasogenic edema persists. Left-sided choroid fissure cyst is present which measures up to 8 mm in diameter. ORBITS: The visualized portion of the orbits demonstrate no acute abnormality. SINUSES: The visualized paranasal sinuses and mastoid air cells demonstrate no acute abnormality. SOFT TISSUES/SKULL:  Postoperative findings associated with left temporal occipital craniotomy is noted without significant interval change visualized. Interval increase/development of acute hemorrhage within left temporal occipital surgical resection bed underlying craniotomy postoperative change, as discussed above.      Ct Head Wo Contrast    Result Date: 1/9/2020  EXAMINATION: CT OF THE HEAD WITHOUT CONTRAST  1/9/2020 2:34 am TECHNIQUE: CT of the head was performed without the administration of intravenous contrast. Dose modulation, iterative reconstruction, and/or weight based adjustment of the mA/kV was utilized to reduce the radiation dose to as low as reasonably achievable. COMPARISON: None. HISTORY: ORDERING SYSTEM PROVIDED HISTORY: post op resection of cavernoma TECHNOLOGIST PROVIDED HISTORY: post op resection of cavernoma Is the patient pregnant?->No Reason for Exam: post op resection of cavernoma Acuity: Unknown Type of Exam: Unknown FINDINGS: BRAIN/VENTRICLES: There is no acute intracranial hemorrhage, mass effect or midline shift. No abnormal extra-axial fluid collection. The gray-white differentiation is maintained without evidence of an acute infarct. There is no evidence of hydrocephalus. Left temporal occipital mild encephalomalacia is seen in the align craniotomy postoperative change. ORBITS: The visualized portion of the orbits demonstrate no acute abnormality. SINUSES: The visualized paranasal sinuses and mastoid air cells demonstrate no acute abnormality. SOFT TISSUES/SKULL:  Recent left occipital craniotomy postoperative changes seen with minimal extra-axial soft tissue air seen surrounding the craniotomy site. 1. Interval resection of left occipital cavernoma with recent left occipital craniotomy postoperative change, as discussed above. 2. Mild left occipital lobe encephalomalacia in setting of resection surgical bed. 3. No evidence of surgical complication identified. Ct Head Wo Contrast    Result Date: 1/8/2020  EXAMINATION: CT OF THE HEAD WITHOUT CONTRAST  1/8/2020 9:20 am TECHNIQUE: CT of the head was performed without the administration of intravenous contrast. Dose modulation, iterative reconstruction, and/or weight based adjustment of the mA/kV was utilized to reduce the radiation dose to as low as reasonably achievable.  COMPARISON: MRI brain from 01/06/2020 and 01/05/2020 HISTORY: ORDERING SYSTEM PROVIDED HISTORY: stealth protocol for pre op planning, OR today TECHNOLOGIST PROVIDED HISTORY: stealth protocol for pre op planning, OR today Is the patient pregnant?->No Reason for Exam: Left occipital small cavernoma  stealth protocol pt going to surgery today FINDINGS: BRAIN/VENTRICLES: Stable high density intraparenchymal abnormality in the left occipital lobe corresponding to known cavernoma. No new blood products. No shift of midline structures. Ventricles are normal in size. Stable low-density focus in the left basal ganglia ORBITS: The visualized portion of the orbits demonstrate no acute abnormality. SINUSES: The visualized paranasal sinuses and mastoid air cells demonstrate no acute abnormality. SOFT TISSUES/SKULL:  No acute abnormality of the visualized skull or soft tissues. Stable left occipital cavernoma. No new blood products. Ct Head Wo Contrast    Result Date: 1/5/2020  EXAMINATION: CT OF THE HEAD WITHOUT CONTRAST  1/5/2020 4:57 am TECHNIQUE: CT of the head was performed without the administration of intravenous contrast. Dose modulation, iterative reconstruction, and/or weight based adjustment of the mA/kV was utilized to reduce the radiation dose to as low as reasonably achievable. COMPARISON: CT head without contrast January 4, 2020. CT angiogram head and neck with contrast January 4, 2020. HISTORY: ORDERING SYSTEM PROVIDED HISTORY: SAH TECHNOLOGIST PROVIDED HISTORY: SAH Is the patient pregnant?->No Reason for Exam: SAH Acuity: Acute Type of Exam: Subsequent/Follow-up FINDINGS: BRAIN/VENTRICLES: Stable volume of left occipital acute subarachnoid hemorrhage plus or minus acute intraparenchymal acute hemorrhage is noted in comparison with the prior study. No significant surrounding vasogenic edema is identified. No abnormal extra-axial fluid collection. The gray-white differentiation is maintained without evidence of an acute infarct. There is no evidence of hydrocephalus. ANTERIOR CIRCULATION: No significant stenosis of the intracranial internal carotid, anterior cerebral, or middle cerebral arteries. No aneurysm. POSTERIOR CIRCULATION: No significant stenosis of the vertebral, basilar, or posterior cerebral arteries. No aneurysm. Bilateral posterior communicating arteries are present. OTHER: No dural venous sinus thrombosis on this non-dedicated study. BRAIN: No mass effect or midline shift. No extra-axial fluid collection. The gray-white differentiation is maintained. Unremarkable CTA of the head and neck. Mrv Head W Wo Contrast    Result Date: 1/5/2020  EXAMINATION: MRV OF THE HEAD WITH AND WITHOUT CONTRAST  1/5/2020: TECHNIQUE: Multiplanar multisequence MRV of the head was performed with and without the administration of intravenous contrast. COMPARISON: None. HISTORY: ORDERING SYSTEM PROVIDED HISTORY: sah, avm:? TECHNOLOGIST PROVIDED HISTORY: sah, avm:? Is the patient pregnant?->No FINDINGS: No focal stenosis or thrombus is seen of the major dural venous sinuses. Left occipital intraparenchymal susceptibility artifact is noted and better evaluated on separate MRI of the brain performed concurrently. There is a small left occipital developmental venous anomaly. 1. No dural venous sinus thrombosis. 2. Left occipital intraparenchymal susceptibility artifact consistent with residua of prior hemorrhage within adjacent developmental venous anomaly suggesting an underlying cavernoma better evaluated on separate brain MRI. Mri Brain W Wo Contrast    Result Date: 1/10/2020  EXAMINATION: MRI OF THE BRAIN WITHOUT AND WITH CONTRAST  1/10/2020 1:26 pm TECHNIQUE: Multiplanar multisequence MRI of the head/brain was performed without and with the administration of intravenous contrast. COMPARISON: MRI brain performed 01/06/2020.  HISTORY: ORDERING SYSTEM PROVIDED HISTORY: s/p cavernoma resection TECHNOLOGIST PROVIDED HISTORY: s/p cavernoma resection Is the patient pregnant?->No in size and configuration. The sellar/suprasellar regions appear unremarkable. The normal signal voids within the major intracranial vessels appear maintained. ORBITS: The visualized portion of the orbits demonstrate no acute abnormality. SINUSES: The visualized paranasal sinuses and mastoid air cells are well aerated. BONES/SOFT TISSUES: The bone marrow signal intensity appears normal. The soft tissues demonstrate no acute abnormality. Left occipital 7 mm cavernoma with surrounding hemosiderin staining consistent with sequela of remote hemorrhage. This hemosiderin deposition likely accounts for hyperdensity on recent head CT as there is no adjacent FLAIR signal abnormality to suggest acute subarachnoid hemorrhage, however small amount of acute subarachnoid hemorrhage canal at be completely excluded. Mri Brain Wo Contrast    Result Date: 1/6/2020  EXAMINATION: MRI OF THE BRAIN WITHOUT CONTRAST  1/6/2020 11:54 am TECHNIQUE: Multiplanar multisequence MRI of the brain was performed without the administration of intravenous contrast. COMPARISON: MRI brain from 01/05/2020. HISTORY: ORDERING SYSTEM PROVIDED HISTORY: STEALTH PROTOCOL T2 ONLY FOR SURGICAL PLANNING TECHNOLOGIST PROVIDED HISTORY: STEALTH PROTOCOL T2 ONLY FOR SURGICAL PLANNING Is the patient pregnant?->No FINDINGS: Noncontrast stealth images of the brain were obtained for surgical neuronavigation. There is redemonstration of a left occipital lobe lesion with surrounding T2 signal hypointensity. No other brain masses are visualized. Stealth images obtained for surgical neuronavigation. RECENT VITALS:     Temp: 98.7 °F (37.1 °C),  Pulse: 78, Resp: 16, BP: 126/84, SpO2: 100 %    This patient is a 30 y.o. Female with headache. Headache is been going on for the past 2 to 3 days. Patient is status post craniotomy for cavernoma resection with Dr. Erick Reddy.   Patient had repeat CT scan which could not exclude acute subarachnoid hemorrhage however

## 2020-01-26 ENCOUNTER — APPOINTMENT (OUTPATIENT)
Dept: CT IMAGING | Age: 25
DRG: 044 | End: 2020-01-26
Payer: MEDICARE

## 2020-01-26 LAB
ALBUMIN SERPL-MCNC: 3.5 G/DL (ref 3.5–5.2)
ALBUMIN/GLOBULIN RATIO: 1.3 (ref 1–2.5)
ALP BLD-CCNC: 106 U/L (ref 35–104)
ALT SERPL-CCNC: 24 U/L (ref 5–33)
ANION GAP SERPL CALCULATED.3IONS-SCNC: 12 MMOL/L (ref 9–17)
AST SERPL-CCNC: 15 U/L
BILIRUB SERPL-MCNC: 0.35 MG/DL (ref 0.3–1.2)
BUN BLDV-MCNC: 8 MG/DL (ref 6–20)
BUN/CREAT BLD: ABNORMAL (ref 9–20)
CALCIUM SERPL-MCNC: 9 MG/DL (ref 8.6–10.4)
CHLORIDE BLD-SCNC: 104 MMOL/L (ref 98–107)
CO2: 23 MMOL/L (ref 20–31)
CREAT SERPL-MCNC: 0.71 MG/DL (ref 0.5–0.9)
GFR AFRICAN AMERICAN: >60 ML/MIN
GFR NON-AFRICAN AMERICAN: >60 ML/MIN
GFR SERPL CREATININE-BSD FRML MDRD: ABNORMAL ML/MIN/{1.73_M2}
GFR SERPL CREATININE-BSD FRML MDRD: ABNORMAL ML/MIN/{1.73_M2}
GLUCOSE BLD-MCNC: 132 MG/DL (ref 70–99)
HCT VFR BLD CALC: 36.4 % (ref 36.3–47.1)
HEMOGLOBIN: 12.8 G/DL (ref 11.9–15.1)
INR BLD: 1.1
MAGNESIUM: 1 MG/DL (ref 1.6–2.6)
MCH RBC QN AUTO: 30 PG (ref 25.2–33.5)
MCHC RBC AUTO-ENTMCNC: 35.2 G/DL (ref 28.4–34.8)
MCV RBC AUTO: 85.4 FL (ref 82.6–102.9)
NRBC AUTOMATED: 0 PER 100 WBC
PDW BLD-RTO: 13.9 % (ref 11.8–14.4)
PLATELET # BLD: 171 K/UL (ref 138–453)
PMV BLD AUTO: 11.3 FL (ref 8.1–13.5)
POTASSIUM SERPL-SCNC: 3.3 MMOL/L (ref 3.7–5.3)
PROTHROMBIN TIME: 11.3 SEC (ref 9–12)
RBC # BLD: 4.26 M/UL (ref 3.95–5.11)
SODIUM BLD-SCNC: 139 MMOL/L (ref 135–144)
TOTAL PROTEIN: 6.3 G/DL (ref 6.4–8.3)
WBC # BLD: 9.7 K/UL (ref 3.5–11.3)

## 2020-01-26 PROCEDURE — 6360000002 HC RX W HCPCS: Performed by: STUDENT IN AN ORGANIZED HEALTH CARE EDUCATION/TRAINING PROGRAM

## 2020-01-26 PROCEDURE — 85027 COMPLETE CBC AUTOMATED: CPT

## 2020-01-26 PROCEDURE — 99232 SBSQ HOSP IP/OBS MODERATE 35: CPT | Performed by: INTERNAL MEDICINE

## 2020-01-26 PROCEDURE — 2580000003 HC RX 258: Performed by: NURSE PRACTITIONER

## 2020-01-26 PROCEDURE — 6360000002 HC RX W HCPCS: Performed by: INTERNAL MEDICINE

## 2020-01-26 PROCEDURE — 70450 CT HEAD/BRAIN W/O DYE: CPT

## 2020-01-26 PROCEDURE — 6370000000 HC RX 637 (ALT 250 FOR IP): Performed by: INTERNAL MEDICINE

## 2020-01-26 PROCEDURE — 2060000000 HC ICU INTERMEDIATE R&B

## 2020-01-26 PROCEDURE — 85610 PROTHROMBIN TIME: CPT

## 2020-01-26 PROCEDURE — 6360000002 HC RX W HCPCS: Performed by: NURSE PRACTITIONER

## 2020-01-26 PROCEDURE — 80053 COMPREHEN METABOLIC PANEL: CPT

## 2020-01-26 PROCEDURE — 83735 ASSAY OF MAGNESIUM: CPT

## 2020-01-26 PROCEDURE — 6370000000 HC RX 637 (ALT 250 FOR IP): Performed by: NURSE PRACTITIONER

## 2020-01-26 PROCEDURE — 36415 COLL VENOUS BLD VENIPUNCTURE: CPT

## 2020-01-26 RX ORDER — BUSPIRONE HYDROCHLORIDE 10 MG/1
10 TABLET ORAL 3 TIMES DAILY
Status: DISCONTINUED | OUTPATIENT
Start: 2020-01-26 | End: 2020-01-27 | Stop reason: HOSPADM

## 2020-01-26 RX ORDER — METOCLOPRAMIDE HYDROCHLORIDE 5 MG/ML
10 INJECTION INTRAMUSCULAR; INTRAVENOUS ONCE
Status: COMPLETED | OUTPATIENT
Start: 2020-01-26 | End: 2020-01-26

## 2020-01-26 RX ORDER — SCOLOPAMINE TRANSDERMAL SYSTEM 1 MG/1
1 PATCH, EXTENDED RELEASE TRANSDERMAL
Status: DISCONTINUED | OUTPATIENT
Start: 2020-01-26 | End: 2020-01-27 | Stop reason: HOSPADM

## 2020-01-26 RX ORDER — ONDANSETRON 4 MG/1
4 TABLET, FILM COATED ORAL EVERY 6 HOURS PRN
Status: DISCONTINUED | OUTPATIENT
Start: 2020-01-26 | End: 2020-01-27 | Stop reason: HOSPADM

## 2020-01-26 RX ORDER — DOCUSATE SODIUM 100 MG/1
100 CAPSULE, LIQUID FILLED ORAL 2 TIMES DAILY PRN
Status: DISCONTINUED | OUTPATIENT
Start: 2020-01-26 | End: 2020-01-27 | Stop reason: HOSPADM

## 2020-01-26 RX ORDER — POTASSIUM CHLORIDE 7.45 MG/ML
10 INJECTION INTRAVENOUS PRN
Status: DISCONTINUED | OUTPATIENT
Start: 2020-01-26 | End: 2020-01-27 | Stop reason: HOSPADM

## 2020-01-26 RX ORDER — ATORVASTATIN CALCIUM 40 MG/1
40 TABLET, FILM COATED ORAL NIGHTLY
Status: DISCONTINUED | OUTPATIENT
Start: 2020-01-26 | End: 2020-01-27 | Stop reason: HOSPADM

## 2020-01-26 RX ORDER — LAMOTRIGINE 25 MG/1
25 TABLET ORAL DAILY
Status: DISCONTINUED | OUTPATIENT
Start: 2020-01-26 | End: 2020-01-27 | Stop reason: HOSPADM

## 2020-01-26 RX ORDER — DEXAMETHASONE SODIUM PHOSPHATE 4 MG/ML
4 INJECTION, SOLUTION INTRA-ARTICULAR; INTRALESIONAL; INTRAMUSCULAR; INTRAVENOUS; SOFT TISSUE EVERY 6 HOURS
Status: DISCONTINUED | OUTPATIENT
Start: 2020-01-26 | End: 2020-01-27 | Stop reason: HOSPADM

## 2020-01-26 RX ORDER — POTASSIUM CHLORIDE 20 MEQ/1
40 TABLET, EXTENDED RELEASE ORAL PRN
Status: DISCONTINUED | OUTPATIENT
Start: 2020-01-26 | End: 2020-01-27 | Stop reason: HOSPADM

## 2020-01-26 RX ADMIN — POTASSIUM CHLORIDE 40 MEQ: 1500 TABLET, EXTENDED RELEASE ORAL at 22:22

## 2020-01-26 RX ADMIN — DEXAMETHASONE SODIUM PHOSPHATE 4 MG: 4 INJECTION, SOLUTION INTRAMUSCULAR; INTRAVENOUS at 21:29

## 2020-01-26 RX ADMIN — ACETAMINOPHEN 650 MG: 325 TABLET ORAL at 18:59

## 2020-01-26 RX ADMIN — SODIUM CHLORIDE: 9 INJECTION, SOLUTION INTRAVENOUS at 05:51

## 2020-01-26 RX ADMIN — DEXAMETHASONE SODIUM PHOSPHATE 4 MG: 4 INJECTION, SOLUTION INTRAMUSCULAR; INTRAVENOUS at 14:05

## 2020-01-26 RX ADMIN — ONDANSETRON 4 MG: 2 INJECTION INTRAMUSCULAR; INTRAVENOUS at 05:48

## 2020-01-26 RX ADMIN — OXYCODONE HYDROCHLORIDE AND ACETAMINOPHEN 1 TABLET: 5; 325 TABLET ORAL at 08:42

## 2020-01-26 RX ADMIN — METOCLOPRAMIDE 10 MG: 5 INJECTION, SOLUTION INTRAMUSCULAR; INTRAVENOUS at 09:55

## 2020-01-26 RX ADMIN — OXYCODONE HYDROCHLORIDE AND ACETAMINOPHEN 2 TABLET: 5; 325 TABLET ORAL at 00:33

## 2020-01-26 RX ADMIN — SODIUM CHLORIDE, PRESERVATIVE FREE 10 ML: 5 INJECTION INTRAVENOUS at 21:33

## 2020-01-26 RX ADMIN — DESMOPRESSIN ACETATE 40 MG: 0.2 TABLET ORAL at 21:29

## 2020-01-26 ASSESSMENT — PAIN SCALES - GENERAL
PAINLEVEL_OUTOF10: 9
PAINLEVEL_OUTOF10: 8
PAINLEVEL_OUTOF10: 10
PAINLEVEL_OUTOF10: 9
PAINLEVEL_OUTOF10: 7

## 2020-01-26 NOTE — PLAN OF CARE
Patient assessed for fall risk and fall precautions initiated as needed. Patient instructed about safety devices and allowed to make decisions related to safey. Environment kept free of clutter and adequate lighting provided. Bed in lowest position with brakes locked. Call light in reach. Patient ID band correct and in place. Patient transferred with appropriate methods. Patient free of falls during shift. Will continue to monitor. Renato Tinajero RN

## 2020-01-26 NOTE — PROGRESS NOTES
DATE: 2020    NAME: Barry Zaidi  MRN: [id number]   : 1995    Patient not seen this date for Physical Therapy due to:  [] Blood transfusion in progress  [] Hemodialysis  [x] Patient Declined - pt with c/o nausea and vomiting; will check   [] Spine Precautions   [] Strict Bedrest  [] Surgery/ Procedure  [] Testing      [] Other        [] PT being discontinued at this time. Patient independent. No further needs. [] PT being discontinued at this time as the patient has been transferred to palliative care. No further needs.     Josh Lyman, PT, DPT, CMPT

## 2020-01-26 NOTE — PROGRESS NOTES
[facility]    Progress Note    2020    10:50 AM    Name:   Omar Pascual  MRN:     [id number]     Acct:      [de-identified]   Room:   [address] [place] [address] Day:  1  Admit Date:  2020  1:01 AM    PCP:   Annetta Valle  Code Status:  Full Code    Subjective:     C/C:   Chief Complaint   Patient presents with    Headache     pt c/o headache +light and +sound sensitivity. pt recently admittied for [facility] on the . Interval History Status: improved. Patient seen and examined  Patient feels weak still c/o headache  Patient denies fever chest pain shortness of breath  I am seeing the patient for head aches         Medications: Allergies: Allergies   Allergen Reactions    Latex     Prozac [Fluoxetine Hcl] Other (See Comments)     Seizures       Current Meds:   Scheduled Meds:    sodium chloride flush  10 mL Intravenous 2 times per day     Continuous Infusions:    sodium chloride 75 mL/hr at 20 0551     PRN Meds: labetalol, sodium chloride flush, magnesium hydroxide, ondansetron, nicotine, acetaminophen, hydrALAZINE, hydrALAZINE, sodium chloride flush, oxyCODONE-acetaminophen **OR** oxyCODONE-acetaminophen    Data:     Past Medical History:   has a past medical history of Cerebral vascular malformation, Diabetes mellitus ([facility] Seizure ([facility] [facility] and Traumatic hemorrhage of left cerebrum without loss of consciousness ([facility] [address] [facility] Social History:   reports that she has been smoking cigarettes. She has been smoking about 0.30 packs per day. She has never used smokeless tobacco. She reports current drug use. Drug: Marijuana. She reports that she does not drink alcohol. Family History: History reviewed. No pertinent family history.     Vitals:  BP (!) 139/96   Pulse 78   Temp 97.7 °F (36.5 °C) (Oral)   Resp 16   Ht 5' 1\" (1.549 m)   Wt 174 lb (78.9 kg)   SpO2 97%   BMI 32.88 kg/m²   Temp (24hrs), Av.6 °F (36.4 °C), Min:97 °F cooperative and no distress  Mental Status:  oriented to person, place and time and normal affect  Lungs:  clear to auscultation bilaterally, normal effort  Heart:  regular rate and rhythm, no murmur  Abdomen:  soft, nontender, nondistended, normal bowel sounds, no masses, hepatomegaly, splenomegaly  Extremities:  no edema, redness, tenderness in the calves  Skin:  no gross lesions, rashes, induration    Assessment:        Hospital Problems           Last Modified POA    * (Principal) Occipital subdural bleed ([address] 1/25/2020 Yes    Bipolar 1 disorder ([address] 1/25/2020 Yes    Intraparenchymal hemorrhage of brain ([address] 1/25/2020 Yes    History of seizures 1/25/2020 Yes    Status post craniotomy 1/25/2020 Yes          Plan:        Patient status inpatient in the Progressive Unit/Step down  Headache associated with abnormal CT scan as below neurosurgery consult   Interval increase/development of acute hemorrhage within left temporal   occipital surgical resection bed underlying craniotomy     mri  Redemonstration of small resection cavity in the posterior aspect of the left   temporal lobe containing evolving hemorrhagic products.  There is peripheral   enhancement of the resection cavity as well as adjacent dural enhancement   that may be postoperative.  Attention is recommended on follow-up.        Probable migraine pain control     History of seizure resume home medication     Hypokalemia replace     Avoid antiplatelet        DVT prophylaxis SCD no chemical prophylaxis high risk for bleeding      Bree Belle MD  1/26/2020  10:50 AM

## 2020-01-26 NOTE — PLAN OF CARE
Problem: Pain:  Goal: Pain level will decrease  Description  Pain level will decrease  Outcome: Ongoing     Problem: Pain:  Goal: Control of acute pain  Description  Control of acute pain  Outcome: Ongoing     Problem: Pain:  Goal: Control of chronic pain  Description  Control of chronic pain  Outcome: Ongoing    Patient's pain is assessed on a 0-10 pain scale frequently. Patient taught to call out when early onset of pain is felt. Pain is managed with PRN pain medication, repositioning, and emotional support.

## 2020-01-26 NOTE — PROGRESS NOTES
Neurosurgery Resident    Daily Progress Note     1/26/2020  1:02 PM    Subjective:    No acute events overnight. No new complaints. Feeling slightly better this morning.     Vitals:    01/25/20 2358 01/26/20 0450 01/26/20 0813 01/26/20 1210   BP: 108/72 115/72 (!) 139/96 (!) 148/97   Pulse: 81 86 78 92   Resp: 15 13 16 22   Temp: 97 °F (36.1 °C) 97 °F (36.1 °C) 97.7 °F (36.5 °C) 98 °F (36.7 °C)   TempSrc: Oral Oral Oral Oral   SpO2:  97%     Weight:       Height:           Objective:    Gen: Resting comfortably, no acute distress  CV: RRR, pulses 2+  Resp: Symmetric chest rise, no accessory muscle use, normal respirations    MSK:  BUE and BLE with 5/5 strength throughout and SILT      Lab Results   Component Value Date    WBC 9.7 01/26/2020    HGB 12.8 01/26/2020    HCT 36.4 01/26/2020     01/26/2020    CHOL 245 (H) 01/05/2020    TRIG 420 (H) 01/07/2020    HDL 24 (L) 01/05/2020    LDLDIRECT 104 (H) 01/05/2020    ALT 24 01/26/2020    AST 15 01/26/2020     01/26/2020    K 3.3 (L) 01/26/2020     01/26/2020    CREATININE 0.71 01/26/2020    BUN 8 01/26/2020    CO2 23 01/26/2020    TSH 3.19 01/05/2020    INR 1.1 01/26/2020    LABA1C 8.7 (H) 01/05/2020       Assessment/Plan    30 y.o. female who presents with residual left cavitary hematoma/hemorrhage after resection    - F/u rpt CTH today  - Decadron 4mg q6h  - Scopolamine patch  - Neuro checks per floor protocol  - Additional recommendations may follow    Please contact neurosurgery with any changes in patients neurologic status    Elvia English DO  PGY-4, Department of [address], [place], [place]  1:02 PM 1/26/2020

## 2020-01-27 VITALS
OXYGEN SATURATION: 97 % | RESPIRATION RATE: 15 BRPM | HEART RATE: 95 BPM | DIASTOLIC BLOOD PRESSURE: 81 MMHG | WEIGHT: 174 LBS | TEMPERATURE: 98.1 F | BODY MASS INDEX: 32.85 KG/M2 | SYSTOLIC BLOOD PRESSURE: 127 MMHG | HEIGHT: 61 IN

## 2020-01-27 PROCEDURE — 6370000000 HC RX 637 (ALT 250 FOR IP): Performed by: NURSE PRACTITIONER

## 2020-01-27 PROCEDURE — 6360000002 HC RX W HCPCS: Performed by: STUDENT IN AN ORGANIZED HEALTH CARE EDUCATION/TRAINING PROGRAM

## 2020-01-27 PROCEDURE — 2580000003 HC RX 258: Performed by: NURSE PRACTITIONER

## 2020-01-27 PROCEDURE — 6370000000 HC RX 637 (ALT 250 FOR IP): Performed by: INTERNAL MEDICINE

## 2020-01-27 PROCEDURE — 99238 HOSP IP/OBS DSCHRG MGMT 30/<: CPT | Performed by: INTERNAL MEDICINE

## 2020-01-27 PROCEDURE — APPSS15 APP SPLIT SHARED TIME 0-15 MINUTES: Performed by: NURSE PRACTITIONER

## 2020-01-27 RX ORDER — PREDNISONE 10 MG/1
TABLET ORAL
Qty: 60 TABLET | Refills: 0 | Status: SHIPPED | OUTPATIENT
Start: 2020-01-27 | End: 2020-02-11

## 2020-01-27 RX ORDER — OXYCODONE HYDROCHLORIDE AND ACETAMINOPHEN 5; 325 MG/1; MG/1
1 TABLET ORAL EVERY 6 HOURS PRN
Qty: 12 TABLET | Refills: 0 | Status: SHIPPED | OUTPATIENT
Start: 2020-01-27 | End: 2020-01-30

## 2020-01-27 RX ORDER — CALCIUM CARBONATE 200(500)MG
500 TABLET,CHEWABLE ORAL 3 TIMES DAILY PRN
Status: DISCONTINUED | OUTPATIENT
Start: 2020-01-27 | End: 2020-01-27 | Stop reason: HOSPADM

## 2020-01-27 RX ORDER — SCOLOPAMINE TRANSDERMAL SYSTEM 1 MG/1
1 PATCH, EXTENDED RELEASE TRANSDERMAL
Qty: 3 PATCH | Refills: 0 | Status: ON HOLD | OUTPATIENT
Start: 2020-01-29 | End: 2022-06-25 | Stop reason: HOSPADM

## 2020-01-27 RX ADMIN — ACETAMINOPHEN 650 MG: 325 TABLET ORAL at 08:45

## 2020-01-27 RX ADMIN — OXYCODONE HYDROCHLORIDE AND ACETAMINOPHEN 2 TABLET: 5; 325 TABLET ORAL at 03:20

## 2020-01-27 RX ADMIN — DEXAMETHASONE SODIUM PHOSPHATE 4 MG: 4 INJECTION, SOLUTION INTRAMUSCULAR; INTRAVENOUS at 08:45

## 2020-01-27 RX ADMIN — DEXAMETHASONE SODIUM PHOSPHATE 4 MG: 4 INJECTION, SOLUTION INTRAMUSCULAR; INTRAVENOUS at 03:11

## 2020-01-27 RX ADMIN — SODIUM CHLORIDE, PRESERVATIVE FREE 10 ML: 5 INJECTION INTRAVENOUS at 08:45

## 2020-01-27 ASSESSMENT — PAIN SCALES - GENERAL
PAINLEVEL_OUTOF10: 10
PAINLEVEL_OUTOF10: 9
PAINLEVEL_OUTOF10: 8

## 2020-01-27 NOTE — PLAN OF CARE
Problem: Falls - Risk of:  Goal: Will remain free from falls  Description  Will remain free from falls  Outcome: Met This Shift     Problem: Falls - Risk of:  Goal: Absence of physical injury  Description  Absence of physical injury  Outcome: Met This Shift    Patient remained free from falls/injuries. Bed locked and in lowest position. Call light and bedside table within reach. Patient taught to call out appropriately. Bed alarm set.

## 2020-01-27 NOTE — PROGRESS NOTES
Neurosurgery ANA PAULA/Resident    Daily Progress Note     1/27/2020  9:12 AM    Chart reviewed. No acute events overnight. Patient reports that nausea has significantly improved, reports a  headache at this time  Vitals:    01/26/20 2122 01/27/20 0400 01/27/20 0730 01/27/20 0840   BP: (!) 144/97 (!) 141/94  127/81   Pulse: 138 118 113 95   Resp: 18 17  15   Temp: 97.7 °F (36.5 °C) 98 °F (36.7 °C)  98.1 °F (36.7 °C)   TempSrc: Oral Oral  Oral   SpO2:  97%     Weight:       Height:           PE:   AOx3, pleasant and cooperative during exam though she reports she is tired  CN 2-12 intact   PERRL, EOMI   Denies any visual changes   Denies nausea and vomiting but reports headache     Motor  L deltoid 5/5; R deltoid 5/5  L biceps 5/5; R biceps 5/5  L triceps 5/5; R triceps 5/5  L wrist extension 4+/5; R wrist extension 5/5       L iliopsoas 5/5 , R iliopsoas 5/5  L quadriceps 5/5; R quadriceps 5/5  L Dorsiflexion 5/5; R dorsiflexion 5/5  L Plantarflexion 5/5; R plantarflexion 5/5    Denies bowel or bladder incontinence           Lab Results   Component Value Date    WBC 9.7 01/26/2020    HGB 12.8 01/26/2020    HCT 36.4 01/26/2020     01/26/2020    CHOL 245 (H) 01/05/2020    TRIG 420 (H) 01/07/2020    HDL 24 (L) 01/05/2020    LDLDIRECT 104 (H) 01/05/2020    ALT 24 01/26/2020    AST 15 01/26/2020     01/26/2020    K 3.3 (L) 01/26/2020     01/26/2020    CREATININE 0.71 01/26/2020    BUN 8 01/26/2020    CO2 23 01/26/2020    TSH 3.19 01/05/2020    INR 1.1 01/26/2020    LABA1C 8.7 (H) 01/05/2020         A/P  30 y.o. female who presents with residual left cavitary hematoma/hemorrhage after resection and complaints of headache and nausea      - ok for DC per neurosurgery  -F/u MRI brain W/WO contrast in 1 month  -taper prednisone       Please contact neurosurgery with any changes in patients neurologic status.        Electronically signed by WILLIAM Enciso NP on 1/27/2020 at 9:47 AM

## 2020-01-27 NOTE — DISCHARGE SUMMARY
01/04/2020    KETUA NEGATIVE 01/04/2020    BILIRUBINUR NEGATIVE 01/04/2020    UROBILINOGEN Normal 01/04/2020    NITRU NEGATIVE 01/04/2020    LEUKOCYTESUR SMALL 01/04/2020     TSH:    Lab Results   Component Value Date    TSH 3.19 01/05/2020        Radiology:  Ct Head Wo Contrast    Result Date: 1/26/2020  Redemonstration of postsurgical change in the left posterior temporal region with hemorrhagic products and this is not significantly changed compared to prior examination. Ct Head Wo Contrast    Result Date: 1/25/2020  Interval increase/development of acute hemorrhage within left temporal occipital surgical resection bed underlying craniotomy postoperative change, as discussed above. Mri Brain W Wo Contrast    Result Date: 1/25/2020  Redemonstration of small resection cavity in the posterior aspect of the left temporal lobe containing evolving hemorrhagic products. There is peripheral enhancement of the resection cavity as well as adjacent dural enhancement that may be postoperative. Attention is recommended on follow-up. Consultations:    Consults:     Final Specialist Recommendations/Findings:   IP CONSULT TO NEUROSURGERY  IP CONSULT TO INTERNAL MEDICINE  IP CONSULT TO NEUROSURGERY      The patient was seen and examined on day of discharge and this discharge summary is in conjunction with any daily progress note from day of discharge.     Discharge plan:     Disposition: Home    Physician Follow Up:     Norma Valle  [address]  [phone]    In 1 week      [address]  [phone]    In 1 week      Hussain Canales DO  [address] [facility], [address]  MO[address] [place]   [phone]    In 1 week      Loyd Brown MD  [address] [facility], [address]  MO[address] [facility]  [phone]    In 1 week         Requiring Further Evaluation/Follow Up POST HOSPITALIZATION/Incidental Findings:     Diet: regular diet    Activity: As tolerated    Instructions to Patient:     Discharge Medications:      Medication List      START taking these medications    oxyCODONE-acetaminophen 5-325 MG per tablet  Commonly known as:  PERCOCET  Take 1 tablet by mouth every 6 hours as needed for Pain for up to 3 days. predniSONE 10 MG tablet  Commonly known as:  DELTASONE  Take 6 tablets by mouth daily for 5 days, THEN 4 tablets daily for 5 days, THEN 2 tablets daily for 5 days. Start taking on:  January 27, 2020     scopolamine transdermal patch  Commonly known as:  TRANSDERM-SCOP  Place 1 patch onto the skin every 72 hours  Start taking on:  January 29, 2020        CONTINUE taking these medications    atorvastatin 40 MG tablet  Commonly known as:  LIPITOR  Take 1 tablet by mouth nightly     busPIRone 10 MG tablet  Commonly known as:  BUSPAR     docusate sodium 100 MG capsule  Commonly known as:  COLACE     lamoTRIgine 25 MG tablet  Commonly known as:  LAMICTAL     levETIRAcetam 500 MG tablet  Commonly known as:  KEPPRA  Take 1 tablet by mouth 2 times daily for 7 days     Nexplanon 68 MG implant  Generic drug:  etonogestrel     ondansetron 4 MG tablet  Commonly known as:  Zofran  Take 1 tablet by mouth every 6 hours as needed for Nausea or Vomiting           Where to Get Your Medications      These medications were sent to [facility] [address] [facility], [address], [place] [address]    Phone:  [phone]   · oxyCODONE-acetaminophen 5-325 MG per tablet  · scopolamine transdermal patch     You can get these medications from any pharmacy    Bring a paper prescription for each of these medications  · predniSONE 10 MG tablet         Discharge Procedure Orders   MRI BRAIN W WO CONTRAST   Standing Status: Future Standing Exp.  Date: 01/27/21       Time Spent on discharge is  20 mins in patient examination, evaluation, counseling as well as medication reconciliation, prescriptions for required medications, discharge plan and follow up. Electronically signed by   Laney Neff MD  1/27/2020  10:42 AM      Thank you Dr. Mojgan Steele for the opportunity to be involved in this patient's care.

## 2020-01-27 NOTE — DISCHARGE INSTR - COC
Continuity of Care Form    Patient Name: Christiano Cobos   :  1995  MRN:  [id number]    Admit date:  2020  Discharge date:  2020    Code Status Order: Full Code   Advance Directives:   Advance Care Flowsheet Documentation     Date/Time Healthcare Directive Type of Healthcare Directive Copy in [address] Agent's Name Healthcare Agent's Phone Number    20 1032  No, patient does not have an advance directive for healthcare treatment -- -- -- -- --          Admitting Physician:  Nehemias Bills MD  PCP: Purvi Maldonado    [facility]Room[facility] 1936/4865-37  Discharging Unit Phone Number: [phone]    Emergency Contact:   Extended Emergency Contact Information  Primary Emergency Contact: Mikal Olivares  Address:  [facility]  Megan Jewell [address] [place] [address] Phone: [phone]  Relation: Parent  Hearing or visual needs: None  Other needs: None  Preferred language: English   needed? No  Secondary Emergency Contact: Pau [address] Phone: [phone]  Relation: Other  Hearing or visual needs: None  Other needs: None  Preferred language: English   needed? No    Past Surgical History:  Past Surgical History:   Procedure Laterality Date    CRANIOTOMY  2020    LEFT OCCIPITAL CRANIOTOMY    CRANIOTOMY Left 2020    LEFT OCCIPITAL CRANIOTOMY, 1ST VASCULAR LESION WITH URBAN NAVIGATION (LATERAL WITH ROMERO BAG, HOLLINGSWORTH HEADHOLDER, MICROSCOPE) performed by Susan Sandoval DO at [place] [address]       Immunization History: There is no immunization history on file for this patient.     Active Problems:  Patient Active Problem List   Diagnosis Code    Bipolar 1 disorder ([address] F31.9    Recurrent depression ([address] F33.9    SAH (subarachnoid hemorrhage) ([address] I60.9    Intraparenchymal hematoma of brain ([address] S06.360A    Intraparenchymal (please select all that are sent with patient):  {CHP DME Belongings:947955530}    RN SIGNATURE:  {Esignature:[id number]}    CASE MANAGEMENT/SOCIAL WORK SECTION    Inpatient Status Date: ***    Readmission Risk Assessment Score:  Readmission Risk              Risk of Unplanned Readmission:        12           Discharging to Facility/ Agency   · Name:   · Address:  · Phone:  · Fax:    Dialysis Facility (if applicable)   · Name:  · Address:  · Dialysis Schedule:  · Phone:  · Fax:    / signature: {Esignature:[id number]}    PHYSICIAN SECTION    Prognosis: Fair    Condition at Discharge: Stable    Rehab Potential (if transferring to Rehab): Good    Recommended Labs or Other Treatments After Discharge: CBC CMP in 1 week repeat MRI as outpatient follow-up with neurosurgery to schedule in 1 month    Physician Certification: I certify the above information and transfer of Randall Payment  is necessary for the continuing treatment of the diagnosis listed and that she requires Home Care for less 30 days.      Update Admission H&P: No change in H&P    PHYSICIAN SIGNATURE:  Electronically signed by Rosie Waldrop MD on 1/27/20 at 11:19 AM

## 2020-01-27 NOTE — PROGRESS NOTES
CLINICAL PHARMACY NOTE: MEDS TO [facility] [place][note 2] [employer] Select Patient?: No  Total # of Prescriptions Filled: 3   The following medications were delivered to the patient:  · Oxycodone/ acetaminophen 5/325 mg  · Scopolamine 1 mg / 3 day patch  · Prednisone 10 mg tab  Total # of Interventions Completed: 2  Time Spent (min): 60    Additional Documentation:Medication was delivered to the room, we had to wait for the Doctor to sign the prednisone prescription and then we delivered it to the room.

## 2020-01-27 NOTE — PLAN OF CARE
No shortness of breath at this time, pt ambulates off the unit to visit friend on the 4th floor. Has remained free from falls, and injury, safety maintained. Rates pain on a scale of 0 to 10 a 0 this morning.

## 2020-02-18 ENCOUNTER — APPOINTMENT (OUTPATIENT)
Dept: MRI IMAGING | Age: 25
DRG: 420 | End: 2020-02-18
Payer: MEDICARE

## 2020-02-18 ENCOUNTER — APPOINTMENT (OUTPATIENT)
Dept: CT IMAGING | Age: 25
DRG: 420 | End: 2020-02-18
Payer: MEDICARE

## 2020-02-18 ENCOUNTER — HOSPITAL ENCOUNTER (INPATIENT)
Age: 25
LOS: 7 days | Discharge: HOME | DRG: 420 | End: 2020-02-25
Attending: EMERGENCY MEDICINE | Admitting: INTERNAL MEDICINE
Payer: MEDICARE

## 2020-02-18 PROBLEM — R51.9 HEADACHE: Status: ACTIVE | Noted: 2020-02-18

## 2020-02-18 LAB
ABSOLUTE EOS #: 0.06 K/UL (ref 0–0.44)
ABSOLUTE IMMATURE GRANULOCYTE: 0.03 K/UL (ref 0–0.3)
ABSOLUTE LYMPH #: 2.83 K/UL (ref 1.1–3.7)
ABSOLUTE MONO #: 0.63 K/UL (ref 0.1–1.2)
ALBUMIN SERPL-MCNC: 3.8 G/DL (ref 3.5–5.2)
ALBUMIN/GLOBULIN RATIO: 1.2 (ref 1–2.5)
ALLEN TEST: POSITIVE
ALP BLD-CCNC: 138 U/L (ref 35–104)
ALT SERPL-CCNC: 14 U/L (ref 5–33)
ANION GAP SERPL CALCULATED.3IONS-SCNC: 18 MMOL/L (ref 9–17)
AST SERPL-CCNC: 8 U/L
BASOPHILS # BLD: 1 % (ref 0–2)
BASOPHILS ABSOLUTE: 0.05 K/UL (ref 0–0.2)
BETA-HYDROXYBUTYRATE: 1.61 MMOL/L (ref 0.02–0.27)
BILIRUB SERPL-MCNC: 0.34 MG/DL (ref 0.3–1.2)
BUN BLDV-MCNC: 21 MG/DL (ref 6–20)
BUN/CREAT BLD: ABNORMAL (ref 9–20)
CALCIUM SERPL-MCNC: 9.5 MG/DL (ref 8.6–10.4)
CHLORIDE BLD-SCNC: 80 MMOL/L (ref 98–107)
CO2: 29 MMOL/L (ref 20–31)
CREAT SERPL-MCNC: 1.06 MG/DL (ref 0.5–0.9)
DIFFERENTIAL TYPE: ABNORMAL
EOSINOPHILS RELATIVE PERCENT: 1 % (ref 1–4)
ETHANOL PERCENT: <0.01 %
ETHANOL: <10 MG/DL
FIO2: 21
GFR AFRICAN AMERICAN: >60 ML/MIN
GFR NON-AFRICAN AMERICAN: >60 ML/MIN
GFR SERPL CREATININE-BSD FRML MDRD: ABNORMAL ML/MIN/{1.73_M2}
GFR SERPL CREATININE-BSD FRML MDRD: ABNORMAL ML/MIN/{1.73_M2}
GLUCOSE BLD-MCNC: 792 MG/DL (ref 70–99)
HCT VFR BLD CALC: 36.6 % (ref 36.3–47.1)
HEMOGLOBIN: 12.3 G/DL (ref 11.9–15.1)
IMMATURE GRANULOCYTES: 0 %
INR BLD: 1
KEPPRA: <2 UG/ML
LACTIC ACID, WHOLE BLOOD: 1.1 MMOL/L (ref 0.7–2.1)
LACTIC ACID: NORMAL MMOL/L
LAMOTRIGINE LEVEL: <1 UG/ML (ref 3–15)
LYMPHOCYTES # BLD: 26 % (ref 24–43)
MAGNESIUM: 1.3 MG/DL (ref 1.6–2.6)
MCH RBC QN AUTO: 26.9 PG (ref 25.2–33.5)
MCHC RBC AUTO-ENTMCNC: 33.6 G/DL (ref 28.4–34.8)
MCV RBC AUTO: 80.1 FL (ref 82.6–102.9)
MODE: ABNORMAL
MONOCYTES # BLD: 6 % (ref 3–12)
NEGATIVE BASE EXCESS, ART: ABNORMAL (ref 0–2)
NRBC AUTOMATED: 0 PER 100 WBC
O2 DEVICE/FLOW/%: ABNORMAL
PARTIAL THROMBOPLASTIN TIME: 21.8 SEC (ref 20.5–30.5)
PATIENT TEMP: ABNORMAL
PDW BLD-RTO: 12 % (ref 11.8–14.4)
PLATELET # BLD: 254 K/UL (ref 138–453)
PLATELET ESTIMATE: ABNORMAL
PMV BLD AUTO: 11.5 FL (ref 8.1–13.5)
POC HCO3: 27.7 MMOL/L (ref 21–28)
POC O2 SATURATION: 98 % (ref 94–98)
POC PCO2 TEMP: ABNORMAL MM HG
POC PCO2: 39.1 MM HG (ref 35–48)
POC PH TEMP: ABNORMAL
POC PH: 7.46 (ref 7.35–7.45)
POC PO2 TEMP: ABNORMAL MM HG
POC PO2: 96 MM HG (ref 83–108)
POSITIVE BASE EXCESS, ART: 4 (ref 0–3)
POTASSIUM SERPL-SCNC: 2.4 MMOL/L (ref 3.7–5.3)
PROTHROMBIN TIME: 10.5 SEC (ref 9–12)
RBC # BLD: 4.57 M/UL (ref 3.95–5.11)
RBC # BLD: ABNORMAL 10*6/UL
SAMPLE SITE: ABNORMAL
SEG NEUTROPHILS: 66 % (ref 36–65)
SEGMENTED NEUTROPHILS ABSOLUTE COUNT: 7.16 K/UL (ref 1.5–8.1)
SERUM OSMOLALITY: 310 MOSM/KG (ref 275–295)
SODIUM BLD-SCNC: 127 MMOL/L (ref 135–144)
TCO2 (CALC), ART: 29 MMOL/L (ref 22–29)
TOTAL PROTEIN: 6.9 G/DL (ref 6.4–8.3)
TROPONIN INTERP: NORMAL
TROPONIN T: NORMAL NG/ML
TROPONIN, HIGH SENSITIVITY: 7 NG/L (ref 0–14)
WBC # BLD: 10.8 K/UL (ref 3.5–11.3)
WBC # BLD: ABNORMAL 10*3/UL

## 2020-02-18 PROCEDURE — 82803 BLOOD GASES ANY COMBINATION: CPT

## 2020-02-18 PROCEDURE — 81003 URINALYSIS AUTO W/O SCOPE: CPT

## 2020-02-18 PROCEDURE — 70544 MR ANGIOGRAPHY HEAD W/O DYE: CPT

## 2020-02-18 PROCEDURE — 82947 ASSAY GLUCOSE BLOOD QUANT: CPT

## 2020-02-18 PROCEDURE — 96372 THER/PROPH/DIAG INJ SC/IM: CPT

## 2020-02-18 PROCEDURE — 85610 PROTHROMBIN TIME: CPT

## 2020-02-18 PROCEDURE — 70551 MRI BRAIN STEM W/O DYE: CPT

## 2020-02-18 PROCEDURE — 80177 DRUG SCRN QUAN LEVETIRACETAM: CPT

## 2020-02-18 PROCEDURE — 96374 THER/PROPH/DIAG INJ IV PUSH: CPT

## 2020-02-18 PROCEDURE — 93005 ELECTROCARDIOGRAM TRACING: CPT | Performed by: STUDENT IN AN ORGANIZED HEALTH CARE EDUCATION/TRAINING PROGRAM

## 2020-02-18 PROCEDURE — 76937 US GUIDE VASCULAR ACCESS: CPT

## 2020-02-18 PROCEDURE — 70450 CT HEAD/BRAIN W/O DYE: CPT

## 2020-02-18 PROCEDURE — 80175 DRUG SCREEN QUAN LAMOTRIGINE: CPT

## 2020-02-18 PROCEDURE — 80053 COMPREHEN METABOLIC PANEL: CPT

## 2020-02-18 PROCEDURE — 6370000000 HC RX 637 (ALT 250 FOR IP): Performed by: STUDENT IN AN ORGANIZED HEALTH CARE EDUCATION/TRAINING PROGRAM

## 2020-02-18 PROCEDURE — 85025 COMPLETE CBC W/AUTO DIFF WBC: CPT

## 2020-02-18 PROCEDURE — 83930 ASSAY OF BLOOD OSMOLALITY: CPT

## 2020-02-18 PROCEDURE — 99223 1ST HOSP IP/OBS HIGH 75: CPT | Performed by: INTERNAL MEDICINE

## 2020-02-18 PROCEDURE — APPNB60 APP NON BILLABLE TIME 46-60 MINS: Performed by: NURSE PRACTITIONER

## 2020-02-18 PROCEDURE — 36600 WITHDRAWAL OF ARTERIAL BLOOD: CPT

## 2020-02-18 PROCEDURE — 2060000000 HC ICU INTERMEDIATE R&B

## 2020-02-18 PROCEDURE — 82010 KETONE BODYS QUAN: CPT

## 2020-02-18 PROCEDURE — 96375 TX/PRO/DX INJ NEW DRUG ADDON: CPT

## 2020-02-18 PROCEDURE — 2580000003 HC RX 258: Performed by: STUDENT IN AN ORGANIZED HEALTH CARE EDUCATION/TRAINING PROGRAM

## 2020-02-18 PROCEDURE — 99285 EMERGENCY DEPT VISIT HI MDM: CPT

## 2020-02-18 PROCEDURE — G0480 DRUG TEST DEF 1-7 CLASSES: HCPCS

## 2020-02-18 PROCEDURE — 84484 ASSAY OF TROPONIN QUANT: CPT

## 2020-02-18 PROCEDURE — 83605 ASSAY OF LACTIC ACID: CPT

## 2020-02-18 PROCEDURE — 93005 ELECTROCARDIOGRAM TRACING: CPT | Performed by: EMERGENCY MEDICINE

## 2020-02-18 PROCEDURE — 99255 IP/OBS CONSLTJ NEW/EST HI 80: CPT | Performed by: PSYCHIATRY & NEUROLOGY

## 2020-02-18 PROCEDURE — 83735 ASSAY OF MAGNESIUM: CPT

## 2020-02-18 PROCEDURE — 6360000002 HC RX W HCPCS: Performed by: STUDENT IN AN ORGANIZED HEALTH CARE EDUCATION/TRAINING PROGRAM

## 2020-02-18 PROCEDURE — 85730 THROMBOPLASTIN TIME PARTIAL: CPT

## 2020-02-18 RX ORDER — POTASSIUM CHLORIDE 20 MEQ/1
30 TABLET, EXTENDED RELEASE ORAL ONCE
Status: DISCONTINUED | OUTPATIENT
Start: 2020-02-18 | End: 2020-02-18

## 2020-02-18 RX ORDER — 0.9 % SODIUM CHLORIDE 0.9 %
1000 INTRAVENOUS SOLUTION INTRAVENOUS ONCE
Status: COMPLETED | OUTPATIENT
Start: 2020-02-18 | End: 2020-02-18

## 2020-02-18 RX ORDER — ONDANSETRON 2 MG/ML
4 INJECTION INTRAMUSCULAR; INTRAVENOUS ONCE
Status: COMPLETED | OUTPATIENT
Start: 2020-02-18 | End: 2020-02-18

## 2020-02-18 RX ORDER — SODIUM CHLORIDE 450 MG/100ML
INJECTION, SOLUTION INTRAVENOUS CONTINUOUS
Status: DISCONTINUED | OUTPATIENT
Start: 2020-02-18 | End: 2020-02-19

## 2020-02-18 RX ORDER — MAGNESIUM SULFATE 1 G/100ML
2 INJECTION INTRAVENOUS ONCE
Status: COMPLETED | OUTPATIENT
Start: 2020-02-18 | End: 2020-02-18

## 2020-02-18 RX ORDER — DIPHENHYDRAMINE HYDROCHLORIDE 50 MG/ML
25 INJECTION INTRAMUSCULAR; INTRAVENOUS ONCE
Status: COMPLETED | OUTPATIENT
Start: 2020-02-18 | End: 2020-02-18

## 2020-02-18 RX ORDER — DEXTROSE AND SODIUM CHLORIDE 5; .45 G/100ML; G/100ML
INJECTION, SOLUTION INTRAVENOUS CONTINUOUS PRN
Status: DISCONTINUED | OUTPATIENT
Start: 2020-02-18 | End: 2020-02-25 | Stop reason: HOSPADM

## 2020-02-18 RX ORDER — MAGNESIUM SULFATE 1 G/100ML
1 INJECTION INTRAVENOUS ONCE
Status: COMPLETED | OUTPATIENT
Start: 2020-02-18 | End: 2020-02-19

## 2020-02-18 RX ORDER — DEXTROSE MONOHYDRATE 25 G/50ML
12.5 INJECTION, SOLUTION INTRAVENOUS PRN
Status: DISCONTINUED | OUTPATIENT
Start: 2020-02-18 | End: 2020-02-19 | Stop reason: SDUPTHER

## 2020-02-18 RX ORDER — LAMOTRIGINE 25 MG/1
25 TABLET ORAL DAILY
Status: DISCONTINUED | OUTPATIENT
Start: 2020-02-18 | End: 2020-02-25 | Stop reason: HOSPADM

## 2020-02-18 RX ORDER — POTASSIUM CHLORIDE 20 MEQ/1
40 TABLET, EXTENDED RELEASE ORAL ONCE
Status: COMPLETED | OUTPATIENT
Start: 2020-02-18 | End: 2020-02-18

## 2020-02-18 RX ORDER — 0.9 % SODIUM CHLORIDE 0.9 %
15 INTRAVENOUS SOLUTION INTRAVENOUS ONCE
Status: DISCONTINUED | OUTPATIENT
Start: 2020-02-18 | End: 2020-02-25 | Stop reason: HOSPADM

## 2020-02-18 RX ORDER — LEVETIRACETAM 500 MG/1
500 TABLET ORAL 2 TIMES DAILY
Status: DISCONTINUED | OUTPATIENT
Start: 2020-02-18 | End: 2020-02-25 | Stop reason: HOSPADM

## 2020-02-18 RX ORDER — POTASSIUM CHLORIDE 7.45 MG/ML
10 INJECTION INTRAVENOUS PRN
Status: DISCONTINUED | OUTPATIENT
Start: 2020-02-18 | End: 2020-02-19

## 2020-02-18 RX ORDER — POTASSIUM CHLORIDE 7.45 MG/ML
10 INJECTION INTRAVENOUS
Status: COMPLETED | OUTPATIENT
Start: 2020-02-18 | End: 2020-02-19

## 2020-02-18 RX ORDER — MAGNESIUM SULFATE 1 G/100ML
1 INJECTION INTRAVENOUS PRN
Status: DISCONTINUED | OUTPATIENT
Start: 2020-02-18 | End: 2020-02-25 | Stop reason: HOSPADM

## 2020-02-18 RX ORDER — LEVETIRACETAM 5 MG/ML
500 INJECTION INTRAVASCULAR ONCE
Status: COMPLETED | OUTPATIENT
Start: 2020-02-18 | End: 2020-02-18

## 2020-02-18 RX ORDER — HYDROCODONE BITARTRATE AND ACETAMINOPHEN 5; 325 MG/1; MG/1
1 TABLET ORAL EVERY 6 HOURS PRN
Status: DISCONTINUED | OUTPATIENT
Start: 2020-02-18 | End: 2020-02-25 | Stop reason: HOSPADM

## 2020-02-18 RX ORDER — PROCHLORPERAZINE EDISYLATE 5 MG/ML
10 INJECTION INTRAMUSCULAR; INTRAVENOUS ONCE
Status: COMPLETED | OUTPATIENT
Start: 2020-02-18 | End: 2020-02-18

## 2020-02-18 RX ORDER — DEXAMETHASONE SODIUM PHOSPHATE 10 MG/ML
10 INJECTION INTRAMUSCULAR; INTRAVENOUS ONCE
Status: COMPLETED | OUTPATIENT
Start: 2020-02-18 | End: 2020-02-18

## 2020-02-18 RX ORDER — ATORVASTATIN CALCIUM 40 MG/1
40 TABLET, FILM COATED ORAL NIGHTLY
Status: DISCONTINUED | OUTPATIENT
Start: 2020-02-18 | End: 2020-02-25 | Stop reason: HOSPADM

## 2020-02-18 RX ORDER — POTASSIUM CHLORIDE 7.45 MG/ML
10 INJECTION INTRAVENOUS
Status: DISCONTINUED | OUTPATIENT
Start: 2020-02-18 | End: 2020-02-18

## 2020-02-18 RX ORDER — LORAZEPAM 2 MG/ML
0.5 INJECTION INTRAMUSCULAR ONCE
Status: COMPLETED | OUTPATIENT
Start: 2020-02-18 | End: 2020-02-18

## 2020-02-18 RX ADMIN — POTASSIUM CHLORIDE 10 MEQ: 10 INJECTION, SOLUTION INTRAVENOUS at 21:54

## 2020-02-18 RX ADMIN — DEXAMETHASONE SODIUM PHOSPHATE 10 MG: 10 INJECTION INTRAMUSCULAR; INTRAVENOUS at 16:30

## 2020-02-18 RX ADMIN — POTASSIUM CHLORIDE 40 MEQ: 1500 TABLET, EXTENDED RELEASE ORAL at 18:43

## 2020-02-18 RX ADMIN — LORAZEPAM 0.5 MG: 2 INJECTION INTRAMUSCULAR; INTRAVENOUS at 16:28

## 2020-02-18 RX ADMIN — DIPHENHYDRAMINE HYDROCHLORIDE 25 MG: 50 INJECTION, SOLUTION INTRAMUSCULAR; INTRAVENOUS at 16:29

## 2020-02-18 RX ADMIN — PROCHLORPERAZINE EDISYLATE 10 MG: 5 INJECTION INTRAMUSCULAR; INTRAVENOUS at 16:31

## 2020-02-18 RX ADMIN — SODIUM CHLORIDE 1000 ML: 9 INJECTION, SOLUTION INTRAVENOUS at 18:44

## 2020-02-18 RX ADMIN — POTASSIUM CHLORIDE 10 MEQ: 10 INJECTION, SOLUTION INTRAVENOUS at 20:45

## 2020-02-18 RX ADMIN — SODIUM CHLORIDE 1000 ML: 9 INJECTION, SOLUTION INTRAVENOUS at 16:28

## 2020-02-18 RX ADMIN — POTASSIUM CHLORIDE 10 MEQ: 10 INJECTION, SOLUTION INTRAVENOUS at 18:44

## 2020-02-18 RX ADMIN — ONDANSETRON 4 MG: 2 INJECTION INTRAMUSCULAR; INTRAVENOUS at 16:22

## 2020-02-18 RX ADMIN — LEVETIRACETAM 500 MG: 5 INJECTION INTRAVENOUS at 16:22

## 2020-02-18 RX ADMIN — POTASSIUM CHLORIDE 10 MEQ: 10 INJECTION, SOLUTION INTRAVENOUS at 19:41

## 2020-02-18 RX ADMIN — MAGNESIUM SULFATE HEPTAHYDRATE 2 G: 1 INJECTION, SOLUTION INTRAVENOUS at 18:44

## 2020-02-18 ASSESSMENT — PAIN DESCRIPTION - PAIN TYPE
TYPE: ACUTE PAIN
TYPE: ACUTE PAIN

## 2020-02-18 ASSESSMENT — PAIN DESCRIPTION - FREQUENCY: FREQUENCY: INTERMITTENT

## 2020-02-18 ASSESSMENT — ENCOUNTER SYMPTOMS
VOMITING: 1
SHORTNESS OF BREATH: 0
COUGH: 0
PHOTOPHOBIA: 1
NAUSEA: 1
ABDOMINAL PAIN: 0
SORE THROAT: 0

## 2020-02-18 ASSESSMENT — PAIN - FUNCTIONAL ASSESSMENT: PAIN_FUNCTIONAL_ASSESSMENT: ACTIVITIES ARE NOT PREVENTED

## 2020-02-18 ASSESSMENT — PAIN DESCRIPTION - ONSET: ONSET: ON-GOING

## 2020-02-18 ASSESSMENT — PAIN DESCRIPTION - LOCATION
LOCATION: HEAD
LOCATION: HEAD

## 2020-02-18 ASSESSMENT — PAIN SCALES - GENERAL
PAINLEVEL_OUTOF10: 10
PAINLEVEL_OUTOF10: 10

## 2020-02-18 ASSESSMENT — PAIN DESCRIPTION - PROGRESSION: CLINICAL_PROGRESSION: NOT CHANGED

## 2020-02-18 ASSESSMENT — PAIN DESCRIPTION - DESCRIPTORS: DESCRIPTORS: HEADACHE;STABBING

## 2020-02-18 NOTE — ED PROVIDER NOTES
FACULTY SIGN-OUT  ADDENDUM       Patient: Ralph Patiño   MRN: [id number]  PCP:  Melissa Trammell  The patient's initial evaluation and plan have been discussed with the prior provider who initially evaluated the patient. Nursing Notes, Past Medical Hx, Past Surgical Hx, Social Hx, Allergies, and Family Hx were all reviewed. Pertinent Comments: The patient is a 30 y.o. female taken in signout with previous intraparenchymal hemorrhage of the left occipital lobe and has had craniotomy in the past with subtle rebleed as well. CT here shows to be improved from previous  We are awaiting neurosurgery involvement as well as MRI and reevaluation after but likely observation unit admission for further symptomatic control    MRI is relatively negative however significant electrolyte abnormalities have been fine with pseudohyponatremia secondary to blood sugar of almost 800 but normal bicarbonate and no DKA. Possible hyperosmolar component which is much more related to dehydration will begin IV fluid resuscitation as well. Also concerning found to have hypomagnesemia at 1.3 as well as hypokalemia at 2.4. EKG done shows show some mild ST depressions consistent with hypokalemia but no josiane U waves. Starting IV as well as p.o. potassium replacement. No IV insulin to be given until potassium is corrected above 3. Internal medicine doctor from [facility] has been in the emergency room evaluating the patient as well and agrees with this plan.         ED COURSE      The patient was given the following medications:  Orders Placed This Encounter   Medications    levetiracetam (KEPPRA) 500 mg/100 mL IVPB    ondansetron (ZOFRAN) injection 4 mg    dexamethasone (DECADRON) injection 10 mg    0.9 % sodium chloride bolus    prochlorperazine (COMPAZINE) injection 10 mg    diphenhydrAMINE (BENADRYL) injection 25 mg    LORazepam (ATIVAN) injection 0.5 mg    0.9 % sodium chloride bolus       RECENT VITALS:   BP: 129/81

## 2020-02-18 NOTE — ED NOTES
Dr. [place]illon Point at bedside      Karoline Trevino, [address] [place] [address]  02/18/20 4706

## 2020-02-18 NOTE — ED PROVIDER NOTES
Additional findings are as noted. For APC cases I have personally evaluated and examined the patient in conjunction with the APC and agree with the treatment plan and disposition of the patient as recorded by the APC.     Chidi Calderon MD  Attending Emergency  Physician       Dara Hewitt MD  02/18/20 3733

## 2020-02-18 NOTE — LETTER
Outpatient Diabetes Education Program     2/21/20    RE: Felipa Mckeon  1995  [facility] [facility] [address]        Dear Kayley Stoddard    As a follow up to a [facility] admission for diabetes related illness or inpatient diabetes education referral, please consider ordering an outpatient diabetes education / MNT to address ongoing diabetes self management needs. Please place order via [employer] system:   REF20 - Ambulatory referral to Diabetic Education -  Tarah Macias  ED     Diabetes Education Services  staff will contact the patient to set up assessment and education sessions. Thank you.

## 2020-02-18 NOTE — FLOWSHEET NOTE
[facility] CARE DEPARTMENT - [address]     Emergency/Trauma Note    PATIENT NAME: Annette Jang    Shift date: 2/18/2020  Shift day: Tuesday   Shift # 2    Room # 12/12   Name: Annette Jang            Age: 30 y.o. Gender: female          Moravian: None   Place of Alevism: None    Trauma/Incident type: Stroke Alert Consult  Admit Date & Time: 2/18/2020  2:48 PM  TRAUMA NAME: NA        PATIENT/EVENT DESCRIPTION:  Annette Jang is a 30 y.o. female who arrived via private vehicle from home as a stroke consult. Patient presents c/o headache and seeing spots flash in front of her. Patient had brain surgery on 1/8/2020 to remove a brain tumor. Pt to be admitted to 12/12. SPIRITUAL ASSESSMENT/INTERVENTION:   responded to perfect serve notification and went to gather information and offer support. Upon entry, patient laying comfortably on bed. Patient lives with her mother, who brought her to the hospital, but has gone on to work. She recognized  from previous visit. Patient expressed worry about a check up that is coming in two weeks because she is still experiencing symptoms that she did before surgery.  provided spiritual and emotional support assuring her that God was watching over her and not to borrow trouble that hasn't come yet.  also provided a warm blanket because patient was cold. Patient accepted 's offer of prayer. PATIENT BELONGINGS:  With patient    ANY BELONGINGS OF SIGNIFICANT VALUE NOTED:  No    REGISTRATION STAFF NOTIFIED? Yes      WHAT IS YOUR SPIRITUAL CARE PLAN FOR THIS PATIENT?:   No immediate follow-up is necessary, but chaplains will remain available for further support as needed. Electronically signed by Cyrus Watkins      02/18/20 1600   Encounter Summary   Services provided to: Patient   Referral/Consult From: Multi-disciplinary team   Support System Parent; Family members   Continue Visiting   (2/18/2020)   Complexity

## 2020-02-18 NOTE — ED NOTES
Critical potassium and glucose noted.   Dr. Humphrey Loja notified, awaiting further orders      Celena Tyson RN  02/18/20 0308

## 2020-02-18 NOTE — ED NOTES
Bed: 12  Expected date:   Expected time:   Means of arrival:   Comments:     Mau Richard RN  02/18/20 1206

## 2020-02-18 NOTE — ED NOTES
Pt to CT with RN present and Isaac Nix, neurology PA, and Nani Oh, [employer] [facility], RN  02/18/20 0227

## 2020-02-18 NOTE — ED NOTES
Pt resting on stretcher with eyes closed, chest rise and fall noted. Pt arousable to verbal stimuli. Pt remains on telemetry monitoring with alarms set.   Awaiting MRI, will continue to monitor      Cleo Barlow RN  02/18/20 0921

## 2020-02-18 NOTE — ED PROVIDER NOTES
[facility] ED  Emergency Department Encounter  EmergencyMedicine Resident     Pt Adriana Askew  MRN: [id number]  [place] 1995  Date of evaluation: 2/18/20  PCP:  Ana Ch       Chief Complaint   Patient presents with    Dizziness     dizziness and vomiting for the past couple of days. Brain bleed one month ago, has not yet had follow up with neurosurgery        HISTORY OF PRESENT ILLNESS  (Location/Symptom, Timing/Onset, Context/Setting, Quality, Duration, Modifying Factors, Severity.)      Rae Andersen is a 30 y.o. female who presents with severe sudden onset headache yesterday, associated with nausea and vomiting and persistent dizziness at rest.  +Photophobia, confusion \"my head is in a fog \", no weakness, numbness tingling, gait disturbance. Patient has a history of multiple subarachnoid hemorrhages, most recently this January where she was admitted at [facility] for a left cavitary hematoma/hemorrhage after resection. Patient has not taken her medications including Keppra and Lamictal for the past several days. PAST MEDICAL / SURGICAL / SOCIAL / FAMILY HISTORY      has a past medical history of Cerebral vascular malformation, Diabetes mellitus ([facility] [address] Seizure ([facility] [address] and Traumatic hemorrhage of left cerebrum without loss of consciousness ([facility] [address] has a past surgical history that includes craniotomy (01/08/2020) and craniotomy (Left, 1/8/2020).     Social History     Socioeconomic History    Marital status: Single     Spouse name: Not on file    Number of children: Not on file    Years of education: Not on file    Highest education level: Not on file   Occupational History    Not on file   Social Needs    Financial resource strain: Not on file    Food insecurity:     Worry: Not on file     Inability: Not on file    Transportation needs:     Medical: Not on file     Non-medical: Not on file   Tobacco Use    Smoking status: Current Some Day Smoker     Packs/day: 0.30     Types: Cigarettes    Smokeless tobacco: Never Used   Substance and Sexual Activity    Alcohol use: No    Drug use: Yes     Types: Marijuana    Sexual activity: Yes     Partners: Male   Lifestyle    Physical activity:     Days per week: Not on file     Minutes per session: Not on file    Stress: Not on file   Relationships    Social connections:     Talks on phone: Not on file     Gets together: Not on file     Attends Spiritism service: Not on file     Active member of club or organization: Not on file     Attends meetings of clubs or organizations: Not on file     Relationship status: Not on file    Intimate partner violence:     Fear of current or ex partner: Not on file     Emotionally abused: Not on file     Physically abused: Not on file     Forced sexual activity: Not on file   Other Topics Concern    Not on file   Social History Narrative    Not on file       No family history on file. Allergies:  Latex and Prozac [fluoxetine hcl]    Home Medications:  Prior to Admission medications    Medication Sig Start Date End Date Taking?  Authorizing Provider   scopolamine (TRANSDERM-SCOP) transdermal patch Place 1 patch onto the skin every 72 hours 1/29/20   [facility] Lacey Pompa MD   levETIRAcetam (KEPPRA) 500 MG tablet Take 1 tablet by mouth 2 times daily for 7 days 1/10/20 1/17/20  Shannan Schultz MD   atorvastatin (LIPITOR) 40 MG tablet Take 1 tablet by mouth nightly 1/10/20   Shannan Schultz MD   busPIRone (BUSPAR) 10 MG tablet Take 10 mg by mouth 3 times daily    Historical Provider, MD   lamoTRIgine (LAMICTAL) 25 MG tablet Take 25 mg by mouth daily    Historical Provider, MD   docusate sodium (COLACE) 100 MG capsule Take 100 mg by mouth 2 times daily as needed for Constipation    Historical Provider, MD   etonogestrel (NEXPLANON) 68 MG implant 68 mg by Subdermal route once    Historical Provider, MD   ondansetron (ZOFRAN) 4 MG tablet Take 1 tablet by mouth every 6 hours as needed for Nausea or Vomiting 8/16/17   Adriana De La Rosa MD       REVIEW OF SYSTEMS    (2-9 systems for level 4, 10 or more for level 5)      Review of Systems   Constitutional: Negative for chills and fever. HENT: Negative for congestion and sore throat. Eyes: Positive for photophobia. Respiratory: Negative for cough and shortness of breath. Cardiovascular: Negative for chest pain. Gastrointestinal: Positive for nausea and vomiting. Negative for abdominal pain. Genitourinary: Negative for dysuria and frequency. Musculoskeletal: Negative for neck pain and neck stiffness. Skin: Negative for rash. Neurological: Negative for seizures, weakness and headaches. Psychiatric/Behavioral: Positive for confusion and decreased concentration. PHYSICAL EXAM   (up to 7 for level 4, 8 or more for level 5)      INITIAL VITALS:   /73   Pulse 77   Temp 98.2 °F (36.8 °C) (Oral)   Resp 14   Ht 5' 1\" (1.549 m)   Wt 174 lb (78.9 kg)   SpO2 97%   BMI 32.88 kg/m²     Physical Exam  Constitutional:       General: She is not in acute distress. Appearance: She is well-developed. HENT:      Head: Normocephalic and atraumatic. Mouth/Throat:      Mouth: Mucous membranes are dry. Eyes:      Extraocular Movements: Extraocular movements intact. Pupils: Pupils are equal, round, and reactive to light. Neck:      Musculoskeletal: Normal range of motion and neck supple. Cardiovascular:      Rate and Rhythm: Normal rate and regular rhythm. Pulmonary:      Effort: Pulmonary effort is normal.      Breath sounds: Normal breath sounds. Abdominal:      General: Bowel sounds are normal. There is no distension. Palpations: Abdomen is soft. Tenderness: There is no abdominal tenderness. Musculoskeletal: Normal range of motion. Skin:     General: Skin is warm and dry. Neurological:      General: No focal deficit present.       Mental Status: She is alert and oriented Inpatient consult to Dietitian    Inpatient consult to Neurology    Inpatient consult to Neurosurgery    OT eval and treat    PT evaluation and treat    Initiate Oxygen Therapy Protocol    POCT glucose - every hour    EKG 12 Lead    PATIENT STATUS (FROM ED OR OR/PROCEDURAL) Inpatient       MEDICATIONS ORDERED:  Orders Placed This Encounter   Medications    levetiracetam (KEPPRA) 500 mg/100 mL IVPB    ondansetron (ZOFRAN) injection 4 mg    dexamethasone (DECADRON) injection 10 mg    0.9 % sodium chloride bolus    prochlorperazine (COMPAZINE) injection 10 mg    diphenhydrAMINE (BENADRYL) injection 25 mg    LORazepam (ATIVAN) injection 0.5 mg    0.9 % sodium chloride bolus    DISCONTD: potassium chloride 10 mEq/100 mL IVPB (Peripheral Line)    DISCONTD: potassium chloride (KLOR-CON M) extended release tablet 30 mEq    magnesium sulfate 1 g in dextrose 5% 100 mL IVPB    potassium chloride (KLOR-CON M) extended release tablet 40 mEq    potassium chloride 10 mEq/100 mL IVPB (Peripheral Line)    atorvastatin (LIPITOR) tablet 40 mg    lamoTRIgine (LAMICTAL) tablet 25 mg    levETIRAcetam (KEPPRA) tablet 500 mg    FOLLOWED BY Linked Order Group     0.9 % sodium chloride bolus     0.45 % sodium chloride infusion    dextrose 5 % and 0.45 % sodium chloride infusion    enoxaparin (LOVENOX) injection 40 mg    dextrose 50 % IV solution    potassium chloride 10 mEq/100 mL IVPB (Peripheral Line)    magnesium sulfate 1 g in dextrose 5% 100 mL IVPB    OR Linked Order Group     sodium phosphate 10 mmol in dextrose 5 % 250 mL IVPB     sodium phosphate 15 mmol in dextrose 5 % 250 mL IVPB     sodium phosphate 20 mmol in dextrose 5 % 500 mL IVPB    insulin regular (HUMULIN R;NOVOLIN R) 100 Units in sodium chloride 0.9 % 100 mL infusion    HYDROcodone-acetaminophen (NORCO) 5-325 MG per tablet 1 tablet    magnesium sulfate 1 g in dextrose 5% 100 mL IVPB       DDX: Subarachnoid hemorrhage, migraine, meningitis, tension headache, cluster headache    DIAGNOSTIC RESULTS / EMERGENCY DEPARTMENT COURSE / MDM     LABS:  Results for orders placed or performed during the hospital encounter of 02/18/20   Levetiracetam Level   Result Value Ref Range    Levetiracetam Lvl <2 ug/mL   CBC Auto Differential   Result Value Ref Range    WBC 10.8 3.5 - 11.3 k/uL    RBC 4.57 3.95 - 5.11 m/uL    Hemoglobin 12.3 11.9 - 15.1 g/dL    Hematocrit 36.6 36.3 - 47.1 %    MCV 80.1 (L) 82.6 - 102.9 fL    MCH 26.9 25.2 - 33.5 pg    MCHC 33.6 28.4 - 34.8 g/dL    RDW 12.0 11.8 - 14.4 %    Platelets 661 146 - 146 k/uL    MPV 11.5 8.1 - 13.5 fL    NRBC Automated 0.0 0.0 per 100 WBC    Differential Type NOT REPORTED     Seg Neutrophils 66 (H) 36 - 65 %    Lymphocytes 26 24 - 43 %    Monocytes 6 3 - 12 %    Eosinophils % 1 1 - 4 %    Basophils 1 0 - 2 %    Immature Granulocytes 0 0 %    Segs Absolute 7.16 1.50 - 8.10 k/uL    Absolute Lymph # 2.83 1.10 - 3.70 k/uL    Absolute Mono # 0.63 0.10 - 1.20 k/uL    Absolute Eos # 0.06 0.00 - 0.44 k/uL    Basophils Absolute 0.05 0.00 - 0.20 k/uL    Absolute Immature Granulocyte 0.03 0.00 - 0.30 k/uL    WBC Morphology NOT REPORTED     RBC Morphology MICROCYTOSIS PRESENT     Platelet Estimate NOT REPORTED    Comprehensive Metabolic Panel w/ Reflex to MG   Result Value Ref Range    Glucose 792 (HH) 70 - 99 mg/dL    BUN 21 (H) 6 - 20 mg/dL    CREATININE 1.06 (H) 0.50 - 0.90 mg/dL    Bun/Cre Ratio NOT REPORTED 9 - 20    Calcium 9.5 8.6 - 10.4 mg/dL    Sodium 127 (L) 135 - 144 mmol/L    Potassium 2.4 (LL) 3.7 - 5.3 mmol/L    Chloride 80 (L) 98 - 107 mmol/L    CO2 29 20 - 31 mmol/L    Anion Gap 18 (H) 9 - 17 mmol/L    Alkaline Phosphatase 138 (H) 35 - 104 U/L    ALT 14 5 - 33 U/L    AST 8 <32 U/L    Total Bilirubin 0.34 0.3 - 1.2 mg/dL    Total Protein 6.9 6.4 - 8.3 g/dL    Alb 3.8 3.5 - 5.2 g/dL    Albumin/Globulin Ratio 1.2 1.0 - 2.5    GFR Non-African American >60 >60 mL/min    GFR African American >60 >60 mL/min    GFR Comment          GFR Staging NOT REPORTED    Lamotrigine Level   Result Value Ref Range    Lamotrigine Lvl <1.0 (L) 3.0 - 15.0 ug/mL   Protime-INR   Result Value Ref Range    Protime 10.5 9.0 - 12.0 sec    INR 1.0    APTT   Result Value Ref Range    PTT 21.8 20.5 - 30.5 sec   Magnesium   Result Value Ref Range    Magnesium 1.3 (L) 1.6 - 2.6 mg/dL   Lactic acid, plasma   Result Value Ref Range    Lactic Acid NOT REPORTED mmol/L    Lactic Acid, Whole Blood 1.1 0.7 - 2.1 mmol/L   Osmolality   Result Value Ref Range    Serum Osmolality 310 (H) 275 - 295 mOsm/kg         RADIOLOGY:  Ct Head Wo Contrast    Result Date: 2/18/2020  EXAMINATION: CT OF THE HEAD WITHOUT CONTRAST  2/18/2020 3:07 pm TECHNIQUE: CT of the head was performed without the administration of intravenous contrast. Dose modulation, iterative reconstruction, and/or weight based adjustment of the mA/kV was utilized to reduce the radiation dose to as low as reasonably achievable. COMPARISON: January 26, 2020 CT head. HISTORY: ORDERING SYSTEM PROVIDED HISTORY: HA, vomiting Hx of SAH last month and prior TECHNOLOGIST PROVIDED HISTORY: HA, vomiting Hx of SAH last month and prior Is the patient pregnant?->No FINDINGS: BRAIN/VENTRICLES: Left occipital craniotomy and subjacent intraparenchymal hemorrhage appears improved with a central hypodense surgical bed. No intraventricular or other intracranial hemorrhage. No midline shift or herniation. Ventricles, cisterns and sulci normal. ORBITS: The visualized portion of the orbits demonstrate no acute abnormality. SINUSES: The visualized paranasal sinuses and mastoid air cells demonstrate no acute abnormality. SOFT TISSUES/SKULL:  No acute abnormality of the visualized skull or soft tissues. Left occipital craniotomy with interval improvement of subjacent intraparenchymal hemorrhage.  The findings were sent to the Radiology Results [address] at 3:43 pm on 2/18/2020to be communicated to a HISTORY: headache, vomiting Is the patient pregnant?->No Reason for Exam: headache, vomiting Acuity: Acute Type of Exam: Initial FINDINGS: BRAIN/VENTRICLES: There is no acute intracranial hemorrhage, mass effect or midline shift. No abnormal extra-axial fluid collection. The gray-white differentiation is maintained without evidence of an acute infarct. There is no evidence of hydrocephalus. Left temporal occipital resection cavity with acute blood is again visualized with mild increased volume of hemorrhage which measures up to 21 mm in diameter. Surrounding vasogenic edema persists. Left-sided choroid fissure cyst is present which measures up to 8 mm in diameter. ORBITS: The visualized portion of the orbits demonstrate no acute abnormality. SINUSES: The visualized paranasal sinuses and mastoid air cells demonstrate no acute abnormality. SOFT TISSUES/SKULL:  Postoperative findings associated with left temporal occipital craniotomy is noted without significant interval change visualized. Interval increase/development of acute hemorrhage within left temporal occipital surgical resection bed underlying craniotomy postoperative change, as discussed above. Mri Brain W Wo Contrast    Result Date: 1/25/2020  EXAMINATION: MRI OF THE BRAIN WITHOUT AND WITH CONTRAST  1/25/2020 9:35 am TECHNIQUE: Multiplanar multisequence MRI of the head/brain was performed without and with the administration of intravenous contrast. COMPARISON: CT brain performed 01/25/2020. HISTORY: ORDERING SYSTEM PROVIDED HISTORY: history of cavernoma with new hemorrhage TECHNOLOGIST PROVIDED HISTORY: history of cavernoma with new hemorrhage Is the patient pregnant?->No FINDINGS: INTRACRANIAL STRUCTURES/VENTRICLES:  The sellar and suprasellar structures, optic chiasm, corpus callosum, pineal gland, tectum, and midline brainstem structures are unremarkable. The craniocervical junction is unremarkable.  There is redemonstration of a small resection focus in the left temporal lobe posteriorly that contains evolving hemorrhagic products. There is postcontrast enhancement of the cavity peripherally measuring approximately 2.2 x 2.1 cm. There is mild adjacent dural enhancement. There is no significant mass effect or midline shift. There is otherwise satisfactory gray-white matter differentiation. The ventricular structures are symmetric and unremarkable. The infratentorial structures including the cerebellopontine angles and internal auditory canals are unremarkable. ORBITS: The visualized portion of the orbits demonstrate no acute abnormality. SINUSES: The visualized paranasal sinuses and mastoid air cells are well aerated. BONES/SOFT TISSUES: There has been prior left posterior parietal temporal craniotomy. Redemonstration of small resection cavity in the posterior aspect of the left temporal lobe containing evolving hemorrhagic products. There is peripheral enhancement of the resection cavity as well as adjacent dural enhancement that may be postoperative. Attention is recommended on follow-up. EKG    Sinus rhythm, normal axis, intervals within normal limits, no significant ST elevations or ST depressions that is some T wave inversions in V1 to 3, lead III. T wave changes consistent with hypokalemia, doubt ischemia    Interpretation is nondiagnostic EKG, T wave changes. All EKG's are interpreted by the Emergency Department Physician who either signs or Co-signs this chart in the absence of a cardiologist.    EMERGENCY DEPARTMENT COURSE:    Patient arrives to the emergency department for complaint of severe 10 out of 10 headache, nausea vomiting. Vital signs are remarkable for hypertension 165/111 and tachycardia into the 110s. Her neuro exam is unremarkable, she is afebrile, she has no neck stiffness tenderness, negative Kernig. No observable rashes. Doubt meningitis at this point.   Given is been going on for 2 days a neuro consult was placed who rapidly evaluated the patient at bedside. CT head shows redemonstration of small resection of cavitary the posterior aspect of the left temporal lobe containing evolving hemorrhagic project, appears improved from previous. Consultation with neurosurgical NP, recommended follow-up with MRA, MRI, and treating headache per ED recommendations. IV line was unable to be obtained on multiple times pic team was consulted to place IV lines. Once lines were in place we treated her headache with Compazine, Benadryl, Decadron, IV fluids, small 0.5 mg dose of Ativan as she is anxious and raising her blood pressure could be detrimental.  We will also start her her home Lamictal and Keppra. Patient's headache is improved on these medications, resting comfortably, no new or evolving neuro symptoms. During her work-up, patient's blood sugar is elevated at 796, potassium of 2.4, magnesium of 1.3. Patient denies a history of diabetes mellitus, she does remark she did have gestational diabetes but states her blood sugars were only abnormally high during pregnancy. I do not think this is diabetic ketoacidosis at this point with a bicarb level of 29. HHS is a consideration. Serum Osm and ketones pending. Patient received x2 normal saline boluses, 2 g magnesium, 40 mEq IV potassium and 40 mEq p.o. potassium. Will replace potassium and magnesium before treating hyperglycemia with insulin. Discussed case with jovani, admited to intermed. PROCEDURES:  None    CONSULTS:  IP CONSULT TO NEUROLOGY  IP CONSULT TO IV TEAM  IP CONSULT TO INTERNAL MEDICINE  IP CONSULT TO NEUROSURGERY  IP CONSULT TO DIABETES EDUCATOR  IP CONSULT TO DIETITIAN  IP CONSULT TO NEUROLOGY    CRITICAL CARE:  None    FINAL IMPRESSION      No diagnosis found.       DISPOSITION / PLAN     DISPOSITION        PATIENT REFERRED TO:  Marie Mondragon  [id number] [facility] [address]            DISCHARGE MEDICATIONS:  New Prescriptions    No medications on file       Cecilio Elise DO  Emergency Medicine Resident    (Please note that portions of thisnote were completed with a voice recognition program.  Efforts were made to edit the dictations but occasionally words are mis-transcribed.)       Cecilio Elise DO  Resident  02/18/20 [id number]

## 2020-02-18 NOTE — CONSULTS
Historical Provider, MD   ondansetron (ZOFRAN) 4 MG tablet Take 1 tablet by mouth every 6 hours as needed for Nausea or Vomiting 8/16/17   Tacho Clinton MD    Scheduled Meds:   levetiracetam  500 mg Intravenous Once    ondansetron  4 mg Intravenous Once    dexamethasone  10 mg Intravenous Once    sodium chloride  1,000 mL Intravenous Once    prochlorperazine  10 mg Intramuscular Once    diphenhydrAMINE  25 mg Intravenous Once    LORazepam  0.5 mg Intravenous Once     Continuous Infusions:  PRN Meds:.    Past Medical History   has a past medical history of Cerebral vascular malformation, Diabetes mellitus ([facility] [address] Seizure ([facility] [address] and Traumatic hemorrhage of left cerebrum without loss of consciousness ([facility] [address] OBJECTIVE  BP (!) 165/111   Pulse 100   Temp 98.2 °F (36.8 °C) (Oral)   Resp 12   Ht 5' 1\" (1.549 m)   Wt 174 lb (78.9 kg)   SpO2 97%   BMI 32.88 kg/m²     ROS  CONSTITUTIONAL: negative for fatigue and malaise   EYES: negative for double vision and photophobia    HEENT: negative for tinnitus and sore throat   RESPIRATORY: negative for cough, shortness of breath   CARDIOVASCULAR: negative for chest pain, palpitations   GASTROINTESTINAL: negative for nausea, vomiting   GENITOURINARY: negative for incontinence   MUSCULOSKELETAL: negative for neck or back pain   NEUROLOGICAL: negative for seizures; positive for dizziness and vision changes   PSYCHIATRIC: negative for agitated     Review of systems otherwise negative. EXAM:    CONSTITUTIONAL:  Well developed, well nourished, alert and oriented x 3, in no acute distress. GCS 15. Nontoxic. No dysarthria. No aphasia.    HEAD:  normocephalic, atraumatic    EYES:  PERRLA, EOMI.   ENT:  moist mucous membranes   NECK:  supple, symmetric   LUNGS:  Equal air entry bilaterally, clear   CARDIOVASCULAR:  normal s1 / s2   ABDOMEN:  Soft, no rigidity   NEUROLOGIC:  Mental Status:  A & O x3,awake             Cranial Nerves:    II: Visual fields:  abnormal - right homonymous hemianopsia. III: Pupils:  equal, round, reactive to light  III,IV,VI: Extra Ocular Movements: intact  V: Facial sensation:  intact  VII: Facial strength: intact    Motor Exam:    Drift:  absent  Tone:  normal    Motor exam is symmetrical 5 out of 5 all extremities bilaterally    Sensory:    Touch:    Right Upper Extremity:  normal  Left Upper Extremity:  normal  Right Lower Extremity:  normal  Left Lower Extremity:  normal   SKIN:  no rash        INITIAL NIH STROKE SCALE    Time Performed:  1520 PM    Administer stroke scale items in the order listed. Record performance in each category after each subscale exam. Do not go back and change scores. Follow directions provided for each exam technique. Scores should reflect what the patient does, not what the clinician thinks the patient can do. The clinician should record answers while administering the exam and work quickly. Except where indicated, the patient should not be coached (i.e., repeated requests to patient to make a special effort). 1a.  Level of consciousness:  0 - alert; keenly responsive  1b. Level of consciousness questions:  0 - answers both questions correctly  1c. Level of consciousness questions:  0 - performs both tasks correctly  2. Best Gaze:  0 - normal  3. Visual:  1 - partial hemianopia  4. Facial Palsy:  0 - normal symmetric movement  5a. Motor left arm:  0 - no drift, limb holds 90 (or 45) degrees for full 10 seconds  5b. Motor right arm:  0 - no drift, limb holds 90 (or 45) degrees for full 10 seconds  6a. Motor left le - no drift; leg holds 30 degree position for full 5 seconds  6b. Motor right le - no drift; leg holds 30 degree position for full 5 seconds  7. Limb Ataxia:  0 - absent  8. Sensory:  0 - normal; no sensory loss  9. Best Language:  0 - no aphasia, normal  10. Dysarthria:  0 - normal  11.   Extinction and Inattention:  0 - no abnormality    TOTAL:  1    Imaging:  CT brain without contrast: left occipital craniotomy with interval improvement in subjacent IPH  CTA head/neck with and without contrast: deferred  MRI brain without contrast (limited stroke protocol): pending    Assessment  1. Last Known Well (date and time): 2/15  2. Inclusion and Exclusion criteria reviewed: Candidate for IV tPA therapy     Yes []     No  [x] due to the following exclusion criteria: out of window  3. Candidate for Thrombectomy    Yes []      No [x] due to the following exclusion criteria: No concern for LVO    Recommendations:   MRI brain wo contrast and MRA head wo contrast to further evaluate  Migraine cocktail per ER   SBP < 160    If MRI/MRA unremarkable and symptoms improved, patient can be discharged home vs admission to observation for intractable migraine    Consider need for neurosurgery consult or at least make Dr. Jena Contreras aware of her admission    Recommendations discussed with ER resident and attending.      Discussed with WILLIAM Nayak - CNP  Neuro Critical Care  Pager [facility] [place]

## 2020-02-18 NOTE — H&P
[facility]    HISTORY AND PHYSICAL EXAMINATION            Date:   2/18/2020  Patient name:  Smiley Todd  Date of admission:  2/18/2020  2:48 PM  MRN:   [id number]  Account:  [de-identified]  YOB: 1995  PCP:    Diogenes Valle  Room:   12/12  Code Status:    Prior    Chief Complaint:     Chief Complaint   Patient presents with    Dizziness     dizziness and vomiting for the past couple of days. Brain bleed one month ago, has not yet had follow up with neurosurgery        History Obtained From:     patient, electronic medical record    History of Present Illness:     Smiley Todd is a 30 y.o. Non-/non  female who presents with Dizziness (dizziness and vomiting for the past couple of days.   Brain bleed one month ago, has not yet had follow up with neurosurgery )   and is admitted to the hospital for the management of <principal problem not specified>.    30years old female with past medical history of intracranial hemorrhage gestational diabetes patient is poor historian history was taken from the ER physician and the patient patient came to the hospital because of dizziness and headache associated with photophobia also patient complains of polyuria patient still complained of headache patient was found to have hyperosmolar nonketotic diabetic coma without coma history is limited because of mental status also patient was found to have electrolyte imbalance and dehydration admitted for further evaluation and treatment    Past Medical History:     Past Medical History:   Diagnosis Date    Cerebral vascular malformation     [facility]    Diabetes mellitus ([address]     Seizure ([address]     Traumatic hemorrhage of left cerebrum without loss of consciousness ([address]     @ [facility]        Past Surgical History:     Past Surgical History:   Procedure Laterality Date    CRANIOTOMY  01/08/2020    LEFT OCCIPITAL CRANIOTOMY    CRANIOTOMY palpitations  GASTROINTESTINAL:  negative for nausea, vomiting, diarrhea, constipation, change in bowel habits, abdominal pain   GENITOURINARY:  negative for difficulty of urination, burning with urination, frequency   INTEGUMENT:  negative for rash, skin lesions, easy bruising   HEMATOLOGIC/LYMPHATIC:  negative for swelling/edema   ALLERGIC/IMMUNOLOGIC:  negative for urticaria , itching  ENDOCRINE:  negative increase in drinking, increase in urination, hot or cold intolerance  MUSCULOSKELETAL:  negative joint pains, muscle aches, swelling of joints  NEUROLOGICAL:  negative for headaches, dizziness, lightheadedness, numbness, pain, tingling extremities  BEHAVIOR/PSYCH:  negative for depression, anxiety    Physical Exam:   /81   Pulse 66   Temp 98.2 °F (36.8 °C) (Oral)   Resp 15   Ht 5' 1\" (1.549 m)   Wt 174 lb (78.9 kg)   SpO2 96%   BMI 32.88 kg/m²   Temp (24hrs), Av.2 °F (36.8 °C), Min:98.2 °F (36.8 °C), Max:98.2 °F (36.8 °C)    No results for input(s): POCGLU in the last 72 hours.   No intake or output data in the 24 hours ending 20    General Appearance: alert, looks dehydrated  Mental status: Alert but history is limited  Head: normocephalic, atraumatic  Eye: no icterus, redness, pupils equal and reactive, extraocular eye movements intact, conjunctiva clear  Ear: normal external ear, no discharge, hearing intact  Nose: no drainage noted  Mouth: mucous membranes moist  Neck: supple, no carotid bruits, thyroid not palpable  Lungs: Bilateral equal air entry, clear to ausculation, no wheezing, rales or rhonchi, normal effort  Cardiovascular: normal rate, regular rhythm, no murmur, gallop, rub  Abdomen: Soft, nontender, nondistended, normal bowel sounds, no hepatomegaly or splenomegaly  Neurologic: Moves all extremity no focal asymmetry  Skin: No gross lesions, rashes, bruising or bleeding on exposed skin area  Extremities: peripheral pulses palpable, no pedal edema or calf pain with palpation  Psych: normal affect    Investigations:      Laboratory Testing:  Recent Results (from the past 24 hour(s))   Levetiracetam Level    Collection Time: 02/18/20  4:20 PM   Result Value Ref Range    Levetiracetam Lvl <2 ug/mL   CBC Auto Differential    Collection Time: 02/18/20  4:20 PM   Result Value Ref Range    WBC 10.8 3.5 - 11.3 k/uL    RBC 4.57 3.95 - 5.11 m/uL    Hemoglobin 12.3 11.9 - 15.1 g/dL    Hematocrit 36.6 36.3 - 47.1 %    MCV 80.1 (L) 82.6 - 102.9 fL    MCH 26.9 25.2 - 33.5 pg    MCHC 33.6 28.4 - 34.8 g/dL    RDW 12.0 11.8 - 14.4 %    Platelets 669 337 - 448 k/uL    MPV 11.5 8.1 - 13.5 fL    NRBC Automated 0.0 0.0 per 100 WBC    Differential Type NOT REPORTED     Seg Neutrophils 66 (H) 36 - 65 %    Lymphocytes 26 24 - 43 %    Monocytes 6 3 - 12 %    Eosinophils % 1 1 - 4 %    Basophils 1 0 - 2 %    Immature Granulocytes 0 0 %    Segs Absolute 7.16 1.50 - 8.10 k/uL    Absolute Lymph # 2.83 1.10 - 3.70 k/uL    Absolute Mono # 0.63 0.10 - 1.20 k/uL    Absolute Eos # 0.06 0.00 - 0.44 k/uL    Basophils Absolute 0.05 0.00 - 0.20 k/uL    Absolute Immature Granulocyte 0.03 0.00 - 0.30 k/uL    WBC Morphology NOT REPORTED     RBC Morphology MICROCYTOSIS PRESENT     Platelet Estimate NOT REPORTED    Comprehensive Metabolic Panel w/ Reflex to MG    Collection Time: 02/18/20  4:20 PM   Result Value Ref Range    Glucose 792 (HH) 70 - 99 mg/dL    BUN 21 (H) 6 - 20 mg/dL    CREATININE 1.06 (H) 0.50 - 0.90 mg/dL    Bun/Cre Ratio NOT REPORTED 9 - 20    Calcium 9.5 8.6 - 10.4 mg/dL    Sodium 127 (L) 135 - 144 mmol/L    Potassium 2.4 (LL) 3.7 - 5.3 mmol/L    Chloride 80 (L) 98 - 107 mmol/L    CO2 29 20 - 31 mmol/L    Anion Gap 18 (H) 9 - 17 mmol/L    Alkaline Phosphatase 138 (H) 35 - 104 U/L    ALT 14 5 - 33 U/L    AST 8 <32 U/L    Total Bilirubin 0.34 0.3 - 1.2 mg/dL    Total Protein 6.9 6.4 - 8.3 g/dL    Alb 3.8 3.5 - 5.2 g/dL    Albumin/Globulin Ratio 1.2 1.0 - 2.5    GFR Non-African American >60 >60 mL/min GFR African American >60 >60 mL/min    GFR Comment          GFR Staging NOT REPORTED    Lamotrigine Level    Collection Time: 02/18/20  4:20 PM   Result Value Ref Range    Lamotrigine Lvl <1.0 (L) 3.0 - 15.0 ug/mL   Protime-INR    Collection Time: 02/18/20  4:20 PM   Result Value Ref Range    Protime 10.5 9.0 - 12.0 sec    INR 1.0    APTT    Collection Time: 02/18/20  4:20 PM   Result Value Ref Range    PTT 21.8 20.5 - 30.5 sec   Magnesium    Collection Time: 02/18/20  4:20 PM   Result Value Ref Range    Magnesium 1.3 (L) 1.6 - 2.6 mg/dL       Imaging/Diagnostics:  Ct Head Wo Contrast    Result Date: 2/18/2020  Left occipital craniotomy with interval improvement of subjacent intraparenchymal hemorrhage. The findings were sent to the Radiology Results [address] at 3:43 pm on 2/18/2020to be communicated to a licensed caregiver. Case discussed with Dr. Raine Montes at 3:47 p.m.        Assessment :      Hospital Problems           Last Modified POA    Headache 2/18/2020 Yes          Plan:     Patient status inpatient in the Progressive Unit/Step down    Headache probably related to hyperosmolar nonketotic diabetic coma neurology also was consulted reviewed CT scan patient has history of intracranial bleed repeat CT scan neurosurgery consult neurology was consulted      Hyperosmolar nonketotic diabetic coma insulin IV per protocol was started give IV hydration replace potassium and magnesium follow protocol      Acute metabolic encephalopathy most likely related to above      Dehydration IV fluid      Acute severe hypokalemia replace      Hypomagnesemia replace      Telemetry monitor and neuro check was ordered    Consultations:   [facility] TO IV TEAM  IP CONSULT TO INTERNAL MEDICINE  IP CONSULT TO NEUROSURGERY  IP CONSULT TO DIABETES EDUCATOR  IP CONSULT TO DIETITIAN  IP CONSULT TO NEUROLOGY     Patient is admitted as inpatient status because of co-morbidities listed above, severity of

## 2020-02-19 LAB
-: NORMAL
ABSOLUTE EOS #: 0.09 K/UL (ref 0–0.44)
ABSOLUTE IMMATURE GRANULOCYTE: 0.07 K/UL (ref 0–0.3)
ABSOLUTE LYMPH #: 4.95 K/UL (ref 1.1–3.7)
ABSOLUTE MONO #: 0.92 K/UL (ref 0.1–1.2)
ALBUMIN SERPL-MCNC: 3.6 G/DL (ref 3.5–5.2)
ALBUMIN/GLOBULIN RATIO: 1.3 (ref 1–2.5)
ALP BLD-CCNC: 109 U/L (ref 35–104)
ALT SERPL-CCNC: 12 U/L (ref 5–33)
AMORPHOUS: NORMAL
ANION GAP SERPL CALCULATED.3IONS-SCNC: 14 MMOL/L (ref 9–17)
ANION GAP SERPL CALCULATED.3IONS-SCNC: 15 MMOL/L (ref 9–17)
ANION GAP SERPL CALCULATED.3IONS-SCNC: 18 MMOL/L (ref 9–17)
ANION GAP SERPL CALCULATED.3IONS-SCNC: 18 MMOL/L (ref 9–17)
AST SERPL-CCNC: 11 U/L
BACTERIA: NORMAL
BASOPHILS # BLD: 0 % (ref 0–2)
BASOPHILS ABSOLUTE: 0.03 K/UL (ref 0–0.2)
BILIRUB SERPL-MCNC: 0.45 MG/DL (ref 0.3–1.2)
BILIRUBIN URINE: NEGATIVE
BUN BLDV-MCNC: 14 MG/DL (ref 6–20)
BUN BLDV-MCNC: 15 MG/DL (ref 6–20)
BUN BLDV-MCNC: 17 MG/DL (ref 6–20)
BUN BLDV-MCNC: 17 MG/DL (ref 6–20)
BUN/CREAT BLD: ABNORMAL (ref 9–20)
CALCIUM SERPL-MCNC: 8.8 MG/DL (ref 8.6–10.4)
CALCIUM SERPL-MCNC: 9.2 MG/DL (ref 8.6–10.4)
CASTS UA: NORMAL /LPF (ref 0–8)
CHLORIDE BLD-SCNC: 101 MMOL/L (ref 98–107)
CHLORIDE BLD-SCNC: 101 MMOL/L (ref 98–107)
CHLORIDE BLD-SCNC: 103 MMOL/L (ref 98–107)
CHLORIDE BLD-SCNC: 96 MMOL/L (ref 98–107)
CO2: 22 MMOL/L (ref 20–31)
CO2: 24 MMOL/L (ref 20–31)
COLOR: YELLOW
COMMENT UA: ABNORMAL
CREAT SERPL-MCNC: 0.66 MG/DL (ref 0.5–0.9)
CREAT SERPL-MCNC: 0.72 MG/DL (ref 0.5–0.9)
CREAT SERPL-MCNC: 0.8 MG/DL (ref 0.5–0.9)
CREAT SERPL-MCNC: 0.83 MG/DL (ref 0.5–0.9)
CRYSTALS, UA: NORMAL /HPF
DIFFERENTIAL TYPE: ABNORMAL
EKG ATRIAL RATE: 128 BPM
EKG ATRIAL RATE: 84 BPM
EKG P AXIS: 19 DEGREES
EKG P AXIS: 36 DEGREES
EKG P-R INTERVAL: 112 MS
EKG P-R INTERVAL: 146 MS
EKG Q-T INTERVAL: 364 MS
EKG Q-T INTERVAL: 408 MS
EKG QRS DURATION: 88 MS
EKG QRS DURATION: 88 MS
EKG QTC CALCULATION (BAZETT): 430 MS
EKG QTC CALCULATION (BAZETT): 595 MS
EKG R AXIS: 44 DEGREES
EKG R AXIS: 66 DEGREES
EKG T AXIS: 42 DEGREES
EKG T AXIS: 44 DEGREES
EKG VENTRICULAR RATE: 128 BPM
EKG VENTRICULAR RATE: 84 BPM
EOSINOPHILS RELATIVE PERCENT: 1 % (ref 1–4)
EPITHELIAL CELLS UA: NORMAL /HPF (ref 0–5)
ESTIMATED AVERAGE GLUCOSE: 329 MG/DL
GFR AFRICAN AMERICAN: >60 ML/MIN
GFR NON-AFRICAN AMERICAN: >60 ML/MIN
GFR SERPL CREATININE-BSD FRML MDRD: ABNORMAL ML/MIN/{1.73_M2}
GLUCOSE BLD-MCNC: 192 MG/DL (ref 65–105)
GLUCOSE BLD-MCNC: 193 MG/DL (ref 65–105)
GLUCOSE BLD-MCNC: 204 MG/DL (ref 65–105)
GLUCOSE BLD-MCNC: 214 MG/DL (ref 70–99)
GLUCOSE BLD-MCNC: 220 MG/DL (ref 65–105)
GLUCOSE BLD-MCNC: 223 MG/DL (ref 70–99)
GLUCOSE BLD-MCNC: 224 MG/DL (ref 65–105)
GLUCOSE BLD-MCNC: 225 MG/DL (ref 65–105)
GLUCOSE BLD-MCNC: 252 MG/DL (ref 65–105)
GLUCOSE BLD-MCNC: 287 MG/DL (ref 70–99)
GLUCOSE BLD-MCNC: 377 MG/DL (ref 70–99)
GLUCOSE BLD-MCNC: 386 MG/DL (ref 65–105)
GLUCOSE BLD-MCNC: 417 MG/DL (ref 65–105)
GLUCOSE BLD-MCNC: 422 MG/DL (ref 65–105)
GLUCOSE URINE: ABNORMAL
HBA1C MFR BLD: 13.1 % (ref 4–6)
HCT VFR BLD CALC: 37.3 % (ref 36.3–47.1)
HEMOGLOBIN: 12.6 G/DL (ref 11.9–15.1)
IMMATURE GRANULOCYTES: 1 %
KETONES, URINE: ABNORMAL
LEUKOCYTE ESTERASE, URINE: ABNORMAL
LYMPHOCYTES # BLD: 34 % (ref 24–43)
MAGNESIUM: 1.4 MG/DL (ref 1.6–2.6)
MAGNESIUM: 1.6 MG/DL (ref 1.6–2.6)
MAGNESIUM: 1.7 MG/DL (ref 1.6–2.6)
MAGNESIUM: 1.8 MG/DL (ref 1.6–2.6)
MCH RBC QN AUTO: 27.2 PG (ref 25.2–33.5)
MCHC RBC AUTO-ENTMCNC: 33.8 G/DL (ref 28.4–34.8)
MCV RBC AUTO: 80.4 FL (ref 82.6–102.9)
MONOCYTES # BLD: 6 % (ref 3–12)
MUCUS: NORMAL
NITRITE, URINE: NEGATIVE
NRBC AUTOMATED: 0 PER 100 WBC
OTHER OBSERVATIONS UA: NORMAL
PDW BLD-RTO: 12.4 % (ref 11.8–14.4)
PH UA: 6.5 (ref 5–8)
PHOSPHORUS: 1.3 MG/DL (ref 2.6–4.5)
PHOSPHORUS: 2.7 MG/DL (ref 2.6–4.5)
PLATELET # BLD: 251 K/UL (ref 138–453)
PLATELET ESTIMATE: ABNORMAL
PMV BLD AUTO: 11 FL (ref 8.1–13.5)
POTASSIUM SERPL-SCNC: 3 MMOL/L (ref 3.7–5.3)
POTASSIUM SERPL-SCNC: 3.3 MMOL/L (ref 3.7–5.3)
POTASSIUM SERPL-SCNC: 3.3 MMOL/L (ref 3.7–5.3)
POTASSIUM SERPL-SCNC: 3.4 MMOL/L (ref 3.7–5.3)
PROTEIN UA: NEGATIVE
RBC # BLD: 4.64 M/UL (ref 3.95–5.11)
RBC # BLD: ABNORMAL 10*6/UL
RBC UA: NORMAL /HPF (ref 0–4)
RENAL EPITHELIAL, UA: NORMAL /HPF
SEG NEUTROPHILS: 58 % (ref 36–65)
SEGMENTED NEUTROPHILS ABSOLUTE COUNT: 8.55 K/UL (ref 1.5–8.1)
SODIUM BLD-SCNC: 136 MMOL/L (ref 135–144)
SODIUM BLD-SCNC: 139 MMOL/L (ref 135–144)
SODIUM BLD-SCNC: 140 MMOL/L (ref 135–144)
SODIUM BLD-SCNC: 141 MMOL/L (ref 135–144)
SPECIFIC GRAVITY UA: 1.02 (ref 1–1.03)
TOTAL PROTEIN: 6.3 G/DL (ref 6.4–8.3)
TRICHOMONAS: NORMAL
TURBIDITY: CLEAR
URINE HGB: NEGATIVE
UROBILINOGEN, URINE: NORMAL
WBC # BLD: 14.6 K/UL (ref 3.5–11.3)
WBC # BLD: ABNORMAL 10*3/UL
WBC UA: NORMAL /HPF (ref 0–5)
YEAST: NORMAL

## 2020-02-19 PROCEDURE — 6370000000 HC RX 637 (ALT 250 FOR IP): Performed by: INTERNAL MEDICINE

## 2020-02-19 PROCEDURE — 84100 ASSAY OF PHOSPHORUS: CPT

## 2020-02-19 PROCEDURE — APPSS15 APP SPLIT SHARED TIME 0-15 MINUTES: Performed by: NURSE PRACTITIONER

## 2020-02-19 PROCEDURE — 2060000000 HC ICU INTERMEDIATE R&B

## 2020-02-19 PROCEDURE — 83036 HEMOGLOBIN GLYCOSYLATED A1C: CPT

## 2020-02-19 PROCEDURE — 99223 1ST HOSP IP/OBS HIGH 75: CPT | Performed by: PSYCHIATRY & NEUROLOGY

## 2020-02-19 PROCEDURE — 87086 URINE CULTURE/COLONY COUNT: CPT

## 2020-02-19 PROCEDURE — 6370000000 HC RX 637 (ALT 250 FOR IP): Performed by: NURSE PRACTITIONER

## 2020-02-19 PROCEDURE — 2500000003 HC RX 250 WO HCPCS: Performed by: INTERNAL MEDICINE

## 2020-02-19 PROCEDURE — 95816 EEG AWAKE AND DROWSY: CPT | Performed by: PSYCHIATRY & NEUROLOGY

## 2020-02-19 PROCEDURE — 81001 URINALYSIS AUTO W/SCOPE: CPT

## 2020-02-19 PROCEDURE — 93010 ELECTROCARDIOGRAM REPORT: CPT | Performed by: INTERNAL MEDICINE

## 2020-02-19 PROCEDURE — 6360000002 HC RX W HCPCS: Performed by: INTERNAL MEDICINE

## 2020-02-19 PROCEDURE — 85025 COMPLETE CBC W/AUTO DIFF WBC: CPT

## 2020-02-19 PROCEDURE — 80053 COMPREHEN METABOLIC PANEL: CPT

## 2020-02-19 PROCEDURE — 87186 SC STD MICRODIL/AGAR DIL: CPT

## 2020-02-19 PROCEDURE — 95819 EEG AWAKE AND ASLEEP: CPT

## 2020-02-19 PROCEDURE — 2580000003 HC RX 258: Performed by: INTERNAL MEDICINE

## 2020-02-19 PROCEDURE — 99232 SBSQ HOSP IP/OBS MODERATE 35: CPT | Performed by: INTERNAL MEDICINE

## 2020-02-19 PROCEDURE — 83735 ASSAY OF MAGNESIUM: CPT

## 2020-02-19 PROCEDURE — 80048 BASIC METABOLIC PNL TOTAL CA: CPT

## 2020-02-19 PROCEDURE — 36415 COLL VENOUS BLD VENIPUNCTURE: CPT

## 2020-02-19 PROCEDURE — G0108 DIAB MANAGE TRN  PER INDIV: HCPCS

## 2020-02-19 PROCEDURE — 86403 PARTICLE AGGLUT ANTBDY SCRN: CPT

## 2020-02-19 RX ORDER — MAGNESIUM SULFATE 1 G/100ML
1 INJECTION INTRAVENOUS PRN
Status: DISCONTINUED | OUTPATIENT
Start: 2020-02-19 | End: 2020-02-25 | Stop reason: HOSPADM

## 2020-02-19 RX ORDER — INSULIN GLARGINE 100 [IU]/ML
10 INJECTION, SOLUTION SUBCUTANEOUS DAILY
Status: DISCONTINUED | OUTPATIENT
Start: 2020-02-19 | End: 2020-02-21

## 2020-02-19 RX ORDER — GLIPIZIDE 5 MG/1
2.5 TABLET ORAL
Status: DISCONTINUED | OUTPATIENT
Start: 2020-02-19 | End: 2020-02-19

## 2020-02-19 RX ORDER — POTASSIUM CHLORIDE 7.45 MG/ML
10 INJECTION INTRAVENOUS PRN
Status: DISCONTINUED | OUTPATIENT
Start: 2020-02-19 | End: 2020-02-25 | Stop reason: HOSPADM

## 2020-02-19 RX ORDER — UREA 10 %
3 LOTION (ML) TOPICAL ONCE
Status: COMPLETED | OUTPATIENT
Start: 2020-02-19 | End: 2020-02-19

## 2020-02-19 RX ORDER — DEXTROSE MONOHYDRATE 50 MG/ML
100 INJECTION, SOLUTION INTRAVENOUS PRN
Status: DISCONTINUED | OUTPATIENT
Start: 2020-02-19 | End: 2020-02-25 | Stop reason: HOSPADM

## 2020-02-19 RX ORDER — NICOTINE POLACRILEX 4 MG
15 LOZENGE BUCCAL PRN
Status: DISCONTINUED | OUTPATIENT
Start: 2020-02-19 | End: 2020-02-25 | Stop reason: HOSPADM

## 2020-02-19 RX ORDER — POTASSIUM CHLORIDE 20 MEQ/1
40 TABLET, EXTENDED RELEASE ORAL ONCE
Status: COMPLETED | OUTPATIENT
Start: 2020-02-19 | End: 2020-02-19

## 2020-02-19 RX ORDER — POTASSIUM CHLORIDE 20 MEQ/1
40 TABLET, EXTENDED RELEASE ORAL PRN
Status: DISCONTINUED | OUTPATIENT
Start: 2020-02-19 | End: 2020-02-25 | Stop reason: HOSPADM

## 2020-02-19 RX ORDER — DEXTROSE MONOHYDRATE 25 G/50ML
12.5 INJECTION, SOLUTION INTRAVENOUS PRN
Status: DISCONTINUED | OUTPATIENT
Start: 2020-02-19 | End: 2020-02-25 | Stop reason: HOSPADM

## 2020-02-19 RX ADMIN — MAGNESIUM SULFATE HEPTAHYDRATE 1 G: 1 INJECTION, SOLUTION INTRAVENOUS at 16:54

## 2020-02-19 RX ADMIN — POTASSIUM CHLORIDE 10 MEQ: 7.46 INJECTION, SOLUTION INTRAVENOUS at 06:43

## 2020-02-19 RX ADMIN — SODIUM CHLORIDE 0.1 UNITS/KG/HR: 9 INJECTION, SOLUTION INTRAVENOUS at 05:24

## 2020-02-19 RX ADMIN — INSULIN LISPRO 2 UNITS: 100 INJECTION, SOLUTION INTRAVENOUS; SUBCUTANEOUS at 13:54

## 2020-02-19 RX ADMIN — INSULIN GLARGINE 10 UNITS: 100 INJECTION, SOLUTION SUBCUTANEOUS at 15:28

## 2020-02-19 RX ADMIN — DESMOPRESSIN ACETATE 40 MG: 0.2 TABLET ORAL at 04:42

## 2020-02-19 RX ADMIN — POTASSIUM CHLORIDE 40 MEQ: 1500 TABLET, EXTENDED RELEASE ORAL at 04:42

## 2020-02-19 RX ADMIN — LEVETIRACETAM 500 MG: 500 TABLET, FILM COATED ORAL at 08:10

## 2020-02-19 RX ADMIN — Medication 3 MG: at 23:21

## 2020-02-19 RX ADMIN — LEVETIRACETAM 500 MG: 500 TABLET, FILM COATED ORAL at 21:13

## 2020-02-19 RX ADMIN — HYDROCODONE BITARTRATE AND ACETAMINOPHEN 1 TABLET: 5; 325 TABLET ORAL at 12:36

## 2020-02-19 RX ADMIN — DIBASIC SODIUM PHOSPHATE, MONOBASIC POTASSIUM PHOSPHATE AND MONOBASIC SODIUM PHOSPHATE 1 TABLET: 852; 155; 130 TABLET ORAL at 15:27

## 2020-02-19 RX ADMIN — INSULIN LISPRO 5 UNITS: 100 INJECTION, SOLUTION INTRAVENOUS; SUBCUTANEOUS at 21:18

## 2020-02-19 RX ADMIN — POTASSIUM CHLORIDE 10 MEQ: 7.46 INJECTION, SOLUTION INTRAVENOUS at 10:15

## 2020-02-19 RX ADMIN — SODIUM PHOSPHATE, MONOBASIC, MONOHYDRATE 10 MMOL: 276; 142 INJECTION, SOLUTION INTRAVENOUS at 09:31

## 2020-02-19 RX ADMIN — POTASSIUM CHLORIDE 10 MEQ: 7.46 INJECTION, SOLUTION INTRAVENOUS at 09:12

## 2020-02-19 RX ADMIN — DESMOPRESSIN ACETATE 40 MG: 0.2 TABLET ORAL at 21:13

## 2020-02-19 RX ADMIN — POTASSIUM CHLORIDE 10 MEQ: 7.46 INJECTION, SOLUTION INTRAVENOUS at 11:37

## 2020-02-19 RX ADMIN — DEXTROSE AND SODIUM CHLORIDE: 5; 450 INJECTION, SOLUTION INTRAVENOUS at 07:45

## 2020-02-19 RX ADMIN — DIBASIC SODIUM PHOSPHATE, MONOBASIC POTASSIUM PHOSPHATE AND MONOBASIC SODIUM PHOSPHATE 250 MG: 852; 155; 130 TABLET ORAL at 21:13

## 2020-02-19 RX ADMIN — POTASSIUM CHLORIDE 10 MEQ: 7.46 INJECTION, SOLUTION INTRAVENOUS at 08:10

## 2020-02-19 RX ADMIN — POTASSIUM CHLORIDE 40 MEQ: 1500 TABLET, EXTENDED RELEASE ORAL at 15:28

## 2020-02-19 RX ADMIN — INSULIN LISPRO 12 UNITS: 100 INJECTION, SOLUTION INTRAVENOUS; SUBCUTANEOUS at 17:15

## 2020-02-19 RX ADMIN — MAGNESIUM SULFATE HEPTAHYDRATE 1 G: 1 INJECTION, SOLUTION INTRAVENOUS at 05:24

## 2020-02-19 RX ADMIN — MAGNESIUM SULFATE HEPTAHYDRATE 1 G: 1 INJECTION, SOLUTION INTRAVENOUS at 15:28

## 2020-02-19 RX ADMIN — SODIUM CHLORIDE: 4.5 INJECTION, SOLUTION INTRAVENOUS at 05:24

## 2020-02-19 ASSESSMENT — PAIN SCALES - GENERAL: PAINLEVEL_OUTOF10: 10

## 2020-02-19 NOTE — ED NOTES
Pt follows commands and sits upright. Pt remains on telemetry monitoring with alarms set. When aroused, pt oriented x 4.   Will continue to monitor      Diana Burton RN  02/18/20 2042

## 2020-02-19 NOTE — PROGRESS NOTES
(47.6 kg), % Ideal Body 155%  · BMI Classification: BMI 30.0 - 34.9 Obese Class I    Nutrition Interventions:   Continue current diet, Start ONS  Continued Inpatient Monitoring, Education Initiated(DM education initiated. Discussed avoiding sugar-sweetened beverages. Monitor serving sizes of carbohydrates.)    Nutrition Evaluation:   · Evaluation: Goals set   · Goals: Meet greater than 50% of estimated nutrient needs with intake of therapeutic diet.     · Monitoring: Meal Intake, Supplement Intake, Diet Tolerance, Pertinent Labs, Weight      Electronically signed by Jim Estrella MS, RD, LD on 2/19/20 at 3:25 PM    Contact Number: [phone]

## 2020-02-19 NOTE — ED NOTES
Pt provided water per verbal okay Dr. Marcia Norman   Pt arousable to verbal stimuli, but reports that she is feeling very tired. Writer spoke with Dr. Marcia Norman about this, likely d/t medications administered in ED.    Will continue to monitor      [facility], RN  02/18/20 Ana Ch, RN  02/18/20 [id number]

## 2020-02-19 NOTE — PROGRESS NOTES
Occupational Therapy Not Seen Note    DATE: 2020  Name: Nga Ceron  : 1995  MRN: [id number]    Patient not available for Occupational Therapy due to:    Pt independent with functional mobility and functional tasks.  Pt with no OT acute care needs at this time, will defer OT eval. Pt reports pt IND with ADL/ functional activities, no balance or safety concerns for discharge     Next Scheduled Treatment: defer OT evaluation    Electronically signed by VIKAS Schultz on 2020 at 8:31 AM

## 2020-02-19 NOTE — PROGRESS NOTES
Krystal Moritz,  Seen for evaluation and education on Type 2 uncontrolled, new diagnosis. Lab Results   Component Value Date    LABA1C 8.7 (H) 01/05/2020     Lab Results   Component Value Date     01/05/2020       Discussed with Krystal Moritz, her new diagnosis of T2DM. Patient states that she does have family history of D with grandfather who takes insulin. Patient states she was on Metformin at home because she had GDM with all 3 pregnancies and did take insulin with the oldest. Patient states that she had stopped taking the Metformin after having the brain surgery for the bleed and was on steroids at home post surgery. She had been having increased uriation and thirst for some time but kept getting worse and she was drinking a liter of Reg pop/day in addition to cranberry juice and milk. Patient lives with mom and stepdad and is in the process of moving. Patient states that she does not have her kids at this time and they are with other family members. She also had her license suspended but did not give reason. Patient works at [employer]. During course of conversation patient struggled with recalling \"names\" and exhibited short term memory as she did not know what the box containing the meter was for after performing BG self test. She also repeated several times that she missed dentist appt yesterday because she came to the hospital.    Following Survival Skills education topics were covered as appropriate to patient plan of for care:  _x__ Type 2 Diabetes diagnosis as chronic and progressive       _x__ Healthy eating - CHO food groups and need for CHO controlled diet.   Elimination of sugary beverages, avoid skipping meal    _x__ Role of physical activity - bouts of walking, swimming or low impact aerobics as recommended by care providers- aim for 30 minutes per day      _x__ Blood Glucose Self-Monitoring ( BGSM)  /how to use a BG meter - Provided and demonstrated use of True Metrix meter for education and practice of BGSM skill - this is provided free of charge. Patient had same meter in past but struggled with remembering how to set up lancing device and insert test strip. Concerned about memory issues. _x__ Blood sugar targets Pre meal 80 - 130 and 2 hours post meal < 180    _x__ Hyperglycemia  ( BG over 250) signs and symptoms and treatment    __ Sick Day Care    _x__ Hypoglycemia( BG < than 70) signs and symptoms and treatment \"Rule of 15\"    _x__ Keep BG log and take log and meter to follow up HCP appointments    _x__ Medications -  Insulin Lantus - Basal ( long acting) / Humalog-  Bolus (pre meal)  regime - insulin action times. Patient states that she took Lantus in past and either Novolog or Humalog using pens in past during pregnancy. _x__ Importance of dosing  pre meal rapid insulin from BG result at time of start of  meal & administering  within 15 minutes of eating meals   _x__ Importance of taking long acting insulin at same time each day and not to skip doses   ___ Demonstration of self-administering insulin via vial and syringe   _x__ Demonstration of self-administering insulin via Pen and pen needle tips   _x__ Reinforce safe needle / sharps disposal    _x__ Insulin injection site rotation  _x__ Medications - Orals Metformin    Following Support materials were provided for patient to take home:  _x__ Educational Booklets as available \"ADA Where Do I Begin? Living with Type 2 Diabetes\"  _x__ Be safe with Needles teaching sheet /  [employer]  _x__\"Type 2 Diabetes and Adding Insulin\" fold out with survival skills   _x__ [facility] Diabetes Education brochure/ contact card      Patient verbalizes, demonstrates and needs reinforcement. understanding  of survival skills education. Patient very receptive to information but admits to not good follow through. Concerned that patient was having some memory recall issues.  Patient will need reinforcement, review and

## 2020-02-19 NOTE — PROGRESS NOTES
[facility]    Progress Note    2/19/2020    12:16 PM    Name:   Rae Andersen  MRN:     [id number]     Acct:      [de-identified]   Room:   [address] [place] [address] Day:  1  Admit Date:  2/18/2020  2:48 PM    PCP:   Benny Valle  Code Status:  Full Code    Subjective:     C/C:   Chief Complaint   Patient presents with    Dizziness     dizziness and vomiting for the past couple of days. Brain bleed one month ago, has not yet had follow up with neurosurgery      Interval History Status: Still complaining of weakness. Patient seen and examined  Patient feels weak  Patient denies fever chest pain shortness of breath  I am seeing the patient for      Brief History:   30years old female with past medical history of intracranial hemorrhage presented to the hospital with headache and hyperosmolar nonketotic hyperglycemia patient was treated with insulin drip also MRI was concern for stroke neurology was consulted probably artifact  Patient was also found to have electrolyte imbalance hypokalemia hypomagnesemia was replaced    Medications: Allergies:     Allergies   Allergen Reactions    Latex     Prozac [Fluoxetine Hcl] Other (See Comments)     Seizures       Current Meds:   Scheduled Meds:    glipiZIDE  2.5 mg Oral BID AC    insulin lispro  0-12 Units Subcutaneous TID WC    insulin lispro  0-6 Units Subcutaneous Nightly    atorvastatin  40 mg Oral Nightly    lamoTRIgine  25 mg Oral Daily    levETIRAcetam  500 mg Oral BID    sodium chloride  15 mL/kg Intravenous Once    enoxaparin  40 mg Subcutaneous Daily     Continuous Infusions:    dextrose      dextrose 5 % and 0.45 % NaCl 150 mL/hr at 02/19/20 0745     PRN Meds: glucose, dextrose, glucagon (rDNA), dextrose, dextrose 5 % and 0.45 % NaCl, dextrose, potassium chloride, magnesium sulfate, sodium phosphate IVPB **OR** sodium phosphate IVPB **OR** sodium phosphate IVPB, HYDROcodone 5 mg - AST 8  --   --   --   --   --   --   --    ALT 14  --   --   --   --   --   --   --    ALKPHOS 138*  --   --   --   --   --   --   --    BILITOT 0.34  --   --   --   --   --   --   --    POCGLU  --    < > 252* 204* 225* 220* 224* 193*    < > = values in this interval not displayed. ABG:  Lab Results   Component Value Date    POCPH 7.458 02/18/2020    POCPCO2 39.1 02/18/2020    POCPO2 96.0 02/18/2020    POCHCO3 27.7 02/18/2020    NBEA NOT REPORTED 02/18/2020    PBEA 4 02/18/2020    WTP9QXX 29 02/18/2020    JPUG5SEI 98 02/18/2020    FIO2 21.0 02/18/2020     Lab Results   Component Value Date/Time    SPECIAL NOT REPORTED 01/25/2020 02:40 AM     Lab Results   Component Value Date/Time    CULTURE NO SIGNIFICANT GROWTH 01/08/2020 04:31 PM       Radiology:  Ct Head Wo Contrast    Result Date: 2/18/2020  Stable appearance of posterior left temporal lobe surgical resection side with overlying craniotomy. Otherwise, unremarkable noncontrast CT head examination. Ct Head Wo Contrast    Result Date: 2/18/2020  Left occipital craniotomy with interval improvement of subjacent intraparenchymal hemorrhage. The findings were sent to the Radiology Results [address] at 3:43 pm on 2/18/2020to be communicated to a licensed caregiver. Case discussed with Dr. Suzann Sacks at 3:47 p.m. Mra Head Wo Contrast    Result Date: 2/18/2020  Left occipital craniotomy and subjacent intraparenchymal hemorrhage. Otherwise negative MRA. No evidence of AVM or aneurysm. Mri Brain Wo Contrast    Result Date: 2/18/2020  Possible new acute left posterior cerebral artery territory infarction. Sensitivity and specificity are limited due to local magnetic susceptibility artifact from postsurgical and hemorrhagic change. Left occipital craniectomy with interval decrease in size of the surgical bed and intraparenchymal hemorrhage. Residual or recurrent cavernoma is not excluded.  RECOMMENDATIONS: The findings were sent to the Radiology

## 2020-02-19 NOTE — CONSULTS
Neurology Consult Note        Reason for Consult:  headache  Requesting Physician:  Dr. Cinthia Collazo MD    CHIEF COMPLAINT:  headache    History Obtained From:  patient, electronic medical record       HISTORY OF PRESENT ILLNESS:              The patient is a 30 y.o. female  who presents with left occipital headache that built up last couple of days, aching, peaked to 8-9/10 in intensity prior to coming to ed, associated with nausea, vomiting, light headedness. 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 Has been having intermittent similar headaches since then. Takes tylenol with some relief, not on any prophylactic. Has right homonymous hemianopsia since the surgery. CT head done in ed overall improved compared to previous imaging 1/26/20. Endorses history of seizures as a child, none in adulthood. Had left occipital IPH last year subsequently determined to be from the cavernoma that was then resected in January. Neurosurgery following.        Past Medical History:        Diagnosis Date    Cerebral vascular malformation     [facility]    Diabetes mellitus ([facility]     Seizure ([facility]     Traumatic hemorrhage of left cerebrum without loss of consciousness ([facility]     @ [facility]     Past Surgical History:        Procedure Laterality Date    CRANIOTOMY  01/08/2020    LEFT OCCIPITAL CRANIOTOMY    CRANIOTOMY Left 1/8/2020    LEFT OCCIPITAL CRANIOTOMY, 1ST VASCULAR LESION WITH qcue NAVIGATION (LATERAL WITH ROMERO BAG, HOLLINGSWORTH HEADHOLDER, MICROSCOPE) performed by Cherelle Major DO at [facility] OR     Current Medications:   Current Facility-Administered Medications: atorvastatin (LIPITOR) tablet 40 mg, 40 mg, Oral, Nightly  lamoTRIgine (LAMICTAL) tablet 25 mg, 25 mg, Oral, Daily  levETIRAcetam (KEPPRA) tablet 500 mg, 500 mg, Oral, BID  0.9 % sodium chloride bolus, 15 mL/kg, Intravenous, Once **FOLLOWED BY** 0.45 % sodium chloride infusion, , Intravenous, Continuous  dextrose 5 % and 0.45 % sodium with interval decrease in size of the surgical bed   and intraparenchymal hemorrhage.  Residual or recurrent cavernoma is not   excluded. MRA Head wo contrast 2/18/2020  Left occipital craniotomy and subjacent intraparenchymal hemorrhage. Otherwise negative MRA.  No evidence of AVM or aneurysm. IMPRESSION/RECOMMENDATIONS:     49-year-old female with history of left occipital cavernoma resection January 8, 2020, history of IPH secondary to it last year, presents with headache, CT head with interval improvement in hemorrhage in the resection bed. Found to have electrolyte abnormalities with severe hyperglycemia, hypokalemia. Medicine admission for evaluation correction. Headache improved with IV fluids, Decadron, Benadryl, magnesium, Compazine. Continue symptomatic management for headache. Left PCA territory diffusion restriction on MRI likely artifactual from magnetic susceptibility artifact. If frequency increases, will benefit from prophylactic. We will get EEG to evaluate for epileptiform discharges. Patient currently not taking any antiepileptic, including Lamictal or Keppra. was supposed to be on Keppra for a week only per previous discharge notes. Thank you for the consult, will follow.       Nemesio Huff MD  2/19/2020

## 2020-02-19 NOTE — ED NOTES
Dr. Rajendra Giron, neurosurgery, at bedside      Yair Aviles, [address] [place] [address]  02/18/20 [id number]

## 2020-02-19 NOTE — PROGRESS NOTES
Smoking Cessation - topics covered   []  Health Risks  []  Benefits of Quitting   []  Smoking Cessation  []  Patient has no history of tobacco use per note in significant history. []  Patient is former smoker per note in significant history. Patient quit in   []  No need for tobacco cessation education. []  Booklet given  [x]  Patient verbalizes understanding. [x]  Patient denies need for tobacco cessation education stating she quit smoking 1/8/2020 after her surgery. []  Unable to meet with patient today. Will follow up as able.   Maia Shone  1:01 PM

## 2020-02-19 NOTE — PROGRESS NOTES
Neurosurgery ANA PAULA/Resident    Daily Progress Note     2/19/2020  10:43 AM    Chart reviewed. No acute events overnight. Patient seen and examined this morning with Dr Екатерина Forte, resting in bed, still on insulin drip for elevated glucose levels     Vitals:    02/18/20 2201 02/18/20 2333 02/19/20 0745 02/19/20 0800   BP:  123/78  104/64   Pulse: 79 81  92   Resp: 18 11  16   Temp:  98.4 °F (36.9 °C)  98.5 °F (36.9 °C)   TempSrc:  Oral  Temporal   SpO2: 97% 97%  99%   Weight:  157 lb 11.2 oz (71.5 kg) 162 lb 14.7 oz (73.9 kg)    Height:  5' 1\" (1.549 m)         PE:   AOx3   Right sided Homonymous hemianopsia   PERRL, EOMI     Motor   L deltoid 5/5; R deltoid 5/5  L biceps 5/5; R biceps 5/5  L triceps 5/5; R triceps 5/5    L iliopsoas 5/5 , R iliopsoas 5/5  L quadriceps 5/5; R quadriceps 5/5  L Dorsiflexion 5/5; R dorsiflexion 5/5  L Plantarflexion 5/5; R plantarflexion 5/5  L EHL 5/5; R EHL 5/5    Sensation:denies paresthesias       Lab Results   Component Value Date    WBC 10.8 02/18/2020    HGB 12.3 02/18/2020    HCT 36.6 02/18/2020     02/18/2020    CHOL 245 (H) 01/05/2020    TRIG 420 (H) 01/07/2020    HDL 24 (L) 01/05/2020    LDLDIRECT 104 (H) 01/05/2020    ALT 14 02/18/2020    AST 8 02/18/2020     02/19/2020    K 3.0 (L) 02/19/2020     02/19/2020    CREATININE 0.80 02/19/2020    BUN 17 02/19/2020    CO2 22 02/19/2020    TSH 3.19 01/05/2020    INR 1.0 02/18/2020    LABA1C 8.7 (H) 01/05/2020       Radiology   Ct Head Wo Contrast    Result Date: 2/18/2020  EXAMINATION: CT OF THE HEAD WITHOUT CONTRAST  2/18/2020 8:07 pm TECHNIQUE: CT of the head was performed without the administration of intravenous contrast. Dose modulation, iterative reconstruction, and/or weight based adjustment of the mA/kV was utilized to reduce the radiation dose to as low as reasonably achievable. COMPARISON: MRI brain without contrast February 18, 2020. CT head scan without contrast February 18, 2020.  HISTORY: ORDERING am TECHNIQUE: CT of the head was performed without the administration of intravenous contrast. Dose modulation, iterative reconstruction, and/or weight based adjustment of the mA/kV was utilized to reduce the radiation dose to as low as reasonably achievable. COMPARISON: MRI brain without and with contrast January 10, 2020. CT head scan without contrast January 9, 2020. HISTORY: ORDERING SYSTEM PROVIDED HISTORY: headache, vomiting TECHNOLOGIST PROVIDED HISTORY: headache, vomiting Is the patient pregnant?->No Reason for Exam: headache, vomiting Acuity: Acute Type of Exam: Initial FINDINGS: BRAIN/VENTRICLES: There is no acute intracranial hemorrhage, mass effect or midline shift. No abnormal extra-axial fluid collection. The gray-white differentiation is maintained without evidence of an acute infarct. There is no evidence of hydrocephalus. Left temporal occipital resection cavity with acute blood is again visualized with mild increased volume of hemorrhage which measures up to 21 mm in diameter. Surrounding vasogenic edema persists. Left-sided choroid fissure cyst is present which measures up to 8 mm in diameter. ORBITS: The visualized portion of the orbits demonstrate no acute abnormality. SINUSES: The visualized paranasal sinuses and mastoid air cells demonstrate no acute abnormality. SOFT TISSUES/SKULL:  Postoperative findings associated with left temporal occipital craniotomy is noted without significant interval change visualized. Interval increase/development of acute hemorrhage within left temporal occipital surgical resection bed underlying craniotomy postoperative change, as discussed above. Mra Head Wo Contrast    Result Date: 2/18/2020  EXAMINATION: MRA OF THE HEAD WITHOUT CONTRAST 2/18/2020 5:56 pm TECHNIQUE: MRA of the head was performed utilizing time-of-flight imaging with MIP images. No intravenous contrast was administered.  COMPARISON: CT head February 18, 2020 and MR brain January 25, 2020 HISTORY: ORDERING SYSTEM PROVIDED HISTORY: dizziness, headache, history of recent left occipital cavernoma resection TECHNOLOGIST PROVIDED HISTORY: dizziness, headache, history of recent left occipital cavernoma resection Is the patient pregnant?->No FINDINGS: ANTERIOR CIRCULATION: No significant stenosis of the intracranial internal carotid, anterior cerebral, or middle cerebral arteries. POSTERIOR CIRCULATION: No significant stenosis of the vertebral, basilar, or posterior cerebral arteries. Left occipital craniotomy and subjacent 10 x 14 mm focus of T1 shortening consistent with methemoglobin. Left occipital craniotomy and subjacent intraparenchymal hemorrhage. Otherwise negative MRA. No evidence of AVM or aneurysm. Mri Brain W Wo Contrast    Result Date: 1/25/2020  EXAMINATION: MRI OF THE BRAIN WITHOUT AND WITH CONTRAST  1/25/2020 9:35 am TECHNIQUE: Multiplanar multisequence MRI of the head/brain was performed without and with the administration of intravenous contrast. COMPARISON: CT brain performed 01/25/2020. HISTORY: ORDERING SYSTEM PROVIDED HISTORY: history of cavernoma with new hemorrhage TECHNOLOGIST PROVIDED HISTORY: history of cavernoma with new hemorrhage Is the patient pregnant?->No FINDINGS: INTRACRANIAL STRUCTURES/VENTRICLES:  The sellar and suprasellar structures, optic chiasm, corpus callosum, pineal gland, tectum, and midline brainstem structures are unremarkable. The craniocervical junction is unremarkable. There is redemonstration of a small resection focus in the left temporal lobe posteriorly that contains evolving hemorrhagic products. There is postcontrast enhancement of the cavity peripherally measuring approximately 2.2 x 2.1 cm. There is mild adjacent dural enhancement. There is no significant mass effect or midline shift. There is otherwise satisfactory gray-white matter differentiation. The ventricular structures are symmetric and unremarkable.   The infratentorial structures including the cerebellopontine angles and internal auditory canals are unremarkable. ORBITS: The visualized portion of the orbits demonstrate no acute abnormality. SINUSES: The visualized paranasal sinuses and mastoid air cells are well aerated. BONES/SOFT TISSUES: There has been prior left posterior parietal temporal craniotomy. Redemonstration of small resection cavity in the posterior aspect of the left temporal lobe containing evolving hemorrhagic products. There is peripheral enhancement of the resection cavity as well as adjacent dural enhancement that may be postoperative. Attention is recommended on follow-up. Mri Brain Wo Contrast    Result Date: 2/18/2020  EXAMINATION: MRI OF THE BRAIN WITHOUT CONTRAST  2/18/2020 5:56 pm TECHNIQUE: Multiplanar multisequence MRI of the brain was performed without the administration of intravenous contrast. COMPARISON: CT head from today and MR brain January 25, 2020 HISTORY: ORDERING SYSTEM PROVIDED HISTORY: dizziness, headache; history of recent left occipital cavernoma resection TECHNOLOGIST PROVIDED HISTORY: dizziness, headache; history of recent left occipital cavernoma resection Is the patient pregnant?->No FINDINGS: INTRACRANIAL STRUCTURES/VENTRICLES: No mass effect or midline shift. Left occipital surgical bed in hemorrhagic products appears smaller. It appears hyperintense on T1 and T2 weighted sequences centrally and has hypointense hemosiderin ring. There also appears to be restricted diffusion of the posterior cerebral artery territory/occipital cortex. The ventricles and sulci are normal in size and configuration. The sellar/suprasellar regions appear unremarkable. The normal signal voids within the major intracranial vessels appear maintained. ORBITS: The visualized portion of the orbits demonstrate no acute abnormality. SINUSES: The visualized paranasal sinuses and mastoid air cells are well aerated.  BONES/SOFT TISSUES: Left occipital craniectomy. Possible new acute left posterior cerebral artery territory infarction. Sensitivity and specificity are limited due to local magnetic susceptibility artifact from postsurgical and hemorrhagic change. Left occipital craniectomy with interval decrease in size of the surgical bed and intraparenchymal hemorrhage. Residual or recurrent cavernoma is not excluded. RECOMMENDATIONS: The findings were sent to the Radiology Results [address] at 7:13 pm on 2/18/2020to be communicated to a licensed caregiver. A/P   Headache, Nausea and vomiting    Elevated Blood glucose 792 on admission       -PT and OT signed off as patient is independent    - Still requiring insulin drip at this time    - No neurosurgical interventions planned, we will sign off and have patient follow up  in 1 month with Dr Judah Saenz    Please contact neurosurgery with any changes in patients neurologic status.        Leona Villegas CNP  2/19/20  10:43 AM

## 2020-02-19 NOTE — PROGRESS NOTES
Physical Therapy  DATE: 2020    NAME: Isaac Cedillo  MRN: [id number]   : 1995    Patient not seen this date for Physical Therapy due to:  [] Blood transfusion in progress  [] Hemodialysis  []  Patient Declined  [] Spine Precautions   [] Strict Bedrest  [] Surgery/ Procedure  [] Testing      [] Other        [x] Pt reports baseline for balance and mobility without concerns about returning home upon discharge. No acute PT needs at this time, PT to sign off.    [] PT being discontinued at this time as the patient has been transferred to palliative care. No further needs. PT to sign off at this time. Horacio Galarza   This treatment/evaluation completed by signing SPT. Signing PT agrees with treatment and documentation.

## 2020-02-19 NOTE — PROCEDURES
EEG REPORT       Patient: Herbie Garcia Age: 30 y.o. MRN: [id number]    Date: 2/18/2020  Referring Provider: No ref. provider found    History: This routine 30 minute scalp EEG was recorded with video- monitoring for a 30 y.o. Jennifer Norwood female  who presented with encephalopathy. This EEG was performed to evaluate for focal and epileptiform abnormalities.      Herbie Garcia   Current Facility-Administered Medications   Medication Dose Route Frequency Provider Last Rate Last Dose    insulin lispro (HUMALOG) injection vial 0-12 Units  0-12 Units Subcutaneous TID WC Samaria Rodriguez MD   12 Units at 02/19/20 1715    insulin lispro (HUMALOG) injection vial 0-6 Units  0-6 Units Subcutaneous Nightly Samaria Rodriguez MD        glucose (GLUTOSE) 40 % oral gel 15 g  15 g Oral PRN Minh Camargo MD        dextrose 50 % IV solution  12.5 g Intravenous PRN Minh Camargo MD        glucagon (rDNA) injection 1 mg  1 mg Intramuscular PRN Minh Camargo MD        dextrose 5 % solution  100 mL/hr Intravenous PRN Minh Camargo MD        phosphorus (K PHOS NEUTRAL) tablet 1 tablet  250 mg Oral TID Minh Camargo MD   1 tablet at 02/19/20 1527    insulin glargine (LANTUS) injection vial 10 Units  10 Units Subcutaneous Daily Minh Camargo MD   10 Units at 02/19/20 1528    potassium chloride 10 mEq/100 mL IVPB (Peripheral Line)  10 mEq Intravenous PRN Minh Camargo MD        magnesium sulfate 1 g in dextrose 5% 100 mL IVPB  1 g Intravenous PRN Minh Camargo MD   Stopped at 02/19/20 1747    potassium chloride (KLOR-CON M) extended release tablet 40 mEq  40 mEq Oral PRN Minh Camargo MD   40 mEq at 02/19/20 1528    Or    potassium bicarb-citric acid (EFFER-K) effervescent tablet 40 mEq  40 mEq Oral PRN Minh Camargo MD        Or   [employer] potassium chloride 10 mEq/100 mL IVPB (Peripheral Line)  10 mEq Intravenous PRN Minh Camargo MD        atorvastatin (LIPITOR) tablet 40 mg  40 mg Oral Nightly Samaria Young MD   40 mg at 02/19/20 0442    lamoTRIgine (LAMICTAL) tablet 25 mg  25 mg Oral Daily Samaria Young MD        levETIRAcetam (KEPPRA) tablet 500 mg  500 mg Oral BID Danny Ferrer MD   500 mg at 02/19/20 0810    0.9 % sodium chloride bolus  15 mL/kg Intravenous Once Danny Ferrer MD        dextrose 5 % and 0.45 % sodium chloride infusion   Intravenous Continuous PRN Danny Ferrer  mL/hr at 02/19/20 0745      enoxaparin (LOVENOX) injection 40 mg  40 mg Subcutaneous Daily Danny Ferrer MD        magnesium sulfate 1 g in dextrose 5% 100 mL IVPB  1 g Intravenous PRN Danny Ferrer MD        HYDROcodone-acetaminophen [facility]) 5-325 MG per tablet 1 tablet  1 tablet Oral Q6H PRN Danny Ferrer MD   1 tablet at 02/19/20 1236        Technical Description: This is a 21 channel digital EEG recording with time-locked video. Electrodes were placed in accordance with the 10-20 International System of Electrode Placement. Single lead EKG monitoring as well as temporal electrodes were included. The patient was not sleep deprived. This recording was obtained during wakefulness. EEG Description: The dominant background activity during maximal recorded wakefulness consisted of bioccipitally dominant 9-10 Hz, 25-35 uV symmetric activity. Frequent intrusions of polymorphic delta and theta frequency of 3 to 5 Hz were seen. During drowsiness, roving eye movement artifact was seen. Rudimentary vertex sharp waves were seen. Deeper sleep was not obtained. Photic stimulation - stepwise photic stimulation at 2-30 Hz was performed and there no driving response was seen. Hyperventilation - was not preformed. The EKG channel demonstrated a normal sinus rhythm. Interpretation  This EEG was abnormal in wakefulness and drowsiness. Frequent intrusions of polymorphic delta and theta frequencies were seen.        Clinical correlation  This EEG was abnormal suggestive of mild encephalopathy of nonspecific etiology. No abnormal epileptiform activity was seen.     Miguelangel Aviles MD  Diplomate, American Board of Psychiatry and Neurology  Diplomate, American Board of Clinical Neurophysiology  Diplomate, American Board of Epilepsy

## 2020-02-19 NOTE — ED PROVIDER NOTES
[facility] ED  Emergency Department  Emergency Medicine Resident Sign-out     Care of Carolina Arce was assumed from Dr. Oliver Segura and is being seen for Dizziness (dizziness and vomiting for the past couple of days. Brain bleed one month ago, has not yet had follow up with neurosurgery )  . The patient's initial evaluation and plan have been discussed with the prior provider who initially evaluated the patient.      EMERGENCY DEPARTMENT COURSE / MEDICAL DECISION MAKING:       MEDICATIONS GIVEN:  Orders Placed This Encounter   Medications    levetiracetam (KEPPRA) 500 mg/100 mL IVPB    ondansetron (ZOFRAN) injection 4 mg    dexamethasone (DECADRON) injection 10 mg    0.9 % sodium chloride bolus    prochlorperazine (COMPAZINE) injection 10 mg    diphenhydrAMINE (BENADRYL) injection 25 mg    LORazepam (ATIVAN) injection 0.5 mg    0.9 % sodium chloride bolus    DISCONTD: potassium chloride 10 mEq/100 mL IVPB (Peripheral Line)    DISCONTD: potassium chloride (KLOR-CON M) extended release tablet 30 mEq    magnesium sulfate 1 g in dextrose 5% 100 mL IVPB    potassium chloride (KLOR-CON M) extended release tablet 40 mEq    potassium chloride 10 mEq/100 mL IVPB (Peripheral Line)    atorvastatin (LIPITOR) tablet 40 mg    lamoTRIgine (LAMICTAL) tablet 25 mg    levETIRAcetam (KEPPRA) tablet 500 mg    FOLLOWED BY Linked Order Group     0.9 % sodium chloride bolus     0.45 % sodium chloride infusion    dextrose 5 % and 0.45 % sodium chloride infusion    enoxaparin (LOVENOX) injection 40 mg    dextrose 50 % IV solution    potassium chloride 10 mEq/100 mL IVPB (Peripheral Line)    magnesium sulfate 1 g in dextrose 5% 100 mL IVPB    OR Linked Order Group     sodium phosphate 10 mmol in dextrose 5 % 250 mL IVPB     sodium phosphate 15 mmol in dextrose 5 % 250 mL IVPB     sodium phosphate 20 mmol in dextrose 5 % 500 mL IVPB    insulin regular (HUMULIN R;NOVOLIN R) 100 Units in sodium intravenous contrast. Dose modulation, iterative reconstruction, and/or weight based adjustment of the mA/kV was utilized to reduce the radiation dose to as low as reasonably achievable. COMPARISON: January 26, 2020 CT head. HISTORY: ORDERING SYSTEM PROVIDED HISTORY: HA, vomiting Hx of SAH last month and prior TECHNOLOGIST PROVIDED HISTORY: HA, vomiting Hx of SAH last month and prior Is the patient pregnant?->No FINDINGS: BRAIN/VENTRICLES: Left occipital craniotomy and subjacent intraparenchymal hemorrhage appears improved with a central hypodense surgical bed. No intraventricular or other intracranial hemorrhage. No midline shift or herniation. Ventricles, cisterns and sulci normal. ORBITS: The visualized portion of the orbits demonstrate no acute abnormality. SINUSES: The visualized paranasal sinuses and mastoid air cells demonstrate no acute abnormality. SOFT TISSUES/SKULL:  No acute abnormality of the visualized skull or soft tissues. Left occipital craniotomy with interval improvement of subjacent intraparenchymal hemorrhage. The findings were sent to the Radiology Results [address] at 3:43 pm on 2/18/2020to be communicated to a licensed caregiver. Case discussed with Dr. Priscilla Herron at 3:47 p.m. Ct Head Wo Contrast    Result Date: 1/26/2020  EXAMINATION: CT OF THE HEAD WITHOUT CONTRAST  1/26/2020 11:52 am TECHNIQUE: CT of the head was performed without the administration of intravenous contrast. Dose modulation, iterative reconstruction, and/or weight based adjustment of the mA/kV was utilized to reduce the radiation dose to as low as reasonably achievable. COMPARISON: CT brain performed 01/25/2020.  HISTORY: ORDERING SYSTEM PROVIDED HISTORY: f/u hematoma TECHNOLOGIST PROVIDED HISTORY: f/u hematoma Is the patient pregnant?->No Reason for Exam: f/u hematoma FINDINGS: BRAIN/VENTRICLES: There is redemonstration of a resection cavity in the posterior aspect of the left temporal lobe with visualized paranasal sinuses and mastoid air cells demonstrate no acute abnormality. SOFT TISSUES/SKULL:  Postoperative findings associated with left temporal occipital craniotomy is noted without significant interval change visualized. Interval increase/development of acute hemorrhage within left temporal occipital surgical resection bed underlying craniotomy postoperative change, as discussed above. Mra Head Wo Contrast    Result Date: 2/18/2020  EXAMINATION: MRA OF THE HEAD WITHOUT CONTRAST 2/18/2020 5:56 pm TECHNIQUE: MRA of the head was performed utilizing time-of-flight imaging with MIP images. No intravenous contrast was administered. COMPARISON: CT head February 18, 2020 and MR brain January 25, 2020 HISTORY: ORDERING SYSTEM PROVIDED HISTORY: dizziness, headache, history of recent left occipital cavernoma resection TECHNOLOGIST PROVIDED HISTORY: dizziness, headache, history of recent left occipital cavernoma resection Is the patient pregnant?->No FINDINGS: ANTERIOR CIRCULATION: No significant stenosis of the intracranial internal carotid, anterior cerebral, or middle cerebral arteries. POSTERIOR CIRCULATION: No significant stenosis of the vertebral, basilar, or posterior cerebral arteries. Left occipital craniotomy and subjacent 10 x 14 mm focus of T1 shortening consistent with methemoglobin. Left occipital craniotomy and subjacent intraparenchymal hemorrhage. Otherwise negative MRA. No evidence of AVM or aneurysm. Mri Brain W Wo Contrast    Result Date: 1/25/2020  EXAMINATION: MRI OF THE BRAIN WITHOUT AND WITH CONTRAST  1/25/2020 9:35 am TECHNIQUE: Multiplanar multisequence MRI of the head/brain was performed without and with the administration of intravenous contrast. COMPARISON: CT brain performed 01/25/2020.  HISTORY: ORDERING SYSTEM PROVIDED HISTORY: history of cavernoma with new hemorrhage TECHNOLOGIST PROVIDED HISTORY: history of cavernoma with new hemorrhage Is the patient pregnant?->No FINDINGS: INTRACRANIAL STRUCTURES/VENTRICLES:  The sellar and suprasellar structures, optic chiasm, corpus callosum, pineal gland, tectum, and midline brainstem structures are unremarkable. The craniocervical junction is unremarkable. There is redemonstration of a small resection focus in the left temporal lobe posteriorly that contains evolving hemorrhagic products. There is postcontrast enhancement of the cavity peripherally measuring approximately 2.2 x 2.1 cm. There is mild adjacent dural enhancement. There is no significant mass effect or midline shift. There is otherwise satisfactory gray-white matter differentiation. The ventricular structures are symmetric and unremarkable. The infratentorial structures including the cerebellopontine angles and internal auditory canals are unremarkable. ORBITS: The visualized portion of the orbits demonstrate no acute abnormality. SINUSES: The visualized paranasal sinuses and mastoid air cells are well aerated. BONES/SOFT TISSUES: There has been prior left posterior parietal temporal craniotomy. Redemonstration of small resection cavity in the posterior aspect of the left temporal lobe containing evolving hemorrhagic products. There is peripheral enhancement of the resection cavity as well as adjacent dural enhancement that may be postoperative. Attention is recommended on follow-up.      Mri Brain Wo Contrast    Result Date: 2/18/2020  EXAMINATION: MRI OF THE BRAIN WITHOUT CONTRAST  2/18/2020 5:56 pm TECHNIQUE: Multiplanar multisequence MRI of the brain was performed without the administration of intravenous contrast. COMPARISON: CT head from today and MR brain January 25, 2020 HISTORY: ORDERING SYSTEM PROVIDED HISTORY: dizziness, headache; history of recent left occipital cavernoma resection TECHNOLOGIST PROVIDED HISTORY: dizziness, headache; history of recent left occipital cavernoma resection Is the patient pregnant?->No FINDINGS: INTRACRANIAL STRUCTURES/VENTRICLES: No mass effect or midline shift. Left occipital surgical bed in hemorrhagic products appears smaller. It appears hyperintense on T1 and T2 weighted sequences centrally and has hypointense hemosiderin ring. There also appears to be restricted diffusion of the posterior cerebral artery territory/occipital cortex. The ventricles and sulci are normal in size and configuration. The sellar/suprasellar regions appear unremarkable. The normal signal voids within the major intracranial vessels appear maintained. ORBITS: The visualized portion of the orbits demonstrate no acute abnormality. SINUSES: The visualized paranasal sinuses and mastoid air cells are well aerated. BONES/SOFT TISSUES: Left occipital craniectomy. Possible new acute left posterior cerebral artery territory infarction. Sensitivity and specificity are limited due to local magnetic susceptibility artifact from postsurgical and hemorrhagic change. Left occipital craniectomy with interval decrease in size of the surgical bed and intraparenchymal hemorrhage. Residual or recurrent cavernoma is not excluded. RECOMMENDATIONS: The findings were sent to the Radiology Results [address] at 7:13 pm on 2/18/2020to be communicated to a licensed caregiver. RECENT VITALS:     Temp: 98.2 °F (36.8 °C),  Pulse: 77, Resp: 14, BP: 109/73, SpO2: 97 %    This patient is a 30 y.o. Female with severe headache, nausea, vomiting, and confusion. Past history of subarachnoid hemorrhage status post craniotomy. CT head stable. Neurology recommended MRI/MRA. Imaging unremarkable. Most recent intracranial hemorrhage in January 2020. Hypokalemic at 2.3, hyperglycemic at 792. No past history of diabetes. Treated with IV fluids, 40 oral potassium, 40 IV potassium, 2 g magnesium. Plan to recheck potassium prior to starting insulin. Awaiting transfer to floor.     Headache resolved after compazine, benadryl, decadron, and ativan. OUTSTANDING TASKS / RECOMMENDATIONS:    1. Awaiting transfer to floor     FINAL IMPRESSION:     1. Hyperglycemia    2. Dehydration    3. Hypokalemia    4.  Hypomagnesemia        DISPOSITION:         DISPOSITION:  []  Discharge   []  Transfer -    []  Admission -  Intermed   [x]  Against Medical Advice   []  Eloped   FOLLOW-UP: Greg Rolon  [id number] [place] [address]  [phone]           DISCHARGE MEDICATIONS: New Prescriptions    No medications on file          Monika Obrien MD  Emergency Medicine Resident  [facility]      Monika Obrien MD  02/18/20 1568

## 2020-02-20 LAB
ABSOLUTE EOS #: 0.15 K/UL (ref 0–0.44)
ABSOLUTE IMMATURE GRANULOCYTE: 0.04 K/UL (ref 0–0.3)
ABSOLUTE LYMPH #: 4.76 K/UL (ref 1.1–3.7)
ABSOLUTE MONO #: 0.47 K/UL (ref 0.1–1.2)
ALBUMIN SERPL-MCNC: 3 G/DL (ref 3.5–5.2)
ALBUMIN/GLOBULIN RATIO: 1.2 (ref 1–2.5)
ALP BLD-CCNC: 101 U/L (ref 35–104)
ALT SERPL-CCNC: 14 U/L (ref 5–33)
ANION GAP SERPL CALCULATED.3IONS-SCNC: 12 MMOL/L (ref 9–17)
AST SERPL-CCNC: 15 U/L
BASOPHILS # BLD: 1 % (ref 0–2)
BASOPHILS ABSOLUTE: 0.05 K/UL (ref 0–0.2)
BILIRUB SERPL-MCNC: 0.19 MG/DL (ref 0.3–1.2)
BUN BLDV-MCNC: 20 MG/DL (ref 6–20)
BUN/CREAT BLD: ABNORMAL (ref 9–20)
CALCIUM SERPL-MCNC: 8.4 MG/DL (ref 8.6–10.4)
CHLORIDE BLD-SCNC: 101 MMOL/L (ref 98–107)
CO2: 22 MMOL/L (ref 20–31)
CREAT SERPL-MCNC: 0.8 MG/DL (ref 0.5–0.9)
DIFFERENTIAL TYPE: ABNORMAL
EOSINOPHILS RELATIVE PERCENT: 2 % (ref 1–4)
GFR AFRICAN AMERICAN: >60 ML/MIN
GFR NON-AFRICAN AMERICAN: >60 ML/MIN
GFR SERPL CREATININE-BSD FRML MDRD: ABNORMAL ML/MIN/{1.73_M2}
GFR SERPL CREATININE-BSD FRML MDRD: ABNORMAL ML/MIN/{1.73_M2}
GLUCOSE BLD-MCNC: 214 MG/DL (ref 65–105)
GLUCOSE BLD-MCNC: 296 MG/DL (ref 65–105)
GLUCOSE BLD-MCNC: 300 MG/DL (ref 65–105)
GLUCOSE BLD-MCNC: 417 MG/DL (ref 70–99)
HCT VFR BLD CALC: 33.9 % (ref 36.3–47.1)
HEMOGLOBIN: 11.2 G/DL (ref 11.9–15.1)
IMMATURE GRANULOCYTES: 0 %
LYMPHOCYTES # BLD: 50 % (ref 24–43)
MAGNESIUM: 1.3 MG/DL (ref 1.6–2.6)
MCH RBC QN AUTO: 27.8 PG (ref 25.2–33.5)
MCHC RBC AUTO-ENTMCNC: 33 G/DL (ref 28.4–34.8)
MCV RBC AUTO: 84.1 FL (ref 82.6–102.9)
MONOCYTES # BLD: 5 % (ref 3–12)
NRBC AUTOMATED: 0 PER 100 WBC
PDW BLD-RTO: 12.5 % (ref 11.8–14.4)
PHOSPHORUS: 2.7 MG/DL (ref 2.6–4.5)
PLATELET # BLD: 230 K/UL (ref 138–453)
PLATELET ESTIMATE: ABNORMAL
PMV BLD AUTO: 12 FL (ref 8.1–13.5)
POTASSIUM SERPL-SCNC: 3.8 MMOL/L (ref 3.7–5.3)
RBC # BLD: 4.03 M/UL (ref 3.95–5.11)
RBC # BLD: ABNORMAL 10*6/UL
SEG NEUTROPHILS: 43 % (ref 36–65)
SEGMENTED NEUTROPHILS ABSOLUTE COUNT: 4.04 K/UL (ref 1.5–8.1)
SODIUM BLD-SCNC: 135 MMOL/L (ref 135–144)
TOTAL PROTEIN: 5.5 G/DL (ref 6.4–8.3)
WBC # BLD: 9.5 K/UL (ref 3.5–11.3)
WBC # BLD: ABNORMAL 10*3/UL

## 2020-02-20 PROCEDURE — 87040 BLOOD CULTURE FOR BACTERIA: CPT

## 2020-02-20 PROCEDURE — 83735 ASSAY OF MAGNESIUM: CPT

## 2020-02-20 PROCEDURE — 80053 COMPREHEN METABOLIC PANEL: CPT

## 2020-02-20 PROCEDURE — 6370000000 HC RX 637 (ALT 250 FOR IP): Performed by: INTERNAL MEDICINE

## 2020-02-20 PROCEDURE — 85025 COMPLETE CBC W/AUTO DIFF WBC: CPT

## 2020-02-20 PROCEDURE — 84100 ASSAY OF PHOSPHORUS: CPT

## 2020-02-20 PROCEDURE — 2060000000 HC ICU INTERMEDIATE R&B

## 2020-02-20 PROCEDURE — 6360000002 HC RX W HCPCS: Performed by: INTERNAL MEDICINE

## 2020-02-20 PROCEDURE — 82947 ASSAY GLUCOSE BLOOD QUANT: CPT

## 2020-02-20 PROCEDURE — 36415 COLL VENOUS BLD VENIPUNCTURE: CPT

## 2020-02-20 PROCEDURE — 99232 SBSQ HOSP IP/OBS MODERATE 35: CPT | Performed by: INTERNAL MEDICINE

## 2020-02-20 RX ADMIN — INSULIN LISPRO 6 UNITS: 100 INJECTION, SOLUTION INTRAVENOUS; SUBCUTANEOUS at 11:48

## 2020-02-20 RX ADMIN — HYDROCODONE BITARTRATE AND ACETAMINOPHEN 1 TABLET: 5; 325 TABLET ORAL at 15:29

## 2020-02-20 RX ADMIN — INSULIN LISPRO 5 UNITS: 100 INJECTION, SOLUTION INTRAVENOUS; SUBCUTANEOUS at 17:06

## 2020-02-20 RX ADMIN — LEVETIRACETAM 500 MG: 500 TABLET, FILM COATED ORAL at 08:51

## 2020-02-20 RX ADMIN — INSULIN LISPRO 4 UNITS: 100 INJECTION, SOLUTION INTRAVENOUS; SUBCUTANEOUS at 17:05

## 2020-02-20 RX ADMIN — HYDROCODONE BITARTRATE AND ACETAMINOPHEN 1 TABLET: 5; 325 TABLET ORAL at 09:17

## 2020-02-20 RX ADMIN — INSULIN LISPRO 5 UNITS: 100 INJECTION, SOLUTION INTRAVENOUS; SUBCUTANEOUS at 11:49

## 2020-02-20 RX ADMIN — DIBASIC SODIUM PHOSPHATE, MONOBASIC POTASSIUM PHOSPHATE AND MONOBASIC SODIUM PHOSPHATE 1 TABLET: 852; 155; 130 TABLET ORAL at 10:18

## 2020-02-20 RX ADMIN — MAGNESIUM SULFATE HEPTAHYDRATE 1 G: 1 INJECTION, SOLUTION INTRAVENOUS at 15:25

## 2020-02-20 RX ADMIN — INSULIN LISPRO 15 UNITS: 100 INJECTION, SOLUTION INTRAVENOUS; SUBCUTANEOUS at 09:16

## 2020-02-20 RX ADMIN — LEVETIRACETAM 500 MG: 500 TABLET, FILM COATED ORAL at 21:13

## 2020-02-20 RX ADMIN — DESMOPRESSIN ACETATE 40 MG: 0.2 TABLET ORAL at 21:13

## 2020-02-20 RX ADMIN — MAGNESIUM SULFATE HEPTAHYDRATE 1 G: 1 INJECTION, SOLUTION INTRAVENOUS at 19:54

## 2020-02-20 RX ADMIN — INSULIN GLARGINE 10 UNITS: 100 INJECTION, SOLUTION SUBCUTANEOUS at 09:16

## 2020-02-20 RX ADMIN — MAGNESIUM SULFATE HEPTAHYDRATE 1 G: 1 INJECTION, SOLUTION INTRAVENOUS at 18:46

## 2020-02-20 RX ADMIN — MAGNESIUM SULFATE HEPTAHYDRATE 1 G: 1 INJECTION, SOLUTION INTRAVENOUS at 17:03

## 2020-02-20 ASSESSMENT — PAIN SCALES - GENERAL
PAINLEVEL_OUTOF10: 7
PAINLEVEL_OUTOF10: 6
PAINLEVEL_OUTOF10: 8
PAINLEVEL_OUTOF10: 6

## 2020-02-20 ASSESSMENT — PAIN DESCRIPTION - LOCATION
LOCATION: HEAD
LOCATION: HEAD

## 2020-02-20 ASSESSMENT — PAIN DESCRIPTION - ONSET: ONSET: ON-GOING

## 2020-02-20 ASSESSMENT — PAIN DESCRIPTION - PAIN TYPE
TYPE: ACUTE PAIN
TYPE: ACUTE PAIN

## 2020-02-20 ASSESSMENT — PAIN DESCRIPTION - DESCRIPTORS: DESCRIPTORS: HEADACHE;SORE

## 2020-02-20 ASSESSMENT — PAIN DESCRIPTION - FREQUENCY: FREQUENCY: CONTINUOUS

## 2020-02-20 NOTE — CARE COORDINATION
Case Management Initial Discharge Plan  Herbie Garcia,             Met with:patient to discuss discharge plans. Information verified: address, contacts, phone number, , insurance Yes  PCP: Candace Valle  Date of last visit: past year    Insurance Provider: Caridad Carlton    Discharge Planning    Living Arrangements:  Parent   Support Systems:  Parent, Family Members    Home has 2 stories  3 stairs to climb to get into front door, stairs to climb to reach second floor  Location of bedroom/bathroom in home 2nd    Patient able to perform ADL's:Independent    Current Services (outpatient & in home) [facility]  DME equipment: walker  DME provider:       Potential Assistance Needed:  N/A    Patient agreeable to home care: Yes  Freedom of choice provided:  yes    Prior SNF/Rehab Placement and Facility: no  Agreeable to SNF/Rehab: No  Chandler of choice provided: no   Evaluation: n/a    Expected Discharge date:  20  Patient expects to be discharged to:  Home  Follow Up Appointment: Best Day/ Time: Wednesday AM    Transportation provider: mom  Transportation arrangements needed for discharge: No    Readmission Risk              Risk of Unplanned Readmission:        21             Does patient have a readmission risk score greater than 14?: Yes  If yes, follow-up appointment must be made within 7 days of discharge. Goals of Care: control headache      Discharge Plan: home with Robert Beth and family.           Electronically signed by Boris Ramon RN on 20 at 5:00 PM

## 2020-02-20 NOTE — PROGRESS NOTES
[facility]    Progress Note    2/20/2020    11:09 AM    Name:   Ralph Patiño  MRN:     [id number]     Acct:      [de-identified]   Room:   [address] [place] [address] Day:  2  Admit Date:  2/18/2020  2:48 PM    PCP:   Denny Valle  Code Status:  Full Code    Subjective:     C/C:   Chief Complaint   Patient presents with    Dizziness     dizziness and vomiting for the past couple of days. Brain bleed one month ago, has not yet had follow up with neurosurgery      Interval History Status: Still complaining of weakness. Patient seen and examined  Patient feels better today headache has resolved  Patient denies fever chest pain shortness of breath  I am seeing the patient for      Brief History:   30years old female with past medical history of intracranial hemorrhage presented to the hospital with headache and hyperosmolar nonketotic hyperglycemia patient was treated with insulin drip also MRI was concern for stroke neurology was consulted probably artifact  Patient was also found to have electrolyte imbalance hypokalemia hypomagnesemia was replaced    Medications: Allergies:     Allergies   Allergen Reactions    Latex     Prozac [Fluoxetine Hcl] Other (See Comments)     Seizures       Current Meds:   Scheduled Meds:    insulin lispro  5 Units Subcutaneous TID WC    insulin lispro  0-12 Units Subcutaneous TID WC    insulin lispro  0-6 Units Subcutaneous Nightly    insulin glargine  10 Units Subcutaneous Daily    atorvastatin  40 mg Oral Nightly    lamoTRIgine  25 mg Oral Daily    levETIRAcetam  500 mg Oral BID    sodium chloride  15 mL/kg Intravenous Once    enoxaparin  40 mg Subcutaneous Daily     Continuous Infusions:    dextrose      dextrose 5 % and 0.45 % NaCl 150 mL/hr at 02/19/20 0745     PRN Meds: glucose, dextrose, glucagon (rDNA), dextrose, potassium chloride, magnesium sulfate, potassium chloride **OR** potassium alternative Yessica at 3:47 p.m. Mra Head Wo Contrast    Result Date: 2/18/2020  Left occipital craniotomy and subjacent intraparenchymal hemorrhage. Otherwise negative MRA. No evidence of AVM or aneurysm. Mri Brain Wo Contrast    Result Date: 2/18/2020  Possible new acute left posterior cerebral artery territory infarction. Sensitivity and specificity are limited due to local magnetic susceptibility artifact from postsurgical and hemorrhagic change. Left occipital craniectomy with interval decrease in size of the surgical bed and intraparenchymal hemorrhage. Residual or recurrent cavernoma is not excluded. RECOMMENDATIONS: The findings were sent to the Radiology Results [address] at 7:13 pm on 2/18/2020to be communicated to a licensed caregiver.        Physical Examination:        General appearance:  alert, cooperative and no distress  Mental Status:  oriented to person, place and time and normal affect  Lungs:  clear to auscultation bilaterally, normal effort  Heart:  regular rate and rhythm, no murmur  Abdomen:  soft, nontender, nondistended, normal bowel sounds, no masses, hepatomegaly, splenomegaly  Extremities:  no edema, redness, tenderness in the calves  Skin:  no gross lesions, rashes, induration    Assessment:        Hospital Problems           Last Modified POA    Headache 2/18/2020 Yes    Hyperglycemia 2/19/2020 Yes    Electrolyte abnormality 2/19/2020 Yes    History of surgery of head 2/19/2020 Yes    History of seizure 2/19/2020 Yes    Headache disorder 2/19/2020 Yes          Plan:        Headache probably related to hyperosmolar nonketotic diabetic hyperglycemia neurology also was consulted patient had abnormal MRI of the brain neurology recommendation appreciated unless patient has acute stroke        Hyperosmolar nonketotic diabetic coma insulin IV per protocol was started give IV hydration replace potassium and magnesium follow protocol improved patient will be discharged on Lantus scheduled

## 2020-02-21 LAB
ABSOLUTE EOS #: 0.24 K/UL (ref 0–0.44)
ABSOLUTE IMMATURE GRANULOCYTE: 0 K/UL (ref 0–0.3)
ABSOLUTE LYMPH #: 4.84 K/UL (ref 1.1–3.7)
ABSOLUTE MONO #: 0.73 K/UL (ref 0.1–1.2)
BASOPHILS # BLD: 0 % (ref 0–2)
BASOPHILS ABSOLUTE: 0 K/UL (ref 0–0.2)
BILIRUBIN URINE: NEGATIVE
COLOR: YELLOW
COMMENT UA: ABNORMAL
CULTURE: ABNORMAL
CULTURE: ABNORMAL
DIFFERENTIAL TYPE: ABNORMAL
EOSINOPHILS RELATIVE PERCENT: 2 % (ref 1–4)
GLUCOSE BLD-MCNC: 205 MG/DL (ref 65–105)
GLUCOSE BLD-MCNC: 248 MG/DL (ref 65–105)
GLUCOSE BLD-MCNC: 323 MG/DL (ref 65–105)
GLUCOSE BLD-MCNC: 332 MG/DL (ref 65–105)
GLUCOSE BLD-MCNC: 437 MG/DL (ref 65–105)
GLUCOSE URINE: ABNORMAL
HCT VFR BLD CALC: 39.4 % (ref 36.3–47.1)
HEMOGLOBIN: 13.1 G/DL (ref 11.9–15.1)
IMMATURE GRANULOCYTES: 0 %
KETONES, URINE: NEGATIVE
LEUKOCYTE ESTERASE, URINE: NEGATIVE
LV EF: 56 %
LVEF MODALITY: NORMAL
LYMPHOCYTES # BLD: 40 % (ref 24–43)
Lab: ABNORMAL
MCH RBC QN AUTO: 27.3 PG (ref 25.2–33.5)
MCHC RBC AUTO-ENTMCNC: 33.2 G/DL (ref 28.4–34.8)
MCV RBC AUTO: 82.1 FL (ref 82.6–102.9)
MONOCYTES # BLD: 6 % (ref 3–12)
MORPHOLOGY: ABNORMAL
NITRITE, URINE: NEGATIVE
NRBC AUTOMATED: 0 PER 100 WBC
PDW BLD-RTO: 12.3 % (ref 11.8–14.4)
PH UA: 6.5 (ref 5–8)
PLATELET # BLD: 257 K/UL (ref 138–453)
PLATELET ESTIMATE: ABNORMAL
PMV BLD AUTO: 11.4 FL (ref 8.1–13.5)
PROTEIN UA: NEGATIVE
RBC # BLD: 4.8 M/UL (ref 3.95–5.11)
RBC # BLD: ABNORMAL 10*6/UL
SEG NEUTROPHILS: 52 % (ref 36–65)
SEGMENTED NEUTROPHILS ABSOLUTE COUNT: 6.29 K/UL (ref 1.5–8.1)
SPECIFIC GRAVITY UA: 1.02 (ref 1–1.03)
SPECIMEN DESCRIPTION: ABNORMAL
TURBIDITY: CLEAR
URINE HGB: NEGATIVE
UROBILINOGEN, URINE: NORMAL
WBC # BLD: 12.1 K/UL (ref 3.5–11.3)
WBC # BLD: ABNORMAL 10*3/UL

## 2020-02-21 PROCEDURE — 36415 COLL VENOUS BLD VENIPUNCTURE: CPT

## 2020-02-21 PROCEDURE — 6370000000 HC RX 637 (ALT 250 FOR IP): Performed by: INTERNAL MEDICINE

## 2020-02-21 PROCEDURE — 2060000000 HC ICU INTERMEDIATE R&B

## 2020-02-21 PROCEDURE — 81003 URINALYSIS AUTO W/O SCOPE: CPT

## 2020-02-21 PROCEDURE — 85025 COMPLETE CBC W/AUTO DIFF WBC: CPT

## 2020-02-21 PROCEDURE — 87086 URINE CULTURE/COLONY COUNT: CPT

## 2020-02-21 PROCEDURE — 99254 IP/OBS CNSLTJ NEW/EST MOD 60: CPT | Performed by: INTERNAL MEDICINE

## 2020-02-21 PROCEDURE — 93306 TTE W/DOPPLER COMPLETE: CPT

## 2020-02-21 PROCEDURE — 82947 ASSAY GLUCOSE BLOOD QUANT: CPT

## 2020-02-21 PROCEDURE — 99232 SBSQ HOSP IP/OBS MODERATE 35: CPT | Performed by: INTERNAL MEDICINE

## 2020-02-21 PROCEDURE — G0108 DIAB MANAGE TRN  PER INDIV: HCPCS

## 2020-02-21 RX ORDER — INSULIN GLARGINE 100 [IU]/ML
15 INJECTION, SOLUTION SUBCUTANEOUS DAILY
Status: DISCONTINUED | OUTPATIENT
Start: 2020-02-22 | End: 2020-02-22

## 2020-02-21 RX ORDER — INSULIN GLARGINE 100 [IU]/ML
5 INJECTION, SOLUTION SUBCUTANEOUS ONCE
Status: COMPLETED | OUTPATIENT
Start: 2020-02-21 | End: 2020-02-21

## 2020-02-21 RX ADMIN — INSULIN GLARGINE 10 UNITS: 100 INJECTION, SOLUTION SUBCUTANEOUS at 07:54

## 2020-02-21 RX ADMIN — INSULIN LISPRO 6 UNITS: 100 INJECTION, SOLUTION INTRAVENOUS; SUBCUTANEOUS at 00:48

## 2020-02-21 RX ADMIN — INSULIN LISPRO 8 UNITS: 100 INJECTION, SOLUTION INTRAVENOUS; SUBCUTANEOUS at 12:39

## 2020-02-21 RX ADMIN — LEVETIRACETAM 500 MG: 500 TABLET, FILM COATED ORAL at 07:53

## 2020-02-21 RX ADMIN — INSULIN LISPRO 4 UNITS: 100 INJECTION, SOLUTION INTRAVENOUS; SUBCUTANEOUS at 07:53

## 2020-02-21 RX ADMIN — INSULIN LISPRO 7 UNITS: 100 INJECTION, SOLUTION INTRAVENOUS; SUBCUTANEOUS at 18:04

## 2020-02-21 RX ADMIN — HYDROCODONE BITARTRATE AND ACETAMINOPHEN 1 TABLET: 5; 325 TABLET ORAL at 08:00

## 2020-02-21 RX ADMIN — HYDROCODONE BITARTRATE AND ACETAMINOPHEN 1 TABLET: 5; 325 TABLET ORAL at 01:04

## 2020-02-21 RX ADMIN — INSULIN LISPRO 5 UNITS: 100 INJECTION, SOLUTION INTRAVENOUS; SUBCUTANEOUS at 07:55

## 2020-02-21 RX ADMIN — HYDROCODONE BITARTRATE AND ACETAMINOPHEN 1 TABLET: 5; 325 TABLET ORAL at 14:30

## 2020-02-21 RX ADMIN — INSULIN LISPRO 7 UNITS: 100 INJECTION, SOLUTION INTRAVENOUS; SUBCUTANEOUS at 12:40

## 2020-02-21 RX ADMIN — INSULIN GLARGINE 5 UNITS: 100 INJECTION, SOLUTION SUBCUTANEOUS at 12:39

## 2020-02-21 RX ADMIN — DESMOPRESSIN ACETATE 40 MG: 0.2 TABLET ORAL at 21:42

## 2020-02-21 RX ADMIN — LEVETIRACETAM 500 MG: 500 TABLET, FILM COATED ORAL at 21:42

## 2020-02-21 RX ADMIN — INSULIN LISPRO 8 UNITS: 100 INJECTION, SOLUTION INTRAVENOUS; SUBCUTANEOUS at 18:02

## 2020-02-21 RX ADMIN — INSULIN LISPRO 2 UNITS: 100 INJECTION, SOLUTION INTRAVENOUS; SUBCUTANEOUS at 21:42

## 2020-02-21 ASSESSMENT — ENCOUNTER SYMPTOMS
COLOR CHANGE: 0
ABDOMINAL DISTENTION: 0
COUGH: 0
EYE DISCHARGE: 0
APNEA: 0

## 2020-02-21 ASSESSMENT — PAIN DESCRIPTION - LOCATION: LOCATION: HEAD

## 2020-02-21 ASSESSMENT — PAIN SCALES - GENERAL
PAINLEVEL_OUTOF10: 10
PAINLEVEL_OUTOF10: 6
PAINLEVEL_OUTOF10: 6

## 2020-02-21 ASSESSMENT — PAIN DESCRIPTION - PAIN TYPE: TYPE: ACUTE PAIN

## 2020-02-21 NOTE — DISCHARGE INSTR - COC
[facility] I61.9    History of seizures Z87.898    Cavernoma D18.00    Status post craniotomy Z98.890    Occipital subdural bleed (HCC) I62.00    Headache R51    Hyperglycemia R73.9    Electrolyte abnormality E87.8    History of surgery of head Z98.890    History of seizure Z87.898    Headache disorder R51    Bandemia D72.825    Acute cystitis without hematuria N30.00    MRSA infection A49.02       Isolation/Infection:   Isolation          No Isolation        Patient Infection Status     Infection Onset Added Last Indicated Last Indicated By Review Planned Expiration Resolved Resolved By    MRSA 02/19/20 02/21/20 02/19/20 Culture, Urine              Nurse Assessment:  Last Vital Signs: /83   Pulse 84   Temp 97.9 °F (36.6 °C) (Oral)   Resp 13   Ht 5' 1\" (1.549 m)   Wt 162 lb 14.7 oz (73.9 kg)   SpO2 97%   BMI 30.78 kg/m²     Last documented pain score (0-10 scale): Pain Level: 10  Last Weight:   Wt Readings from Last 1 Encounters:   02/19/20 162 lb 14.7 oz (73.9 kg)     Mental Status:  oriented and alert    IV Access:  - None    Nursing Mobility/ADLs:  Walking   Independent  Transfer  Independent  Bathing  Independent  Dressing  Independent  Toileting  Independent  Feeding  Independent  Med Admin  Independent  Med Delivery   whole    Wound Care Documentation and Therapy:        Elimination:  Continence:   · Bowel: Yes  · Bladder: Yes  Urinary Catheter: None   Colostomy/Ileostomy/Ileal Conduit: No       Date of Last BM: **  Intake/Output Summary (Last 24 hours) at 2/21/2020 0937  Last data filed at 2/21/2020 0626  Gross per 24 hour   Intake 1008 ml   Output 3700 ml   Net -2692 ml     I/O last 3 completed shifts: In: 1008 [P. O.:900;  I.V.:108]  Out: 3700 [Urine:3700]    Safety Concerns:     Blood sugars- see note below    Impairments/Disabilities:      None    Nutrition Therapy:  Current Nutrition Therapy:   - Oral Diet:  Carb Control 5 carbs/meal (2000kcals/day)    Routes of Feeding: Oral  Liquids: No Restrictions  Daily Fluid Restriction: no  Last Modified Barium Swallow with Video (Video Swallowing Test): not done    Treatments at the Time of Hospital Discharge:   Respiratory Treatments: ***  Oxygen Therapy:  is not on home oxygen therapy. Ventilator:    - No ventilator support    Rehab Therapies: ***  Weight Bearing Status/Restrictions: No weight bearing restirctions  Other Medical Equipment (for information only, NOT a DME order):  ***  Other Treatments: Continue diabetic education, patient is new to insulin. Diabetic Education saw her in the hospital, but needs to be reinforced as patient did not recall much of teaching immediately after education provided. Glucometer provided to patient. Patient's personal belongings (please select all that are sent with patient):  None    RN SIGNATURE:  Electronically signed by David Danielson RN on 2/25/20 at 12:01 PM    CASE MANAGEMENT/SOCIAL WORK SECTION    Inpatient Status Date: 2/18/2020    Readmission Risk Assessment Score:  Readmission Risk              Risk of Unplanned Readmission:        19           Discharging to Facility/ Agency   · Name:    [facility]  · Address:  · Phone:    [phone]  · Fax:    [phone]    Dialysis Facility (if applicable)   · Name:  · Address:  · Dialysis Schedule:  · Phone:  · Fax:    / signature: Electronically signed by Lizzy Flores RN on 2/25/2020 at 4:01 PM      PHYSICIAN SECTION    Prognosis: Good    Condition at Discharge: Stable    Rehab Potential (if transferring to Rehab): Good    Recommended Labs or Other Treatments After Discharge: cbc, bmp, magnesium in 1 week    Physician Certification: I certify the above information and transfer of Zeke Holloway  is necessary for the continuing treatment of the diagnosis listed and that she requires Home Care for less 30 days.      Update Admission H&P: No change in H&P    PHYSICIAN SIGNATURE:  Electronically signed by Bernarda Vila

## 2020-02-21 NOTE — CONSULTS
Relationship status: Not on file    Intimate partner violence:     Fear of current or ex partner: Not on file     Emotionally abused: Not on file     Physically abused: Not on file     Forced sexual activity: Not on file   Other Topics Concern    Not on file   Social History Narrative    Not on file       Family History:   History reviewed. No pertinent family history. Allergies:   Latex and Prozac [fluoxetine hcl]     Review of Systems:     Review of Systems   Constitutional: Negative for activity change. HENT: Negative for congestion. Gum hypertrophic   Eyes: Negative for discharge. Respiratory: Negative for apnea and cough. Cardiovascular: Negative for chest pain. Gastrointestinal: Negative for abdominal distention. Endocrine: Negative for cold intolerance. Genitourinary: Positive for dysuria. Negative for difficulty urinating. Musculoskeletal: Negative for arthralgias. Skin: Negative for color change. Allergic/Immunologic: Negative for immunocompromised state. Neurological: Negative for dizziness. Hematological: Negative for adenopathy. Psychiatric/Behavioral: Negative for agitation. Physical Examination :     Patient Vitals for the past 8 hrs:   BP Temp Temp src Pulse Resp SpO2   02/21/20 0400 113/83 97.9 °F (36.6 °C) Oral 84 13 97 %       Physical Exam  Constitutional:       Appearance: Normal appearance. HENT:      Head: Normocephalic and atraumatic. Nose: Nose normal. No congestion. Mouth/Throat:      Mouth: Mucous membranes are moist.      Pharynx: Oropharynx is clear. Eyes:      General: No scleral icterus. Conjunctiva/sclera: Conjunctivae normal.      Pupils: Pupils are equal, round, and reactive to light. Neck:      Musculoskeletal: Neck supple. No neck rigidity. Cardiovascular:      Rate and Rhythm: Normal rate and regular rhythm. Heart sounds: Normal heart sounds. No murmur. Pulmonary:      Effort: No respiratory distress. Breath sounds: Normal breath sounds. Abdominal:      General: There is no distension. Palpations: Abdomen is soft. There is no mass. Genitourinary:     Comments: Urine clear  Musculoskeletal:         General: No swelling or tenderness. Skin:     General: Skin is dry. Coloration: Skin is not jaundiced. Neurological:      General: No focal deficit present. Mental Status: She is alert and oriented to person, place, and time. Psychiatric:         Thought Content: Thought content normal.           Medical Decision Making:   I have independently reviewed/ordered the following labs:    CBC with Differential:   Recent Labs     02/20/20  0546 02/21/20  0539   WBC 9.5 12.1*   HGB 11.2* 13.1   HCT 33.9* 39.4    257   LYMPHOPCT 50* 40   MONOPCT 5 6     BMP:  Recent Labs     02/19/20  1545 02/20/20  0546 02/20/20  1301    135  --    K 3.3* 3.8  --     101  --    CO2 24 22  --    BUN 14 20  --    CREATININE 0.72 0.80  --    MG 1.4*  --  1.3*     Hepatic Function Panel:   Recent Labs     02/19/20  1545 02/20/20  0546   PROT 6.3* 5.5*   LABALBU 3.6 3.0*   BILITOT 0.45 0.19*   ALKPHOS 109* 101   ALT 12 14   AST 11 15     No results for input(s): RPR in the last 72 hours. No results for input(s): HIV in the last 72 hours. No results for input(s): BC in the last 72 hours. Lab Results   Component Value Date    CREATININE 0.80 02/20/2020    GLUCOSE 417 02/20/2020       Detailed results: Thank you for allowing us to participate in the care of this patient. Please call with questions. This note is created with the assistance of a speech recognition program.  While intending to generate adocument that actually reflects the content of the visit, the document can still have some errors including those of syntax and sound a like substitutions which may escape proof reading. It such instances, actual meaningcan be extrapolated by contextual diversion.     Romain Rolon  Office: [phone]

## 2020-02-21 NOTE — PROGRESS NOTES
chloride **OR** potassium alternative oral replacement **OR** potassium chloride, dextrose 5 % and 0.45 % NaCl, magnesium sulfate, HYDROcodone 5 mg - acetaminophen    Data:     Past Medical History:   has a past medical history of Cerebral vascular malformation, Diabetes mellitus ([facility] Seizure ([facility] and Traumatic hemorrhage of left cerebrum without loss of consciousness ([facility] Social History:   reports that she has been smoking cigarettes. She has been smoking about 0.30 packs per day. She has never used smokeless tobacco. She reports current drug use. Drug: Marijuana. She reports that she does not drink alcohol. Family History: History reviewed. No pertinent family history. Vitals:  /73   Pulse 75   Temp 97.6 °F (36.4 °C) (Temporal)   Resp 13   Ht 5' 1\" (1.549 m)   Wt 162 lb 14.7 oz (73.9 kg)   SpO2 98%   BMI 30.78 kg/m²   Temp (24hrs), Av.7 °F (36.5 °C), Min:97.6 °F (36.4 °C), Max:97.9 °F (36.6 °C)    Recent Labs     20  1627 20  2112 20  0031 20  0729   POCGLU 214* 300* 437* 248*       I/O (24Hr):     Intake/Output Summary (Last 24 hours) at 2020 1039  Last data filed at 2020 0626  Gross per 24 hour   Intake 1008 ml   Output 3700 ml   Net -2692 ml       Labs:  Hematology:  Recent Labs     20  1620 20  1545 20  0546 20  0539   WBC 10.8 14.6* 9.5 12.1*   RBC 4.57 4.64 4.03 4.80   HGB 12.3 12.6 11.2* 13.1   HCT 36.6 37.3 33.9* 39.4   MCV 80.1* 80.4* 84.1 82.1*   MCH 26.9 27.2 27.8 27.3   MCHC 33.6 33.8 33.0 33.2   RDW 12.0 12.4 12.5 12.3    251 230 257   MPV 11.5 11.0 12.0 11.4   INR 1.0  --   --   --      Chemistry:  Recent Labs     20  0627 20  1105 20  1545 20  0546 20  1301   NA  --    < > 141 140 139 135  --    K  --    < > 3.0* 3.4* 3.3* 3.8  --    CL  --    < > 101 103 101 101  --    CO2  --    < > 22 22 24 22  --    GLUCOSE  --    < > 287* 223* 214* 417*  --    BUN  -- hepatomegaly, splenomegaly  Extremities:  no edema, redness, tenderness in the calves  Skin:  no gross lesions, rashes, induration    Assessment:        Hospital Problems           Last Modified POA    Headache 2/18/2020 Yes    Hyperglycemia 2/19/2020 Yes    Electrolyte abnormality 2/19/2020 Yes    History of surgery of head 2/19/2020 Yes    History of seizure 2/19/2020 Yes    Headache disorder 2/19/2020 Yes    Bandemia 2/21/2020 Yes    Acute cystitis without hematuria 2/21/2020 Yes    MRSA infection 2/21/2020 Yes          Plan:        Headache probably related to hyperosmolar nonketotic diabetic hyperglycemia neurology also was consulted patient had abnormal MRI of the brain neurology recommendation appreciated unless patient has acute stroke        Hyperosmolar nonketotic diabetic coma insulin IV per protocol was started give IV hydration replace potassium and magnesium follow protocol improved patient will be discharged on Lantus scheduled insulin and sliding scale insulin        Acute metabolic encephalopathy most likely related to above        Dehydration IV fluid        Acute severe hypokalemia replace        Hypomagnesemia replace        Telemetry monitor and neuro check was ordered      Urine culture was positive for staph and strep ID was consulted plan to repeat straight cath culture blood culture and treat if positive also plan for echo    Educate patient regarding insulin      Insulin adjustment was made today anticipate discharge in a.m. if cultures negative and blood sugars under control    Dalton Go MD  2/21/2020  10:39 AM

## 2020-02-21 NOTE — PROGRESS NOTES
Diabetes education follow up visit /  Inpatient Diabetes  Education     Type and Reason for Visit: Patient Education  Verbally reviewed following information with patient  Survival skills Diagnosis,  Blood glucose targets, hypo and hyperglycemia, importance of home blood glucose monitoring, heathy eating  plate method for CHO control portions, be active as recommended by health care providers, take medications oral and or insulin as directed - with insulin action times and plan for meal time and long acting insulin. Written educational materials provided at last visit 2/19/2020. Patient still with short term memory issues and repeating same questions and forgetful in course of conversation. She also expressed having a lot of anxiety overall with her plans and home concerns - stated her mom had to move all belongs and will have place as well as concern for her children and regaining custody. CM and RN discussed case - home care is in place with JANUSZ updated with need for follow up education and reinforcement of self care plan for diabetes. RECOMMENDATIONS FOR OUTPATIENT PLAN:   [employer] follows [place] unified formulary   - True Metrix is preferred    - insulin lantus pen and humalog pen preferred    Diabetes Self-Monitoring Supplies: True Metrix BG meter provided for demo - need supplies on going: _x__ Strips- True Metrix  and lancets for 4 frequency of home BGSM    Diabetes Medications:  ___ Insulin - lantus Pen  long acting - dose per MD   ___ Insulin  humalog Pen   Bolus pre meal set dose  or correction scale - dose per MD  _x__ Insulin Delivery method - Pens -   order Insulin Pen Needle 31G X 4 mm MISC-- quality 4 per day       Diabetes Education / HCP follow -up :   _x_ Would recommend follow -up education at outpatient diabetes education at [place] [id number]. An ordered is needed for this service and can be placed via EHR.  Discharge Navigator --- Med Reconciliation -- New orders for discharge tab -- search diabetic ed - REF20 -  [facility]  - review and sign     _x__ Follow -up with HCP / PCP within one week.     Lucius Carreon, RN CDE

## 2020-02-22 LAB
CULTURE: NORMAL
GLUCOSE BLD-MCNC: 208 MG/DL (ref 65–105)
GLUCOSE BLD-MCNC: 251 MG/DL (ref 65–105)
GLUCOSE BLD-MCNC: 255 MG/DL (ref 65–105)
GLUCOSE BLD-MCNC: 292 MG/DL (ref 65–105)
GLUCOSE BLD-MCNC: 335 MG/DL (ref 65–105)
Lab: NORMAL
SPECIMEN DESCRIPTION: NORMAL

## 2020-02-22 PROCEDURE — 99232 SBSQ HOSP IP/OBS MODERATE 35: CPT | Performed by: INTERNAL MEDICINE

## 2020-02-22 PROCEDURE — 6360000002 HC RX W HCPCS: Performed by: NURSE PRACTITIONER

## 2020-02-22 PROCEDURE — 6370000000 HC RX 637 (ALT 250 FOR IP): Performed by: INTERNAL MEDICINE

## 2020-02-22 PROCEDURE — 6370000000 HC RX 637 (ALT 250 FOR IP): Performed by: NURSE PRACTITIONER

## 2020-02-22 PROCEDURE — 2060000000 HC ICU INTERMEDIATE R&B

## 2020-02-22 PROCEDURE — 82947 ASSAY GLUCOSE BLOOD QUANT: CPT

## 2020-02-22 RX ORDER — FLUCONAZOLE 100 MG/1
100 TABLET ORAL ONCE
Status: COMPLETED | OUTPATIENT
Start: 2020-02-22 | End: 2020-02-22

## 2020-02-22 RX ORDER — INSULIN GLARGINE 100 [IU]/ML
5 INJECTION, SOLUTION SUBCUTANEOUS ONCE
Status: COMPLETED | OUTPATIENT
Start: 2020-02-22 | End: 2020-02-22

## 2020-02-22 RX ORDER — DIPHENHYDRAMINE HYDROCHLORIDE 50 MG/ML
25 INJECTION INTRAMUSCULAR; INTRAVENOUS ONCE
Status: COMPLETED | OUTPATIENT
Start: 2020-02-23 | End: 2020-02-22

## 2020-02-22 RX ORDER — MORPHINE SULFATE 2 MG/ML
2 INJECTION, SOLUTION INTRAMUSCULAR; INTRAVENOUS ONCE
Status: COMPLETED | OUTPATIENT
Start: 2020-02-23 | End: 2020-02-22

## 2020-02-22 RX ORDER — INSULIN GLARGINE 100 [IU]/ML
20 INJECTION, SOLUTION SUBCUTANEOUS DAILY
Status: DISCONTINUED | OUTPATIENT
Start: 2020-02-23 | End: 2020-02-23

## 2020-02-22 RX ADMIN — DESMOPRESSIN ACETATE 40 MG: 0.2 TABLET ORAL at 21:35

## 2020-02-22 RX ADMIN — INSULIN LISPRO 2 UNITS: 100 INJECTION, SOLUTION INTRAVENOUS; SUBCUTANEOUS at 21:45

## 2020-02-22 RX ADMIN — FLUCONAZOLE 100 MG: 100 TABLET ORAL at 14:28

## 2020-02-22 RX ADMIN — INSULIN LISPRO 6 UNITS: 100 INJECTION, SOLUTION INTRAVENOUS; SUBCUTANEOUS at 12:27

## 2020-02-22 RX ADMIN — HYDROCODONE BITARTRATE AND ACETAMINOPHEN 1 TABLET: 5; 325 TABLET ORAL at 23:12

## 2020-02-22 RX ADMIN — FLUCONAZOLE 100 MG: 100 TABLET ORAL at 07:14

## 2020-02-22 RX ADMIN — INSULIN GLARGINE 15 UNITS: 100 INJECTION, SOLUTION SUBCUTANEOUS at 08:15

## 2020-02-22 RX ADMIN — INSULIN LISPRO 7 UNITS: 100 INJECTION, SOLUTION INTRAVENOUS; SUBCUTANEOUS at 08:15

## 2020-02-22 RX ADMIN — INSULIN LISPRO 6 UNITS: 100 INJECTION, SOLUTION INTRAVENOUS; SUBCUTANEOUS at 08:16

## 2020-02-22 RX ADMIN — DIPHENHYDRAMINE HYDROCHLORIDE 25 MG: 50 INJECTION, SOLUTION INTRAMUSCULAR; INTRAVENOUS at 23:56

## 2020-02-22 RX ADMIN — INSULIN LISPRO 6 UNITS: 100 INJECTION, SOLUTION INTRAVENOUS; SUBCUTANEOUS at 16:34

## 2020-02-22 RX ADMIN — INSULIN LISPRO 10 UNITS: 100 INJECTION, SOLUTION INTRAVENOUS; SUBCUTANEOUS at 12:26

## 2020-02-22 RX ADMIN — MORPHINE SULFATE 2 MG: 2 INJECTION, SOLUTION INTRAMUSCULAR; INTRAVENOUS at 23:56

## 2020-02-22 RX ADMIN — LEVETIRACETAM 500 MG: 500 TABLET, FILM COATED ORAL at 21:35

## 2020-02-22 RX ADMIN — LEVETIRACETAM 500 MG: 500 TABLET, FILM COATED ORAL at 08:14

## 2020-02-22 RX ADMIN — INSULIN LISPRO 10 UNITS: 100 INJECTION, SOLUTION INTRAVENOUS; SUBCUTANEOUS at 16:35

## 2020-02-22 RX ADMIN — INSULIN GLARGINE 5 UNITS: 100 INJECTION, SOLUTION SUBCUTANEOUS at 10:53

## 2020-02-22 ASSESSMENT — ENCOUNTER SYMPTOMS
ABDOMINAL DISTENTION: 0
COLOR CHANGE: 0
EYE DISCHARGE: 0
APNEA: 0
COUGH: 0

## 2020-02-22 ASSESSMENT — PAIN SCALES - GENERAL
PAINLEVEL_OUTOF10: 7

## 2020-02-22 NOTE — PROGRESS NOTES
magnesium sulfate, potassium chloride **OR** potassium alternative oral replacement **OR** potassium chloride, dextrose 5 % and 0.45 % NaCl, magnesium sulfate, HYDROcodone 5 mg - acetaminophen    Data:     Past Medical History:   has a past medical history of Cerebral vascular malformation, Diabetes mellitus ([facility] Seizure ([facility] and Traumatic hemorrhage of left cerebrum without loss of consciousness ([facility] Social History:   reports that she has been smoking cigarettes. She has been smoking about 0.30 packs per day. She has never used smokeless tobacco. She reports current drug use. Drug: Marijuana. She reports that she does not drink alcohol. Family History: History reviewed. No pertinent family history. Vitals:  /76   Pulse 83   Temp 97.9 °F (36.6 °C) (Oral)   Resp 13   Ht 5' 1\" (1.549 m)   Wt 162 lb 14.7 oz (73.9 kg)   SpO2 99%   BMI 30.78 kg/m²   Temp (24hrs), Av °F (36.7 °C), Min:97.9 °F (36.6 °C), Max:98.2 °F (36.8 °C)    Recent Labs     20  1238 20  1802 20  2136 20  0728   POCGLU 332* 323* 205* 251*       I/O (24Hr):     Intake/Output Summary (Last 24 hours) at 2020 0917  Last data filed at 2020 1738  Gross per 24 hour   Intake 900 ml   Output --   Net 900 ml       Labs:  Hematology:  Recent Labs     20  1545 20  0546 20  0539   WBC 14.6* 9.5 12.1*   RBC 4.64 4.03 4.80   HGB 12.6 11.2* 13.1   HCT 37.3 33.9* 39.4   MCV 80.4* 84.1 82.1*   MCH 27.2 27.8 27.3   MCHC 33.8 33.0 33.2   RDW 12.4 12.5 12.3    230 257   MPV 11.0 12.0 11.4     Chemistry:  Recent Labs     20  1105 20  1545 20  0546 20  1301    139 135  --    K 3.4* 3.3* 3.8  --     101 101  --    CO2 22 24 22  --    GLUCOSE 223* 214* 417*  --    BUN 15 14 20  --    CREATININE 0.66 0.72 0.80  --    MG 1.6 1.4*  --  1.3*   ANIONGAP 15 14 12  --    LABGLOM >60 >60 >60  --    GFRAA >60 >60 >60  --    CALCIUM 8.8 8.8 8.4*  --    PHOS 1.3*

## 2020-02-23 ENCOUNTER — APPOINTMENT (OUTPATIENT)
Dept: CT IMAGING | Age: 25
DRG: 420 | End: 2020-02-23
Payer: MEDICARE

## 2020-02-23 LAB
ABSOLUTE EOS #: 0.22 K/UL (ref 0–0.44)
ABSOLUTE IMMATURE GRANULOCYTE: 0.06 K/UL (ref 0–0.3)
ABSOLUTE LYMPH #: 3.78 K/UL (ref 1.1–3.7)
ABSOLUTE MONO #: 0.62 K/UL (ref 0.1–1.2)
ALBUMIN SERPL-MCNC: 3.7 G/DL (ref 3.5–5.2)
ALBUMIN/GLOBULIN RATIO: 1.1 (ref 1–2.5)
ALP BLD-CCNC: 156 U/L (ref 35–104)
ALT SERPL-CCNC: 52 U/L (ref 5–33)
ANION GAP SERPL CALCULATED.3IONS-SCNC: 18 MMOL/L (ref 9–17)
AST SERPL-CCNC: 40 U/L
BASOPHILS # BLD: 0 % (ref 0–2)
BASOPHILS ABSOLUTE: 0.05 K/UL (ref 0–0.2)
BILIRUB SERPL-MCNC: 0.25 MG/DL (ref 0.3–1.2)
BUN BLDV-MCNC: 23 MG/DL (ref 6–20)
BUN/CREAT BLD: ABNORMAL (ref 9–20)
CALCIUM SERPL-MCNC: 10.3 MG/DL (ref 8.6–10.4)
CHLORIDE BLD-SCNC: 97 MMOL/L (ref 98–107)
CO2: 19 MMOL/L (ref 20–31)
CREAT SERPL-MCNC: 0.8 MG/DL (ref 0.5–0.9)
DIFFERENTIAL TYPE: ABNORMAL
EOSINOPHILS RELATIVE PERCENT: 2 % (ref 1–4)
GFR AFRICAN AMERICAN: >60 ML/MIN
GFR NON-AFRICAN AMERICAN: >60 ML/MIN
GFR SERPL CREATININE-BSD FRML MDRD: ABNORMAL ML/MIN/{1.73_M2}
GFR SERPL CREATININE-BSD FRML MDRD: ABNORMAL ML/MIN/{1.73_M2}
GLUCOSE BLD-MCNC: 224 MG/DL (ref 65–105)
GLUCOSE BLD-MCNC: 251 MG/DL (ref 65–105)
GLUCOSE BLD-MCNC: 290 MG/DL (ref 70–99)
GLUCOSE BLD-MCNC: 298 MG/DL (ref 65–105)
GLUCOSE BLD-MCNC: 332 MG/DL (ref 65–105)
GLUCOSE BLD-MCNC: 98 MG/DL (ref 65–105)
HCT VFR BLD CALC: 43.2 % (ref 36.3–47.1)
HEMOGLOBIN: 13.7 G/DL (ref 11.9–15.1)
IMMATURE GRANULOCYTES: 1 %
LYMPHOCYTES # BLD: 33 % (ref 24–43)
MCH RBC QN AUTO: 27.5 PG (ref 25.2–33.5)
MCHC RBC AUTO-ENTMCNC: 31.7 G/DL (ref 28.4–34.8)
MCV RBC AUTO: 86.6 FL (ref 82.6–102.9)
MONOCYTES # BLD: 5 % (ref 3–12)
NRBC AUTOMATED: 0 PER 100 WBC
PDW BLD-RTO: 12.9 % (ref 11.8–14.4)
PLATELET # BLD: 257 K/UL (ref 138–453)
PLATELET ESTIMATE: ABNORMAL
PMV BLD AUTO: 11.2 FL (ref 8.1–13.5)
POTASSIUM SERPL-SCNC: 3.5 MMOL/L (ref 3.7–5.3)
RBC # BLD: 4.99 M/UL (ref 3.95–5.11)
RBC # BLD: ABNORMAL 10*6/UL
SEG NEUTROPHILS: 59 % (ref 36–65)
SEGMENTED NEUTROPHILS ABSOLUTE COUNT: 6.78 K/UL (ref 1.5–8.1)
SODIUM BLD-SCNC: 134 MMOL/L (ref 135–144)
TOTAL PROTEIN: 7.1 G/DL (ref 6.4–8.3)
WBC # BLD: 11.5 K/UL (ref 3.5–11.3)
WBC # BLD: ABNORMAL 10*3/UL

## 2020-02-23 PROCEDURE — 80053 COMPREHEN METABOLIC PANEL: CPT

## 2020-02-23 PROCEDURE — 6360000002 HC RX W HCPCS: Performed by: INTERNAL MEDICINE

## 2020-02-23 PROCEDURE — 85025 COMPLETE CBC W/AUTO DIFF WBC: CPT

## 2020-02-23 PROCEDURE — 82947 ASSAY GLUCOSE BLOOD QUANT: CPT

## 2020-02-23 PROCEDURE — 6370000000 HC RX 637 (ALT 250 FOR IP): Performed by: INTERNAL MEDICINE

## 2020-02-23 PROCEDURE — 1200000000 HC SEMI PRIVATE

## 2020-02-23 PROCEDURE — 36415 COLL VENOUS BLD VENIPUNCTURE: CPT

## 2020-02-23 PROCEDURE — 99232 SBSQ HOSP IP/OBS MODERATE 35: CPT | Performed by: INTERNAL MEDICINE

## 2020-02-23 PROCEDURE — 70450 CT HEAD/BRAIN W/O DYE: CPT

## 2020-02-23 RX ORDER — INSULIN GLARGINE 100 [IU]/ML
25 INJECTION, SOLUTION SUBCUTANEOUS DAILY
Status: DISCONTINUED | OUTPATIENT
Start: 2020-02-24 | End: 2020-02-25 | Stop reason: HOSPADM

## 2020-02-23 RX ORDER — MORPHINE SULFATE 2 MG/ML
2 INJECTION, SOLUTION INTRAMUSCULAR; INTRAVENOUS EVERY 4 HOURS PRN
Status: DISCONTINUED | OUTPATIENT
Start: 2020-02-23 | End: 2020-02-25 | Stop reason: HOSPADM

## 2020-02-23 RX ORDER — INSULIN GLARGINE 100 [IU]/ML
5 INJECTION, SOLUTION SUBCUTANEOUS ONCE
Status: COMPLETED | OUTPATIENT
Start: 2020-02-23 | End: 2020-02-23

## 2020-02-23 RX ADMIN — INSULIN LISPRO 6 UNITS: 100 INJECTION, SOLUTION INTRAVENOUS; SUBCUTANEOUS at 11:53

## 2020-02-23 RX ADMIN — ENOXAPARIN SODIUM 40 MG: 40 INJECTION SUBCUTANEOUS at 09:07

## 2020-02-23 RX ADMIN — INSULIN LISPRO 13 UNITS: 100 INJECTION, SOLUTION INTRAVENOUS; SUBCUTANEOUS at 11:57

## 2020-02-23 RX ADMIN — DESMOPRESSIN ACETATE 40 MG: 0.2 TABLET ORAL at 22:17

## 2020-02-23 RX ADMIN — MORPHINE SULFATE 2 MG: 2 INJECTION, SOLUTION INTRAMUSCULAR; INTRAVENOUS at 14:05

## 2020-02-23 RX ADMIN — INSULIN LISPRO 10 UNITS: 100 INJECTION, SOLUTION INTRAVENOUS; SUBCUTANEOUS at 09:21

## 2020-02-23 RX ADMIN — INSULIN LISPRO 3 UNITS: 100 INJECTION, SOLUTION INTRAVENOUS; SUBCUTANEOUS at 21:45

## 2020-02-23 RX ADMIN — INSULIN GLARGINE 20 UNITS: 100 INJECTION, SOLUTION SUBCUTANEOUS at 09:07

## 2020-02-23 RX ADMIN — HYDROCODONE BITARTRATE AND ACETAMINOPHEN 1 TABLET: 5; 325 TABLET ORAL at 09:07

## 2020-02-23 RX ADMIN — POTASSIUM CHLORIDE 40 MEQ: 1500 TABLET, EXTENDED RELEASE ORAL at 16:03

## 2020-02-23 RX ADMIN — LAMOTRIGINE 25 MG: 25 TABLET ORAL at 09:07

## 2020-02-23 RX ADMIN — INSULIN LISPRO 8 UNITS: 100 INJECTION, SOLUTION INTRAVENOUS; SUBCUTANEOUS at 09:08

## 2020-02-23 RX ADMIN — LEVETIRACETAM 500 MG: 500 TABLET, FILM COATED ORAL at 22:16

## 2020-02-23 RX ADMIN — LEVETIRACETAM 500 MG: 500 TABLET, FILM COATED ORAL at 09:18

## 2020-02-23 RX ADMIN — HYDROCODONE BITARTRATE AND ACETAMINOPHEN 1 TABLET: 5; 325 TABLET ORAL at 21:21

## 2020-02-23 RX ADMIN — MORPHINE SULFATE 2 MG: 2 INJECTION, SOLUTION INTRAMUSCULAR; INTRAVENOUS at 23:38

## 2020-02-23 RX ADMIN — INSULIN GLARGINE 5 UNITS: 100 INJECTION, SOLUTION SUBCUTANEOUS at 14:09

## 2020-02-23 ASSESSMENT — PAIN DESCRIPTION - LOCATION
LOCATION: HEAD
LOCATION: HEAD

## 2020-02-23 ASSESSMENT — ENCOUNTER SYMPTOMS
APNEA: 0
ABDOMINAL DISTENTION: 0
COLOR CHANGE: 0
COUGH: 0
EYE DISCHARGE: 0

## 2020-02-23 ASSESSMENT — PAIN SCALES - GENERAL
PAINLEVEL_OUTOF10: 6
PAINLEVEL_OUTOF10: 4
PAINLEVEL_OUTOF10: 10
PAINLEVEL_OUTOF10: 3
PAINLEVEL_OUTOF10: 3
PAINLEVEL_OUTOF10: 9
PAINLEVEL_OUTOF10: 7
PAINLEVEL_OUTOF10: 7

## 2020-02-23 ASSESSMENT — PAIN DESCRIPTION - PAIN TYPE
TYPE: ACUTE PAIN
TYPE: ACUTE PAIN

## 2020-02-23 NOTE — PROGRESS NOTES
Patient continues to have severe headache pain despite receiving Norco. She says she is seeing flashing rights in her peripheral vision. She also feels numbness at the top of her head and radiating to the back of her head. NO mentation or LOC changes. Neurochecks remain normal. Her vitals are also stable at this time. Perfect serve message sent to Internal medicine. Pardeep HEARN at bedside. CT scan of head ordered.

## 2020-02-23 NOTE — CONSULTS
Endovascular Neurosurgery Note for  Stroke Alert @ 15:10  2/22/2020 9:10 PM  Pt Name: Jeremi Kurtz  MRN: [id number]  YOB: 1995  Date of evaluation: 2/22/2020  Primary Care Physician: Heidemyrtle KelloggBeny is a 30 y.o. female who presents with history of January 8, 2020 left occipital cavernoma resection with residual right homonymous hemianopsia. She has had 2 to 3 days of headache, dizziness, and scotomas in the right peripheral vision. No other motor weakness. NIH of 1. Allergies  is allergic to latex and prozac [fluoxetine hcl]. Medications  Prior to Admission medications    Medication Sig Start Date End Date Taking?  Authorizing Provider   levETIRAcetam (KEPPRA) 500 MG tablet Take 1 tablet by mouth 2 times daily for 7 days 1/10/20 2/18/20 Yes Lennox Larsen MD   ondansetron [facility]) 4 MG tablet Take 1 tablet by mouth every 6 hours as needed for Nausea or Vomiting 8/16/17  Yes Rut Hernandez MD   scopolamine (TRANSDERM-SCOP) transdermal patch Place 1 patch onto the skin every 72 hours 1/29/20   Zoltan Watt MD   atorvastatin (LIPITOR) 40 MG tablet Take 1 tablet by mouth nightly 1/10/20   Lennox Larsen MD   busPIRone (BUSPAR) 10 MG tablet Take 10 mg by mouth 3 times daily    Historical Provider, MD   lamoTRIgine (LAMICTAL) 25 MG tablet Take 25 mg by mouth daily    Historical Provider, MD   docusate sodium (COLACE) 100 MG capsule Take 100 mg by mouth 2 times daily as needed for Constipation    Historical Provider, MD   etonogestrel (NEXPLANON) 68 MG implant 68 mg by Subdermal route once    Historical Provider, MD    Scheduled Meds:   [START ON 2/23/2020] insulin glargine  20 Units Subcutaneous Daily    insulin lispro  10 Units Subcutaneous TID WC    insulin lispro  0-12 Units Subcutaneous TID WC    insulin lispro  0-6 Units Subcutaneous Nightly    atorvastatin  40 mg Oral Nightly    lamoTRIgine  25 mg Oral Daily    levETIRAcetam  500 mg Oral BID    sodium chloride abused: Not on file     Forced sexual activity: Not on file   Other Topics Concern    Not on file   Social History Narrative    Not on file     Family History  History reviewed. No pertinent family history. ROS  CONSTITUTIONAL: negative for fatigue and malaise   EYES: negative for double vision and photophobia    HEENT: negative for tinnitus and sore throat   RESPIRATORY: negative for cough, shortness of breath   CARDIOVASCULAR: negative for chest pain, palpitations   GASTROINTESTINAL: negative for nausea, vomiting   GENITOURINARY: negative for incontinence   MUSCULOSKELETAL: negative for neck or back pain   NEUROLOGICAL: negative for seizures + right visual field loss   PSYCHIATRIC: negative for agitated      Review of systems otherwise negative    OBJECTIVE  /80   Pulse 104   Temp 98 °F (36.7 °C) (Oral)   Resp 17   Ht 5' 1\" (1.549 m)   Wt 162 lb 14.7 oz (73.9 kg)   SpO2 97%   BMI 30.78 kg/m²   Gen:Lying in bed, nad  CV:RRR  NEURO:, Oriented x3, intact language  CN: EOMI, right homonymous hemianopsia, V1 to V3 intact, no facial asymmetry. ,  Midline tongue  motor: 5 out of 5 bilateral upper and lower extremities  COORD: no dysmetria    Imaging:  Images were personally reviewed including:  CT brain without contrast: Improving ICH with left occipital craniotomy  MRI with increased DWI in the left PCA territory MRA was unremarkable. Assessment  30 y.o. female who presents with history of January 8, 2020 left occipital cavernoma resection with residual right homonymous hemianopsia. 1. Possible subacute on chronic left pca cva      Recommendations:  1. NS consult, migraine cocktail  2.  NEurology consult if warranted for seizure workup and ha management  3. BS management per medicine team    110 minutes with greater than 50% of time spent face to face, counseling, coordinating care, examining patient, reviewing images and labs personally, and speaking with team.      Samuel Miller MD Pager:

## 2020-02-23 NOTE — PROGRESS NOTES
Patient received awake and alert, no c/o pain no s/s of distress. Nsg assessment completed, see flow. Safety measures in place.

## 2020-02-23 NOTE — PROGRESS NOTES
[facility]    Progress Note    2/23/2020    9:48 AM    Name:   Ansley Nicholas  MRN:     [id number]     Acct:      [de-identified]   Room:   [address] [place] [address] Day:  5  Admit Date:  2/18/2020  2:48 PM    PCP:   Bandar Valle  Code Status:  Full Code    Subjective:     C/C:   Chief Complaint   Patient presents with    Dizziness     dizziness and vomiting for the past couple of days. Brain bleed one month ago, has not yet had follow up with neurosurgery      Interval History Status: Complains of generalized weakness    Patient seen and examined  Patient feels weak blood sugar still uncontrolled she had episode of headache last night severe pending CT scan neurology was notified according to EMR  Patient denies fever chest pain shortness of breath  I am seeing the patient for      Brief History:   30years old female with past medical history of intracranial hemorrhage presented to the hospital with headache and hyperosmolar nonketotic hyperglycemia patient was treated with insulin drip also MRI was concern for stroke neurology was consulted probably artifact  Patient was also found to have electrolyte imbalance hypokalemia hypomagnesemia was replaced    Medications: Allergies:     Allergies   Allergen Reactions    Latex     Prozac [Fluoxetine Hcl] Other (See Comments)     Seizures       Current Meds:   Scheduled Meds:    insulin glargine  20 Units Subcutaneous Daily    insulin lispro  10 Units Subcutaneous TID WC    insulin lispro  0-12 Units Subcutaneous TID WC    insulin lispro  0-6 Units Subcutaneous Nightly    atorvastatin  40 mg Oral Nightly    lamoTRIgine  25 mg Oral Daily    levETIRAcetam  500 mg Oral BID    sodium chloride  15 mL/kg Intravenous Once    enoxaparin  40 mg Subcutaneous Daily     Continuous Infusions:    dextrose      dextrose 5 % and 0.45 % NaCl 150 mL/hr at 02/19/20 0745     PRN Meds: glucose, dextrose, glucagon (rDNA), dextrose, potassium chloride, magnesium sulfate, potassium chloride **OR** potassium alternative oral replacement **OR** potassium chloride, dextrose 5 % and 0.45 % NaCl, magnesium sulfate, HYDROcodone 5 mg - acetaminophen    Data:     Past Medical History:   has a past medical history of Cerebral vascular malformation, Diabetes mellitus ([facility] [address] Seizure ([facility] [address] and Traumatic hemorrhage of left cerebrum without loss of consciousness ([facility] [address] Social History:   reports that she has been smoking cigarettes. She has been smoking about 0.30 packs per day. She has never used smokeless tobacco. She reports current drug use. Drug: Marijuana. She reports that she does not drink alcohol. Family History: History reviewed. No pertinent family history. Vitals:  BP 97/61   Pulse 86   Temp 97.9 °F (36.6 °C) (Oral)   Resp 20   Ht 5' 1\" (1.549 m)   Wt 162 lb 14.7 oz (73.9 kg)   SpO2 96%   BMI 30.78 kg/m²   Temp (24hrs), Av °F (36.7 °C), Min:97.9 °F (36.6 °C), Max:98.3 °F (36.8 °C)    Recent Labs     20  1611 200 20  0616 20  0859   POCGLU 292* 208* 224* 332*       I/O (24Hr):     Intake/Output Summary (Last 24 hours) at 2020 0948  Last data filed at 2020 0616  Gross per 24 hour   Intake 1500 ml   Output --   Net 1500 ml       Labs:  Hematology:  Recent Labs     20  0539   WBC 12.1*   RBC 4.80   HGB 13.1   HCT 39.4   MCV 82.1*   MCH 27.3   MCHC 33.2   RDW 12.3      MPV 11.4     Chemistry:  Recent Labs     20  1301   MG 1.3*   PHOS 2.7     Recent Labs     20  1052 20  1225 20  1611 20  2140 20  0616 20  0859   POCGLU 335* 255* 292* 208* 224* 332*     ABG:  Lab Results   Component Value Date    POCPH 7.458 2020    POCPCO2 39.1 2020    POCPO2 96.0 2020    POCHCO3 27.7 2020    NBEA NOT REPORTED 2020    PBEA 4 2020    WGP5ESD 29 2020    FIGL3STF 98 2020    FIO2 21.0 2020 Lab Results   Component Value Date/Time    SPECIAL NOT REPORTED 02/21/2020 08:40 AM     Lab Results   Component Value Date/Time    CULTURE NO SIGNIFICANT GROWTH 02/21/2020 08:40 AM       Radiology:  Ct Head Wo Contrast    Result Date: 2/18/2020  Stable appearance of posterior left temporal lobe surgical resection side with overlying craniotomy. Otherwise, unremarkable noncontrast CT head examination. Ct Head Wo Contrast    Result Date: 2/18/2020  Left occipital craniotomy with interval improvement of subjacent intraparenchymal hemorrhage. The findings were sent to the Radiology Results [address] at 3:43 pm on 2/18/2020to be communicated to a licensed caregiver. Case discussed with Dr. Bruce Payne at 3:47 p.m. Mra Head Wo Contrast    Result Date: 2/18/2020  Left occipital craniotomy and subjacent intraparenchymal hemorrhage. Otherwise negative MRA. No evidence of AVM or aneurysm. Mri Brain Wo Contrast    Result Date: 2/18/2020  Possible new acute left posterior cerebral artery territory infarction. Sensitivity and specificity are limited due to local magnetic susceptibility artifact from postsurgical and hemorrhagic change. Left occipital craniectomy with interval decrease in size of the surgical bed and intraparenchymal hemorrhage. Residual or recurrent cavernoma is not excluded. RECOMMENDATIONS: The findings were sent to the Radiology Results [address] at 7:13 pm on 2/18/2020to be communicated to a licensed caregiver.        Physical Examination:        General appearance:  alert, cooperative and no distress  Mental Status:  oriented to person, place and time and normal affect  Lungs:  clear to auscultation bilaterally, normal effort  Heart:  regular rate and rhythm, no murmur  Abdomen:  soft, nontender, nondistended, normal bowel sounds, no masses, hepatomegaly, splenomegaly  Extremities:  no edema, redness, tenderness in the calves  Skin:  no gross lesions, rashes,

## 2020-02-23 NOTE — CARE COORDINATION
Bedside Eval    Writer called to bedside via RN. Patient with c/o of acute onset worsening HA extending from posterior to top of head. Patient reporting vision changes and light flashes. Patient remains neurologically intact without focal neuro changes. Patient with history of bleed s/p crani January 25th secondary to HTN. Vital signs have remained stable without fluctuations in BP or HR. Patient has been receiving prn Norco for XAVIER throughout the day. Neurology and Neuro surgey have signed off-- however RN did notify neurology service, no additional orders received.     Will continue to monitor

## 2020-02-23 NOTE — CONSULTS
[facility] -   Progress Note    admission date 2/18/2020    reason for consultation:   Staph and Group B Strep in urine     Impression :   Current:  · MRSA/ GBS UTI or contamination  · Hyperosmotic diabetic state  · mild leukocytosis  · DM 2    Other:  · past craniotomy for brain bleed  Discussion / summary of stay / plan of care   · Pt urinated in a hat then urine was collected in the sup - urine was contaminated  Recommendations   · Straight cath urine culture 2-21-20: No growth --> No antibiotic treatment necessary. · Blood cultures x 2 : No growth     Infection Control Recommendations   · [place] Precautions  · Contact Isolation       Antimicrobial Stewardship Recommendations   · Discontinuation of therapy    Coordination ofOutpatient Care:   · Estimated Length of IV antimicrobials:  · Patient will need Midline / picc Catheter Insertion:   · Patient will need SNF:  · Patient will need outpatient wound care:     History of Present Illness:       INITIAL HISTORY:    Leopoldo Daley is a 30y.o.-year-old female with history of intraparenchymal hemorrhage 8/2019, SAH and left occipital cavernoma resection 1/8/20, and occipital subdural bleed 1/25/20 with crainiotomy. She presented to the ED with headache for the past 2 days. Headache was associated with photophobia, confusion, nausea, and vomiting. Patient was hyperglycemic 792 and hypokalemic 2.3 CT head showed stable post craniotomy changes. MRI suggestive of new acute left posterior cerebral territory infarct, limited interpretation because of post surgical changes. Decrease size of surgical bed and intraparenchymal hemorrhage. Residual or recurrent cavernoma not excluded. Headache resolved with compazine, benadryl, decadron and ativan. Initiated hyperosmolar nonketotic diabetic coma protocol.    She has not had a follow up neurosurgery appt and has not been taking any of the prescribed medications since her prior admission, including metformin. Patient has history of seizure since age 23 and is on [address]. Patient has baseline right sided hemianopsia since her surgery in Jan.   UA found Staph aureus and Group B strep in the urine. However, sample was collected from voiding hat = contaminated. EEG suggest mild encephalopathy     WBC was 10.8 on admission peaking at 14.6 on 2/19. CURRENT EVALUATION :  2/23/2020     Afebrile  VS stable    Patient feels better but still weak. Hyperglycemia has improved but not yet controlled. Electrolyte imbalance, on replacement treatment. Evaluated by NS who plan no interventions and have signed off. MRI changes felt to be artifactual.    Blood cultures: No growth  Urine cath culture: pending    Summary of relevant labs: 2/23/2020    Labs:  WBC 10.8- 14.6- 9.5 -12.1   Hb 13.1  Plat 257    Micro:  Urine Cx  Staph aureus and Group B strep   UA  3+ glucose/ small ketones / small leukocyte esterase     Imaging:  CT Head 2/18 Stable appearance of posterior left temporal lobe surgical resection side with overlying craniotomy. Otherwise, unremarkable noncontrast CT head examination. 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 Sensitivity and specificity are limited due to local magnetic susceptibility artifact from postsurgical and hemorrhagic change. Left occipital craniectomy with interval decrease in size of the surgical bed and intraparenchymal hemorrhage. Residual or recurrent cavernoma is not excluded. MRA Head 2/18  Left occipital craniotomy and subjacent intraparenchymal hemorrhage. Otherwise negative MRA. No evidence of AVM or aneurysm. CT Head 2/18  Left occipital craniotomy with interval improvement of subjacent intraparenchymal hemorrhage. The findings were sent to the Radiology Results [id number] at 3:43 pm on 2/18/2020to be communicated to a licensed caregiver.   Case discussed with Dr. Crys Fuentes at 3: 47 p.m. Discussed with patient, RN. I have personally reviewed the past medical history, past surgical history, medications, social history, and family history, and I haveupdated the database accordingly. Past Medical History:     Past Medical History:   Diagnosis Date    Cerebral vascular malformation     @[email or web address]    [facility] mellitus ([facility] [address]     Seizure ([facility] [address]     Traumatic hemorrhage of left cerebrum without loss of consciousness ([facility] [address]     @ [employer]       Past Surgical  History:     Past Surgical History:   Procedure Laterality Date    CRANIOTOMY  01/08/2020    LEFT OCCIPITAL CRANIOTOMY    CRANIOTOMY Left 1/8/2020    LEFT OCCIPITAL CRANIOTOMY, 1ST VASCULAR LESION WITH [employer] NAVIGATION (LATERAL WITH ROMERO BAG, HOLLINGSWORTH HEADHOLDER, MICROSCOPE) performed by Jenny Petty DO at [facility] OR       Medications:      insulin glargine  5 Units Subcutaneous Once    [START ON 2/24/2020] insulin glargine  25 Units Subcutaneous Daily    insulin lispro  13 Units Subcutaneous TID WC    insulin lispro  0-12 Units Subcutaneous TID WC    insulin lispro  0-6 Units Subcutaneous Nightly    atorvastatin  40 mg Oral Nightly    lamoTRIgine  25 mg Oral Daily    levETIRAcetam  500 mg Oral BID    sodium chloride  15 mL/kg Intravenous Once       Social History:     Social History     Socioeconomic History    Marital status: Single     Spouse name: Not on file    Number of children: Not on file    Years of education: Not on file    Highest education level: Not on file   Occupational History    Not on file   Social Needs    Financial resource strain: Not on file    Food insecurity:     Worry: Not on file     Inability: Not on file    Transportation needs:     Medical: Not on file     Non-medical: Not on file   Tobacco Use    Smoking status: Current Some Day Smoker     Packs/day: 0.30     Types: Cigarettes    Smokeless tobacco: Never Used   Substance and Sexual Activity    Alcohol use: No    Drug use:  Yes Types: Marijuana    Sexual activity: Yes     Partners: Male   Lifestyle    Physical activity:     Days per week: Not on file     Minutes per session: Not on file    Stress: Not on file   Relationships    Social connections:     Talks on phone: Not on file     Gets together: Not on file     Attends [employer] service: Not on file     Active member of club or organization: Not on file     Attends meetings of clubs or organizations: Not on file     Relationship status: Not on file    Intimate partner violence:     Fear of current or ex partner: Not on file     Emotionally abused: Not on file     Physically abused: Not on file     Forced sexual activity: Not on file   Other Topics Concern    Not on file   Social History Narrative    Not on file       Family History:   History reviewed. No pertinent family history. Allergies:   Latex and Prozac [fluoxetine hcl]     Review of Systems:     Review of Systems   Constitutional: Negative for activity change. HENT: Negative for congestion. Gum hypertrophic   Eyes: Negative for discharge. Respiratory: Negative for apnea and cough. Cardiovascular: Negative for chest pain. Gastrointestinal: Negative for abdominal distention. Endocrine: Negative for cold intolerance. Genitourinary: Positive for dysuria. Negative for difficulty urinating. Musculoskeletal: Negative for arthralgias. Skin: Negative for color change. Allergic/Immunologic: Negative for immunocompromised state. Neurological: Negative for dizziness. Hematological: Negative for adenopathy. Psychiatric/Behavioral: Negative for agitation. Physical Examination :     Patient Vitals for the past 8 hrs:   BP Temp Temp src Pulse Resp   02/23/20 0415 97/61 97.9 °F (36.6 °C) Oral 86 20       Physical Exam  Constitutional:       Appearance: Normal appearance. HENT:      Head: Normocephalic and atraumatic. Nose: Nose normal. No congestion.       Mouth/Throat:      Mouth:

## 2020-02-24 LAB
ABSOLUTE EOS #: 0.37 K/UL (ref 0–0.44)
ABSOLUTE IMMATURE GRANULOCYTE: 0 K/UL (ref 0–0.3)
ABSOLUTE LYMPH #: 5.54 K/UL (ref 1.1–3.7)
ABSOLUTE MONO #: 0.86 K/UL (ref 0.1–1.2)
ALBUMIN SERPL-MCNC: 3.8 G/DL (ref 3.5–5.2)
ALBUMIN/GLOBULIN RATIO: 1.3 (ref 1–2.5)
ALP BLD-CCNC: 139 U/L (ref 35–104)
ALT SERPL-CCNC: 46 U/L (ref 5–33)
ANION GAP SERPL CALCULATED.3IONS-SCNC: 13 MMOL/L (ref 9–17)
AST SERPL-CCNC: 27 U/L
BASOPHILS # BLD: 1 % (ref 0–2)
BASOPHILS ABSOLUTE: 0.12 K/UL (ref 0–0.2)
BILIRUB SERPL-MCNC: 0.25 MG/DL (ref 0.3–1.2)
BUN BLDV-MCNC: 20 MG/DL (ref 6–20)
BUN/CREAT BLD: ABNORMAL (ref 9–20)
CALCIUM SERPL-MCNC: 9.2 MG/DL (ref 8.6–10.4)
CHLORIDE BLD-SCNC: 101 MMOL/L (ref 98–107)
CO2: 21 MMOL/L (ref 20–31)
CREAT SERPL-MCNC: 0.69 MG/DL (ref 0.5–0.9)
DIFFERENTIAL TYPE: ABNORMAL
EOSINOPHILS RELATIVE PERCENT: 3 % (ref 1–4)
GFR AFRICAN AMERICAN: >60 ML/MIN
GFR NON-AFRICAN AMERICAN: >60 ML/MIN
GFR SERPL CREATININE-BSD FRML MDRD: ABNORMAL ML/MIN/{1.73_M2}
GFR SERPL CREATININE-BSD FRML MDRD: ABNORMAL ML/MIN/{1.73_M2}
GLUCOSE BLD-MCNC: 200 MG/DL (ref 65–105)
GLUCOSE BLD-MCNC: 211 MG/DL (ref 65–105)
GLUCOSE BLD-MCNC: 212 MG/DL (ref 70–99)
GLUCOSE BLD-MCNC: 222 MG/DL (ref 65–105)
GLUCOSE BLD-MCNC: 226 MG/DL (ref 65–105)
GLUCOSE BLD-MCNC: 297 MG/DL (ref 65–105)
GLUCOSE BLD-MCNC: 79 MG/DL (ref 65–105)
HCT VFR BLD CALC: 42 % (ref 36.3–47.1)
HEMOGLOBIN: 13.1 G/DL (ref 11.9–15.1)
IMMATURE GRANULOCYTES: 0 %
LYMPHOCYTES # BLD: 45 % (ref 24–43)
MCH RBC QN AUTO: 27.3 PG (ref 25.2–33.5)
MCHC RBC AUTO-ENTMCNC: 31.2 G/DL (ref 28.4–34.8)
MCV RBC AUTO: 87.7 FL (ref 82.6–102.9)
MONOCYTES # BLD: 7 % (ref 3–12)
MORPHOLOGY: NORMAL
NRBC AUTOMATED: 0 PER 100 WBC
PDW BLD-RTO: 12.9 % (ref 11.8–14.4)
PLATELET # BLD: 258 K/UL (ref 138–453)
PLATELET ESTIMATE: ABNORMAL
PMV BLD AUTO: 11.1 FL (ref 8.1–13.5)
POTASSIUM SERPL-SCNC: 3.8 MMOL/L (ref 3.7–5.3)
RBC # BLD: 4.79 M/UL (ref 3.95–5.11)
RBC # BLD: ABNORMAL 10*6/UL
SEG NEUTROPHILS: 44 % (ref 36–65)
SEGMENTED NEUTROPHILS ABSOLUTE COUNT: 5.41 K/UL (ref 1.5–8.1)
SODIUM BLD-SCNC: 135 MMOL/L (ref 135–144)
TOTAL PROTEIN: 6.8 G/DL (ref 6.4–8.3)
WBC # BLD: 12.3 K/UL (ref 3.5–11.3)
WBC # BLD: ABNORMAL 10*3/UL

## 2020-02-24 PROCEDURE — 82947 ASSAY GLUCOSE BLOOD QUANT: CPT

## 2020-02-24 PROCEDURE — 99232 SBSQ HOSP IP/OBS MODERATE 35: CPT | Performed by: INTERNAL MEDICINE

## 2020-02-24 PROCEDURE — 6370000000 HC RX 637 (ALT 250 FOR IP): Performed by: INTERNAL MEDICINE

## 2020-02-24 PROCEDURE — 85025 COMPLETE CBC W/AUTO DIFF WBC: CPT

## 2020-02-24 PROCEDURE — 1200000000 HC SEMI PRIVATE

## 2020-02-24 PROCEDURE — 6360000002 HC RX W HCPCS: Performed by: INTERNAL MEDICINE

## 2020-02-24 PROCEDURE — 80053 COMPREHEN METABOLIC PANEL: CPT

## 2020-02-24 PROCEDURE — 36415 COLL VENOUS BLD VENIPUNCTURE: CPT

## 2020-02-24 RX ORDER — INSULIN GLARGINE 100 [IU]/ML
30 INJECTION, SOLUTION SUBCUTANEOUS ONCE
Status: DISCONTINUED | OUTPATIENT
Start: 2020-02-24 | End: 2020-02-24

## 2020-02-24 RX ORDER — INSULIN GLARGINE 100 [IU]/ML
30 INJECTION, SOLUTION SUBCUTANEOUS ONCE
Status: COMPLETED | OUTPATIENT
Start: 2020-02-25 | End: 2020-02-25

## 2020-02-24 RX ORDER — INSULIN GLARGINE 100 [IU]/ML
30 INJECTION, SOLUTION SUBCUTANEOUS ONCE
Status: DISCONTINUED | OUTPATIENT
Start: 2020-02-25 | End: 2020-02-24

## 2020-02-24 RX ADMIN — MORPHINE SULFATE 2 MG: 2 INJECTION, SOLUTION INTRAMUSCULAR; INTRAVENOUS at 10:16

## 2020-02-24 RX ADMIN — INSULIN LISPRO 7 UNITS: 100 INJECTION, SOLUTION INTRAVENOUS; SUBCUTANEOUS at 13:40

## 2020-02-24 RX ADMIN — INSULIN GLARGINE 25 UNITS: 100 INJECTION, SOLUTION SUBCUTANEOUS at 08:55

## 2020-02-24 RX ADMIN — LEVETIRACETAM 500 MG: 500 TABLET, FILM COATED ORAL at 21:32

## 2020-02-24 RX ADMIN — LAMOTRIGINE 25 MG: 25 TABLET ORAL at 08:41

## 2020-02-24 RX ADMIN — MORPHINE SULFATE 2 MG: 2 INJECTION, SOLUTION INTRAMUSCULAR; INTRAVENOUS at 21:32

## 2020-02-24 RX ADMIN — HYDROCODONE BITARTRATE AND ACETAMINOPHEN 1 TABLET: 5; 325 TABLET ORAL at 17:51

## 2020-02-24 RX ADMIN — INSULIN LISPRO 6 UNITS: 100 INJECTION, SOLUTION INTRAVENOUS; SUBCUTANEOUS at 08:53

## 2020-02-24 RX ADMIN — LEVETIRACETAM 500 MG: 500 TABLET, FILM COATED ORAL at 08:41

## 2020-02-24 RX ADMIN — INSULIN LISPRO 2 UNITS: 100 INJECTION, SOLUTION INTRAVENOUS; SUBCUTANEOUS at 21:50

## 2020-02-24 RX ADMIN — INSULIN LISPRO 4 UNITS: 100 INJECTION, SOLUTION INTRAVENOUS; SUBCUTANEOUS at 16:24

## 2020-02-24 RX ADMIN — HYDROCODONE BITARTRATE AND ACETAMINOPHEN 1 TABLET: 5; 325 TABLET ORAL at 09:13

## 2020-02-24 RX ADMIN — DESMOPRESSIN ACETATE 40 MG: 0.2 TABLET ORAL at 21:32

## 2020-02-24 RX ADMIN — INSULIN LISPRO 13 UNITS: 100 INJECTION, SOLUTION INTRAVENOUS; SUBCUTANEOUS at 08:51

## 2020-02-24 ASSESSMENT — PAIN SCALES - GENERAL
PAINLEVEL_OUTOF10: 3
PAINLEVEL_OUTOF10: 5
PAINLEVEL_OUTOF10: 4
PAINLEVEL_OUTOF10: 4
PAINLEVEL_OUTOF10: 8
PAINLEVEL_OUTOF10: 4
PAINLEVEL_OUTOF10: 10
PAINLEVEL_OUTOF10: 4

## 2020-02-24 ASSESSMENT — ENCOUNTER SYMPTOMS
EYE DISCHARGE: 0
COLOR CHANGE: 0
ABDOMINAL DISTENTION: 0
COUGH: 0
APNEA: 0

## 2020-02-24 ASSESSMENT — PAIN DESCRIPTION - LOCATION: LOCATION: HEAD

## 2020-02-24 ASSESSMENT — PAIN DESCRIPTION - FREQUENCY: FREQUENCY: INTERMITTENT

## 2020-02-24 ASSESSMENT — PAIN DESCRIPTION - PROGRESSION: CLINICAL_PROGRESSION: NOT CHANGED

## 2020-02-24 ASSESSMENT — PAIN DESCRIPTION - DESCRIPTORS: DESCRIPTORS: ACHING

## 2020-02-24 ASSESSMENT — PAIN DESCRIPTION - ONSET: ONSET: ON-GOING

## 2020-02-24 ASSESSMENT — PAIN DESCRIPTION - PAIN TYPE: TYPE: ACUTE PAIN

## 2020-02-24 NOTE — PROGRESS NOTES
[facility]   Infectious diseases evaluation  admission date 2/18/2020    reason for consultation:   Staph and Group B Strep in urine     Impression :   Current:  · MRSA/ GBS UTI or contamination  · Hyperosmotic diabetic state  · mild leukocytosis  · DM 2    Other:  · past craniotomy for brain bleed  Discussion / summary of stay / plan of care   · Pt urinated in a hat then urine was collected in the sup - urine was contaminated  · 2/18 2D ECHO showed no vegetations   · 2/21 blood and urine cultures negative. Recommendations   · Straight cath was no growth and hence signifies that original MRSA of the urine was a contamination. · Large left occipital surgical wound that seems to be dry at this point, for possible drainage that might need to be cultured and treated. · I suspect the 2 left cervical lymph nodes are reactive. 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 Headache was associated with photophobia, confusion, nausea, and vomiting. Patient was hyperglycemic 792 and hypokalemic 2.3 CT head showed stable post craniotomy changes. MRI suggestive of new acute left posterior cerebral territory infarct, limited to to post surgical changes. Decrease size of surgical bed and intraparenchymal hemorrhage.  Residual or recurrent cavernoma not excluded. Headache resolved with compazine, benadryl, decadron and ativan. Initiated hyperosmolar nonketotic diabetic coma protocol. She has not had a follow up neurosurgery appt and has not been taking any of the prescribed medications since her prior admission, including metformin. Patient has history of seizure since age 23 and is on [de-identified]. Patient has baseline right sided hemianopsia since her surgery in Jan.   UA found Staph aureus and Group B strep in the urine. EEG suggest mild encephalopathy     WBC was 10.8 on admission peaking at 14.6 on 2/19. Interval changes  2/24/2020   Patient is afebrile with no leukocytosis. Glucose levels are improved but not resolved. Urine and blood cultures show no growth. Patient having intermittent headaches, CT showed no acute findings. She does have tender lymph nodes over the left occipital and cervical area, they seem to be draining the surgical occipital wound, that wound at this time is closed and healed but remains tender, not red no drainage noticed. Summary of relevant labs:  Labs:  WBC 10.8- 14.6- 9.5 -12.1 -11.5 -12.3    Micro:  2/24 Urine Culture straight cath -No growth   2/20 blood cx no growth   2/19 Urine Cx  Staph aureus and Group B strep   UA  3+ glucose/ small ketones / small leukocyte esterase     Imaging:  CT head 2/22  Stable intraparenchymal hemorrhage subjacent to overlying craniotomy. No new intracranial hemorrhage, mass effect, or midline shift. ECHO 2/21  Summary  Left ventricle is normal in size. Global left ventricular systolic function  is normal. Calculated ejection fraction is 56 % by Heart Model . Thickened mitral valve leaflets. Trivial mitral regurgitation and tricuspid regurgitation. Estimated right ventricular systolic pressure is 19ZIBU.   No obvious vegetation on this study, consider CATHY if indicated    CT Head 2/18 Stable appearance of posterior left temporal lobe surgical resection side with overlying craniotomy. Otherwise, unremarkable noncontrast CT head examination. 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 Sensitivity and specificity are limited due to local magnetic susceptibility artifact from postsurgical and hemorrhagic change. Left occipital craniectomy with interval decrease in size of the surgical bed and intraparenchymal hemorrhage. Residual or recurrent cavernoma is not excluded. MRA Head 2/18  Left occipital craniotomy and subjacent intraparenchymal hemorrhage. Otherwise negative MRA. No evidence of AVM or aneurysm. CT Head 2/18  Left occipital craniotomy with interval improvement of subjacent intraparenchymal hemorrhage. The findings were sent to the Radiology Results [address] at 3:43 pm on 2/18/2020to be communicated to a licensed caregiver. Case discussed with Dr. Otto Hylton at 3:47 p.m. I have personally reviewed the past medical history, past surgical history, medications, social history, and family history, and I haveupdated the database accordingly.   Past Medical History:     Past Medical History:   Diagnosis Date    Cerebral vascular malformation     [email or web address]    Diabetes mellitus ([address]     Seizure ([address]     Traumatic hemorrhage of left cerebrum without loss of consciousness ([address]     @ [employer]       Past Surgical  History:     Past Surgical History:   Procedure Laterality Date    CRANIOTOMY  01/08/2020    LEFT OCCIPITAL CRANIOTOMY    CRANIOTOMY Left 1/8/2020    LEFT OCCIPITAL CRANIOTOMY, 1ST VASCULAR LESION WITH [place]abel NAVIGATION (LATERAL WITH ROMERO BAG, HOLLINGSWORTH HEADHOLDER, MICROSCOPE) performed by Hussain Canales DO at [facility] OR       Medications:      insulin glargine  25 Units Subcutaneous Daily    insulin lispro  13 Units Subcutaneous TID WC    insulin lispro  0-12 Units Subcutaneous TID WC    insulin lispro  0-6 Units Subcutaneous Nightly    atorvastatin  40 mg Oral Nightly    lamoTRIgine  25 mg Oral Daily    levETIRAcetam  500 mg Oral BID    sodium chloride  15 mL/kg Intravenous Once       Social History:     Social History     Socioeconomic History    Marital status: Single     Spouse name: Not on file    Number of children: Not on file    Years of education: Not on file    Highest education level: Not on file   Occupational History    Not on file   Social Needs    Financial resource strain: Not on file    Food insecurity:     Worry: Not on file     Inability: Not on file    Transportation needs:     Medical: Not on file     Non-medical: Not on file   Tobacco Use    Smoking status: Current Some Day Smoker     Packs/day: 0.30     Types: Cigarettes    Smokeless tobacco: Never Used   Substance and Sexual Activity    Alcohol use: No    Drug use: Yes     Types: Marijuana    Sexual activity: Yes     Partners: Male   Lifestyle    Physical activity:     Days per week: Not on file     Minutes per session: Not on file    Stress: Not on file   Relationships    Social connections:     Talks on phone: Not on file     Gets together: Not on file     Attends Denominational service: Not on file     Active member of club or organization: Not on file     Attends meetings of clubs or organizations: Not on file     Relationship status: Not on file    Intimate partner violence:     Fear of current or ex partner: Not on file     Emotionally abused: Not on file     Physically abused: Not on file     Forced sexual activity: Not on file   Other Topics Concern    Not on file   Social History Narrative    Not on file       Family History:   History reviewed. No pertinent family history. Allergies:   Latex and Prozac [fluoxetine hcl]     Review of Systems:     Review of Systems   Constitutional: Negative for activity change. HENT: Negative for congestion. Gum hypertrophic   Eyes: Negative for discharge. Respiratory: Negative for apnea and cough. Cardiovascular: Negative for chest pain. Gastrointestinal: Negative for abdominal distention. Endocrine: Negative for cold intolerance. Genitourinary: Negative for difficulty urinating, dysuria and flank pain. Musculoskeletal: Negative for arthralgias. Skin: Negative for color change. Allergic/Immunologic: Negative for immunocompromised state. Neurological: Negative for dizziness and seizures. Hematological: Negative for adenopathy. Psychiatric/Behavioral: Negative for agitation. Physical Examination :     No data found. Physical Exam  Constitutional:       Appearance: Normal appearance. HENT:      Head: Normocephalic and atraumatic. Nose: Nose normal. No congestion. Mouth/Throat:      Mouth: Mucous membranes are moist.      Pharynx: Oropharynx is clear. Eyes:      General: No scleral icterus. Conjunctiva/sclera: Conjunctivae normal.      Pupils: Pupils are equal, round, and reactive to light. Neck:      Musculoskeletal: Neck supple. No neck rigidity. Cardiovascular:      Rate and Rhythm: Normal rate and regular rhythm. Heart sounds: Normal heart sounds. No murmur. Pulmonary:      Effort: No respiratory distress. Breath sounds: Normal breath sounds. Abdominal:      General: There is no distension. Palpations: Abdomen is soft. There is no mass. Genitourinary:     Comments: Urine clear  Musculoskeletal:         General: No swelling or tenderness. Skin:     General: Skin is warm and dry. Coloration: Skin is not jaundiced. Findings: No erythema. Neurological:      General: No focal deficit present. Mental Status: She is alert and oriented to person, place, and time. Psychiatric:         Thought Content:  Thought content normal.           Medical Decision Making:   I have independently reviewed/ordered the following labs:    CBC with Differential:   Recent Labs     02/23/20  1130 02/24/20  0637   WBC 11.5* 12.3*   HGB 13.7 13.1   HCT 43.2 42.0    258   LYMPHOPCT 33 PENDING   MONOPCT 5 PENDING     BMP:  Recent Labs     02/23/20  1130   *   K 3.5*   CL 97*   CO2 19*   BUN 23*   CREATININE 0.80     Hepatic Function Panel:   Recent Labs     02/23/20  1130   PROT 7.1   LABALBU 3.7   BILITOT 0.25*   ALKPHOS 156*   ALT 52*   AST 40*     No results for input(s): RPR in the last 72 hours. No results for input(s): HIV in the last 72 hours. No results for input(s): BC in the last 72 hours. Lab Results   Component Value Date    CREATININE 0.80 02/23/2020    GLUCOSE 290 02/23/2020       Detailed results: Thank you for allowing us to participate in the care of this patient. Please call with questions. This note is created with the assistance of a speech recognition program.  While intending to generate adocument that actually reflects the content of the visit, the document can still have some errors including those of syntax and sound a like substitutions which may escape proof reading. It such instances, actual meaningcan be extrapolated by contextual diversion. Alicia Dumas  Office: [phone]  Perfect serve / office [phone]    I have discussed the care of the patient, including pertinent history and exam findings,  with the student- I have seen and examined the patient and the key elements of all parts of the encounter have been performed by me. I agree with the assessment, plan and orders as documented by the student.     Molly Barajas, Infectious Diseases

## 2020-02-24 NOTE — PROGRESS NOTES
magnesium sulfate, potassium chloride **OR** potassium alternative oral replacement **OR** potassium chloride, dextrose 5 % and 0.45 % NaCl, magnesium sulfate, HYDROcodone 5 mg - acetaminophen    Data:     Past Medical History:   has a past medical history of Cerebral vascular malformation, Diabetes mellitus ([facility] [address] Seizure ([facility] [address] and Traumatic hemorrhage of left cerebrum without loss of consciousness ([facility] [address] Social History:   reports that she has been smoking cigarettes. She has been smoking about 0.30 packs per day. She has never used smokeless tobacco. She reports current drug use. Drug: Marijuana. She reports that she does not drink alcohol. Family History: History reviewed. No pertinent family history. Vitals:  /73   Pulse 92   Temp 98.1 °F (36.7 °C) (Oral)   Resp 20   Ht 5' 1\" (1.549 m)   Wt 162 lb 14.7 oz (73.9 kg)   SpO2 98%   BMI 30.78 kg/m²   Temp (24hrs), Av.2 °F (36.8 °C), Min:98 °F (36.7 °C), Max:98.4 °F (36.9 °C)    Recent Labs     20  1644 20  2134 20  0702 20  0837   POCGLU 98 298* 200* 297*       I/O (24Hr):     Intake/Output Summary (Last 24 hours) at 2020 1000  Last data filed at 2020 1500  Gross per 24 hour   Intake 720 ml   Output 700 ml   Net 20 ml       Labs:  Hematology:  Recent Labs     20  1130 20  0637   WBC 11.5* 12.3*   RBC 4.99 4.79   HGB 13.7 13.1   HCT 43.2 42.0   MCV 86.6 87.7   MCH 27.5 27.3   MCHC 31.7 31.2   RDW 12.9 12.9    258   MPV 11.2 11.1     Chemistry:  Recent Labs     20  1130 20  0637   * 135   K 3.5* 3.8   CL 97* 101   CO2 19* 21   GLUCOSE 290* 212*   BUN 23* 20   CREATININE 0.80 0.69   ANIONGAP 18* 13   LABGLOM >60 >60   GFRAA >60 >60   CALCIUM 10.3 9.2     Recent Labs     20  0859 20  1130 20  1145 20  1644 20  2134 20  0637 20  0702 20  0837   PROT  --  7.1  --   --   --  6.8  --   --    LABALBU  --  3.7  --   --   --  3.8  -- --    AST  --  40*  --   --   --  27  --   --    ALT  --  52*  --   --   --  46*  --   --    ALKPHOS  --  156*  --   --   --  139*  --   --    BILITOT  --  0.25*  --   --   --  0.25*  --   --    POCGLU 332*  --  251* 98 298*  --  200* 297*     ABG:  Lab Results   Component Value Date    POCPH 7.458 02/18/2020    POCPCO2 39.1 02/18/2020    POCPO2 96.0 02/18/2020    POCHCO3 27.7 02/18/2020    NBEA NOT REPORTED 02/18/2020    PBEA 4 02/18/2020    NFL8DJG 29 02/18/2020    FZDY9ZNQ 98 02/18/2020    FIO2 21.0 02/18/2020     Lab Results   Component Value Date/Time    SPECIAL NOT REPORTED 02/21/2020 08:40 AM     Lab Results   Component Value Date/Time    CULTURE NO SIGNIFICANT GROWTH 02/21/2020 08:40 AM       Radiology:  Ct Head Wo Contrast    Result Date: 2/18/2020  Stable appearance of posterior left temporal lobe surgical resection side with overlying craniotomy. Otherwise, unremarkable noncontrast CT head examination. Ct Head Wo Contrast    Result Date: 2/18/2020  Left occipital craniotomy with interval improvement of subjacent intraparenchymal hemorrhage. The findings were sent to the Radiology Results [address] at 3:43 pm on 2/18/2020to be communicated to a licensed caregiver. Case discussed with Dr. Mirta John at 3:47 p.m. Mra Head Wo Contrast    Result Date: 2/18/2020  Left occipital craniotomy and subjacent intraparenchymal hemorrhage. Otherwise negative MRA. No evidence of AVM or aneurysm. Mri Brain Wo Contrast    Result Date: 2/18/2020  Possible new acute left posterior cerebral artery territory infarction. Sensitivity and specificity are limited due to local magnetic susceptibility artifact from postsurgical and hemorrhagic change. Left occipital craniectomy with interval decrease in size of the surgical bed and intraparenchymal hemorrhage. Residual or recurrent cavernoma is not excluded.  RECOMMENDATIONS: The findings were sent to the Radiology Results [address] at 7:13 pm on 2/18/2020to be communicated to a licensed caregiver.        Physical Examination:        General appearance:  alert, cooperative and no distress  Mental Status:  oriented to person, place and time and normal affect  Lungs:  clear to auscultation bilaterally, normal effort  Heart:  regular rate and rhythm, no murmur  Abdomen:  soft, nontender, nondistended, normal bowel sounds, no masses, hepatomegaly, splenomegaly  Extremities:  no edema, redness, tenderness in the calves  Skin:  no gross lesions, rashes, induration    Assessment:        Hospital Problems           Last Modified POA    Headache 2/18/2020 Yes    Hyperglycemia 2/19/2020 Yes    Electrolyte abnormality 2/19/2020 Yes    History of surgery of head 2/19/2020 Yes    History of seizure 2/19/2020 Yes    Headache disorder 2/19/2020 Yes    Bandemia 2/21/2020 Yes    Acute cystitis without hematuria 2/21/2020 Yes    MRSA infection 2/21/2020 Yes          Plan:        Headache probably related to hyperosmolar nonketotic diabetic hyperglycemia neurology also was consulted patient had abnormal MRI of the brain neurology recommendation appreciated most likely artifact no evidence of acute stroke patient still complaining of intermittent headache repeat CT scan showed no significant change from previous scan stable intracranial hemorrhage postoperatively        Hyperosmolar nonketotic diabetic coma insulin IV per protocol   Resolved      Diabetes mellitus probably type I adjusted dose of insulin today uncontrolled        Acute metabolic encephalopathy most likely related to above        Dehydration IV fluid        Acute severe hypokalemia replace        Hypomagnesemia replace        Telemetry monitor and neuro check was ordered      Urine culture was positive for staph most likely contaminant ID recommendation appreciated      Anticipate discharge once blood sugar is better controlled less than 180  Medication adjustment was made again today    [employer],

## 2020-02-24 NOTE — CARE COORDINATION
Transition planning:  Met with pt discussed home needs, pt declines Home Care at this time. CM will call and cancel referral to [facility].. Spoke with Abran Cook at [facility] she informs pt was d/c from services 1/24/2020. Informed to cancel current referral pt has declined.

## 2020-02-25 VITALS
SYSTOLIC BLOOD PRESSURE: 106 MMHG | WEIGHT: 162.92 LBS | HEIGHT: 61 IN | DIASTOLIC BLOOD PRESSURE: 76 MMHG | BODY MASS INDEX: 30.76 KG/M2 | OXYGEN SATURATION: 100 % | RESPIRATION RATE: 20 BRPM | TEMPERATURE: 98.5 F | HEART RATE: 99 BPM

## 2020-02-25 PROBLEM — E11.9 NEWLY DIAGNOSED DIABETES (HCC): Status: ACTIVE | Noted: 2020-02-25

## 2020-02-25 LAB
ABSOLUTE EOS #: 0.22 K/UL (ref 0–0.44)
ABSOLUTE IMMATURE GRANULOCYTE: 0.05 K/UL (ref 0–0.3)
ABSOLUTE LYMPH #: 4.33 K/UL (ref 1.1–3.7)
ABSOLUTE MONO #: 0.82 K/UL (ref 0.1–1.2)
ALBUMIN SERPL-MCNC: 3.3 G/DL (ref 3.5–5.2)
ALBUMIN/GLOBULIN RATIO: 1.1 (ref 1–2.5)
ALP BLD-CCNC: 176 U/L (ref 35–104)
ALT SERPL-CCNC: 70 U/L (ref 5–33)
ANION GAP SERPL CALCULATED.3IONS-SCNC: 17 MMOL/L (ref 9–17)
AST SERPL-CCNC: 81 U/L
BASOPHILS # BLD: 1 % (ref 0–2)
BASOPHILS ABSOLUTE: 0.07 K/UL (ref 0–0.2)
BILIRUB SERPL-MCNC: 0.3 MG/DL (ref 0.3–1.2)
BUN BLDV-MCNC: 19 MG/DL (ref 6–20)
BUN/CREAT BLD: ABNORMAL (ref 9–20)
C-PEPTIDE: 4.3 NG/ML (ref 1.1–4.4)
CALCIUM SERPL-MCNC: 9.7 MG/DL (ref 8.6–10.4)
CHLORIDE BLD-SCNC: 100 MMOL/L (ref 98–107)
CO2: 22 MMOL/L (ref 20–31)
CREAT SERPL-MCNC: 0.85 MG/DL (ref 0.5–0.9)
DIFFERENTIAL TYPE: ABNORMAL
EOSINOPHILS RELATIVE PERCENT: 2 % (ref 1–4)
GFR AFRICAN AMERICAN: >60 ML/MIN
GFR NON-AFRICAN AMERICAN: >60 ML/MIN
GFR SERPL CREATININE-BSD FRML MDRD: ABNORMAL ML/MIN/{1.73_M2}
GFR SERPL CREATININE-BSD FRML MDRD: ABNORMAL ML/MIN/{1.73_M2}
GLUCOSE BLD-MCNC: 159 MG/DL (ref 65–105)
GLUCOSE BLD-MCNC: 183 MG/DL (ref 65–105)
GLUCOSE BLD-MCNC: 191 MG/DL (ref 65–105)
GLUCOSE BLD-MCNC: 296 MG/DL (ref 70–99)
HCT VFR BLD CALC: 41.4 % (ref 36.3–47.1)
HEMOGLOBIN: 12.8 G/DL (ref 11.9–15.1)
IMMATURE GRANULOCYTES: 1 %
LYMPHOCYTES # BLD: 41 % (ref 24–43)
MAGNESIUM: 1.1 MG/DL (ref 1.6–2.6)
MCH RBC QN AUTO: 27.5 PG (ref 25.2–33.5)
MCHC RBC AUTO-ENTMCNC: 30.9 G/DL (ref 28.4–34.8)
MCV RBC AUTO: 89 FL (ref 82.6–102.9)
MONOCYTES # BLD: 8 % (ref 3–12)
NRBC AUTOMATED: 0 PER 100 WBC
PDW BLD-RTO: 13.1 % (ref 11.8–14.4)
PHOSPHORUS: 3.3 MG/DL (ref 2.6–4.5)
PLATELET # BLD: 213 K/UL (ref 138–453)
PLATELET ESTIMATE: ABNORMAL
PMV BLD AUTO: 11 FL (ref 8.1–13.5)
POTASSIUM SERPL-SCNC: 4.1 MMOL/L (ref 3.7–5.3)
RBC # BLD: 4.65 M/UL (ref 3.95–5.11)
RBC # BLD: ABNORMAL 10*6/UL
SEG NEUTROPHILS: 47 % (ref 36–65)
SEGMENTED NEUTROPHILS ABSOLUTE COUNT: 4.99 K/UL (ref 1.5–8.1)
SODIUM BLD-SCNC: 139 MMOL/L (ref 135–144)
TOTAL PROTEIN: 6.2 G/DL (ref 6.4–8.3)
WBC # BLD: 10.5 K/UL (ref 3.5–11.3)
WBC # BLD: ABNORMAL 10*3/UL

## 2020-02-25 PROCEDURE — 80053 COMPREHEN METABOLIC PANEL: CPT

## 2020-02-25 PROCEDURE — 84100 ASSAY OF PHOSPHORUS: CPT

## 2020-02-25 PROCEDURE — 6370000000 HC RX 637 (ALT 250 FOR IP): Performed by: INTERNAL MEDICINE

## 2020-02-25 PROCEDURE — 99239 HOSP IP/OBS DSCHRG MGMT >30: CPT | Performed by: INTERNAL MEDICINE

## 2020-02-25 PROCEDURE — 84681 ASSAY OF C-PEPTIDE: CPT

## 2020-02-25 PROCEDURE — 82947 ASSAY GLUCOSE BLOOD QUANT: CPT

## 2020-02-25 PROCEDURE — 85025 COMPLETE CBC W/AUTO DIFF WBC: CPT

## 2020-02-25 PROCEDURE — 36415 COLL VENOUS BLD VENIPUNCTURE: CPT

## 2020-02-25 PROCEDURE — 6360000002 HC RX W HCPCS: Performed by: INTERNAL MEDICINE

## 2020-02-25 PROCEDURE — 99225 PR SBSQ OBSERVATION CARE/DAY 25 MINUTES: CPT | Performed by: INTERNAL MEDICINE

## 2020-02-25 PROCEDURE — 83735 ASSAY OF MAGNESIUM: CPT

## 2020-02-25 RX ORDER — GLUCOSAMINE HCL/CHONDROITIN SU 500-400 MG
CAPSULE ORAL
Qty: 100 STRIP | Refills: 3 | Status: SHIPPED | OUTPATIENT
Start: 2020-02-25

## 2020-02-25 RX ORDER — DOXYCYCLINE HYCLATE 100 MG
100 TABLET ORAL 2 TIMES DAILY
Qty: 14 TABLET | Refills: 0 | Status: SHIPPED | OUTPATIENT
Start: 2020-02-25 | End: 2020-03-03

## 2020-02-25 RX ORDER — UBIQUINOL 100 MG
CAPSULE ORAL
Qty: 100 EACH | Refills: 3 | Status: SHIPPED | OUTPATIENT
Start: 2020-02-25

## 2020-02-25 RX ORDER — BLOOD-GLUCOSE METER
1 KIT MISCELLANEOUS DAILY
Qty: 1 KIT | Refills: 0 | Status: SHIPPED | OUTPATIENT
Start: 2020-02-25

## 2020-02-25 RX ORDER — BUTALBITAL, ACETAMINOPHEN AND CAFFEINE 50; 325; 40 MG/1; MG/1; MG/1
1 TABLET ORAL EVERY 4 HOURS PRN
Qty: 30 TABLET | Refills: 0 | Status: ON HOLD | OUTPATIENT
Start: 2020-02-25 | End: 2022-06-21

## 2020-02-25 RX ORDER — LANCETS 30 GAUGE
1 EACH MISCELLANEOUS 3 TIMES DAILY
Qty: 600 EACH | Refills: 1 | Status: SHIPPED | OUTPATIENT
Start: 2020-02-25

## 2020-02-25 RX ORDER — BUTALBITAL, ACETAMINOPHEN AND CAFFEINE 50; 325; 40 MG/1; MG/1; MG/1
1 TABLET ORAL EVERY 4 HOURS PRN
Status: DISCONTINUED | OUTPATIENT
Start: 2020-02-25 | End: 2020-02-25 | Stop reason: HOSPADM

## 2020-02-25 RX ORDER — ADHESIVE TAPE 3"X 2.3 YD
1 TAPE, NON-MEDICATED TOPICAL 2 TIMES DAILY
Qty: 60 TABLET | Refills: 0 | Status: ON HOLD | OUTPATIENT
Start: 2020-02-25 | End: 2022-06-25 | Stop reason: SDUPTHER

## 2020-02-25 RX ORDER — BUTALBITAL, ACETAMINOPHEN AND CAFFEINE 50; 325; 40 MG/1; MG/1; MG/1
2 TABLET ORAL ONCE
Status: COMPLETED | OUTPATIENT
Start: 2020-02-25 | End: 2020-02-25

## 2020-02-25 RX ADMIN — INSULIN GLARGINE 30 UNITS: 100 INJECTION, SOLUTION SUBCUTANEOUS at 11:57

## 2020-02-25 RX ADMIN — MAGNESIUM SULFATE HEPTAHYDRATE 1 G: 1 INJECTION, SOLUTION INTRAVENOUS at 10:39

## 2020-02-25 RX ADMIN — INSULIN LISPRO 5 UNITS: 100 INJECTION, SOLUTION INTRAVENOUS; SUBCUTANEOUS at 12:02

## 2020-02-25 RX ADMIN — INSULIN LISPRO 5 UNITS: 100 INJECTION, SOLUTION INTRAVENOUS; SUBCUTANEOUS at 17:25

## 2020-02-25 RX ADMIN — LEVETIRACETAM 500 MG: 500 TABLET, FILM COATED ORAL at 08:22

## 2020-02-25 RX ADMIN — MAGNESIUM SULFATE HEPTAHYDRATE 1 G: 1 INJECTION, SOLUTION INTRAVENOUS at 12:03

## 2020-02-25 RX ADMIN — INSULIN GLARGINE 25 UNITS: 100 INJECTION, SOLUTION SUBCUTANEOUS at 08:21

## 2020-02-25 RX ADMIN — INSULIN LISPRO 2 UNITS: 100 INJECTION, SOLUTION INTRAVENOUS; SUBCUTANEOUS at 08:20

## 2020-02-25 RX ADMIN — INSULIN LISPRO 2 UNITS: 100 INJECTION, SOLUTION INTRAVENOUS; SUBCUTANEOUS at 17:19

## 2020-02-25 RX ADMIN — INSULIN LISPRO 5 UNITS: 100 INJECTION, SOLUTION INTRAVENOUS; SUBCUTANEOUS at 08:21

## 2020-02-25 RX ADMIN — BUTALBITAL, ACETAMINOPHEN AND CAFFEINE 1 TABLET: 50; 325; 40 TABLET ORAL at 14:03

## 2020-02-25 RX ADMIN — INSULIN LISPRO 2 UNITS: 100 INJECTION, SOLUTION INTRAVENOUS; SUBCUTANEOUS at 11:58

## 2020-02-25 RX ADMIN — MAGNESIUM SULFATE HEPTAHYDRATE 1 G: 1 INJECTION, SOLUTION INTRAVENOUS at 13:27

## 2020-02-25 RX ADMIN — MAGNESIUM SULFATE HEPTAHYDRATE 1 G: 1 INJECTION, SOLUTION INTRAVENOUS at 14:57

## 2020-02-25 RX ADMIN — BUTALBITAL, ACETAMINOPHEN AND CAFFEINE 2 TABLET: 50; 325; 40 TABLET ORAL at 08:27

## 2020-02-25 ASSESSMENT — PAIN DESCRIPTION - PAIN TYPE: TYPE: ACUTE PAIN

## 2020-02-25 ASSESSMENT — PAIN SCALES - GENERAL
PAINLEVEL_OUTOF10: 8
PAINLEVEL_OUTOF10: 8
PAINLEVEL_OUTOF10: 0
PAINLEVEL_OUTOF10: 0
PAINLEVEL_OUTOF10: 9
PAINLEVEL_OUTOF10: 10
PAINLEVEL_OUTOF10: 10

## 2020-02-25 ASSESSMENT — ENCOUNTER SYMPTOMS
COUGH: 0
SINUS PRESSURE: 0
EYE PAIN: 0
ABDOMINAL PAIN: 0
COLOR CHANGE: 0
EYE DISCHARGE: 0
ABDOMINAL DISTENTION: 0
APNEA: 0
CHEST TIGHTNESS: 0

## 2020-02-25 ASSESSMENT — PAIN DESCRIPTION - LOCATION
LOCATION: HEAD
LOCATION: HEAD

## 2020-02-25 ASSESSMENT — PAIN DESCRIPTION - DESCRIPTORS: DESCRIPTORS: STABBING

## 2020-02-25 ASSESSMENT — PAIN DESCRIPTION - FREQUENCY: FREQUENCY: INTERMITTENT

## 2020-02-25 NOTE — DISCHARGE SUMMARY
Tono Fong 19    Discharge Summary     Patient ID: Leopoldo Daley  :  1995   MRN: [id number]     ACCOUNT:  [de-identified]   Patient's PCP: Debbie Lundborg D Matus  Admit Date: 2020   Discharge Date: 2020     Length of Stay: 7  Code Status:  Full Code  Admitting Physician: Jennifer Ochoa MD  Discharge Physician: Dave Manriquez MD     Active Discharge Diagnoses:     Hospital Problem Lists:  Principal Problem:    Newly diagnosed diabetes (HCC)-HbA1c of 13.1  Active Problems:    Headache    Hyperglycemia    Electrolyte abnormality    History of surgery of head    History of seizure    Headache disorder    Bandemia    Acute cystitis without hematuria    MRSA infection  Resolved Problems:    * No resolved hospital problems. *      Admission Condition:  poor     Discharged Condition: good    Hospital Stay:     Hospital Course:  Leopoldo Daley is a 30 y.o. female who was admitted for the management of  Newly diagnosed diabetes ([facility] [address] , presented to ER with Dizziness (dizziness and vomiting for the past couple of days. Brain bleed one month ago, has not yet had follow up with neurosurgery )  Leopoldo Daley is a 30 y.o. Non-/non  female who presents with Dizziness (dizziness and vomiting for the past couple of days. Brain bleed one month ago, has not yet had follow up with neurosurgery ) and is admitted to the hospital for the management of <principal problem not specified.  30years old female with past medical history of intracranial hemorrhage gestational diabetes patient is poor historian history was taken from the ER physician and the patient patient came to the hospital because of dizziness and headache associated with photophobia also patient complains of polyuria patient still complained of headache patient was found to have hyperosmolar nonketotic diabetic coma without coma history is limited because of mental status also patient was found to have electrolyte imbalance and dehydration admitted for further evaluation and treatment    Patient admitted as above and was found to have new onset diabetes type 2 with HHNC. Patient had been managed with insulin drip with IV fluid boluses and hydration. Patient had been started on both long-acting and short-acting insulin. Her A1c was 13.1. Patient did have headache which did resolve after a dose of 2 tablets of Fioricet today. There was concern for acute stroke from MRI, however neurology consult evaluated and indicated it was an artifact. Patient was seen today by diabetes nurse instructor as well as dietitian to help patient with insight into management and complications of diabetes. She will be discharged home with Levemir as well as [facility], with home health care to follow-up patient for further recommendations. Also patient will need a close follow-up with her primary care physician to help with management of the diabetes. Patient is also being discharged with supplies including glucometer with test strips, lancets, alcohol prep pad. Compliance to medications and follow-up visits as well as blood glucose monitoring has been addressed. All electrolyte imbalances were corrected. She is being discharged home in a stable state. Patient did have swelling left Nuchal lymph nodes and was said to be reactive by ID. Examination of the scalp showed well-healed surgical scar with little induration at the distal aspect with no discharges. But because of recent surgery, patient is being discharged on p.o. doxycycline for 7 days. There was a questionable MRSA in urine culture, which ended up being contaminant since the one that was done with straight cath did not show any growth. Significant therapeutic interventions: Correction of insulin, electrolytes.     Significant Diagnostic Studies:   Labs / Micro:  CBC:   Lab Results   Component Value Date    WBC 10.5 02/25/2020    RBC 4.65 02/25/2020    HGB 12.8 02/25/2020    HCT 41.4 02/25/2020    MCV 89.0 02/25/2020    MCH 27.5 02/25/2020    MCHC 30.9 02/25/2020    RDW 13.1 02/25/2020     02/25/2020     BMP:    Lab Results   Component Value Date    GLUCOSE 296 02/25/2020     02/25/2020    K 4.1 02/25/2020     02/25/2020    CO2 22 02/25/2020    ANIONGAP 17 02/25/2020    BUN 19 02/25/2020    CREATININE 0.85 02/25/2020    BUNCRER NOT REPORTED 02/25/2020    CALCIUM 9.7 02/25/2020    LABGLOM >60 02/25/2020    GFRAA >60 02/25/2020    GFR      02/25/2020    GFR NOT REPORTED 02/25/2020     HFP:    Lab Results   Component Value Date    PROT 6.2 02/25/2020     CMP:    Lab Results   Component Value Date    GLUCOSE 296 02/25/2020     02/25/2020    K 4.1 02/25/2020     02/25/2020    CO2 22 02/25/2020    BUN 19 02/25/2020    CREATININE 0.85 02/25/2020    ANIONGAP 17 02/25/2020    ALKPHOS 176 02/25/2020    ALT 70 02/25/2020    AST 81 02/25/2020    BILITOT 0.30 02/25/2020    LABALBU 3.3 02/25/2020    ALBUMIN 1.1 02/25/2020    LABGLOM >60 02/25/2020    GFRAA >60 02/25/2020    GFR      02/25/2020    GFR NOT REPORTED 02/25/2020    PROT 6.2 02/25/2020    CALCIUM 9.7 02/25/2020     PT/INR:    Lab Results   Component Value Date    PROTIME 10.5 02/18/2020    INR 1.0 02/18/2020     PTT:   Lab Results   Component Value Date    APTT 21.8 02/18/2020     FLP:    Lab Results   Component Value Date    CHOL 245 01/05/2020    TRIG 420 01/07/2020    HDL 24 01/05/2020     U/A:    Lab Results   Component Value Date    COLORU YELLOW 02/21/2020    TURBIDITY CLEAR 02/21/2020    SPECGRAV 1.018 02/21/2020    HGBUR NEGATIVE 02/21/2020    PHUR 6.5 02/21/2020    PROTEINU NEGATIVE 02/21/2020    GLUCOSEU 3+ 02/21/2020    KETUA NEGATIVE 02/21/2020    BILIRUBINUR NEGATIVE 02/21/2020    UROBILINOGEN Normal 02/21/2020    NITRU NEGATIVE 02/21/2020    LEUKOCYTESUR NEGATIVE 02/21/2020     TSH:    Lab Results   Component Value Date    TSH 3.19 01/05/2020 Radiology:  Ct Head Wo Contrast    Result Date: 2/23/2020  Stable intraparenchymal hemorrhage subjacent to overlying craniotomy. No new intracranial hemorrhage, mass effect, or midline shift. Ct Head Wo Contrast    Result Date: 2/18/2020  Stable appearance of posterior left temporal lobe surgical resection side with overlying craniotomy. Otherwise, unremarkable noncontrast CT head examination. Ct Head Wo Contrast    Result Date: 2/18/2020  Left occipital craniotomy with interval improvement of subjacent intraparenchymal hemorrhage. The findings were sent to the Radiology Results [address] at 3:43 pm on 2/18/2020to be communicated to a licensed caregiver. Case discussed with Dr. April Black at 3:47 p.m. Mra Head Wo Contrast    Result Date: 2/18/2020  Left occipital craniotomy and subjacent intraparenchymal hemorrhage. Otherwise negative MRA. No evidence of AVM or aneurysm. Mri Brain Wo Contrast    Result Date: 2/18/2020  Possible new acute left posterior cerebral artery territory infarction. Sensitivity and specificity are limited due to local magnetic susceptibility artifact from postsurgical and hemorrhagic change. Left occipital craniectomy with interval decrease in size of the surgical bed and intraparenchymal hemorrhage. Residual or recurrent cavernoma is not excluded. RECOMMENDATIONS: The findings were sent to the Radiology Results [address] at 7:13 pm on 2/18/2020to be communicated to a licensed caregiver.        Consultations:    Consults:     Final Specialist Recommendations/Findings:   IP CONSULT TO NEUROLOGY  IP CONSULT TO IV TEAM  IP CONSULT TO INTERNAL MEDICINE  IP CONSULT TO NEUROSURGERY  IP CONSULT TO DIABETES EDUCATOR  IP CONSULT TO DIETITIAN  IP CONSULT TO NEUROLOGY  IP CONSULT TO STROKE TEAM  IP CONSULT TO INFECTIOUS DISEASES  IP CONSULT TO HOME CARE NEEDS      The patient was seen and examined on day of discharge and this discharge summary is in conjunction with CONTINUE taking these medications    atorvastatin 40 MG tablet  Commonly known as:  LIPITOR  Take 1 tablet by mouth nightly     busPIRone 10 MG tablet  Commonly known as:  BUSPAR     docusate sodium 100 MG capsule  Commonly known as:  COLACE     lamoTRIgine 25 MG tablet  Commonly known as:  LAMICTAL     levETIRAcetam 500 MG tablet  Commonly known as:  KEPPRA  Take 1 tablet by mouth 2 times daily for 7 days     Nexplanon 68 MG implant  Generic drug:  etonogestrel     ondansetron 4 MG tablet  Commonly known as:  Zofran  Take 1 tablet by mouth every 6 hours as needed for Nausea or Vomiting     scopolamine transdermal patch  Commonly known as:  TRANSDERM-SCOP  Place 1 patch onto the skin every 72 hours           Where to Get Your Medications      These medications were sent to [facility] [address]  [address] [facility], [address] [address]    Phone:  [phone]   · butalbital-acetaminophen-caffeine -40 MG per tablet  · doxycycline hyclate 100 MG tablet  · insulin aspart 100 UNIT/ML injection pen  · insulin detemir 100 UNIT/ML injection pen  · Magnesium Oxide 200 MG Tabs     Information about where to get these medications is not yet available    Ask your nurse or doctor about these medications  · insulin lispro 100 UNIT/ML injection vial         Discharge Procedure Orders   Alcohol prep pad     DME Order for Diabetic Testing Supplies as OP   Order Comments: Testing Strips: Insulin dependent- 300 per 3 months    Lancets: Insulin dependent- 300 per 3 months    Lancet device: 1 unit per 6 months    Calibration/Control solution for glucometer: 1 bottle per 3 months     DME Order for Glucometer as OP   Order Comments: You must complete the order parameters below and add the medical necessity documentation for this DME in a separate note.     Home blood glucose monitor    Diagnosis: DM 1    Testing frequency: Check blood sugars and record 3 times daily    Duration: purchase       Time Spent on discharge is  35 mins in patient examination, evaluation, counseling as well as medication reconciliation, prescriptions for required medications, discharge plan and follow up. Electronically signed by   Hira Dueñas MD  2/25/2020  11:58 AM      Thank you Dr. Viky Rodriguez for the opportunity to be involved in this patient's care.

## 2020-02-25 NOTE — PLAN OF CARE
Patient assessed for fall risk and fall precautions. Patient and family instructed about safety devices and allowed to make decisions related to safety. Environment kept free of clutter and adequate lighting provided. Bed in lowest position with brakes locked. Call light in reach. Patient ID band correct and in place. Patient transferred with appropriate methods. Will continue to monitor. Patient able to communicate presence of pain and needs for medication. See MAR. Patients blood sugar continues to be elevated. Pt educated on self injections of both short acting and long acting insulin. Will continue to work with patient to stabilize blood sugar.   Marika Tejeda RN
Problem: Falls - Risk of:  Goal: Absence of physical injury  Description  Absence of physical injury  Outcome: Met This Shift  Note:   PT. WITH SIDE RAILS TIMES 2 WHILE IN BED, NON SKID FOOT WEAR IN PLACE, PT. EDUCATED ON FALL SAFETY, PT. USING CALL LIGHT APPROPRIATELY, AND CALL LIGHT WITHIN REACH.       Problem: Falls - Risk of:  Goal: Will remain free from falls  Description  Will remain free from falls  Outcome: Met This Shift
Problem: Falls - Risk of:  Goal: Will remain free from falls  Description  Will remain free from falls  2/19/2020 1518 by Xena Rivas RN  Outcome: Ongoing   Pt remains free of falls at this time. Bed locked in lowest position, siderails x2, call light in reach. Non-skid footwear applied. Pt ambulates in room with steady gait. Encouraged pt to call for assistance as needed for safety. Falling star posted outside of room. Will continue to monitor.   Electronically signed by Xena Rivas RN on 2/19/2020 at 3:18 PM
Problem: Falls - Risk of:  Goal: Will remain free from falls  Description  Will remain free from falls  2/25/2020 1442 by Jenelle Reyes RN  Outcome: Met This Shift  2/25/2020 0413 by Jozef Isaac RN  Outcome: Ongoing  Goal: Absence of physical injury  Description  Absence of physical injury  2/25/2020 1442 by Jenelle Reyes RN  Outcome: Met This Shift  2/25/2020 0413 by Jozef Isaac RN  Outcome: Ongoing     Problem: Serum Glucose Level - Abnormal:  Goal: Ability to maintain appropriate glucose levels will improve  Description  Ability to maintain appropriate glucose levels will improve  2/25/2020 1442 by Jenelle Reyes RN  Outcome: Met This Shift  2/25/2020 0413 by Jozef Isaac RN  Outcome: Ongoing     Problem: Pain:  Goal: Pain level will decrease  Description  Pain level will decrease  2/25/2020 1442 by Jenelle Reyes RN  Outcome: Ongoing  2/25/2020 0413 by Jozef Isaac RN  Outcome: Ongoing  Goal: Control of acute pain  Description  Control of acute pain  2/25/2020 1442 by Jenelle Reyes RN  Outcome: Ongoing  2/25/2020 0413 by Jozef sIaac RN  Outcome: Ongoing  Goal: Control of chronic pain  Description  Control of chronic pain  2/25/2020 1442 by Jenelle Reyes RN  Outcome: Ongoing  2/25/2020 0413 by Jozef Isaac RN  Outcome: Ongoing     Problem: Nutrition  Goal: Optimal nutrition therapy  2/25/2020 1442 by Jenelle Reyes RN  Outcome: Ongoing  2/25/2020 0413 by Jozef Isaac RN  Outcome: Ongoing
Problem: Falls - Risk of:  Goal: Will remain free from falls  Description  Will remain free from falls  Outcome: Ongoing   Pt remains free of falls at this time. Bed locked in lowest position, siderails x2, call light in reach. Non-skid footwear applied. Pt ambulates in room with steady gait. Encouraged pt to call for assistance as needed for safety. Falling star posted outside of room. Will continue to monitor.   Electronically signed by Leanne Browne RN on 2/21/2020 at 3:01 PM
Problem: Pain:  Goal: Pain level will decrease  Description  Pain level will decrease  2/24/2020 1408 by Krzysztof Valdivia RN  Outcome: Met This Shift  2/24/2020 0433 by Joyce Benson RN  Outcome: Ongoing  Goal: Control of acute pain  Description  Control of acute pain  2/24/2020 1408 by Krzysztof Valdivia RN  Outcome: Met This Shift  2/24/2020 0433 by Joyce Benson RN  Outcome: Ongoing  Goal: Control of chronic pain  Description  Control of chronic pain  2/24/2020 1408 by Krzysztof Valdivia RN  Outcome: Met This Shift  2/24/2020 0433 by Joyce Benson RN  Outcome: Ongoing     Problem: Falls - Risk of:  Goal: Will remain free from falls  Description  Will remain free from falls  2/24/2020 1408 by Krzysztof Valdivia RN  Outcome: Met This Shift  2/24/2020 0433 by Joyce Benson RN  Outcome: Ongoing  Goal: Absence of physical injury  Description  Absence of physical injury  2/24/2020 1408 by Krzysztof Valdivia RN  Outcome: Met This Shift  2/24/2020 0433 by Joyce Benson RN  Outcome: Ongoing     Problem: Nutrition  Goal: Optimal nutrition therapy  2/24/2020 1408 by Krzysztof Valdivia RN  Outcome: Met This Shift  2/24/2020 0433 by Joyce Benson RN  Outcome: Ongoing     Problem: Serum Glucose Level - Abnormal:  Goal: Ability to maintain appropriate glucose levels will improve  Description  Ability to maintain appropriate glucose levels will improve  2/24/2020 1408 by Krzysztof Valdivia RN  Outcome: Met This Shift  2/24/2020 0433 by Joyce Benson RN  Outcome: Ongoing
Problem: Pain:  Goal: Pain level will decrease  Description  Pain level will decrease  Outcome: Met This Shift  Goal: Control of acute pain  Description  Control of acute pain  Outcome: Met This Shift  Goal: Control of chronic pain  Description  Control of chronic pain  Outcome: Met This Shift     Problem: Falls - Risk of:  Goal: Will remain free from falls  Description  Will remain free from falls  Outcome: Met This Shift  Goal: Absence of physical injury  Description  Absence of physical injury  Outcome: Met This Shift     Problem: Nutrition  Goal: Optimal nutrition therapy  Outcome: Met This Shift     Problem: Serum Glucose Level - Abnormal:  Goal: Ability to maintain appropriate glucose levels will improve  Description  Ability to maintain appropriate glucose levels will improve  Outcome: Met This Shift
Problem: Pain:  Goal: Pain level will decrease  Description  Pain level will decrease  Outcome: Ongoing  Goal: Control of acute pain  Description  Control of acute pain  Outcome: Ongoing  Goal: Control of chronic pain  Description  Control of chronic pain  Outcome: Ongoing     Problem: Falls - Risk of:  Goal: Will remain free from falls  Description  Will remain free from falls  Outcome: Ongoing  Goal: Absence of physical injury  Description  Absence of physical injury  Outcome: Ongoing     Problem: Nutrition  Goal: Optimal nutrition therapy  Outcome: Ongoing     Problem: Serum Glucose Level - Abnormal:  Goal: Ability to maintain appropriate glucose levels will improve  Description  Ability to maintain appropriate glucose levels will improve  Outcome: Ongoing
Level - Abnormal:  Goal: Ability to maintain appropriate glucose levels will improve  Description  Ability to maintain appropriate glucose levels will improve  2/24/2020 1411 by Irvin Rangel, RN  Outcome: Met This Shift  2/24/2020 1409 by Irvin Rangel, RN  Outcome: Met This Shift  2/24/2020 1408 by Irvin Rangel, RN  Outcome: Met This Shift  2/24/2020 0433 by Mark Hernandez RN  Outcome: Ongoing

## 2020-02-25 NOTE — CARE COORDINATION
Transition Planning:  Alerted by bedside nurse pt is going to be d/c'd today and MD recommends pt to d/c with HC. Pt is agreeable to [facility] with previous provider [facility]. New e-referral sent to [address] with Agapito Blood informed of referral pt again requests services. Able to accept.     [id number] Karla Greenberg spoke with  [facility] informed pt will be d/c today. Faxed JANUSZ/AVS, HC order and face sheet to [facility]..    1655 Spoke with Agapito Blood at [facility] informed that previous fax to [phone] was not received, fax communication informs line is busy. He informs this is the correct number and no alternate number is available. CM refaxed all above dc paperwork, placed in  dc pt files.

## 2020-02-25 NOTE — PROGRESS NOTES
[facility] -   Infectious diseases evaluation  admission date 2/18/2020    reason for consultation:   Staph and Group B Strep in urine     Impression :   Current:  · MRSA/ GBS UTI or contamination  · Hyperosmotic diabetic state  · mild leukocytosis  · DM 2    Other:  · past craniotomy for brain bleed  Discussion / summary of stay / plan of care   · Pt urinated in a hat then urine was collected in the sup - urine was contaminated  · 2/18 2D ECHO showed no vegetations   · 2/21 blood and urine cultures negative. Recommendations   · Straight cath was no growth and hence signifies that original MRSA of the urine was a contamination. No antibiotics necessary. · Large left occipital surgical wound that seems to be dry at this point, for possible drainage that might need to be cultured and treated. · I suspect the 2 left cervical lymph nodes are reactive. Monitor lymph nodes and incision site for erythema, drainage, or signs of infection. If seen call Dr. Luis Israel immediately and make appointment for office visit. Will accommodate timing to avoid repeat admission. 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 Headache was associated with photophobia, confusion, nausea, and vomiting.  Patient was hyperglycemic 792 and hypokalemic 2.3 Subcutaneous TID WC    insulin lispro  0-6 Units Subcutaneous Nightly    atorvastatin  40 mg Oral Nightly    lamoTRIgine  25 mg Oral Daily    levETIRAcetam  500 mg Oral BID    sodium chloride  15 mL/kg Intravenous Once       Social History:     Social History     Socioeconomic History    Marital status: Single     Spouse name: Not on file    Number of children: Not on file    Years of education: Not on file    Highest education level: Not on file   Occupational History    Not on file   Social Needs    Financial resource strain: Not on file    Food insecurity:     Worry: Not on file     Inability: Not on file    Transportation needs:     Medical: Not on file     Non-medical: Not on file   Tobacco Use    Smoking status: Current Some Day Smoker     Packs/day: 0.30     Types: Cigarettes    Smokeless tobacco: Never Used   Substance and Sexual Activity    Alcohol use: No    Drug use: Yes     Types: Marijuana    Sexual activity: Yes     Partners: Male   Lifestyle    Physical activity:     Days per week: Not on file     Minutes per session: Not on file    Stress: Not on file   Relationships    Social connections:     Talks on phone: Not on file     Gets together: Not on file     Attends Orthodox service: Not on file     Active member of club or organization: Not on file     Attends meetings of clubs or organizations: Not on file     Relationship status: Not on file    Intimate partner violence:     Fear of current or ex partner: Not on file     Emotionally abused: Not on file     Physically abused: Not on file     Forced sexual activity: Not on file   Other Topics Concern    Not on file   Social History Narrative    Not on file       Family History:   History reviewed. No pertinent family history. Allergies:   Latex and Prozac [fluoxetine hcl]     Review of Systems:     Review of Systems   Constitutional: Negative for activity change and chills.    HENT: Negative for congestion and sinus [phone]        I have discussed the care of the patient, including pertinent history and exam findings,  with the student- I have seen and examined the patient and the key elements of all parts of the encounter have been performed by me. I agree with the assessment, plan and orders as documented by the student.     Molly Barajas, Infectious Diseases

## 2020-02-25 NOTE — PROGRESS NOTES
Discharge paperwork given to patient- all questions answered. Patient discharged with all of her belongings and prescriptions.

## 2020-02-26 ENCOUNTER — TELEPHONE (OUTPATIENT)
Dept: DIABETES SERVICES | Age: 25
End: 2020-02-26

## 2020-02-26 ENCOUNTER — OFFICE VISIT (OUTPATIENT)
Dept: NEUROSURGERY | Age: 25
End: 2020-02-26

## 2020-02-26 VITALS
WEIGHT: 162.2 LBS | SYSTOLIC BLOOD PRESSURE: 124 MMHG | BODY MASS INDEX: 30.65 KG/M2 | HEART RATE: 106 BPM | DIASTOLIC BLOOD PRESSURE: 81 MMHG

## 2020-02-26 PROCEDURE — 4004F PT TOBACCO SCREEN RCVD TLK: CPT | Performed by: NEUROLOGICAL SURGERY

## 2020-02-26 PROCEDURE — 3046F HEMOGLOBIN A1C LEVEL >9.0%: CPT | Performed by: NEUROLOGICAL SURGERY

## 2020-02-26 PROCEDURE — 1111F DSCHRG MED/CURRENT MED MERGE: CPT | Performed by: NEUROLOGICAL SURGERY

## 2020-02-26 PROCEDURE — G8427 DOCREV CUR MEDS BY ELIG CLIN: HCPCS | Performed by: NEUROLOGICAL SURGERY

## 2020-02-26 PROCEDURE — G8484 FLU IMMUNIZE NO ADMIN: HCPCS | Performed by: NEUROLOGICAL SURGERY

## 2020-02-26 PROCEDURE — G8417 CALC BMI ABV UP PARAM F/U: HCPCS | Performed by: NEUROLOGICAL SURGERY

## 2020-02-26 PROCEDURE — 99024 POSTOP FOLLOW-UP VISIT: CPT | Performed by: NEUROLOGICAL SURGERY

## 2020-02-26 PROCEDURE — 2022F DILAT RTA XM EVC RTNOPTHY: CPT | Performed by: NEUROLOGICAL SURGERY

## 2020-02-26 NOTE — PROGRESS NOTES
CLINICAL PHARMACY NOTE: MEDS TO [address] Select Patient?: No  Total # of Prescriptions Filled: 10   The following medications were delivered to the patient:  · Magnesium oxide 400  · Butalbital-APAP-Caffeine 50/325/40  · levemir flextouch  · Insulin aspart flexpen  · Doxycycline 100 mg  · True metrix blood glucose test strips  · True metrix meter w/ device kit  · Alcohol prep 70% pads  · trueplus lancets 33 G  · B- DUF pen needles 3/16: 31 G  Total # of Interventions Completed: 1  Time Spent (min): 15    Additional Documentation:   Prescriber did not send a prescription for the pen needles to inject the insulin with. Perfect served and needed to wait for response.    Two meds were delivered to patient at bedside on 2/25/2020 by Marga Jose, PharmD Candidate 2020, 2/26/2020 9:46 AM

## 2020-02-26 NOTE — PROGRESS NOTES
[facility] [address] [address] Parvin Palomares  MOB # 2 Tiffanie Backer  [address] [place] [id number]  Dept: [phone]    Patient:  Florie Boast  YOB: 1995  Date: 2/26/20    The patient is a 30 y.o. female who presents today for consult of the following problems:     Chief Complaint   Patient presents with    Follow-up     Cerebral cavernoma follow up             HPI:     Florie Boast is a 30 y.o. female on whom neurosurgical consultation was requested by Elisabet Carrion for management of cerebral cavernoma. Approximately 7 weeks out. Has intermittent right-sided homonymous hemianopsia that she states comes and goes. Unfortunately she had a recent hospitalization with severe hyperglycemia with sugars in the 700s. Also notes left sided retro-auricular and posterior cervical spinal lymphadenopathy. No fevers chills nausea vomiting sore throat earache diarrhea or any other signs or symptoms of infection. Telly Wood History:     Past Medical History:   Diagnosis Date    Cerebral vascular malformation     @[email or web address]    Diabetes mellitus ([address]     Seizure ([address]     Traumatic hemorrhage of left cerebrum without loss of consciousness ([address]     @ [facility]     Past Surgical History:   Procedure Laterality Date    CRANIOTOMY  01/08/2020    LEFT OCCIPITAL CRANIOTOMY    CRANIOTOMY Left 1/8/2020    LEFT OCCIPITAL CRANIOTOMY, 1ST VASCULAR LESION WITH [employer] NAVIGATION (LATERAL WITH ROMERO BAG, HOLLINGSWORTH HEADHOLDER, MICROSCOPE) performed by Cherelle Major DO at [place] [address]     No family history on file.   Current Outpatient Medications on File Prior to Visit   Medication Sig Dispense Refill    butalbital-acetaminophen-caffeine (FIORICET, ESGIC) -40 MG per tablet Take 1 tablet by mouth every 4 hours as needed for Headaches 30 tablet 0    doxycycline hyclate (VIBRA-TABS) 100 MG tablet Take 1 tablet by mouth 2 times daily for 7 days 14 tablet 0    Magnesium Oxide (MAG-OXIDE) 200 MG TABS Take 1 tablet by mouth 2 times daily 60 tablet 0    insulin detemir (LEVEMIR FLEXTOUCH) 100 UNIT/ML injection pen Inject 25 Units into the skin nightly 5 pen 3    insulin aspart (NOVOLOG FLEXPEN) 100 UNIT/ML injection pen Inject 5 Units into the skin 3 times daily (before meals) 5 pen 3    glucose monitoring kit (FREESTYLE) monitoring kit 1 kit by Does not apply route daily 1 kit 0    blood glucose monitor strips Test 3 times a day & as needed for symptoms of irregular blood glucose. 100 strip 3    Lancets MISC 1 each by Does not apply route 3 times daily 600 each 1    Alcohol Swabs (ALCOHOL PREP) 70 % PADS 1 pad  each 3    Insulin Pen Needle 30G X 8 MM MISC 1 each by Does not apply route daily 100 each 3    scopolamine (TRANSDERM-SCOP) transdermal patch Place 1 patch onto the skin every 72 hours 3 patch 0    levETIRAcetam (KEPPRA) 500 MG tablet Take 1 tablet by mouth 2 times daily for 7 days 60 tablet 0    atorvastatin (LIPITOR) 40 MG tablet Take 1 tablet by mouth nightly 30 tablet 3    etonogestrel (NEXPLANON) 68 MG implant 68 mg by Subdermal route once      ondansetron (ZOFRAN) 4 MG tablet Take 1 tablet by mouth every 6 hours as needed for Nausea or Vomiting 10 tablet 0    insulin lispro (HUMALOG) 100 UNIT/ML injection vial Inject 0-12 Units into the skin 3 times daily (with meals) 1 vial 3    busPIRone (BUSPAR) 10 MG tablet Take 10 mg by mouth 3 times daily      lamoTRIgine (LAMICTAL) 25 MG tablet Take 25 mg by mouth daily      docusate sodium (COLACE) 100 MG capsule Take 100 mg by mouth 2 times daily as needed for Constipation       No current facility-administered medications on file prior to visit.       Social History     Tobacco Use    Smoking status: Current Some Day Smoker     Packs/day: 0.30     Types: Cigarettes    Smokeless tobacco: Never Used   Substance Use Topics    Alcohol use: No    Drug use: Yes     Types: Marijuana       Allergies   Allergen Reactions    Latex     Prozac Intact    Motor  L deltoid 5/5; R deltoid 5/5  L biceps 5/5; R biceps 5/5  L triceps 5/5; R triceps 5/5  L wrist extension 5/5; R wrist extension 5/5  L intrinsics 5/5; R intrinsics 5/5     L iliopsoas 5/5 , R iliopsoas 5/5  L quadriceps 5/5; R quadriceps 5/5  L Dorsiflexion 5/5; R dorsiflexion 5/5  L Plantarflexion 5/5; R plantarflexion 5/5  L EHL 5/5; R EHL 5/5    Reflexes  L Brachioradialis 2+/4; R brachioradialis 2+/4  L Biceps 2+/4; R Biceps 2+/4  L Triceps 2+/4; R Triceps 2+/4  L Patellar 2+/4: R Patellar 2+/4  L Achilles 2+/4; R Achilles 2+/4    hoffmans L: neg  hoffmans R: neg  Clonus L: neg  Clonus R: neg  Babinski L: up  Babinski R; up    Studies Review:     No new. Assessment and Plan:      1. Cerebral cavernoma    2. Posterior cervical lymphadenopathy    3. Type 2 diabetes mellitus without complication, without long-term current use of insulin ([facility]          Plan: Patient has had a fairly complex small left occipital cavernoma. On repeat imaging she did appear to have re-bled at one point which raises the concern for a residual lesion. Unfortunately even during her initial operation it was not quite clear what the distinct borders of the lesion were and I am questioning whether there has been a full resection. I am hesitant to offer any further surgery at without the ability to distinctly localize the lesion at this point. I will have her see a [facility] vascular neurosurgeon who can lay a second pair of eyes and obtain a second opinion on this lesion as to whether or not there is any other surgical intervention warranted versus surveillance. In addition she does have posterior cervical chain lymphadenopathy on the left side for which I will obtain a CT of the soft tissue and speak directly with her family medicine. Physician.     Regarding her blood sugar control the patient has specific questions regarding insulin management which advised her to call the diabetes educator who

## 2020-02-26 NOTE — LETTER
[facility] # [address]  Phone: [phone]  Fax: [phone]    Perez Elias DO        February 26, 2020     Patient: Nga Ceron   YOB: 1995   Date of Visit: 2/26/2020       To Whom It May Concern:    Patient presented to our hospital after a left occipital brain bleed and was found to have a vascular lesion which was then resected by myself. She underwent surgery in early January and has subsequently been in the recovery process with multiple other issues in the interim. She has been managed by myself and does have significant visual field issues on the right side.   If you have any further questions regarding her medical issues from a neurosurgical standpoint please feel free to call me      Perez Elias DO

## 2020-02-27 LAB
CULTURE: NORMAL
CULTURE: NORMAL
Lab: NORMAL
Lab: NORMAL
SPECIMEN DESCRIPTION: NORMAL
SPECIMEN DESCRIPTION: NORMAL

## 2020-02-28 ENCOUNTER — APPOINTMENT (OUTPATIENT)
Dept: CT IMAGING | Age: 25
End: 2020-02-28
Payer: MEDICARE

## 2020-02-28 ENCOUNTER — HOSPITAL ENCOUNTER (EMERGENCY)
Age: 25
Discharge: HOME | End: 2020-02-28
Attending: EMERGENCY MEDICINE
Payer: MEDICARE

## 2020-02-28 VITALS
SYSTOLIC BLOOD PRESSURE: 157 MMHG | RESPIRATION RATE: 17 BRPM | TEMPERATURE: 98.2 F | WEIGHT: 162 LBS | DIASTOLIC BLOOD PRESSURE: 92 MMHG | HEART RATE: 98 BPM | BODY MASS INDEX: 30.61 KG/M2 | OXYGEN SATURATION: 97 %

## 2020-02-28 LAB
-: ABNORMAL
ABSOLUTE EOS #: 0.07 K/UL (ref 0–0.44)
ABSOLUTE IMMATURE GRANULOCYTE: 0.05 K/UL (ref 0–0.3)
ABSOLUTE LYMPH #: 3.08 K/UL (ref 1.1–3.7)
ABSOLUTE MONO #: 0.62 K/UL (ref 0.1–1.2)
ALBUMIN SERPL-MCNC: 4.3 G/DL (ref 3.5–5.2)
ALBUMIN/GLOBULIN RATIO: 1.1 (ref 1–2.5)
ALP BLD-CCNC: 246 U/L (ref 35–104)
ALT SERPL-CCNC: 103 U/L (ref 5–33)
AMORPHOUS: ABNORMAL
ANION GAP SERPL CALCULATED.3IONS-SCNC: 15 MMOL/L (ref 9–17)
AST SERPL-CCNC: 50 U/L
BACTERIA: ABNORMAL
BASOPHILS # BLD: 1 % (ref 0–2)
BASOPHILS ABSOLUTE: 0.06 K/UL (ref 0–0.2)
BETA-HYDROXYBUTYRATE: 0.44 MMOL/L (ref 0.02–0.27)
BILIRUB SERPL-MCNC: 0.27 MG/DL (ref 0.3–1.2)
BILIRUBIN URINE: NEGATIVE
BUN BLDV-MCNC: 10 MG/DL (ref 6–20)
BUN/CREAT BLD: ABNORMAL (ref 9–20)
CALCIUM SERPL-MCNC: 10.7 MG/DL (ref 8.6–10.4)
CASTS UA: ABNORMAL /LPF (ref 0–8)
CHLORIDE BLD-SCNC: 97 MMOL/L (ref 98–107)
CO2: 26 MMOL/L (ref 20–31)
COLOR: YELLOW
CREAT SERPL-MCNC: 0.69 MG/DL (ref 0.5–0.9)
CRYSTALS, UA: ABNORMAL /HPF
DIFFERENTIAL TYPE: ABNORMAL
DIRECT EXAM: NORMAL
EOSINOPHILS RELATIVE PERCENT: 1 % (ref 1–4)
EPITHELIAL CELLS UA: ABNORMAL /HPF (ref 0–5)
GFR AFRICAN AMERICAN: >60 ML/MIN
GFR NON-AFRICAN AMERICAN: >60 ML/MIN
GFR SERPL CREATININE-BSD FRML MDRD: ABNORMAL ML/MIN/{1.73_M2}
GFR SERPL CREATININE-BSD FRML MDRD: ABNORMAL ML/MIN/{1.73_M2}
GLUCOSE BLD-MCNC: 145 MG/DL (ref 70–99)
GLUCOSE BLD-MCNC: 152 MG/DL (ref 74–100)
GLUCOSE URINE: ABNORMAL
HCG QUALITATIVE: NEGATIVE
HCT VFR BLD CALC: 44.8 % (ref 36.3–47.1)
HEMOGLOBIN: 14.2 G/DL (ref 11.9–15.1)
IMMATURE GRANULOCYTES: 0 %
KEPPRA: 39 UG/ML
KETONES, URINE: ABNORMAL
LEUKOCYTE ESTERASE, URINE: NEGATIVE
LIPASE: 25 U/L (ref 13–60)
LYMPHOCYTES # BLD: 27 % (ref 24–43)
Lab: NORMAL
MCH RBC QN AUTO: 27.6 PG (ref 25.2–33.5)
MCHC RBC AUTO-ENTMCNC: 31.7 G/DL (ref 28.4–34.8)
MCV RBC AUTO: 87 FL (ref 82.6–102.9)
MONOCYTES # BLD: 5 % (ref 3–12)
MUCUS: ABNORMAL
NITRITE, URINE: NEGATIVE
NRBC AUTOMATED: 0 PER 100 WBC
OTHER OBSERVATIONS UA: ABNORMAL
PDW BLD-RTO: 13 % (ref 11.8–14.4)
PH UA: 6.5 (ref 5–8)
PLATELET # BLD: 250 K/UL (ref 138–453)
PLATELET ESTIMATE: ABNORMAL
PMV BLD AUTO: 11.7 FL (ref 8.1–13.5)
POC CHLORIDE: 102 MMOL/L (ref 98–107)
POC IONIZED CALCIUM: 1.2 MMOL/L (ref 1.15–1.33)
POC LACTIC ACID: 1.35 MMOL/L (ref 0.56–1.39)
POC POTASSIUM: 3.8 MMOL/L (ref 3.5–4.5)
POC SODIUM: 143 MMOL/L (ref 138–146)
POTASSIUM SERPL-SCNC: 3.9 MMOL/L (ref 3.7–5.3)
PROTEIN UA: NEGATIVE
RBC # BLD: 5.15 M/UL (ref 3.95–5.11)
RBC # BLD: ABNORMAL 10*6/UL
RBC UA: ABNORMAL /HPF (ref 0–4)
RENAL EPITHELIAL, UA: ABNORMAL /HPF
SEG NEUTROPHILS: 66 % (ref 36–65)
SEGMENTED NEUTROPHILS ABSOLUTE COUNT: 7.64 K/UL (ref 1.5–8.1)
SODIUM BLD-SCNC: 138 MMOL/L (ref 135–144)
SPECIFIC GRAVITY UA: 1.01 (ref 1–1.03)
SPECIMEN DESCRIPTION: NORMAL
TOTAL PROTEIN: 8.1 G/DL (ref 6.4–8.3)
TRICHOMONAS: ABNORMAL
TURBIDITY: CLEAR
URINE HGB: NEGATIVE
UROBILINOGEN, URINE: NORMAL
WBC # BLD: 11.5 K/UL (ref 3.5–11.3)
WBC # BLD: ABNORMAL 10*3/UL
WBC UA: ABNORMAL /HPF (ref 0–5)
YEAST: ABNORMAL

## 2020-02-28 PROCEDURE — 81001 URINALYSIS AUTO W/SCOPE: CPT

## 2020-02-28 PROCEDURE — 99284 EMERGENCY DEPT VISIT MOD MDM: CPT

## 2020-02-28 PROCEDURE — 82947 ASSAY GLUCOSE BLOOD QUANT: CPT

## 2020-02-28 PROCEDURE — 80177 DRUG SCRN QUAN LEVETIRACETAM: CPT

## 2020-02-28 PROCEDURE — 82330 ASSAY OF CALCIUM: CPT

## 2020-02-28 PROCEDURE — 82435 ASSAY OF BLOOD CHLORIDE: CPT

## 2020-02-28 PROCEDURE — 96374 THER/PROPH/DIAG INJ IV PUSH: CPT

## 2020-02-28 PROCEDURE — 85025 COMPLETE CBC W/AUTO DIFF WBC: CPT

## 2020-02-28 PROCEDURE — 87804 INFLUENZA ASSAY W/OPTIC: CPT

## 2020-02-28 PROCEDURE — 84703 CHORIONIC GONADOTROPIN ASSAY: CPT

## 2020-02-28 PROCEDURE — 99283 EMERGENCY DEPT VISIT LOW MDM: CPT | Performed by: NEUROLOGICAL SURGERY

## 2020-02-28 PROCEDURE — 84295 ASSAY OF SERUM SODIUM: CPT

## 2020-02-28 PROCEDURE — 84132 ASSAY OF SERUM POTASSIUM: CPT

## 2020-02-28 PROCEDURE — 70450 CT HEAD/BRAIN W/O DYE: CPT

## 2020-02-28 PROCEDURE — 96375 TX/PRO/DX INJ NEW DRUG ADDON: CPT

## 2020-02-28 PROCEDURE — 83605 ASSAY OF LACTIC ACID: CPT

## 2020-02-28 PROCEDURE — 83690 ASSAY OF LIPASE: CPT

## 2020-02-28 PROCEDURE — 82010 KETONE BODYS QUAN: CPT

## 2020-02-28 PROCEDURE — 2580000003 HC RX 258: Performed by: STUDENT IN AN ORGANIZED HEALTH CARE EDUCATION/TRAINING PROGRAM

## 2020-02-28 PROCEDURE — 80053 COMPREHEN METABOLIC PANEL: CPT

## 2020-02-28 PROCEDURE — 6360000002 HC RX W HCPCS: Performed by: STUDENT IN AN ORGANIZED HEALTH CARE EDUCATION/TRAINING PROGRAM

## 2020-02-28 RX ORDER — PROCHLORPERAZINE EDISYLATE 5 MG/ML
10 INJECTION INTRAMUSCULAR; INTRAVENOUS ONCE
Status: COMPLETED | OUTPATIENT
Start: 2020-02-28 | End: 2020-02-28

## 2020-02-28 RX ORDER — DIPHENHYDRAMINE HYDROCHLORIDE 50 MG/ML
25 INJECTION INTRAMUSCULAR; INTRAVENOUS ONCE
Status: COMPLETED | OUTPATIENT
Start: 2020-02-28 | End: 2020-02-28

## 2020-02-28 RX ORDER — 0.9 % SODIUM CHLORIDE 0.9 %
1000 INTRAVENOUS SOLUTION INTRAVENOUS ONCE
Status: COMPLETED | OUTPATIENT
Start: 2020-02-28 | End: 2020-02-28

## 2020-02-28 RX ADMIN — PROCHLORPERAZINE EDISYLATE 10 MG: 5 INJECTION INTRAMUSCULAR; INTRAVENOUS at 18:57

## 2020-02-28 RX ADMIN — DIPHENHYDRAMINE HYDROCHLORIDE 25 MG: 50 INJECTION, SOLUTION INTRAMUSCULAR; INTRAVENOUS at 18:58

## 2020-02-28 RX ADMIN — SODIUM CHLORIDE 1000 ML: 9 INJECTION, SOLUTION INTRAVENOUS at 18:57

## 2020-02-28 ASSESSMENT — PAIN SCALES - GENERAL: PAINLEVEL_OUTOF10: 10

## 2020-02-28 ASSESSMENT — ENCOUNTER SYMPTOMS
NAUSEA: 1
VOMITING: 1
PHOTOPHOBIA: 0
COUGH: 0
SORE THROAT: 0
RHINORRHEA: 0
ABDOMINAL PAIN: 1
STRIDOR: 0
BACK PAIN: 0
SHORTNESS OF BREATH: 0

## 2020-02-28 ASSESSMENT — PAIN DESCRIPTION - PAIN TYPE: TYPE: ACUTE PAIN

## 2020-02-28 ASSESSMENT — PAIN DESCRIPTION - FREQUENCY: FREQUENCY: CONTINUOUS

## 2020-02-28 ASSESSMENT — PAIN DESCRIPTION - LOCATION: LOCATION: ABDOMEN

## 2020-02-28 ASSESSMENT — PAIN DESCRIPTION - DESCRIPTORS: DESCRIPTORS: ACHING

## 2020-02-28 ASSESSMENT — PAIN DESCRIPTION - ONSET: ONSET: ON-GOING

## 2020-02-28 NOTE — ED PROVIDER NOTES
for photophobia. Respiratory: Negative for cough, shortness of breath and stridor. Cardiovascular: Negative for chest pain, palpitations and leg swelling. Gastrointestinal: Positive for abdominal pain, nausea and vomiting. Genitourinary: Positive for frequency. Negative for decreased urine volume. Musculoskeletal: Negative for back pain, neck pain and neck stiffness. Skin: Negative for rash. Allergic/Immunologic: Negative for environmental allergies. Neurological: Positive for dizziness, light-headedness and headaches. Negative for weakness and numbness. Hematological: Negative for adenopathy. Psychiatric/Behavioral: Negative for confusion. PHYSICAL EXAM   (up to 7 for level 4, 8 or more for level 5)      INITIAL VITALS:   BP (!) 157/92   Pulse 98   Temp 98.2 °F (36.8 °C) (Oral)   Resp 17   Wt 162 lb (73.5 kg)   SpO2 97%   BMI 30.61 kg/m²     Physical Exam  Constitutional:       General: She is not in acute distress. Appearance: She is not diaphoretic. HENT:      Head: Normocephalic and atraumatic. Eyes:      Extraocular Movements: Extraocular movements intact. Pupils: Pupils are equal, round, and reactive to light. Neck:      Musculoskeletal: Normal range of motion and neck supple. Trachea: No tracheal deviation. Cardiovascular:      Rate and Rhythm: Normal rate and regular rhythm. Pulmonary:      Effort: Pulmonary effort is normal. No respiratory distress. Abdominal:      General: There is no distension. Palpations: Abdomen is soft. Tenderness: There is no abdominal tenderness. Comments: No CVA tenderness. Abdomen soft nontender no rigidity no guarding   Musculoskeletal: Normal range of motion. Skin:     General: Skin is warm. Neurological:      Mental Status: She is oriented to person, place, and time. Comments: Normal finger-to-nose testing.   No visual field deficit          DIFFERENTIAL  DIAGNOSIS     PLAN (Avelino Armijo / Tia Mckeon / k/uL    Absolute Lymph # 3.08 1.10 - 3.70 k/uL    Absolute Mono # 0.62 0.10 - 1.20 k/uL    Absolute Eos # 0.07 0.00 - 0.44 k/uL    Basophils Absolute 0.06 0.00 - 0.20 k/uL    Absolute Immature Granulocyte 0.05 0.00 - 0.30 k/uL    WBC Morphology NOT REPORTED     RBC Morphology NOT REPORTED     Platelet Estimate NOT REPORTED    Comprehensive Metabolic Panel   Result Value Ref Range    Glucose 145 (H) 70 - 99 mg/dL    BUN 10 6 - 20 mg/dL    CREATININE 0.69 0.50 - 0.90 mg/dL    Bun/Cre Ratio NOT REPORTED 9 - 20    Calcium 10.7 (H) 8.6 - 10.4 mg/dL    Sodium 138 135 - 144 mmol/L    Potassium 3.9 3.7 - 5.3 mmol/L    Chloride 97 (L) 98 - 107 mmol/L    CO2 26 20 - 31 mmol/L    Anion Gap 15 9 - 17 mmol/L    Alkaline Phosphatase 246 (H) 35 - 104 U/L     (H) 5 - 33 U/L    AST 50 (H) <32 U/L    Total Bilirubin 0.27 (L) 0.3 - 1.2 mg/dL    Total Protein 8.1 6.4 - 8.3 g/dL    Alb 4.3 3.5 - 5.2 g/dL    Albumin/Globulin Ratio 1.1 1.0 - 2.5    GFR Non-African American >60 >60 mL/min    GFR African American >60 >60 mL/min    GFR Comment          GFR Staging NOT REPORTED    LIPASE   Result Value Ref Range    Lipase 25 13 - 60 U/L   Beta-Hydroxybutyrate   Result Value Ref Range    Beta-Hydroxybutyrate 0.44 (H) 0.02 - 0.27 mmol/L   HCG Qualitative, Serum   Result Value Ref Range    hCG Qual NEGATIVE NEGATIVE   SODIUM (POC)   Result Value Ref Range    POC Sodium 143 138 - 146 mmol/L   POTASSIUM (POC)   Result Value Ref Range    POC Potassium 3.8 3.5 - 4.5 mmol/L   CHLORIDE (POC)   Result Value Ref Range    POC Chloride 102 98 - 107 mmol/L   CALCIUM, IONIC (POC)   Result Value Ref Range    POC Ionized Calcium 1.20 1. 15 - 1.33 mmol/L   URINALYSIS WITH MICROSCOPIC   Result Value Ref Range    Color, UA YELLOW YELLOW    Turbidity UA CLEAR CLEAR    Glucose, Ur TRACE (A) NEGATIVE    Bilirubin Urine NEGATIVE NEGATIVE    Ketones, Urine TRACE (A) NEGATIVE    Specific Gravity, UA 1.014 1.005 - 1.030    Urine Hgb NEGATIVE NEGATIVE    pH, UA 6.5 5.0 - 8.0    Protein, UA NEGATIVE NEGATIVE    Urobilinogen, Urine Normal Normal    Nitrite, Urine NEGATIVE NEGATIVE    Leukocyte Esterase, Urine NEGATIVE NEGATIVE    -          WBC, UA 2 TO 5 0 - 5 /HPF    RBC, UA 0 TO 2 0 - 4 /HPF    Casts UA  0 - 8 /LPF     0 TO 2 HYALINE Reference range defined for non-centrifuged specimen. Crystals, UA NOT REPORTED None /HPF    Epithelial Cells UA 5 TO 10 0 - 5 /HPF    Renal Epithelial, UA NOT REPORTED 0 /HPF    Bacteria, UA NOT REPORTED None    Mucus, UA NOT REPORTED None    Trichomonas, UA NOT REPORTED None    Amorphous, UA NOT REPORTED None    Other Observations UA NOT REPORTED NOT REQ. Yeast, UA NOT REPORTED None   Levetiracetam Level   Result Value Ref Range    Levetiracetam Lvl 39 ug/mL   Lactic Acid, POC   Result Value Ref Range    POC Lactic Acid 1.35 0.56 - 1.39 mmol/L   POCT Glucose   Result Value Ref Range    POC Glucose 152 (H) 74 - 100 mg/dL           RADIOLOGY:  CT HEAD WO CONTRAST   Preliminary Result   No change in left occipital hemorrhage. EKG  None    All EKG's are interpreted by the Emergency Department Physician who either signs or Co-signs this chart in the absence of a cardiologist.    EMERGENCY DEPARTMENT COURSE:  Patient is a 66-year-old female history of occipital cavernoma status post partial resection, history of intraparenchymal hemorrhage presenting with headaches throughout the day dizziness blurred vision no focal neurologic deficit, mildly tachycardic, does not appear clinically dehydrated, history of diabetes with recent admission due to uncontrolled sugars will check point-of-care chemistry, laboratory work-up, will image CT head, will discuss with neurosurgery pending CT. Patient was reevaluated multiple occasions, patient had continued improvement of headache symptoms down to 2-3 at time of discharge.   Patient evaluated by neurosurgery, repeat CT shows stable appearance from prior, no surgical treatments planned. Due to patient's continued visual changes and even though patient had improved headache did recommend admission for observation with neurology consult due to dizziness complaint, patient elected to be discharged home to follow-up outpatient with [address] as previously scheduled. Patient was instructed to return immediately if she has worsening symptoms including sudden severe change in headache, numbness weakness in arms or legs, worsening dizziness. PROCEDURES:  None    CONSULTS:  IP CONSULT TO NEUROLOGY  IP CONSULT TO NEUROSURGERY    CRITICAL CARE:  None    FINAL IMPRESSION      1. Nonintractable episodic headache, unspecified headache type    2. Nausea    3.  Lightheadedness          DISPOSITION / PLAN     DISPOSITION  dc      PATIENT REFERRED TO:  [facility]  [address] [facility] [id number] [phone]    If symptoms worsen    [facility]  Please call [facility] as soon as possible to schedule your follow-up for your cavernoma          DISCHARGE MEDICATIONS:  Discharge Medication List as of 2/28/2020  9:28 PM          Rigo De Guzman DO  Emergency Medicine Resident    (Please note that portions of thisnote were completed with a voice recognition program.  Efforts were made to edit the dictations but occasionally words are mis-transcribed.)        Rigo De Guzman DO  02/28/20 [id number]

## 2020-02-29 NOTE — CONSULTS
Follow-up with neurosurgery clinic as outpatient              - Follow-up with vascular neurosurgeon at [address] to begin prophylactic anticoagulation from neurosurgery stand point. However, we recommend careful evaluation of all other risk factors associated with anticoagulation therapy as applied to this patient's medical condition  - We recommend SBP < 140        Additional recommendations may follow  Please contact neurosurgery with any changes in patient's neurologic status. Thank you for your consult. Andrea Miramontes MD    PGY-2 Neurology resident Physician  Neurosurgery Team   [facility]  2/28/2020 9:07 PM    I have seen and examined the patient independently. I reviewed all laboratory and imaging studies that are relevant. I agree with the resident's note with the below addendum.

## 2020-02-29 NOTE — ED PROVIDER NOTES
Emergency Medicine Attending Note    I have seen and examined the patient in conjunction with the Resident/MLP. Please see my key portion documented:      I agree with the assessment and plan as discussed with Dr. Denia Bearden. Concern for the patient's blurry vision and continued headache even though she tells me her headache is down to a 3 out of 10. Offered the patient neurology consultation, observation admission for further evaluation, care and treatment and she graciously declines and is adamant that she wants to go home and \"call you of them tomorrow \". Electronically signed:  DONY Meza MD  02/28/20 2408 S. Humza Greenberg MD  02/28/20 2905

## 2020-04-02 ENCOUNTER — TELEMEDICINE (OUTPATIENT)
Dept: NEUROLOGY | Age: 25
End: 2020-04-02
Payer: MEDICARE

## 2020-04-02 VITALS — HEIGHT: 61 IN | WEIGHT: 160 LBS | BODY MASS INDEX: 30.21 KG/M2

## 2020-04-02 PROCEDURE — G8417 CALC BMI ABV UP PARAM F/U: HCPCS | Performed by: STUDENT IN AN ORGANIZED HEALTH CARE EDUCATION/TRAINING PROGRAM

## 2020-04-02 PROCEDURE — 4004F PT TOBACCO SCREEN RCVD TLK: CPT | Performed by: STUDENT IN AN ORGANIZED HEALTH CARE EDUCATION/TRAINING PROGRAM

## 2020-04-02 PROCEDURE — 99214 OFFICE O/P EST MOD 30 MIN: CPT | Performed by: STUDENT IN AN ORGANIZED HEALTH CARE EDUCATION/TRAINING PROGRAM

## 2020-04-02 PROCEDURE — G8427 DOCREV CUR MEDS BY ELIG CLIN: HCPCS | Performed by: STUDENT IN AN ORGANIZED HEALTH CARE EDUCATION/TRAINING PROGRAM

## 2020-04-02 RX ORDER — MAGNESIUM OXIDE 400 MG/1
400 TABLET ORAL DAILY
Qty: 30 TABLET | Refills: 3 | Status: SHIPPED | OUTPATIENT
Start: 2020-04-02 | End: 2020-05-02

## 2020-04-02 RX ORDER — CHLORHEXIDINE GLUCONATE 0.12 MG/ML
15 RINSE ORAL 2 TIMES DAILY
Status: ON HOLD | COMMUNITY
Start: 2020-02-28 | End: 2022-06-21

## 2020-04-02 NOTE — PROGRESS NOTES
[address] [place] [address],  [address], [facility] [address], [facility], [address] [place] [address]  P: [phone]  F: [phone]       TELEHEALTH VISIT        Pt Name: Lucas De La Cruz  MRN: [id number]  YOB: 1995  Date of evaluation: 4/2/2020  Primary Care Physician: Shruthi Waterman  Reason for Evaluation: TELEHEALTH EVALUATION -- Audio/Visual (During [address] public health emergency) and hospital follow up. Lucas De La Cruz 30 y.o. female was seen on Tele health for hospital follow up     Patient was initially admitted in Jan 2020 for on and off headaches, found to have L Occipital cavernoma, was treated with steroids and Occipital cavernoma was resected on 1/8/20, with post operative R homonymous hemianopsia. She then presented on 2/18/20 with headache, dizziness and seeing spots in her right peripheral vision and not feeling good. Her blood sugars were in 800s at that time. MRI Brain show possible new acute left PCA territory infarction, but the sensitivity and specificity limited due to local magnetic susceptibility artifact from the postsurgical and hemorrhagic changes. It showed left occipital craniectomy with interval decrease in size of surgical bed and intraparenchymal hemorrhage. CT head was repeated after that which showed stable left occipital hemorrhage and left parieto-occipital craniotomy. Patient has a history of seizures, last seizure was when she was 23years old. She was placed on Keppra for seizure prophylaxis after the surgery, as per patient she weaned her off of [place] by herself, as she felt worsening of her migraine headaches with Keppra and does not want to take it again. She was prescribed Lamictal by her psychiatrist in the past but never took it and said the medication was \" Recalled\" and does not know the exact reason why.   Denies any seizures since age 23, at present she does not want to take any seizure medications and says she would like to discuss with her psychiatrist/psychologist and Unasyn and Tongue protrudes midline. Palate elevates symmetrically. Shoulder shrug is intact bilaterally. Motor exam:    Able to move both upper and lower extremities equally well against gravity. No pronator drift noted. Muscle bulk appears normal.       Deep tendon reflexes:  Unable to perform deep tendon reflexes. Sensory exam:    Unable to test    Cerebellar exam:    Reveals intact finger-nose-finger testing. No visible abnormal involuntary movements. Gait exam:   Patient is able to stand up with arms crossed, able to do heel walk and toe walk. ASSESSMENT AND PLAN:       This is a 30 y.o. female who was seen on telehealth for hospital follow-up. ·   · Left occipital cavernoma status post resection on 1/8/2020  · Left occipital IPH  · Right homonymous hemianopsia  · Chronic migraine headaches  · History of seizures, last seizure was when patient was 23years old. Plan:  · Magnesium oxide 400 mg QD  · Tyelnol PRN for the headaches  · Patient was supposed to be taking Keppra for seizure prophylaxis as per last hospital admission, but she weaned herself off of Keppra as it was worsening her migraine headaches and does not want to continue  that. She does not want to take any medications for seizures at present. Recommend an AED considering prior history of seizures but as per patient she does not want to take any medications right now, she was prescribed Lamictal by her psychologist/ psychiatrist for mood disorder but as per patient the medication was recalled and she does not know the reason. · She said she will discuss with her psychologist about seizure medications too and will decide about that after discussing with the psychologist. Will call us about that. · Seizure Precautions:   counseled the patient with regard to seizure precautions for injury prevention and reinforced their rationale.  No driving for at least 6 months, no activity at dangerous heights, no operation of

## 2020-07-07 ENCOUNTER — OFFICE VISIT (OUTPATIENT)
Dept: NEUROLOGY | Age: 25
End: 2020-07-07
Payer: MEDICARE

## 2020-07-07 VITALS
SYSTOLIC BLOOD PRESSURE: 123 MMHG | HEIGHT: 62 IN | BODY MASS INDEX: 27.05 KG/M2 | DIASTOLIC BLOOD PRESSURE: 91 MMHG | WEIGHT: 147 LBS | OXYGEN SATURATION: 97 % | TEMPERATURE: 98.2 F | HEART RATE: 107 BPM

## 2020-07-07 PROCEDURE — G8427 DOCREV CUR MEDS BY ELIG CLIN: HCPCS | Performed by: STUDENT IN AN ORGANIZED HEALTH CARE EDUCATION/TRAINING PROGRAM

## 2020-07-07 PROCEDURE — G8417 CALC BMI ABV UP PARAM F/U: HCPCS | Performed by: STUDENT IN AN ORGANIZED HEALTH CARE EDUCATION/TRAINING PROGRAM

## 2020-07-07 PROCEDURE — 4004F PT TOBACCO SCREEN RCVD TLK: CPT | Performed by: STUDENT IN AN ORGANIZED HEALTH CARE EDUCATION/TRAINING PROGRAM

## 2020-07-07 PROCEDURE — 99214 OFFICE O/P EST MOD 30 MIN: CPT | Performed by: STUDENT IN AN ORGANIZED HEALTH CARE EDUCATION/TRAINING PROGRAM

## 2020-07-07 RX ORDER — GABAPENTIN 300 MG/1
300 CAPSULE ORAL NIGHTLY
Qty: 30 CAPSULE | Refills: 1 | Status: SHIPPED | OUTPATIENT
Start: 2020-07-07 | End: 2021-03-04 | Stop reason: SDUPTHER

## 2020-07-07 ASSESSMENT — ENCOUNTER SYMPTOMS
DIARRHEA: 0
EYE REDNESS: 0
CONSTIPATION: 0
EYE PAIN: 0
SINUS PAIN: 0
EYE DISCHARGE: 0
COUGH: 0
SHORTNESS OF BREATH: 0
NAUSEA: 1
SORE THROAT: 0
ABDOMINAL PAIN: 0
VOMITING: 0
PHOTOPHOBIA: 0

## 2020-07-07 NOTE — PROGRESS NOTES
[address] [place] [address],  [address], [facility], [facility], [address] [place] [address]  P: [phone]  F: [address] NOTE     PATIENT NAME: Nasreen Cedillo  PATIENT MRN: [id number]  PRIMARY CARE PHYSICIAN: Eric Epperson    Interval History: 7/7/2020  Patient was last seen on telehealth on 4/2/2020. Since last visit she continues to complain of on and off headaches, occipital region, sometimes associated with nausea. Denies any photophobia phonophobia or dizziness or lightheadedness or any other symptoms associated with the headache. Endorses improvement in the headache with Tylenol. She is also on magnesium oxide 400 mg daily. Denies any side effects on the medications. She was supposed to be started on Lamictal for psychiatric disorder, took it for few days and then stopped taking it. Not planning to continue to take it. Denies any seizures or seizure-like activity since last visit. Her last seizure was age 23. She does not want to take any seizure medications for now. She followed up with Neurosurgeon Dr Bonita Galindo at [facility], planning to repeat a repeat MRI brain. Patient endorses bruises on her upper and lower extremities because of lifting heavy weights, she works at [employer]. She was started on vitamin C by her PCP  Patient denies any other new neurologic concerns during this visit. Notes from 4/2/2020  HPI:         Nasreen Cedillo 30 y.o. 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 She then presented on 2/18/20 with headache, dizziness and seeing spots in her right peripheral vision and not feeling good.  Her blood sugars were in 800s at that time.      MRI Brain show possible new acute left PCA territory infarction, but the sensitivity and specificity limited due to local magnetic susceptibility artifact from the postsurgical and hemorrhagic changes. It showed left occipital craniectomy with interval decrease in size of surgical bed and intraparenchymal hemorrhage. CT head was repeated after that which showed stable left occipital hemorrhage and left parieto-occipital craniotomy.     Patient has a history of seizures, last seizure was when she was 23years old. She was placed on Keppra for seizure prophylaxis after the surgery, as per patient she weaned her off of [address] by herself, as she felt worsening of her migraine headaches with Keppra and does not want to take it again. She was prescribed Lamictal by her psychiatrist in the past but never took it and said the medication was \" Recalled\" and does not know the exact reason why. Denies any seizures since age 23, at present she does not want to take any seizure medications and says she would like to discuss with her psychiatrist/psychologist and [facility] and then decide about it.     Since discharge from the hospital she endorses overall improvement in her headache, denies any double vision or blurry vision, endorses quitting smoking. Currently taking Tylenol as needed for the headaches and endorses improvement in the headache with that, was noted to have low magnesium level and was prescribed magnesium oxide 400 mg daily. Denies any side effects of the medications.   Denies any other concerns during this visit.        PATIENT HISTORY:     Past Medical History:   Diagnosis Date    Cerebral vascular malformation     [email or web address]    Diabetes mellitus ([address]     Seizure ([address]     Traumatic hemorrhage of left cerebrum without loss of consciousness ([address]     @ [facility]        Past Surgical History:   Procedure Laterality Date    CRANIOTOMY  01/08/2020    LEFT OCCIPITAL CRANIOTOMY    CRANIOTOMY Left 1/8/2020    LEFT OCCIPITAL CRANIOTOMY, 1ST VASCULAR LESION WITH Naked Wines NAVIGATION (LATERAL WITH ROMERO BAG, HOLLINGSWORTH HEADHOLDER, MICROSCOPE) performed by Sydnie Gardner DO at [facility]. History     Socioeconomic History    Marital status: Single     Spouse name: Not on file    Number of children: Not on file    Years of education: Not on file    Highest education level: Not on file   Occupational History    Not on file   Social Needs    Financial resource strain: Not on file    Food insecurity     Worry: Not on file     Inability: Not on file    Transportation needs     Medical: Not on file     Non-medical: Not on file   Tobacco Use    Smoking status: Current Some Day Smoker     Packs/day: 0.30     Years: 9.00     Pack years: 2.70     Types: Cigarettes    Smokeless tobacco: Never Used   Substance and Sexual Activity    Alcohol use: No    Drug use: Yes     Types: Marijuana    Sexual activity: Yes     Partners: Male   Lifestyle    Physical activity     Days per week: Not on file     Minutes per session: Not on file    Stress: Not on file   Relationships    Social connections     Talks on phone: Not on file     Gets together: Not on file     Attends Baptism service: Not on file     Active member of club or organization: Not on file     Attends meetings of clubs or organizations: Not on file     Relationship status: Not on file    Intimate partner violence     Fear of current or ex partner: Not on file     Emotionally abused: Not on file     Physically abused: Not on file     Forced sexual activity: Not on file   Other Topics Concern    Not on file   Social History Narrative    Not on file        Current Outpatient Medications   Medication Sig Dispense Refill    gabapentin (NEURONTIN) 300 MG capsule Take 1 capsule by mouth nightly for 30 days.  30 capsule 1    Magnesium Oxide (MAG-OXIDE) 200 MG TABS Take 1 tablet by mouth 2 times daily 60 tablet 0    insulin detemir (LEVEMIR FLEXTOUCH) 100 UNIT/ML injection pen Inject 25 Units into the skin nightly 5 pen 3  insulin aspart (NOVOLOG FLEXPEN) 100 UNIT/ML injection pen Inject 5 Units into the skin 3 times daily (before meals) 5 pen 3    glucose monitoring kit (FREESTYLE) monitoring kit 1 kit by Does not apply route daily 1 kit 0    blood glucose monitor strips Test 3 times a day & as needed for symptoms of irregular blood glucose. 100 strip 3    Lancets MISC 1 each by Does not apply route 3 times daily 600 each 1    Alcohol Swabs (ALCOHOL PREP) 70 % PADS 1 pad  each 3    Insulin Pen Needle 30G X 8 MM MISC 1 each by Does not apply route daily 100 each 3    busPIRone (BUSPAR) 10 MG tablet Take 10 mg by mouth 3 times daily      lamoTRIgine (LAMICTAL) 25 MG tablet Take 25 mg by mouth daily      etonogestrel (NEXPLANON) 68 MG implant 68 mg by Subdermal route once      ondansetron (ZOFRAN) 4 MG tablet Take 1 tablet by mouth every 6 hours as needed for Nausea or Vomiting 10 tablet 0    chlorhexidine (PERIDEX) 0.12 % solution Take 15 mLs by mouth 2 times daily      butalbital-acetaminophen-caffeine (FIORICET, ESGIC) -40 MG per tablet Take 1 tablet by mouth every 4 hours as needed for Headaches (Patient not taking: Reported on 4/2/2020) 30 tablet 0    insulin lispro (HUMALOG) 100 UNIT/ML injection vial Inject 0-12 Units into the skin 3 times daily (with meals) (Patient not taking: Reported on 7/7/2020) 1 vial 3    scopolamine (TRANSDERM-SCOP) transdermal patch Place 1 patch onto the skin every 72 hours (Patient not taking: Reported on 4/2/2020) 3 patch 0    levETIRAcetam (KEPPRA) 500 MG tablet Take 1 tablet by mouth 2 times daily for 7 days (Patient not taking: Reported on 4/2/2020) 60 tablet 0    atorvastatin (LIPITOR) 40 MG tablet Take 1 tablet by mouth nightly (Patient not taking: Reported on 4/2/2020) 30 tablet 3    docusate sodium (COLACE) 100 MG capsule Take 100 mg by mouth 2 times daily as needed for Constipation       No current facility-administered medications for this visit. Allergies   Allergen Reactions    Latex     Prozac [Fluoxetine Hcl] Other (See Comments)     Seizures        REVIEW OF SYSTEMS:     Review of Systems   Constitutional: Positive for fatigue. Negative for chills, diaphoresis, fever and unexpected weight change. HENT: Negative for congestion, ear discharge, ear pain, hearing loss, sinus pain and sore throat. Eyes: Positive for visual disturbance. Negative for photophobia, pain, discharge and redness. Respiratory: Negative for cough and shortness of breath. Cardiovascular: Negative for chest pain, palpitations and leg swelling. Gastrointestinal: Positive for nausea. Negative for abdominal pain, constipation, diarrhea and vomiting. Endocrine: Negative for polydipsia and polyuria. Genitourinary: Negative for difficulty urinating and hematuria. Musculoskeletal: Negative for neck pain. Skin: Negative for pallor and rash. Neurological: Positive for headaches. Negative for dizziness, tremors, seizures, syncope, facial asymmetry, speech difficulty, weakness, light-headedness and numbness. Hematological: Bruises/bleeds easily. Psychiatric/Behavioral: Negative for agitation, behavioral problems and hallucinations. VITALS  BP (!) 123/91 (Site: Left Upper Arm, Position: Sitting, Cuff Size: Medium Adult)   Pulse 107   Temp 98.2 °F (36.8 °C) (Temporal)   Ht 5' 2\" (1.575 m)   Wt 147 lb (66.7 kg)   SpO2 97%   BMI 26.89 kg/m²      PHYSICAL EXAMINATION:     Constitutional: Well developed, well nourished and in no acute distress. Head:  normocephalic, atraumatic. Neck: supple, no carotid bruits, thyroid not palpable  Respiratory: Clear to auscultation bilaterally with no use of accessory muscles during respiration. Cardiovascular: normal rate, regular rhythm, no murmur, gallop, rub.   Abdomen: Soft, nontender, nondistended, normal bowel sounds, no hepatomegaly or splenomegaly  Extremities:  peripheral pulses palpable, no pedal edema or calf pain with palpation  Psych: normal affect      NEUROLOGICAL EXAMINATION:     Mental status   Alert and oriented; intact memory with no confusion, speech or language problems; no hallucinations or delusions     Cranial nerves   II - improved vision, patient was having right homonymous hemianopsia                                          III, IV, VI - extra-ocular muscles full: no pupillary defect; no ELIO, no nystagmus, no ptosis   V - normal facial sensation                                                               VII - normal facial symmetry                                                             VIII - intact hearing                                                                             IX, X - symmetrical palate                                                                  XI - symmetrical shoulder shrug                                                       XII - midline tongue without atrophy or fasciculation     Motor function  Normal muscle bulk and tone  Muscle strength: normal power 5/5       Sensory function Intact to touch in bilateral upper and lower extremities. Cerebellar Intact fine motor movement. No involuntary movements or tremors     Reflex function Intact 2+ DTR and symmetric. Negative Babinski     Gait                  Normal station and gait           PRIOR TESTS AND IMAGING: Following images and Labs were reviewed by the examiner     EEG 2/18/20  Interpretation  This EEG was abnormal in wakefulness and drowsiness. Frequent intrusions of polymorphic delta and theta frequencies were seen.        Clinical correlation  This EEG was abnormal suggestive of mild encephalopathy of nonspecific etiology. No abnormal epileptiform activity was seen. MRI brain without contrast 2/18/2020  Impression   Possible new acute left posterior cerebral artery territory infarction.    Sensitivity and specificity are limited due to local magnetic susceptibility   artifact from seizures at present. · Recommend an AED considering prior history of seizures but as per patient she does not want to take any medications right now, she was prescribed Lamictal by her psychologist/ psychiatrist for mood disorder but as per patient the medication was recalled and she does not know the reason. · She said she will discuss with her psychologist about seizure medications too and will decide about that after discussing with the psychologist. Will call us about that. · Seizure Precautions:   counseled the patient with regard to seizure precautions for injury prevention and reinforced their rationale. No driving for at least 6 months, no activity at dangerous heights, no operation of dangerous machinery, no baths, no swimming. Caution (preferably accompanied) with cooking or near fires. The patient expressed understanding.         · Recommend driving evaluation-prior to start driving  · Follow-up with neurosurgery  · Follow-up in clinic in 3 months  · Instructed patient to call the clinic if symptoms worsens or  any other new symptoms. I have spent 25  minutes face to face with the patient more than 50% of this time was spent counseling and coordinating care.       Electronically signed by Kelsi Petersen MD on 7/7/2020 at 2:02 PM

## 2020-07-15 ENCOUNTER — TELEPHONE (OUTPATIENT)
Dept: INFECTIOUS DISEASES | Age: 25
End: 2020-07-15

## 2020-07-16 ENCOUNTER — HOSPITAL ENCOUNTER (OUTPATIENT)
Age: 25
Discharge: HOME | End: 2020-07-16
Payer: MEDICARE

## 2020-07-16 LAB — VITAMIN D 25-HYDROXY: 30 NG/ML (ref 30–100)

## 2020-07-16 PROCEDURE — 36415 COLL VENOUS BLD VENIPUNCTURE: CPT

## 2020-07-16 PROCEDURE — 82306 VITAMIN D 25 HYDROXY: CPT

## 2020-07-29 ENCOUNTER — TELEPHONE (OUTPATIENT)
Dept: NEUROSURGERY | Age: 25
End: 2020-07-29

## 2020-07-29 NOTE — TELEPHONE ENCOUNTER
Patient called wanting results of Vitamin d lab.     Patient also states the gabapentin is working     Patient also needs order to ortho

## 2020-07-30 NOTE — TELEPHONE ENCOUNTER
Please inform the patient that Vitamin D level was 30. Lower range of normal.   Good to know that Gabapentin is helping her. Thanks.

## 2020-08-19 ENCOUNTER — HOSPITAL ENCOUNTER (OUTPATIENT)
Dept: MRI IMAGING | Age: 25
Discharge: HOME | End: 2020-08-21
Payer: MEDICARE

## 2020-08-19 PROCEDURE — 70551 MRI BRAIN STEM W/O DYE: CPT

## 2020-10-02 ENCOUNTER — APPOINTMENT (OUTPATIENT)
Dept: CT IMAGING | Age: 25
End: 2020-10-02
Payer: MEDICARE

## 2020-10-02 ENCOUNTER — HOSPITAL ENCOUNTER (EMERGENCY)
Age: 25
Discharge: HOME | End: 2020-10-02
Attending: EMERGENCY MEDICINE
Payer: MEDICARE

## 2020-10-02 VITALS
OXYGEN SATURATION: 98 % | DIASTOLIC BLOOD PRESSURE: 85 MMHG | RESPIRATION RATE: 15 BRPM | SYSTOLIC BLOOD PRESSURE: 127 MMHG | TEMPERATURE: 98.1 F | HEART RATE: 91 BPM | WEIGHT: 150 LBS | BODY MASS INDEX: 27.44 KG/M2

## 2020-10-02 PROCEDURE — 6370000000 HC RX 637 (ALT 250 FOR IP): Performed by: STUDENT IN AN ORGANIZED HEALTH CARE EDUCATION/TRAINING PROGRAM

## 2020-10-02 PROCEDURE — 70450 CT HEAD/BRAIN W/O DYE: CPT

## 2020-10-02 PROCEDURE — 99284 EMERGENCY DEPT VISIT MOD MDM: CPT

## 2020-10-02 RX ORDER — ACETAMINOPHEN 500 MG
1000 TABLET ORAL ONCE
Status: COMPLETED | OUTPATIENT
Start: 2020-10-02 | End: 2020-10-02

## 2020-10-02 RX ORDER — PROCHLORPERAZINE MALEATE 10 MG
10 TABLET ORAL ONCE
Status: COMPLETED | OUTPATIENT
Start: 2020-10-02 | End: 2020-10-02

## 2020-10-02 RX ADMIN — PROCHLORPERAZINE MALEATE 10 MG: 10 TABLET ORAL at 20:34

## 2020-10-02 RX ADMIN — ACETAMINOPHEN 1000 MG: 500 TABLET ORAL at 20:34

## 2020-10-02 ASSESSMENT — PAIN SCALES - GENERAL
PAINLEVEL_OUTOF10: 10
PAINLEVEL_OUTOF10: 10

## 2020-10-02 ASSESSMENT — PAIN DESCRIPTION - PAIN TYPE: TYPE: ACUTE PAIN

## 2020-10-02 ASSESSMENT — PAIN DESCRIPTION - LOCATION: LOCATION: HEAD

## 2020-10-02 NOTE — ED NOTES
Patient into ER with c/o head pain/headache s/p head injury yesterday when she hit her L side of her head on the car door  Reports +N/V  Denies LOC  Reports ongoing pain in head  Taking Tylenol PO without relief.   A&O X4  +PERRL X2    Ambulatory to room with steady gait  Nondistressed  GCS=15  VS stable    Call light within reach  Updated on plan of care and processes  Denies complaints at this time  Will continue to monitor       Jeromy Mckenzie RN  10/02/20 0834

## 2020-10-02 NOTE — ED PROVIDER NOTES
[address]  ED  Emergency Department Encounter  Emergency Medicine Resident     Pt Name: Emmett Chinchilla  WSF:[id number]  [place] 1995  Date of evaluation: 10/2/20  PCP:  Ana Ch       Chief Complaint   Patient presents with    Headache     Reports hitting head on car door [place]ested[place], denies LOC headache today with nausea       HISTORY OF PRESENT ILLNESS  (Location/Symptom, Timing/Onset, Context/Setting, Quality, Duration, ModifyingFactors, Severity.)      Emmett Chinchilla is a 30 y.o. female with PMH of tubal vascular malformation, 2 traumatic hemorrhages, seizures on Keppra presents with a headache after being struck in the head with a remote and a car door 2 days ago. No LOC, not any blood thinners, no other neuro symptoms such as numbness or weakness, dizziness or lightheadedness. Patient does admit to photophobia and phonophobia symptoms similar to her normal headaches, however she was concerned because the symptoms have persisted. PAST MEDICAL / SURGICAL / SOCIAL /FAMILY HISTORY      has a past medical history of Cerebral vascular malformation, Diabetes mellitus ([address] Seizure ([address] and Traumatic hemorrhage of left cerebrum without loss of consciousness ([address] No other pertinent PMH on review with patient/guardian. has a past surgical history that includes craniotomy (01/08/2020) and craniotomy (Left, 1/8/2020). No other pertinent PSH on review with patient/guardian.   Social History     Socioeconomic History    Marital status: Single     Spouse name: Not on file    Number of children: Not on file    Years of education: Not on file    Highest education level: Not on file   Occupational History    Not on file   Social Needs    Financial resource strain: Not on file    Food insecurity     Worry: Not on file     Inability: Not on file    Transportation needs     Medical: Not on file     Non-medical: Not on file   Tobacco Use    Smoking status: Current level 5)      Review of Systems   Constitutional: Negative for chills and fever. HENT: Negative for sore throat. Eyes: Negative for pain and visual disturbance. Respiratory: Negative for shortness of breath. Cardiovascular: Negative for chest pain. Gastrointestinal: Negative for abdominal pain, nausea and vomiting. Genitourinary: Negative for dysuria and hematuria. Musculoskeletal: Negative for back pain and neck pain. Skin: Negative for rash. Neurological: Positive for headaches. Negative for dizziness, syncope, facial asymmetry, speech difficulty, weakness, light-headedness and numbness. Hematological: Does not bruise/bleed easily. PHYSICAL EXAM   (up to 7 for level 4, 8 or more for level 5)      INITIAL VITALS:   /85   Pulse 91   Temp 98.1 °F (36.7 °C) (Oral)   Resp 15   Wt 150 lb (68 kg)   SpO2 98%   BMI 27.44 kg/m²     Physical Exam  Constitutional:       General: She is not in acute distress. Appearance: She is well-developed. She is not ill-appearing or toxic-appearing. HENT:      Head: Normocephalic and atraumatic. Right Ear: External ear normal.      Left Ear: External ear normal.      Nose: Nose normal. No rhinorrhea. Mouth/Throat:      Mouth: Mucous membranes are moist.      Pharynx: Oropharynx is clear. No oropharyngeal exudate. Eyes:      General:         Right eye: No discharge. Left eye: No discharge. Extraocular Movements: Extraocular movements intact. Conjunctiva/sclera: Conjunctivae normal.      Pupils: Pupils are equal, round, and reactive to light. Neck:      Musculoskeletal: Normal range of motion and neck supple. Cardiovascular:      Rate and Rhythm: Normal rate and regular rhythm. Heart sounds: Normal heart sounds. No murmur. No friction rub. No gallop. Pulmonary:      Effort: Pulmonary effort is normal.      Breath sounds: Normal breath sounds. No wheezing or rales.    Abdominal:      Palpations: Abdomen is soft.      Tenderness: There is no abdominal tenderness. There is no guarding or rebound. Musculoskeletal: Normal range of motion. Right lower leg: No edema. Left lower leg: No edema. Skin:     General: Skin is warm and dry. Findings: No rash. Neurological:      General: No focal deficit present. Mental Status: She is alert and oriented to person, place, and time. Cranial Nerves: No cranial nerve deficit. Sensory: No sensory deficit. Motor: No weakness. Comments: Sensation is intact to bilateral face, mostly pacifier in bilateral extremities, cranial 2 through 12 grossly intact bilaterally, no pronator drift, normal finger-to-nose and heel-to-shin. DIFFERENTIAL  DIAGNOSIS     PLAN (LABS / IMAGING / EKG):  Orders Placed This Encounter   Procedures    CT HEAD WO CONTRAST       MEDICATIONS ORDERED:  Orders Placed This Encounter   Medications    prochlorperazine (COMPAZINE) tablet 10 mg    acetaminophen (TYLENOL) tablet 1,000 mg           DIAGNOSTIC RESULTS / EMERGENCY DEPARTMENT COURSE / MDM     LABS:  No results found for this visit on 10/02/20. IMPRESSION/MDM/ED COURSE:  30 y.o. female history of cavernous malformation as well as 2 traumatic hemorrhages presents with headache, photophobia, phonophobia for 2 days after she struck in the head with remote in a car door. No other neuro symptoms, no LOC, no AC/AP use. On exam vital signs normal, no neuro deficits. CT head obtained due to history and persistent symptoms which was negative for any acute findings. I did discuss with radiology after concern of potential hyperdensity in the postsurgical area which they stated was stable from previous scan in February 2020, and possibly calcifications but no acute signs of hemorrhage. Patient feeling well, ambulate without any difficulties, no nausea or vomiting, agreeable to receiving Compazine and wanting to be discharged home.        Patient/Guardian requesting discharge. Patient/Guardian was given written and verbal instructions prior to discharge. Patient/Guardian understood and agreed. Patient/Guardian had no further questions. RADIOLOGY:  CT HEAD WO CONTRAST   Final Result   No acute intracranial abnormality. EKG  None    All EKG's are interpreted by the Emergency Department Physician who either signs or Co-signs this chart in the absence of a cardiologist.      PROCEDURES:  None    CONSULTS:  None        FINAL IMPRESSION      1.  Nonintractable headache, unspecified chronicity pattern, unspecified headache type          DISPOSITION / PLAN     DISPOSITION Decision To Discharge 10/02/2020 08:23:25 PM      PATIENT REFERREDTO:  Van Billings  [address] [facility] [phone]    Call in 3 days      [facility] ED  [address] [place] [address]  [phone]  Go to   If symptoms worsen      DISCHARGE MEDICATIONS:  Discharge Medication List as of 10/2/2020  8:24 PM          Antony Manrique DO  PGY 3  Resident Physician Emergency Medicine  10/03/20 1:02 AM        (Please note that portions of this note were completed with a voice recognition program.Efforts were made to edit the dictations but occasionally words are mis-transcribed.)        Antony Manrique DO  Resident  10/03/20 9600

## 2020-10-02 NOTE — ED NOTES
Report received from [facility]. Pt resting in bed. NAD at this time. Even non labored RR. Reports a headache. Will continue to monitor.         Colby Bradshaw RN  10/02/20 3243

## 2020-10-02 NOTE — ED NOTES
Patient remains resting on cart, nondistressed  Gcs=15    Will continue to monitor    Call light within reach     Jcarlos Coats RN  10/02/20 2856

## 2020-10-03 ASSESSMENT — ENCOUNTER SYMPTOMS
BACK PAIN: 0
SORE THROAT: 0
ABDOMINAL PAIN: 0
EYE PAIN: 0
NAUSEA: 0
VOMITING: 0
SHORTNESS OF BREATH: 0

## 2020-10-07 ENCOUNTER — OFFICE VISIT (OUTPATIENT)
Dept: NEUROLOGY | Age: 25
End: 2020-10-07
Payer: MEDICARE

## 2020-10-07 VITALS
RESPIRATION RATE: 16 BRPM | HEART RATE: 109 BPM | TEMPERATURE: 97.3 F | SYSTOLIC BLOOD PRESSURE: 133 MMHG | DIASTOLIC BLOOD PRESSURE: 90 MMHG

## 2020-10-07 PROCEDURE — G8427 DOCREV CUR MEDS BY ELIG CLIN: HCPCS | Performed by: STUDENT IN AN ORGANIZED HEALTH CARE EDUCATION/TRAINING PROGRAM

## 2020-10-07 PROCEDURE — G8484 FLU IMMUNIZE NO ADMIN: HCPCS | Performed by: STUDENT IN AN ORGANIZED HEALTH CARE EDUCATION/TRAINING PROGRAM

## 2020-10-07 PROCEDURE — 99214 OFFICE O/P EST MOD 30 MIN: CPT | Performed by: STUDENT IN AN ORGANIZED HEALTH CARE EDUCATION/TRAINING PROGRAM

## 2020-10-07 PROCEDURE — 4004F PT TOBACCO SCREEN RCVD TLK: CPT | Performed by: STUDENT IN AN ORGANIZED HEALTH CARE EDUCATION/TRAINING PROGRAM

## 2020-10-07 PROCEDURE — G8417 CALC BMI ABV UP PARAM F/U: HCPCS | Performed by: STUDENT IN AN ORGANIZED HEALTH CARE EDUCATION/TRAINING PROGRAM

## 2020-10-07 RX ORDER — DIPHENHYDRAMINE HCL 25 MG
25 CAPSULE ORAL 2 TIMES DAILY PRN
Qty: 28 CAPSULE | Refills: 3 | Status: SHIPPED | OUTPATIENT
Start: 2020-10-07 | End: 2021-03-04 | Stop reason: SDUPTHER

## 2020-10-07 RX ORDER — ACETAMINOPHEN 500 MG
500 TABLET ORAL 2 TIMES DAILY PRN
Qty: 28 TABLET | Refills: 5 | Status: SHIPPED | OUTPATIENT
Start: 2020-10-07 | End: 2021-03-04 | Stop reason: SDUPTHER

## 2020-10-07 RX ORDER — GABAPENTIN 400 MG/1
400 CAPSULE ORAL 2 TIMES DAILY
Qty: 60 CAPSULE | Refills: 3 | Status: SHIPPED | OUTPATIENT
Start: 2020-10-07 | End: 2021-03-04 | Stop reason: SDUPTHER

## 2020-10-07 RX ORDER — MAGNESIUM OXIDE 400 MG/1
400 TABLET ORAL DAILY
Qty: 30 TABLET | Refills: 5 | Status: SHIPPED | OUTPATIENT
Start: 2020-10-07 | End: 2020-11-06

## 2020-10-07 RX ORDER — PROMETHAZINE HYDROCHLORIDE 12.5 MG/1
12.5 TABLET ORAL EVERY 8 HOURS PRN
Qty: 14 TABLET | Refills: 3 | Status: SHIPPED | OUTPATIENT
Start: 2020-10-07 | End: 2021-03-04 | Stop reason: SDUPTHER

## 2020-10-07 ASSESSMENT — ENCOUNTER SYMPTOMS
DIARRHEA: 0
PHOTOPHOBIA: 0
ABDOMINAL PAIN: 0
NAUSEA: 1
EYE REDNESS: 0
VOMITING: 0
SORE THROAT: 0
SHORTNESS OF BREATH: 0
COUGH: 0
EYE PAIN: 0
EYE DISCHARGE: 0
CONSTIPATION: 0
SINUS PAIN: 0

## 2020-10-07 NOTE — PROGRESS NOTES
[address] [place] [address], [facility], [facility], [address] , [address] [place] [address]  P: [phone]  F: [phone] [facility] NOTE     PATIENT NAME: Anthony Kramer  PATIENT MRN: [id number]  PRIMARY CARE PHYSICIAN: Nash Valle       Interval History: 10/7/2020  Patient was last seen in the clinic on 7/7/2020. Since last visit patient continues to complain of on and off headaches, mainly in the occipital region, sometimes associated with nausea and dizziness. Denies any photophobia or phonophobia. Currently taking magnesium oxide 400 mg daily. She endorses significant improvement in her symptoms with gabapentin, her dose was increased to 300 mg twice a day. As per patient gabapentin is helping with her headaches along with her other neuropathic pain. Denies any side effects from gabapentin. She was requesting to increase the dose of gabapentin. She was started on Lamictal by her psychiatrist, took it for couple of days, then stop it again. At present she does not want to take it at all. Taking Tylenol as needed for the headaches, endorses improvement in the headache with that. Denies any side effects. She had cholecystectomy around 2 weeks ago, endorses feeling tired and weak since the surgery. She also had one episode where she bumped her head on the car door, had headache at that time. CT head was done on 10/2/2020 which did not show any acute finding    Patient had MRI brain without contrast on 8/19/2020-previous left occipital lobe surgery with hemosiderin signal changes in the surgical bed related to patient's known cavernoma. Residual cavernoma cannot be excluded given the signal abnormality. No obvious new finding. Patient denies any other new neurologic concerns during this visit. Interval History: 7/7/2020  Patient was last seen on telehealth on 4/2/2020.   Since last visit she continues to complain of on and off headaches, occipital region, sometimes associated with nausea. Denies any photophobia phonophobia or dizziness or lightheadedness or any other symptoms associated with the headache. Endorses improvement in the headache with Tylenol. She is also on magnesium oxide 400 mg daily. Denies any side effects on the medications. She was supposed to be started on Lamictal for psychiatric disorder, took it for few days and then stopped taking it. Not planning to continue to take it. Denies any seizures or seizure-like activity since last visit. Her last seizure was age 23. She does not want to take any seizure medications for now. She followed up with Neurosurgeon Dr Gayle Johnson at [facility], planning to repeat a repeat MRI brain. Patient endorses bruises on her upper and lower extremities because of lifting heavy weights, she works at [employer]. She was started on vitamin C by her PCP  Patient denies any other new neurologic concerns during this visit. Notes from 4/2/2020  HPI:         Keegan Grow 30 y.o. 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 She then presented on 2/18/20 with headache, dizziness and seeing spots in her right peripheral vision and not feeling good. Her blood sugars were in 800s at that time.      MRI Brain show possible new acute left PCA territory infarction, but the sensitivity and specificity limited due to local magnetic susceptibility artifact from the postsurgical and hemorrhagic changes. It showed left occipital craniectomy with interval decrease in size of surgical bed and intraparenchymal hemorrhage.   CT head was repeated after that which showed stable left occipital hemorrhage and left parieto-occipital craniotomy.     Patient has a history of seizures, last seizure was when she was 23years old. She was placed on Keppra for seizure prophylaxis after the surgery, as per patient she weaned her off of [address] by herself, as she felt worsening of her migraine headaches with Keppra and does not want to take it again. She was prescribed Lamictal by her psychiatrist in the past but never took it and said the medication was \" Recalled\" and does not know the exact reason why. Denies any seizures since age 23, at present she does not want to take any seizure medications and says she would like to discuss with her psychiatrist/psychologist and [facility] and then decide about it.     Since discharge from the hospital she endorses overall improvement in her headache, denies any double vision or blurry vision, endorses quitting smoking. Currently taking Tylenol as needed for the headaches and endorses improvement in the headache with that, was noted to have low magnesium level and was prescribed magnesium oxide 400 mg daily. Denies any side effects of the medications.   Denies any other concerns during this visit.        PATIENT HISTORY:     Past Medical History:   Diagnosis Date    Cerebral vascular malformation     [email or web address]    Diabetes mellitus ([facility]     Seizure ([address]     Traumatic hemorrhage of left cerebrum without loss of consciousness ([address]     @ [facility]        Past Surgical History:   Procedure Laterality Date    CRANIOTOMY  01/08/2020    LEFT OCCIPITAL CRANIOTOMY    CRANIOTOMY Left 1/8/2020    LEFT OCCIPITAL CRANIOTOMY, 1ST VASCULAR LESION WITH URBAN NAVIGATION (LATERAL WITH ROMERO BAG, HOLLINGSWORTH HEADHOLDER, MICROSCOPE) performed by Thania Bowens DO at [facility] History     Socioeconomic History    Marital status: Single     Spouse name: Not on file    Number of children: Not on file    Years of education: Not on file    Highest education level: Not on file   Occupational History    Not on file   Social Needs    Financial resource strain: Not on file   Nils-Don insecurity     Worry: Not on file     Inability: Not on file    Transportation needs     Medical: Not on file     Non-medical: Not on file   Tobacco Use    Smoking status: Current Some Day Smoker     Packs/day: 0.30     Years: 9.00     Pack years: 2.70     Types: Cigarettes    Smokeless tobacco: Never Used   Substance and Sexual Activity    Alcohol use: No    Drug use: Yes     Types: Marijuana    Sexual activity: Yes     Partners: Male   Lifestyle    Physical activity     Days per week: Not on file     Minutes per session: Not on file    Stress: Not on file   Relationships    Social connections     Talks on phone: Not on file     Gets together: Not on file     Attends Alevism service: Not on file     Active member of club or organization: Not on file     Attends meetings of clubs or organizations: Not on file     Relationship status: Not on file    Intimate partner violence     Fear of current or ex partner: Not on file     Emotionally abused: Not on file     Physically abused: Not on file     Forced sexual activity: Not on file   Other Topics Concern    Not on file   Social History Narrative    Not on file        Current Outpatient Medications   Medication Sig Dispense Refill    gabapentin (NEURONTIN) 400 MG capsule Take 1 capsule by mouth 2 times daily for 30 days.  60 capsule 3    magnesium oxide (MAG-OX) 400 MG tablet Take 1 tablet by mouth daily 30 tablet 5    diphenhydrAMINE (BENADRYL ALLERGY) 25 MG capsule Take 1 capsule by mouth 2 times daily as needed (Severe headache with nausea and vomiting.) 28 capsule 3    promethazine (PHENERGAN) 12.5 MG tablet Take 1 tablet by mouth every 8 hours as needed for Nausea (moderate to severe headache) 14 tablet 3    acetaminophen (TYLENOL) 500 MG tablet Take 1 tablet by mouth 2 times daily as needed for Pain (severe headaches) 28 tablet 5    chlorhexidine (PERIDEX) 0.12 % solution Take 15 mLs by mouth 2 times daily      Magnesium Oxide (MAG-OXIDE) 200 MG TABS Take 1 tablet by mouth 2 times daily 60 tablet 0    insulin detemir (LEVEMIR FLEXTOUCH) 100 UNIT/ML injection pen Inject 25 Units into the skin nightly 5 pen 3    insulin aspart (NOVOLOG FLEXPEN) 100 UNIT/ML injection pen Inject 5 Units into the skin 3 times daily (before meals) 5 pen 3    glucose monitoring kit (FREESTYLE) monitoring kit 1 kit by Does not apply route daily 1 kit 0    blood glucose monitor strips Test 3 times a day & as needed for symptoms of irregular blood glucose. 100 strip 3    Lancets MISC 1 each by Does not apply route 3 times daily 600 each 1    Alcohol Swabs (ALCOHOL PREP) 70 % PADS 1 pad  each 3    Insulin Pen Needle 30G X 8 MM MISC 1 each by Does not apply route daily 100 each 3    busPIRone (BUSPAR) 10 MG tablet Take 10 mg by mouth 3 times daily      lamoTRIgine (LAMICTAL) 25 MG tablet Take 25 mg by mouth daily      docusate sodium (COLACE) 100 MG capsule Take 100 mg by mouth 2 times daily as needed for Constipation      etonogestrel (NEXPLANON) 68 MG implant 68 mg by Subdermal route once      ondansetron (ZOFRAN) 4 MG tablet Take 1 tablet by mouth every 6 hours as needed for Nausea or Vomiting 10 tablet 0    gabapentin (NEURONTIN) 300 MG capsule Take 1 capsule by mouth nightly for 30 days.  30 capsule 1    butalbital-acetaminophen-caffeine (FIORICET, ESGIC) -40 MG per tablet Take 1 tablet by mouth every 4 hours as needed for Headaches (Patient not taking: Reported on 4/2/2020) 30 tablet 0    insulin lispro (HUMALOG) 100 UNIT/ML injection vial Inject 0-12 Units into the skin 3 times daily (with meals) (Patient not taking: Reported on 7/7/2020) 1 vial 3    scopolamine (TRANSDERM-SCOP) transdermal patch Place 1 patch onto the skin every 72 hours (Patient not taking: Reported on 4/2/2020) 3 patch 0    levETIRAcetam (KEPPRA) 500 MG tablet Take 1 tablet by mouth 2 times daily for 7 days (Patient not taking: Reported on 4/2/2020) 60 tablet 0    atorvastatin (LIPITOR) 40 MG tablet Take 1 tablet by mouth nightly (Patient not taking: Reported on 4/2/2020) 30 tablet 3     No current facility-administered medications for this visit. Allergies   Allergen Reactions    Latex     Prozac [Fluoxetine Hcl] Other (See Comments)     Seizures        REVIEW OF SYSTEMS:     Review of Systems   Constitutional: Positive for fatigue. Negative for chills, diaphoresis, fever and unexpected weight change. HENT: Negative for congestion, ear discharge, ear pain, hearing loss, sinus pain and sore throat. Eyes: Positive for visual disturbance. Negative for photophobia, pain, discharge and redness. Respiratory: Negative for cough and shortness of breath. Cardiovascular: Negative for chest pain, palpitations and leg swelling. Gastrointestinal: Positive for nausea. Negative for abdominal pain, constipation, diarrhea and vomiting. Endocrine: Negative for polydipsia and polyuria. Genitourinary: Negative for difficulty urinating and hematuria. Musculoskeletal: Negative for neck pain. Skin: Negative for pallor and rash. Neurological: Positive for headaches. Negative for dizziness, tremors, seizures, syncope, facial asymmetry, speech difficulty, weakness, light-headedness and numbness. Hematological: Bruises/bleeds easily. Psychiatric/Behavioral: Negative for agitation, behavioral problems and hallucinations. VITALS  BP (!) 133/90 (Site: Right Upper Arm, Position: Sitting, Cuff Size: Medium Adult)   Pulse 109   Temp 97.3 °F (36.3 °C)   Resp 16      PHYSICAL EXAMINATION:     Constitutional: Well developed, well nourished and in no acute distress. Head:  normocephalic, atraumatic. Neck: supple, no carotid bruits, thyroid not palpable  Respiratory: Clear to auscultation bilaterally with no use of accessory muscles during respiration. Cardiovascular: normal rate, regular rhythm, no murmur, gallop, rub.   Abdomen: Soft, nontender, nondistended, normal bowel sounds, no hepatomegaly or splenomegaly  Extremities:  peripheral pulses palpable, no pedal edema or calf pain with palpation  Psych: normal affect      NEUROLOGICAL EXAMINATION:     Mental status   Alert and oriented; intact memory with no confusion, speech or language problems; no hallucinations or delusions     Cranial nerves   II - improved vision, patient was having right homonymous hemianopsia                                          III, IV, VI - extra-ocular muscles full: no pupillary defect; no ELIO, no nystagmus, no ptosis   V - normal facial sensation                                                               VII - normal facial symmetry                                                             VIII - intact hearing                                                                             IX, X - symmetrical palate                                                                  XI - symmetrical shoulder shrug                                                       XII - midline tongue without atrophy or fasciculation     Motor function  Normal muscle bulk and tone  Muscle strength: normal power 5/5       Sensory function Intact to touch in bilateral upper and lower extremities. Cerebellar Intact fine motor movement. No involuntary movements or tremors     Reflex function Intact 2+ DTR and symmetric. Negative Babinski     Gait                  Normal station and gait           PRIOR TESTS AND IMAGING: Following images and Labs were reviewed by the examiner     EEG 2/18/20  Interpretation  This EEG was abnormal in wakefulness and drowsiness. Frequent intrusions of polymorphic delta and theta frequencies were seen.        Clinical correlation  This EEG was abnormal suggestive of mild encephalopathy of nonspecific etiology. No abnormal epileptiform activity was seen. MRI brain without contrast 2/18/2020  Impression   Possible new acute left posterior cerebral artery territory infarction. Sensitivity and specificity are limited due to local magnetic susceptibility   artifact from postsurgical and hemorrhagic change.       Left occipital craniectomy with interval decrease in size of the surgical bed   and intraparenchymal hemorrhage.  Residual or recurrent cavernoma is not   excluded.         CT head without contrast 10/2/2020  Impression    No acute intracranial abnormality. MRI brain without contrast 8/19/2020  Impression    Previous left occipital lobe surgery with hemosiderin and signal changes in    the surgical bed related to the patient's known cavernoma.  Residual    cavernoma cannot be excluded given this signal abnormality.  No obvious new    finding. ASSESSMENT / PLAN:       This is a 30 y.o. female who was seen on telehealth for hospital follow-up. · Left occipital cavernoma status post resection on 1/8/2020  · Left occipital IPH   · Right homonymous hemianopsia- improved  · Chronic migraine headaches  · Episodic headaches  · Patient presented on 2/18/20 with headache, dizziness and seeing spots in her right peripheral vision and not feeling good. Her blood sugars were in 800s at that time. MRI Brain show possible new acute left PCA territory infarction, but the sensitivity and specificity limited due to local magnetic susceptibility artifact from the postsurgical and hemorrhagic changes. It showed left occipital craniectomy with interval decrease in size of surgical bed and intraparenchymal hemorrhage. CT head was repeated after that which showed stable left occipital hemorrhage and left parieto-occipital craniotomy. · History of seizures, last seizure was when patient was 23years old. Was prescribed Keppra during recent hospitalization, does not want to take any AED at present.   · Bipolar disorder  · Hyperglycemia     Plan:  · Continue magnesium oxide 400 mg QD  · Tyelnol 500 mg twice daily PRN for the headaches  · We will increase the dose of gabapentin to 400 mg twice a day from 300 mg twice a day. Discussed all possible side effects of the medication with the patient including dizziness drowsiness and increase sedation. · Benadryl 25 mg twice daily PRN with Phenergan 12.5 mg twice daily as needed for nausea vomiting associated with severe headaches. · vitamin D-30 on 7/16/2020    · Patient was supposed to be taking Keppra for seizure prophylaxis as per last hospital admission, but she weaned herself off of Keppra as it was worsening her migraine headaches and does not want to continue  that. She does not want to take any medications for seizures at present. · Recommend an AED considering prior history of seizures but as per patient she does not want to take any medications right now, she was prescribed Lamictal by her psychologist/ psychiatrist for mood disorder but as per patient the medication was recalled and she does not know the reason. · She said she will discuss with her psychologist about seizure medications too and will decide about that after discussing with the psychologist. Will call us about that. · Seizure Precautions:   counseled the patient with regard to seizure precautions for injury prevention and reinforced their rationale. No driving for at least 6 months, no activity at dangerous heights, no operation of dangerous machinery, no baths, no swimming. Caution (preferably accompanied) with cooking or near fires. The patient expressed understanding.         · Recommend driving evaluation-prior to start driving  · Continue to follow-up with neurosurgery  · Follow-up in clinic in 4 months  · Instructed patient to call the clinic if symptoms worsens or  any other new symptoms. I have spent 25  minutes face to face with the patient more than 50% of this time was spent counseling and coordinating care.       Electronically signed by Preeti Connor MD on 10/7/2020 at 1:07 PM

## 2021-03-04 ENCOUNTER — OFFICE VISIT (OUTPATIENT)
Dept: NEUROLOGY | Age: 26
End: 2021-03-04
Payer: MEDICARE

## 2021-03-04 VITALS
OXYGEN SATURATION: 99 % | DIASTOLIC BLOOD PRESSURE: 87 MMHG | HEIGHT: 62 IN | HEART RATE: 88 BPM | SYSTOLIC BLOOD PRESSURE: 125 MMHG | BODY MASS INDEX: 27.6 KG/M2 | WEIGHT: 150 LBS

## 2021-03-04 DIAGNOSIS — G43.109 CHRONIC MIGRAINE WITH AURA: ICD-10-CM

## 2021-03-04 DIAGNOSIS — I61.9 INTRAPARENCHYMAL HEMORRHAGE OF BRAIN (HCC): ICD-10-CM

## 2021-03-04 DIAGNOSIS — G43.709 CHRONIC MIGRAINE WITHOUT AURA WITHOUT STATUS MIGRAINOSUS, NOT INTRACTABLE: ICD-10-CM

## 2021-03-04 DIAGNOSIS — Z87.898 HISTORY OF SEIZURES: ICD-10-CM

## 2021-03-04 DIAGNOSIS — R51.9 NONINTRACTABLE EPISODIC HEADACHE, UNSPECIFIED HEADACHE TYPE: Primary | ICD-10-CM

## 2021-03-04 DIAGNOSIS — D18.00 CAVERNOMA: ICD-10-CM

## 2021-03-04 PROCEDURE — 99214 OFFICE O/P EST MOD 30 MIN: CPT | Performed by: STUDENT IN AN ORGANIZED HEALTH CARE EDUCATION/TRAINING PROGRAM

## 2021-03-04 PROCEDURE — G8484 FLU IMMUNIZE NO ADMIN: HCPCS | Performed by: STUDENT IN AN ORGANIZED HEALTH CARE EDUCATION/TRAINING PROGRAM

## 2021-03-04 PROCEDURE — G8428 CUR MEDS NOT DOCUMENT: HCPCS | Performed by: STUDENT IN AN ORGANIZED HEALTH CARE EDUCATION/TRAINING PROGRAM

## 2021-03-04 PROCEDURE — 4004F PT TOBACCO SCREEN RCVD TLK: CPT | Performed by: STUDENT IN AN ORGANIZED HEALTH CARE EDUCATION/TRAINING PROGRAM

## 2021-03-04 PROCEDURE — G8417 CALC BMI ABV UP PARAM F/U: HCPCS | Performed by: STUDENT IN AN ORGANIZED HEALTH CARE EDUCATION/TRAINING PROGRAM

## 2021-03-04 RX ORDER — DIPHENHYDRAMINE HCL 25 MG
25 CAPSULE ORAL 2 TIMES DAILY PRN
Qty: 28 CAPSULE | Refills: 6 | Status: SHIPPED | OUTPATIENT
Start: 2021-03-04 | End: 2021-07-20

## 2021-03-04 RX ORDER — MAGNESIUM OXIDE 400 MG/1
400 TABLET ORAL DAILY
Qty: 30 TABLET | Refills: 6 | Status: SHIPPED | OUTPATIENT
Start: 2021-03-04 | End: 2021-04-03

## 2021-03-04 RX ORDER — GABAPENTIN 400 MG/1
400 CAPSULE ORAL 2 TIMES DAILY
Qty: 60 CAPSULE | Refills: 6 | Status: SHIPPED | OUTPATIENT
Start: 2021-03-04 | End: 2021-07-20 | Stop reason: SDUPTHER

## 2021-03-04 RX ORDER — PROMETHAZINE HYDROCHLORIDE 12.5 MG/1
12.5 TABLET ORAL EVERY 8 HOURS PRN
Qty: 28 TABLET | Refills: 6 | Status: SHIPPED | OUTPATIENT
Start: 2021-03-04 | End: 2021-07-20

## 2021-03-04 RX ORDER — ACETAMINOPHEN 500 MG
500 TABLET ORAL 2 TIMES DAILY PRN
Qty: 28 TABLET | Refills: 6 | Status: ON HOLD | OUTPATIENT
Start: 2021-03-04 | End: 2022-06-21

## 2021-03-04 ASSESSMENT — ENCOUNTER SYMPTOMS
CONSTIPATION: 0
SINUS PAIN: 0
SORE THROAT: 0
COUGH: 0
EYE DISCHARGE: 0
DIARRHEA: 0
VOMITING: 0
EYE PAIN: 0
ABDOMINAL PAIN: 0
PHOTOPHOBIA: 0
SHORTNESS OF BREATH: 0
EYE REDNESS: 0
NAUSEA: 1

## 2021-03-04 NOTE — PROGRESS NOTES
[address] [place] [address], [facility] [place] [address], [facility], [address] [facility], [address] [place] [address]  P: [phone]  F: [facility] NOTE     PATIENT NAME: Brenden WHEELER MRN: [id number]  PRIMARY CARE PHYSICIAN: Shreyas Valle     Interval History: 3/4/2021   Patient was last seen in the clinic in October 2020. Since last visit endorses significant improvement in the headaches, currently having intermittent on and off headaches, mainly in the occipital region. Denies any photophobia phonophobia, endorses nausea and dizziness associated with headache. Taking magnesium oxide 400 mg daily, denies any side effects from that. Patient is also taking gabapentin 400 mg twice a day, denies any side effects with the medications. Not taking Lamictal.  Takes Tylenol as needed for the headaches, endorses improvement in the headache with Tylenol. For severe headaches take Benadryl and Phenergan, endorses improvement in the headache with them. Denies any side effects from them. Denies any recurrence of seizures or seizure-like activity. Last seizure was when she was 23years old, does not want to take any AEDs. Patient denies any other new neurologic concerns since last visit. Denies any other changes in her medications  Denies any new allergies. Interval History: 10/7/2020  Patient was last seen in the clinic on 7/7/2020. Since last visit patient continues to complain of on and off headaches, mainly in the occipital region, sometimes associated with nausea and dizziness. Denies any photophobia or phonophobia. Currently taking magnesium oxide 400 mg daily. She endorses significant improvement in her symptoms with gabapentin, her dose was increased to 300 mg twice a day. As per patient gabapentin is helping with her headaches along with her other neuropathic pain. Denies any side effects from gabapentin. She was requesting to increase the dose of gabapentin.   She was started on Lamictal by her psychiatrist, took it for couple of days, then stop it again. At present she does not want to take it at all. Taking Tylenol as needed for the headaches, endorses improvement in the headache with that. Denies any side effects. She had cholecystectomy around 2 weeks ago, endorses feeling tired and weak since the surgery. She also had one episode where she bumped her head on the car door, had headache at that time. CT head was done on 10/2/2020 which did not show any acute finding    Patient had MRI brain without contrast on 8/19/2020-previous left occipital lobe surgery with hemosiderin signal changes in the surgical bed related to patient's known cavernoma. Residual cavernoma cannot be excluded given the signal abnormality. No obvious new finding. Patient denies any other new neurologic concerns during this visit. Interval History: 7/7/2020  Patient was last seen on telehealth on 4/2/2020. Since last visit she continues to complain of on and off headaches, occipital region, sometimes associated with nausea. Denies any photophobia phonophobia or dizziness or lightheadedness or any other symptoms associated with the headache. Endorses improvement in the headache with Tylenol. She is also on magnesium oxide 400 mg daily. Denies any side effects on the medications. She was supposed to be started on Lamictal for psychiatric disorder, took it for few days and then stopped taking it. Not planning to continue to take it. Denies any seizures or seizure-like activity since last visit. Her last seizure was age 23. She does not want to take any seizure medications for now. She followed up with Neurosurgeon Dr Cary Herrera at [facility], planning to repeat a repeat MRI brain. Patient endorses bruises on her upper and lower extremities because of lifting heavy weights, she works at [employer].   She was started on vitamin C by her PCP  Patient denies any other new neurologic concerns during this visit. Notes from 4/2/2020  HPI:         Cathy Guzmán 30 y.o. 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 She then presented on 2/18/20 with headache, dizziness and seeing spots in her right peripheral vision and not feeling good. Her blood sugars were in 800s at that time.      MRI Brain show possible new acute left PCA territory infarction, but the sensitivity and specificity limited due to local magnetic susceptibility artifact from the postsurgical and hemorrhagic changes. It showed left occipital craniectomy with interval decrease in size of surgical bed and intraparenchymal hemorrhage. CT head was repeated after that which showed stable left occipital hemorrhage and left parieto-occipital craniotomy.     Patient has a history of seizures, last seizure was when she was 23years old. She was placed on Keppra for seizure prophylaxis after the surgery, as per patient she weaned her off of [address] by herself, as she felt worsening of her migraine headaches with Keppra and does not want to take it again. She was prescribed Lamictal by her psychiatrist in the past but never took it and said the medication was \" Recalled\" and does not know the exact reason why. Denies any seizures since age 23, at present she does not want to take any seizure medications and says she would like to discuss with her psychiatrist/psychologist and [facility] and then decide about it.     Since discharge from the hospital she endorses overall improvement in her headache, denies any double vision or blurry vision, endorses quitting smoking.   Currently taking Tylenol as needed for the headaches and endorses improvement in the headache with that, was noted to have low magnesium level and was prescribed magnesium oxide 400 mg daily. Denies any side effects of the medications.   Denies any other concerns during this visit.        PATIENT HISTORY:     Past Medical History:   Diagnosis Date    Cerebral vascular malformation     [email or web address]    Diabetes mellitus ([facility]     Seizure ([facility]     Traumatic hemorrhage of left cerebrum without loss of consciousness ([facility]     @ [facility]        Past Surgical History:   Procedure Laterality Date    CRANIOTOMY  01/08/2020    LEFT OCCIPITAL CRANIOTOMY    CRANIOTOMY Left 1/8/2020    LEFT OCCIPITAL CRANIOTOMY, 1ST VASCULAR LESION WITH URBAN NAVIGATION (LATERAL WITH ROMERO BAG, HOLLINGSWORTH HEADHOLDER, MICROSCOPE) performed by Karsten Peralta DO at [facility]. History     Socioeconomic History    Marital status: Single     Spouse name: Not on file    Number of children: Not on file    Years of education: Not on file    Highest education level: Not on file   Occupational History    Not on file   Social Needs    Financial resource strain: Not on file    Food insecurity     Worry: Not on file     Inability: Not on file    Transportation needs     Medical: Not on file     Non-medical: Not on file   Tobacco Use    Smoking status: Current Some Day Smoker     Packs/day: 0.30     Years: 9.00     Pack years: 2.70     Types: Cigarettes    Smokeless tobacco: Never Used   Substance and Sexual Activity    Alcohol use: No    Drug use: Yes     Types: Marijuana    Sexual activity: Yes     Partners: Male   Lifestyle    Physical activity     Days per week: Not on file     Minutes per session: Not on file    Stress: Not on file   Relationships    Social connections     Talks on phone: Not on file     Gets together: Not on file     Attends Church service: Not on file     Active member of club or organization: Not on file     Attends meetings of clubs or organizations: Not on file     Relationship status: Not on file    Intimate partner violence     Fear of current or ex partner: Not on file     Emotionally abused: Not on file     Physically abused: Not on file     Forced sexual activity: Not on file   Other Topics Concern    Not on file   Social History Narrative    Not on file        Current Outpatient Medications   Medication Sig Dispense Refill    glucose monitoring kit (FREESTYLE) monitoring kit 1 kit by Does not apply route daily 1 kit 0    blood glucose monitor strips Test 3 times a day & as needed for symptoms of irregular blood glucose. 100 strip 3    Alcohol Swabs (ALCOHOL PREP) 70 % PADS 1 pad  each 3    gabapentin (NEURONTIN) 400 MG capsule Take 1 capsule by mouth 2 times daily for 30 days.  60 capsule 3    diphenhydrAMINE (BENADRYL ALLERGY) 25 MG capsule Take 1 capsule by mouth 2 times daily as needed (Severe headache with nausea and vomiting.) (Patient not taking: Reported on 3/4/2021) 28 capsule 3    promethazine (PHENERGAN) 12.5 MG tablet Take 1 tablet by mouth every 8 hours as needed for Nausea (moderate to severe headache) 14 tablet 3    acetaminophen (TYLENOL) 500 MG tablet Take 1 tablet by mouth 2 times daily as needed for Pain (severe headaches) (Patient not taking: Reported on 3/4/2021) 28 tablet 5    chlorhexidine (PERIDEX) 0.12 % solution Take 15 mLs by mouth 2 times daily      butalbital-acetaminophen-caffeine (FIORICET, ESGIC) -40 MG per tablet Take 1 tablet by mouth every 4 hours as needed for Headaches (Patient not taking: Reported on 4/2/2020) 30 tablet 0    insulin lispro (HUMALOG) 100 UNIT/ML injection vial Inject 0-12 Units into the skin 3 times daily (with meals) (Patient not taking: Reported on 7/7/2020) 1 vial 3    Magnesium Oxide (MAG-OXIDE) 200 MG TABS Take 1 tablet by mouth 2 times daily (Patient not taking: Reported on 3/4/2021) 60 tablet 0    insulin detemir (LEVEMIR FLEXTOUCH) 100 UNIT/ML injection pen Inject 25 Units into the skin nightly (Patient not taking: Reported on 3/4/2021) 5 pen 3    insulin aspart (NOVOLOG FLEXPEN) 100 UNIT/ML injection pen Inject 5 Units into the skin 3 times daily (before meals) (Patient not taking: Reported on 3/4/2021) 5 pen 3    Lancets MISC 1 each by Does not apply route 3 times daily (Patient not taking: Reported on 3/4/2021) 600 each 1    Insulin Pen Needle 30G X 8 MM MISC 1 each by Does not apply route daily (Patient not taking: Reported on 3/4/2021) 100 each 3    scopolamine (TRANSDERM-SCOP) transdermal patch Place 1 patch onto the skin every 72 hours (Patient not taking: Reported on 4/2/2020) 3 patch 0    levETIRAcetam (KEPPRA) 500 MG tablet Take 1 tablet by mouth 2 times daily for 7 days (Patient not taking: Reported on 4/2/2020) 60 tablet 0    atorvastatin (LIPITOR) 40 MG tablet Take 1 tablet by mouth nightly (Patient not taking: Reported on 4/2/2020) 30 tablet 3    busPIRone (BUSPAR) 10 MG tablet Take 10 mg by mouth 3 times daily      lamoTRIgine (LAMICTAL) 25 MG tablet Take 25 mg by mouth daily      docusate sodium (COLACE) 100 MG capsule Take 100 mg by mouth 2 times daily as needed for Constipation      etonogestrel (NEXPLANON) 68 MG implant 68 mg by Subdermal route once      ondansetron (ZOFRAN) 4 MG tablet Take 1 tablet by mouth every 6 hours as needed for Nausea or Vomiting 10 tablet 0     No current facility-administered medications for this visit. Allergies   Allergen Reactions    Latex     Prozac [Fluoxetine Hcl] Other (See Comments)     Seizures        REVIEW OF SYSTEMS:     Review of Systems   Constitutional: Negative for chills, diaphoresis, fatigue, fever and unexpected weight change. HENT: Negative for congestion, ear discharge, ear pain, hearing loss, sinus pain and sore throat. Eyes: Positive for visual disturbance. Negative for photophobia, pain, discharge and redness. Respiratory: Negative for cough and shortness of breath. Cardiovascular: Negative for chest pain, palpitations and leg swelling.    Gastrointestinal: Positive for nausea. Negative for abdominal pain, constipation, diarrhea and vomiting. Endocrine: Negative for polydipsia and polyuria. Genitourinary: Negative for difficulty urinating and hematuria. Musculoskeletal: Negative for neck pain. Skin: Negative for pallor and rash. Neurological: Positive for headaches. Negative for dizziness, tremors, seizures, syncope, facial asymmetry, speech difficulty, weakness, light-headedness and numbness. Hematological: Does not bruise/bleed easily. Psychiatric/Behavioral: Negative for agitation, behavioral problems and hallucinations. VITALS  /87   Pulse 88   Ht 5' 2\" (1.575 m)   Wt 150 lb (68 kg)   SpO2 99%   BMI 27.44 kg/m²      PHYSICAL EXAMINATION:     Constitutional: Well developed and in no acute distress. Head:  normocephalic, atraumatic. Neck: supple, no carotid bruits  Respiratory: Clear to auscultation bilaterally . Cardiovascular: normal rate, regular rhythm.   Abdomen: Soft, nontender, nondistended, normal bowel sounds  Extremities:  peripheral pulses palpable, no pedal edema   Psych: normal affect      NEUROLOGICAL EXAMINATION:     Mental status   Alert and oriented; intact memory with no confusion, speech or language problems; no hallucinations or delusions     Cranial nerves   II - improved vision, patient was having right homonymous hemianopsia                                          III, IV, VI - extra-ocular muscles full: no pupillary defect; no ELIO, no nystagmus, no ptosis   V - normal facial sensation                                                               VII - normal facial symmetry                                                             VIII - intact hearing                                                                             IX, X - symmetrical palate                                                                  XI - symmetrical shoulder shrug                                                       XII - midline tongue without atrophy or fasciculation     Motor function  Normal muscle bulk and tone  Muscle strength: normal power 5/5       Sensory function Intact to touch in bilateral upper and lower extremities. Cerebellar No involuntary movements or tremors     Reflex function Intact 2+ DTR and symmetric. Negative Babinski     Gait                  Normal station and gait           PRIOR TESTS AND IMAGING: Following images and Labs were reviewed by the examiner     EEG 2/18/20  Interpretation  This EEG was abnormal in wakefulness and drowsiness. Frequent intrusions of polymorphic delta and theta frequencies were seen.        Clinical correlation  This EEG was abnormal suggestive of mild encephalopathy of nonspecific etiology. No abnormal epileptiform activity was seen. MRI brain without contrast 2/18/2020  Impression   Possible new acute left posterior cerebral artery territory infarction. Sensitivity and specificity are limited due to local magnetic susceptibility   artifact from postsurgical and hemorrhagic change.       Left occipital craniectomy with interval decrease in size of the surgical bed   and intraparenchymal hemorrhage.  Residual or recurrent cavernoma is not   excluded.         CT head without contrast 10/2/2020  Impression    No acute intracranial abnormality. MRI brain without contrast 8/19/2020  Impression    Previous left occipital lobe surgery with hemosiderin and signal changes in    the surgical bed related to the patient's known cavernoma.  Residual    cavernoma cannot be excluded given this signal abnormality.  No obvious new    finding. ASSESSMENT / PLAN:       This is a 30 y.o. female who was seen on telehealth for hospital follow-up.     · Left occipital cavernoma status post resection on 1/8/2020  · Left occipital IPH   · Right homonymous hemianopsia- improved  · Chronic migraine headaches  · Episodic headaches  · Patient presented on 2/18/20 with headache, discussing with the psychologist. Will call us about that. · Seizure Precautions:   counseled the patient with regard to seizure precautions for injury prevention and reinforced their rationale. No driving for at least 6 months, no activity at dangerous heights, no operation of dangerous machinery, no baths, no swimming. Caution (preferably accompanied) with cooking or near fires. The patient expressed understanding.         · Recommend driving evaluation-prior to start driving  · Continue to follow-up with neurosurgery  · Follow-up in clinic in 4 months  · Instructed patient to call the clinic if symptoms worsens or  any other new symptoms. I have spent total 31 minutes  with the patient more than 50% of this time was spent reviewing medical records, discussing imaging findings, counseling regarding medications side effects and compliance, healthy habits and coordinating care.   .      Electronically signed by Gordy Galeazzi, MD on 3/4/2021 at 3:05 PM

## 2021-07-20 ENCOUNTER — OFFICE VISIT (OUTPATIENT)
Dept: NEUROLOGY | Age: 26
End: 2021-07-20
Payer: MEDICARE

## 2021-07-20 VITALS
WEIGHT: 150 LBS | RESPIRATION RATE: 16 BRPM | BODY MASS INDEX: 27.44 KG/M2 | DIASTOLIC BLOOD PRESSURE: 83 MMHG | HEART RATE: 116 BPM | SYSTOLIC BLOOD PRESSURE: 129 MMHG

## 2021-07-20 DIAGNOSIS — G43.109 CHRONIC MIGRAINE WITH AURA: ICD-10-CM

## 2021-07-20 DIAGNOSIS — I61.9 INTRAPARENCHYMAL HEMORRHAGE OF BRAIN (HCC): ICD-10-CM

## 2021-07-20 DIAGNOSIS — G43.709 CHRONIC MIGRAINE WITHOUT AURA WITHOUT STATUS MIGRAINOSUS, NOT INTRACTABLE: ICD-10-CM

## 2021-07-20 DIAGNOSIS — D18.00 CAVERNOMA: ICD-10-CM

## 2021-07-20 DIAGNOSIS — R51.9 NONINTRACTABLE EPISODIC HEADACHE, UNSPECIFIED HEADACHE TYPE: Primary | ICD-10-CM

## 2021-07-20 DIAGNOSIS — Z87.898 HISTORY OF SEIZURES: ICD-10-CM

## 2021-07-20 PROCEDURE — 99214 OFFICE O/P EST MOD 30 MIN: CPT | Performed by: STUDENT IN AN ORGANIZED HEALTH CARE EDUCATION/TRAINING PROGRAM

## 2021-07-20 PROCEDURE — G8427 DOCREV CUR MEDS BY ELIG CLIN: HCPCS | Performed by: STUDENT IN AN ORGANIZED HEALTH CARE EDUCATION/TRAINING PROGRAM

## 2021-07-20 PROCEDURE — G8417 CALC BMI ABV UP PARAM F/U: HCPCS | Performed by: STUDENT IN AN ORGANIZED HEALTH CARE EDUCATION/TRAINING PROGRAM

## 2021-07-20 PROCEDURE — 4004F PT TOBACCO SCREEN RCVD TLK: CPT | Performed by: STUDENT IN AN ORGANIZED HEALTH CARE EDUCATION/TRAINING PROGRAM

## 2021-07-20 RX ORDER — DIPHENHYDRAMINE HCL 25 MG
25 CAPSULE ORAL 2 TIMES DAILY PRN
Qty: 28 CAPSULE | Refills: 4 | Status: ON HOLD | OUTPATIENT
Start: 2021-07-20 | End: 2022-06-21

## 2021-07-20 RX ORDER — PROMETHAZINE HYDROCHLORIDE 12.5 MG/1
12.5 TABLET ORAL 2 TIMES DAILY PRN
Qty: 28 TABLET | Refills: 4 | Status: ON HOLD | OUTPATIENT
Start: 2021-07-20 | End: 2022-09-07 | Stop reason: HOSPADM

## 2021-07-20 RX ORDER — GABAPENTIN 400 MG/1
400 CAPSULE ORAL 3 TIMES DAILY
Qty: 90 CAPSULE | Refills: 4 | Status: SHIPPED | OUTPATIENT
Start: 2021-07-20 | End: 2022-01-31

## 2021-07-20 RX ORDER — ONDANSETRON 4 MG/1
4 TABLET, FILM COATED ORAL EVERY 12 HOURS PRN
Qty: 30 TABLET | Refills: 4 | Status: ON HOLD | OUTPATIENT
Start: 2021-07-20 | End: 2022-09-07 | Stop reason: HOSPADM

## 2021-07-20 RX ORDER — MAGNESIUM OXIDE 400 MG/1
400 TABLET ORAL DAILY
Qty: 30 TABLET | Refills: 4 | Status: SHIPPED | OUTPATIENT
Start: 2021-07-20 | End: 2021-08-19

## 2021-07-20 RX ORDER — NAPROXEN SODIUM 550 MG/1
550 TABLET ORAL 2 TIMES DAILY PRN
Qty: 20 TABLET | Refills: 4 | Status: ON HOLD | OUTPATIENT
Start: 2021-07-20 | End: 2022-06-25 | Stop reason: HOSPADM

## 2021-07-20 ASSESSMENT — ENCOUNTER SYMPTOMS
ABDOMINAL PAIN: 0
DIARRHEA: 0
SHORTNESS OF BREATH: 0
COUGH: 0
EYE REDNESS: 0
EYE DISCHARGE: 0
VOMITING: 0
PHOTOPHOBIA: 0
SINUS PAIN: 0
SORE THROAT: 0
EYE PAIN: 0
NAUSEA: 1
CONSTIPATION: 0

## 2021-07-20 NOTE — PROGRESS NOTES
[address] [place] [address], [facility] [address], [facility], [address] [facility], [address] [place] [address]  P: [phone]  F: [address] [facility] [address] NOTE     PATIENT NAME: Pedro Feliciano  PATIENT MRN: [id number]  PRIMARY CARE PHYSICIAN: Drake Goodpasture Matus       Interval History: 7/20/2021  Patient was last seen in the clinic in March 2021. Patient endorses overall improvement in the headaches as compared to before, may be having 1 or 2 headaches in a week. She is compliant with her magnesium oxide 400 mg daily, denies any side effects from that. She is also taking gabapentin 400 mg twice a day. Not taking Tylenol that much as it is not helping with her headaches. Takes Benadryl and Phenergan as needed for severe headaches associated with nausea vomiting. Also take Zofran as needed for nausea. Denies any recurrence of seizures or seizure-like activity or episodes of confusion or staring spells since last visit. Patient does not want to take any seizure medication. Patient was recently admitted to [facility] for food poisoning, this was around 2 to 3 weeks ago. Endorses improvement in the symptoms. Denies any nausea vomiting or diarrhea at present. Denies any other new neurologic concerns during this visit. She was recently started on Metformin for hyperglycemia, as per patient her blood sugars are still on the higher side. Otherwise denies any other changes in her medications. Denies any new allergies since last visit. Interval History: 3/4/2021   Patient was last seen in the clinic in October 2020. Since last visit endorses significant improvement in the headaches, currently having intermittent on and off headaches, mainly in the occipital region. Denies any photophobia phonophobia, endorses nausea and dizziness associated with headache. Taking magnesium oxide 400 mg daily, denies any side effects from that.   Patient is also taking gabapentin 400 mg twice a day, denies any side effects with the medications. Not taking Lamictal.  Takes Tylenol as needed for the headaches, endorses improvement in the headache with Tylenol. For severe headaches take Benadryl and Phenergan, endorses improvement in the headache with them. Denies any side effects from them. Denies any recurrence of seizures or seizure-like activity. Last seizure was when she was 23years old, does not want to take any AEDs. Patient denies any other new neurologic concerns since last visit. Denies any other changes in her medications  Denies any new allergies. Interval History: 10/7/2020  Patient was last seen in the clinic on 7/7/2020. Since last visit patient continues to complain of on and off headaches, mainly in the occipital region, sometimes associated with nausea and dizziness. Denies any photophobia or phonophobia. Currently taking magnesium oxide 400 mg daily. She endorses significant improvement in her symptoms with gabapentin, her dose was increased to 300 mg twice a day. As per patient gabapentin is helping with her headaches along with her other neuropathic pain. Denies any side effects from gabapentin. She was requesting to increase the dose of gabapentin. She was started on Lamictal by her psychiatrist, took it for couple of days, then stop it again. At present she does not want to take it at all. Taking Tylenol as needed for the headaches, endorses improvement in the headache with that. Denies any side effects. She had cholecystectomy around 2 weeks ago, endorses feeling tired and weak since the surgery. She also had one episode where she bumped her head on the car door, had headache at that time. CT head was done on 10/2/2020 which did not show any acute finding    Patient had MRI brain without contrast on 8/19/2020-previous left occipital lobe surgery with hemosiderin signal changes in the surgical bed related to patient's known cavernoma.   Residual cavernoma cannot be excluded given the signal abnormality. No obvious new finding. Patient denies any other new neurologic concerns during this visit. Interval History: 7/7/2020  Patient was last seen on telehealth on 4/2/2020. Since last visit she continues to complain of on and off headaches, occipital region, sometimes associated with nausea. Denies any photophobia phonophobia or dizziness or lightheadedness or any other symptoms associated with the headache. Endorses improvement in the headache with Tylenol. She is also on magnesium oxide 400 mg daily. Denies any side effects on the medications. She was supposed to be started on Lamictal for psychiatric disorder, took it for few days and then stopped taking it. Not planning to continue to take it. Denies any seizures or seizure-like activity since last visit. Her last seizure was age 23. She does not want to take any seizure medications for now. She followed up with Neurosurgeon Dr Brandy Todd at [facility], planning to repeat a repeat MRI brain. Patient endorses bruises on her upper and lower extremities because of lifting heavy weights, she works at [employer]. She was started on vitamin C by her PCP  Patient denies any other new neurologic concerns during this visit. Notes from 4/2/2020  HPI:         Maura Diaz 30 y.o. 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 She then presented on 2/18/20 with headache, dizziness and seeing spots in her right peripheral vision and not feeling good. Her blood sugars were in 800s at that time.      MRI Brain show possible new acute left PCA territory infarction, but the sensitivity and specificity limited due to local magnetic susceptibility artifact from the postsurgical and hemorrhagic changes.   It showed left occipital craniectomy with interval decrease in size of surgical bed and intraparenchymal hemorrhage. CT head was repeated after that which showed stable left occipital hemorrhage and left parieto-occipital craniotomy.     Patient has a history of seizures, last seizure was when she was 23years old. She was placed on Keppra for seizure prophylaxis after the surgery, as per patient she weaned her off of [address] by herself, as she felt worsening of her migraine headaches with Keppra and does not want to take it again. She was prescribed Lamictal by her psychiatrist in the past but never took it and said the medication was \" Recalled\" and does not know the exact reason why. Denies any seizures since age 23, at present she does not want to take any seizure medications and says she would like to discuss with her psychiatrist/psychologist and [facility] and then decide about it.     Since discharge from the hospital she endorses overall improvement in her headache, denies any double vision or blurry vision, endorses quitting smoking. Currently taking Tylenol as needed for the headaches and endorses improvement in the headache with that, was noted to have low magnesium level and was prescribed magnesium oxide 400 mg daily. Denies any side effects of the medications.   Denies any other concerns during this visit.        PATIENT HISTORY:     Past Medical History:   Diagnosis Date    Cerebral vascular malformation     [email or web address]    Diabetes mellitus ([address]     Seizure ([address]     Traumatic hemorrhage of left cerebrum without loss of consciousness ([address]     @ [facility]        Past Surgical History:   Procedure Laterality Date    CRANIOTOMY  01/08/2020    LEFT OCCIPITAL CRANIOTOMY    CRANIOTOMY Left 1/8/2020    LEFT OCCIPITAL CRANIOTOMY, 1ST VASCULAR LESION WITH Radiation Watch NAVIGATION (LATERAL WITH ROMERO BAG, Clint HEADHOLDER, MICROSCOPE) performed by Shy Ambrocio DO at [address] [place] [address] Socioeconomic History    Marital status: Single     Spouse name: Not on file    Number of children: Not on file    Years of education: Not on file    Highest education level: Not on file   Occupational History    Not on file   Tobacco Use    Smoking status: Current Some Day Smoker     Packs/day: 0.30     Years: 9.00     Pack years: 2.70     Types: Cigarettes    Smokeless tobacco: Never Used   Vaping Use    Vaping Use: Unknown   Substance and Sexual Activity    Alcohol use: No    Drug use: Yes     Types: Marijuana    Sexual activity: Yes     Partners: Male   Other Topics Concern    Not on file   Social History Narrative    Not on file     Social Determinants of Health     Financial Resource Strain:     Difficulty of Paying Living Expenses:    Food Insecurity:     Worried About Running Out of Food in the Last Year:     Ran Out of Food in the Last Year:    Transportation Needs:     Lack of Transportation (Medical):      Lack of Transportation (Non-Medical):    Physical Activity:     Days of Exercise per Week:     Minutes of Exercise per Session:    Stress:     Feeling of Stress :    Social Connections:     Frequency of Communication with Friends and Family:     Frequency of Social Gatherings with Friends and Family:     Attends Voodoo Services:     Active Member of Clubs or Organizations:     Attends Club or Organization Meetings:     Marital Status:    Intimate Partner Violence:     Fear of Current or Ex-Partner:     Emotionally Abused:     Physically Abused:     Sexually Abused:         Current Outpatient Medications   Medication Sig Dispense Refill    magnesium oxide (MAG-OX) 400 MG tablet Take 1 tablet by mouth daily 30 tablet 4    naproxen sodium (ANAPROX) 550 MG tablet Take 1 tablet by mouth 2 times daily as needed for Pain (Headache) 20 tablet 4    diphenhydrAMINE (BENADRYL ALLERGY) 25 MG capsule Take 1 capsule by mouth 2 times daily as needed (Severe headache with nausea and vomiting.) 28 capsule 4    promethazine (PHENERGAN) 12.5 MG tablet Take 1 tablet by mouth 2 times daily as needed for Nausea (moderate to severe headache) 28 tablet 4    gabapentin (NEURONTIN) 400 MG capsule Take 1 capsule by mouth 3 times daily for 30 days. 90 capsule 4    ondansetron (ZOFRAN) 4 MG tablet Take 1 tablet by mouth every 12 hours as needed for Nausea or Vomiting 30 tablet 4    acetaminophen (TYLENOL) 500 MG tablet Take 1 tablet by mouth 2 times daily as needed for Pain (severe headaches) 28 tablet 6    etonogestrel (NEXPLANON) 68 MG implant 68 mg by Subdermal route once      chlorhexidine (PERIDEX) 0.12 % solution Take 15 mLs by mouth 2 times daily (Patient not taking: Reported on 7/20/2021)      butalbital-acetaminophen-caffeine (FIORICET, ESGIC) -40 MG per tablet Take 1 tablet by mouth every 4 hours as needed for Headaches (Patient not taking: Reported on 4/2/2020) 30 tablet 0    insulin lispro (HUMALOG) 100 UNIT/ML injection vial Inject 0-12 Units into the skin 3 times daily (with meals) (Patient not taking: Reported on 7/7/2020) 1 vial 3    Magnesium Oxide (MAG-OXIDE) 200 MG TABS Take 1 tablet by mouth 2 times daily (Patient not taking: Reported on 3/4/2021) 60 tablet 0    insulin detemir (LEVEMIR FLEXTOUCH) 100 UNIT/ML injection pen Inject 25 Units into the skin nightly (Patient not taking: Reported on 3/4/2021) 5 pen 3    insulin aspart (NOVOLOG FLEXPEN) 100 UNIT/ML injection pen Inject 5 Units into the skin 3 times daily (before meals) (Patient not taking: Reported on 3/4/2021) 5 pen 3    glucose monitoring kit (FREESTYLE) monitoring kit 1 kit by Does not apply route daily (Patient not taking: Reported on 7/20/2021) 1 kit 0    blood glucose monitor strips Test 3 times a day & as needed for symptoms of irregular blood glucose.  (Patient not taking: Reported on 7/20/2021) 100 strip 3    Lancets MISC 1 each by Does not apply route 3 times daily (Patient not taking: Reported on 3/4/2021) 600 each 1    Alcohol Swabs (ALCOHOL PREP) 70 % PADS 1 pad TID (Patient not taking: Reported on 7/20/2021) 100 each 3    Insulin Pen Needle 30G X 8 MM MISC 1 each by Does not apply route daily (Patient not taking: Reported on 3/4/2021) 100 each 3    scopolamine (TRANSDERM-SCOP) transdermal patch Place 1 patch onto the skin every 72 hours (Patient not taking: Reported on 4/2/2020) 3 patch 0    levETIRAcetam (KEPPRA) 500 MG tablet Take 1 tablet by mouth 2 times daily for 7 days (Patient not taking: Reported on 4/2/2020) 60 tablet 0    atorvastatin (LIPITOR) 40 MG tablet Take 1 tablet by mouth nightly (Patient not taking: Reported on 4/2/2020) 30 tablet 3    busPIRone (BUSPAR) 10 MG tablet Take 10 mg by mouth 3 times daily      lamoTRIgine (LAMICTAL) 25 MG tablet Take 25 mg by mouth daily      docusate sodium (COLACE) 100 MG capsule Take 100 mg by mouth 2 times daily as needed for Constipation (Patient not taking: Reported on 7/20/2021)       No current facility-administered medications for this visit. Allergies   Allergen Reactions    Latex     Prozac [Fluoxetine Hcl] Other (See Comments)     Seizures        REVIEW OF SYSTEMS:     Review of Systems   Constitutional: Negative for chills, diaphoresis, fatigue, fever and unexpected weight change. HENT: Negative for congestion, ear discharge, ear pain, hearing loss, sinus pain and sore throat. Eyes: Positive for visual disturbance. Negative for photophobia, pain, discharge and redness. Respiratory: Negative for cough and shortness of breath. Cardiovascular: Negative for chest pain, palpitations and leg swelling. Gastrointestinal: Positive for nausea. Negative for abdominal pain, constipation, diarrhea and vomiting. Endocrine: Negative for polydipsia and polyuria. Genitourinary: Negative for difficulty urinating and hematuria. Musculoskeletal: Negative for neck pain. Skin: Negative for pallor and rash.    Neurological: Positive for headaches. Negative for dizziness, tremors, seizures, syncope, facial asymmetry, speech difficulty, weakness, light-headedness and numbness. Hematological: Does not bruise/bleed easily. Psychiatric/Behavioral: Negative for agitation, behavioral problems and hallucinations. VITALS  /83 (Site: Right Upper Arm, Position: Sitting, Cuff Size: Medium Adult)   Pulse 116   Resp 16   Wt 150 lb (68 kg)   BMI 27.44 kg/m²      PHYSICAL EXAMINATION:     Constitutional: Well developed and in no acute distress. Head:  normocephalic, atraumatic. Neck: supple, no carotid bruits  Respiratory: Clear to auscultation bilaterally . Cardiovascular: normal rate, regular rhythm. Abdomen: Soft, nontender, nondistended, normal bowel sounds  Extremities:  peripheral pulses palpable, no pedal edema   Psych: normal affect      NEUROLOGICAL EXAMINATION:     Mental status   Alert and oriented; intact memory with no confusion, speech or language problems; no hallucinations or delusions     Cranial nerves   II - improved vision, right homonymous hemianopsia                             III, IV, VI - extra-ocular muscles full: no pupillary defect; no ELIO, no nystagmus, no ptosis   V - normal facial sensation                                                               VII - normal facial symmetry                                                             VIII - intact hearing                                                                             IX, X - symmetrical palate                                                                  XI - symmetrical shoulder shrug                                                       XII - midline tongue without atrophy or fasciculation     Motor function  Normal muscle bulk and tone  Muscle strength: normal power 5/5       Sensory function Intact to touch in bilateral upper and lower extremities.       Cerebellar No involuntary movements or tremors     Reflex function Intact 2+ DTR and symmetric. Negative Babinski     Gait                  Normal station and gait           PRIOR TESTS AND IMAGING: Following images and Labs were reviewed by the examiner     EEG 2/18/20  Interpretation  This EEG was abnormal in wakefulness and drowsiness. Frequent intrusions of polymorphic delta and theta frequencies were seen.        Clinical correlation  This EEG was abnormal suggestive of mild encephalopathy of nonspecific etiology. No abnormal epileptiform activity was seen. MRI brain without contrast 2/18/2020  Impression   Possible new acute left posterior cerebral artery territory infarction. Sensitivity and specificity are limited due to local magnetic susceptibility   artifact from postsurgical and hemorrhagic change.       Left occipital craniectomy with interval decrease in size of the surgical bed   and intraparenchymal hemorrhage.  Residual or recurrent cavernoma is not   excluded.         CT head without contrast 10/2/2020  Impression    No acute intracranial abnormality. MRI brain without contrast 8/19/2020  Impression    Previous left occipital lobe surgery with hemosiderin and signal changes in    the surgical bed related to the patient's known cavernoma.  Residual    cavernoma cannot be excluded given this signal abnormality.  No obvious new    finding. ASSESSMENT / PLAN:       This is a 30 y.o. female who was seen on telehealth for hospital follow-up. · Left occipital cavernoma status post resection on 1/8/2020  · Left occipital IPH   · Right homonymous hemianopsia- improved  · Chronic migraine headaches  · Episodic headaches  · Patient presented on 2/18/20 with headache, dizziness and seeing spots in her right peripheral vision and not feeling good. Her blood sugars were in 800s at that time.  MRI Brain show possible new acute left PCA territory infarction, but the sensitivity and specificity limited due to local magnetic susceptibility artifact from the postsurgical and hemorrhagic changes. It showed left occipital craniectomy with interval decrease in size of surgical bed and intraparenchymal hemorrhage. CT head was repeated after that which showed stable left occipital hemorrhage and left parieto-occipital craniotomy. · History of seizures, last seizure was when patient was 23years old. Was prescribed Keppra during recent hospitalization, does not want to take any AED at present. · Bipolar disorder  · Hyperglycemia     Plan:  · Continue magnesium oxide 400 mg QD  · Tyelnol 500 mg twice daily PRN for the headaches  · Will add naproxen sodium 550 mg twice daily as needed for mild to moderate headache. No more than 14 days in a month avoid medication overuse headache. Take with food/Protonix to avoid acid reflux. · Gabapentin 400 mg twice a day. Discussed all possible side effects of the medication with the patient including dizziness drowsiness and increase sedation. · Benadryl 25 mg twice daily PRN with Phenergan 12.5 mg twice daily as needed for nausea vomiting associated with severe headaches. · vitamin D-30 on 7/16/2020    · Patient was supposed to be taking Keppra for seizure prophylaxis as per last hospital admission, but she weaned herself off of Keppra as it was worsening her migraine headaches and does not want to continue  that. She does not want to take any medications for seizures at present. · Recommend an AED considering prior history of seizures but as per patient she does not want to take any medications right now, she was prescribed Lamictal by her psychologist/ psychiatrist for mood disorder but as per patient the medication was recalled and she does not know the reason. · She said she will discuss with her psychologist about seizure medications too and will decide about that after discussing with the psychologist. Has not yet discussed with psychologist yet.   · Seizure Precautions:   counseled the patient with regard to seizure precautions for injury prevention and reinforced their rationale. No driving for at least 6 months, no activity at dangerous heights, no operation of dangerous machinery, no baths, no swimming. Caution (preferably accompanied) with cooking or near fires. The patient expressed understanding.         · Recommend driving evaluation-prior to start driving  · Continue to follow-up with neurosurgery  · Follow-up in clinic in 4-5 months  · Instructed patient to call the clinic if symptoms worsens or  any other new symptoms. I have spent total 31 minutes  with the patient more than 50% of this time was spent reviewing medical records, discussing imaging findings, counseling regarding medications side effects and compliance, healthy habits and coordinating care and documentation.        Electronically signed by Carla Matos MD on 7/20/2021 at 10:00 AM

## 2021-08-03 NOTE — CONSULTS
Lab results reviewed and abnormals discussed with physician.   been taking any of the prescribed medications since her prior admission, including metformin. Patient has history of seizure since age 23 and is on [address]. Patient has baseline right sided hemianopsia since her surgery in Jan.   UA found Staph aureus and Group B strep in the urine. However, sample was collected from voiding hat = contaminated. EEG suggest mild encephalopathy     WBC was 10.8 on admission peaking at 14.6 on 2/19. CURRENT EVALUATION :  2/22/2020     Afebrile  VS stable    Patient feels better but still weak. Hyperglycemia has improved but not yet controlled. Electrolyte imbalance, on replacement. Evaluated by NS who plan to interventions and have signed off. MRI changes felt to be artifactual.    Blood cultures: No growth  Urine cath culture: pending    Summary of relevant labs:  Labs:  WBC 10.8- 14.6- 9.5 -12.1     Micro:  Urine Cx  Staph aureus and Group B strep   UA  3+ glucose/ small ketones / small leukocyte esterase     Imaging:  CT Head 2/18 Stable appearance of posterior left temporal lobe surgical resection side with overlying craniotomy. Otherwise, unremarkable noncontrast CT head examination. 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 Sensitivity and specificity are limited due to local magnetic susceptibility artifact from postsurgical and hemorrhagic change. Left occipital craniectomy with interval decrease in size of the surgical bed and intraparenchymal hemorrhage. Residual or recurrent cavernoma is not excluded. MRA Head 2/18  Left occipital craniotomy and subjacent intraparenchymal hemorrhage. Otherwise negative MRA. No evidence of AVM or aneurysm. CT Head 2/18  Left occipital craniotomy with interval improvement of subjacent intraparenchymal hemorrhage. The findings were sent to the Radiology Results [address] at 3:43 pm on 2/18/2020to be communicated to a licensed caregiver.   Case discussed with  No    Drug use: Yes     Types: Marijuana    Sexual activity: Yes     Partners: Male   Lifestyle    Physical activity:     Days per week: Not on file     Minutes per session: Not on file    Stress: Not on file   Relationships    Social connections:     Talks on phone: Not on file     Gets together: Not on file     Attends [employer] service: Not on file     Active member of club or organization: Not on file     Attends meetings of clubs or organizations: Not on file     Relationship status: Not on file    Intimate partner violence:     Fear of current or ex partner: Not on file     Emotionally abused: Not on file     Physically abused: Not on file     Forced sexual activity: Not on file   Other Topics Concern    Not on file   Social History Narrative    Not on file       Family History:   History reviewed. No pertinent family history. Allergies:   Latex and Prozac [fluoxetine hcl]     Review of Systems:     Review of Systems   Constitutional: Negative for activity change. HENT: Negative for congestion. Gum hypertrophic   Eyes: Negative for discharge. Respiratory: Negative for apnea and cough. Cardiovascular: Negative for chest pain. Gastrointestinal: Negative for abdominal distention. Endocrine: Negative for cold intolerance. Genitourinary: Positive for dysuria. Negative for difficulty urinating. Musculoskeletal: Negative for arthralgias. Skin: Negative for color change. Allergic/Immunologic: Negative for immunocompromised state. Neurological: Negative for dizziness. Hematological: Negative for adenopathy. Psychiatric/Behavioral: Negative for agitation. Physical Examination :     Patient Vitals for the past 8 hrs:   Temp Temp [facility]   02/22/20 0816 97.9 °F (36.6 °C) Oral       Physical Exam  Constitutional:       Appearance: Normal appearance. HENT:      Head: Normocephalic and atraumatic. Nose: Nose normal. No congestion.       Mouth/Throat:      Mouth: Mucous membranes are moist.      Pharynx: Oropharynx is clear. Eyes:      General: No scleral icterus. Conjunctiva/sclera: Conjunctivae normal.      Pupils: Pupils are equal, round, and reactive to light. Neck:      Musculoskeletal: Neck supple. No neck rigidity. Cardiovascular:      Rate and Rhythm: Normal rate and regular rhythm. Heart sounds: Normal heart sounds. No murmur. Pulmonary:      Effort: No respiratory distress. Breath sounds: Normal breath sounds. Abdominal:      General: There is no distension. Palpations: Abdomen is soft. There is no mass. Genitourinary:     Comments: Urine clear  Musculoskeletal:         General: No swelling or tenderness. Skin:     General: Skin is dry. Coloration: Skin is not jaundiced. Neurological:      General: No focal deficit present. Mental Status: She is alert and oriented to person, place, and time. Psychiatric:         Thought Content: Thought content normal.           Medical Decision Making:   I have independently reviewed/ordered the following labs:    CBC with Differential:   Recent Labs     02/20/20  0546 02/21/20  0539   WBC 9.5 12.1*   HGB 11.2* 13.1   HCT 33.9* 39.4    257   LYMPHOPCT 50* 40   MONOPCT 5 6     BMP:  Recent Labs     02/19/20  1545 02/20/20  0546 02/20/20  1301    135  --    K 3.3* 3.8  --     101  --    CO2 24 22  --    BUN 14 20  --    CREATININE 0.72 0.80  --    MG 1.4*  --  1.3*     Hepatic Function Panel:   Recent Labs     02/19/20  1545 02/20/20  0546   PROT 6.3* 5.5*   LABALBU 3.6 3.0*   BILITOT 0.45 0.19*   ALKPHOS 109* 101   ALT 12 14   AST 11 15     No results for input(s): RPR in the last 72 hours. No results for input(s): HIV in the last 72 hours. No results for input(s): BC in the last 72 hours. Lab Results   Component Value Date    CREATININE 0.80 02/20/2020    GLUCOSE 417 02/20/2020       Detailed results:         Thank you for allowing us to participate in the care of this patient. Please call with questions. This note is created with the assistance of a speech recognition program.  While intending to generate adocument that actually reflects the content of the visit, the document can still have some errors including those of syntax and sound a like substitutions which may escape proof reading. It such instances, actual meaningcan be extrapolated by contextual diversion.     Salvador Aleman MD  Office: [phone]  Perfect serve / office [phone]

## 2022-01-05 ENCOUNTER — TELEPHONE (OUTPATIENT)
Dept: NEUROSURGERY | Age: 27
End: 2022-01-05

## 2022-01-05 NOTE — LETTER
[address] [facility], [address] [place] [address], [address] [facility]  Phone: [phone]  Fax: [phone]       1/5/2022    Saima Mercer  [address] [place] [id number]    Dear Surekha Mckee recently missed a scheduled appointment with Dr Joceline Vergara on 1/5/2022. Your health and follow-up medical care are important to us. Please call our office as soon as possible so that we may reschedule your appointment. If you have already rescheduled your appointment, please disregard this letter. This is the second scheduled appointment that you have missed within the last twelve months. If you miss 1 more appointment, you may be dismissed from the practice. For future appointments that you are unable to keep, please call the office at [phone] at least 24 hours in advance to cancel and reschedule.      Sincerely,      ANTONI Mireles Dull

## 2022-01-31 ENCOUNTER — OFFICE VISIT (OUTPATIENT)
Dept: NEUROLOGY | Age: 27
End: 2022-01-31
Payer: MEDICARE

## 2022-01-31 VITALS
OXYGEN SATURATION: 98 % | DIASTOLIC BLOOD PRESSURE: 83 MMHG | BODY MASS INDEX: 27.6 KG/M2 | TEMPERATURE: 98.4 F | HEIGHT: 62 IN | SYSTOLIC BLOOD PRESSURE: 118 MMHG | HEART RATE: 91 BPM | WEIGHT: 150 LBS

## 2022-01-31 DIAGNOSIS — Z98.890 STATUS POST CRANIOTOMY: ICD-10-CM

## 2022-01-31 DIAGNOSIS — Z87.898 HISTORY OF SEIZURES: ICD-10-CM

## 2022-01-31 DIAGNOSIS — D18.00 CAVERNOMA: ICD-10-CM

## 2022-01-31 DIAGNOSIS — G43.009 MIGRAINE WITHOUT AURA AND WITHOUT STATUS MIGRAINOSUS, NOT INTRACTABLE: Primary | ICD-10-CM

## 2022-01-31 PROCEDURE — G8417 CALC BMI ABV UP PARAM F/U: HCPCS | Performed by: STUDENT IN AN ORGANIZED HEALTH CARE EDUCATION/TRAINING PROGRAM

## 2022-01-31 PROCEDURE — G8484 FLU IMMUNIZE NO ADMIN: HCPCS | Performed by: STUDENT IN AN ORGANIZED HEALTH CARE EDUCATION/TRAINING PROGRAM

## 2022-01-31 PROCEDURE — 4004F PT TOBACCO SCREEN RCVD TLK: CPT | Performed by: STUDENT IN AN ORGANIZED HEALTH CARE EDUCATION/TRAINING PROGRAM

## 2022-01-31 PROCEDURE — 99212 OFFICE O/P EST SF 10 MIN: CPT | Performed by: STUDENT IN AN ORGANIZED HEALTH CARE EDUCATION/TRAINING PROGRAM

## 2022-01-31 PROCEDURE — G8427 DOCREV CUR MEDS BY ELIG CLIN: HCPCS | Performed by: STUDENT IN AN ORGANIZED HEALTH CARE EDUCATION/TRAINING PROGRAM

## 2022-01-31 RX ORDER — GABAPENTIN 300 MG/1
600 CAPSULE ORAL 3 TIMES DAILY
Qty: 90 CAPSULE | Refills: 4 | Status: ON HOLD | OUTPATIENT
Start: 2022-01-31 | End: 2022-06-21

## 2022-01-31 ASSESSMENT — ENCOUNTER SYMPTOMS
BACK PAIN: 0
SORE THROAT: 0
NAUSEA: 0
DIARRHEA: 0
EYE DISCHARGE: 0
SHORTNESS OF BREATH: 0
VOMITING: 0
CHEST TIGHTNESS: 0
WHEEZING: 0
ABDOMINAL PAIN: 0
COUGH: 0

## 2022-01-31 NOTE — PROGRESS NOTES
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 Interval History: 1/31/2022:   Patient last seen in the clinic on 7/20/2021. Yesterday patient had mechanical fall hitting her head on right  side, unsure about loss of consciousness. Patient endorses pain, tenderness on the scalp. Since the last visit patient endorses increase in her headaches frequency. Patient is getting around 2 episodes per week. Headaches are located retro-orbital he, 7/10 intensity, dull, pressure-like, associated with photophobia, denies any vision changes, blurry vision, nausea, vomiting. Patient also complains of neck pain. Patient get 2-3 episodes per month with headaches intensity is very severe 10/10, which she needs to lie down. Patient takes gabapentin 400 mg 3 times daily, Tylenol and naproxen which helps to elevate the pain. Denies alcohol, recreational drug use. Interval History: 7/20/2021  Patient was last seen in the clinic in March 2021. Patient endorses overall improvement in the headaches as compared to before, may be having 1 or 2 headaches in a week. She is compliant with her magnesium oxide 400 mg daily, denies any side effects from that. She is also taking gabapentin 400 mg twice a day. Not taking Tylenol that much as it is not helping with her headaches. Takes Benadryl and Phenergan as needed for severe headaches associated with nausea vomiting. Also take Zofran as needed for nausea.      Denies any recurrence of seizures or seizure-like activity or episodes of confusion or staring spells since last visit. Patient does not want to take any seizure medication. Patient was recently admitted to [facility] for food poisoning, this was around 2 to 3 weeks ago. Endorses improvement in the symptoms. Denies any nausea vomiting or diarrhea at present. Denies any other new neurologic concerns during this visit. She was recently started on Metformin for hyperglycemia, as per patient her blood sugars are still on the higher side. Otherwise denies any other changes in her medications. Denies any new allergies since last visit. Interval History: 3/4/2021   Patient was last seen in the clinic in October 2020. Since last visit endorses significant improvement in the headaches, currently having intermittent on and off headaches, mainly in the occipital region. Denies any photophobia phonophobia, endorses nausea and dizziness associated with headache. Taking magnesium oxide 400 mg daily, denies any side effects from that. Patient is also taking gabapentin 400 mg twice a day, denies any side effects with the medications. Not taking Lamictal.  Takes Tylenol as needed for the headaches, endorses improvement in the headache with Tylenol. For severe headaches take Benadryl and Phenergan, endorses improvement in the headache with them. Denies any side effects from them. Denies any recurrence of seizures or seizure-like activity. Last seizure was when she was 23years old, does not want to take any AEDs. Patient denies any other new neurologic concerns since last visit. Denies any other changes in her medications  Denies any new allergies. Interval History: 10/7/2020  Patient was last seen in the clinic on 7/7/2020. Since last visit patient continues to complain of on and off headaches, mainly in the occipital region, sometimes associated with nausea and dizziness.   Denies any photophobia or phonophobia. Currently taking magnesium oxide 400 mg daily. She endorses significant improvement in her symptoms with gabapentin, her dose was increased to 300 mg twice a day. As per patient gabapentin is helping with her headaches along with her other neuropathic pain. Denies any side effects from gabapentin. She was requesting to increase the dose of gabapentin. She was started on Lamictal by her psychiatrist, took it for couple of days, then stop it again. At present she does not want to take it at all. Taking Tylenol as needed for the headaches, endorses improvement in the headache with that. Denies any side effects. She had cholecystectomy around 2 weeks ago, endorses feeling tired and weak since the surgery. She also had one episode where she bumped her head on the car door, had headache at that time. CT head was done on 10/2/2020 which did not show any acute finding     Patient had MRI brain without contrast on 8/19/2020-previous left occipital lobe surgery with hemosiderin signal changes in the surgical bed related to patient's known cavernoma. Residual cavernoma cannot be excluded given the signal abnormality. No obvious new finding. Patient denies any other new neurologic concerns during this visit. Interval History: 7/7/2020  Patient was last seen on telehealth on 4/2/2020. Since last visit she continues to complain of on and off headaches, occipital region, sometimes associated with nausea. Denies any photophobia phonophobia or dizziness or lightheadedness or any other symptoms associated with the headache. Endorses improvement in the headache with Tylenol. She is also on magnesium oxide 400 mg daily. Denies any side effects on the medications. She was supposed to be started on Lamictal for psychiatric disorder, took it for few days and then stopped taking it. Not planning to continue to take it. Denies any seizures or seizure-like activity since last visit.   Her last seizure was age 23. She does not want to take any seizure medications for now. She followed up with Neurosurgeon Dr Dejah Cha at [facility], planning to repeat a repeat MRI brain. Patient endorses bruises on her upper and lower extremities because of lifting heavy weights, she works at [employer]. She was started on vitamin C by her PCP  Patient denies any other new neurologic concerns during this visit. 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 She then presented on 2/18/20 with headache, dizziness and seeing spots in her right peripheral vision and not feeling good. Her blood sugars were in 800s at that time. MRI Brain show possible new acute left PCA territory infarction, but the sensitivity and specificity limited due to local magnetic susceptibility artifact from the postsurgical and hemorrhagic changes. It showed left occipital craniectomy with interval decrease in size of surgical bed and intraparenchymal hemorrhage. CT head was repeated after that which showed stable left occipital hemorrhage and left parieto-occipital craniotomy. Patient has a history of seizures, last seizure was when she was 23years old. She was placed on Keppra for seizure prophylaxis after the surgery, as per patient she weaned her off of [address] by herself, as she felt worsening of her migraine headaches with Keppra and does not want to take it again. She was prescribed Lamictal by her psychiatrist in the past but never took it and said the medication was \" Recalled\" and does not know the exact reason why.   Denies any seizures since age 23, at present she does not want to take any seizure medications and says she would like to discuss with her psychiatrist/psychologist and [facility] and then decide about it.     Since discharge from the hospital she endorses overall improvement in her headache, denies any double vision or blurry vision, endorses quitting smoking. Currently taking Tylenol as needed for the headaches and endorses improvement in the headache with that, was noted to have low magnesium level and was prescribed magnesium oxide 400 mg daily. Denies any side effects of the medications. Denies any other concerns during this visit.               PATIENT HISTORY:     Past Medical History:   Diagnosis Date    Cerebral vascular malformation     [email or web address]    Diabetes mellitus ([facility]     Seizure ([facility]     Traumatic hemorrhage of left cerebrum without loss of consciousness ([facility]     @ [facility]        Past Surgical History:   Procedure Laterality Date    CRANIOTOMY  01/08/2020    LEFT OCCIPITAL CRANIOTOMY    CRANIOTOMY Left 1/8/2020    LEFT OCCIPITAL CRANIOTOMY, 1ST VASCULAR LESION WITH Debt Resolve NAVIGATION (LATERAL WITH ROMERO BAG, HOLLINGSWORTH HEADHOLDER, MICROSCOPE) performed by Criselda Mcknight DO at [facility] History     Socioeconomic History    Marital status: Single     Spouse name: Not on file    Number of children: Not on file    Years of education: Not on file    Highest education level: Not on file   Occupational History    Not on file   Tobacco Use    Smoking status: Current Some Day Smoker     Packs/day: 0.30     Years: 9.00     Pack years: 2.70     Types: Cigarettes    Smokeless tobacco: Never Used   Vaping Use    Vaping Use: Unknown   Substance and Sexual Activity    Alcohol use: No    Drug use: Yes     Types: Marijuana Tyrel Gonsales)    Sexual activity: Yes     Partners: Male   Other Topics Concern    Not on file   Social History Narrative    Not on file     Social Determinants of Health     Financial Resource Strain:     Difficulty of Paying Living Expenses: Not on file   Food Insecurity:     Worried About Running Out of Food in the Last Year: Not on file    Genet of Food in the Last Year: Not on file   Transportation Needs:     Lack of Transportation (Medical): Not on file    Lack of Transportation (Non-Medical): Not on file   Physical Activity:     Days of Exercise per Week: Not on file    Minutes of Exercise per Session: Not on file   Stress:     Feeling of Stress : Not on file   Social Connections:     Frequency of Communication with Friends and Family: Not on file    Frequency of Social Gatherings with Friends and Family: Not on file    Attends [employer] Services: Not on file    Active Member of [address] [place] [address] Curse or Organizations: Not on file    Attends Club or Organization Meetings: Not on file    Marital Status: Not on file   Intimate Partner Violence:     Fear of Current or Ex-Partner: Not on file    Emotionally Abused: Not on file    Physically Abused: Not on file    Sexually Abused: Not on file   Housing Stability:     Unable to Pay for Housing in the Last Year: Not on file    Number of [place] in the Last Year: Not on file    Unstable Housing in the Last Year: Not on file        Current Outpatient Medications   Medication Sig Dispense Refill    gabapentin (NEURONTIN) 300 MG capsule Take 2 capsules by mouth 3 times daily for 75 days.  90 capsule 4    naproxen sodium (ANAPROX) 550 MG tablet Take 1 tablet by mouth 2 times daily as needed for Pain (Headache) 20 tablet 4    diphenhydrAMINE (BENADRYL ALLERGY) 25 MG capsule Take 1 capsule by mouth 2 times daily as needed (Severe headache with nausea and vomiting.) 28 capsule 4    promethazine (PHENERGAN) 12.5 MG tablet Take 1 tablet by mouth 2 times daily as needed for Nausea (moderate to severe headache) 28 tablet 4    ondansetron (ZOFRAN) 4 MG tablet Take 1 tablet by mouth every 12 hours as needed for Nausea or Vomiting 30 tablet 4    Magnesium Oxide (MAG-OXIDE) 200 MG TABS Take 1 tablet by mouth 2 times daily 60 tablet 0    docusate sodium (COLACE) 100 MG capsule Take 100 mg by mouth 2 times daily as needed for Constipation       etonogestrel (NEXPLANON) 68 MG implant 68 mg by Subdermal route once      acetaminophen (TYLENOL) 500 MG tablet Take 1 tablet by mouth 2 times daily as needed for Pain (severe headaches) 28 tablet 6    chlorhexidine (PERIDEX) 0.12 % solution Take 15 mLs by mouth 2 times daily (Patient not taking: Reported on 7/20/2021)      butalbital-acetaminophen-caffeine (FIORICET, ESGIC) -40 MG per tablet Take 1 tablet by mouth every 4 hours as needed for Headaches (Patient not taking: Reported on 4/2/2020) 30 tablet 0    insulin lispro (HUMALOG) 100 UNIT/ML injection vial Inject 0-12 Units into the skin 3 times daily (with meals) (Patient not taking: Reported on 7/7/2020) 1 vial 3    insulin detemir (LEVEMIR FLEXTOUCH) 100 UNIT/ML injection pen Inject 25 Units into the skin nightly (Patient not taking: Reported on 3/4/2021) 5 pen 3    insulin aspart (NOVOLOG FLEXPEN) 100 UNIT/ML injection pen Inject 5 Units into the skin 3 times daily (before meals) (Patient not taking: Reported on 3/4/2021) 5 pen 3    glucose monitoring kit (FREESTYLE) monitoring kit 1 kit by Does not apply route daily (Patient not taking: Reported on 7/20/2021) 1 kit 0    blood glucose monitor strips Test 3 times a day & as needed for symptoms of irregular blood glucose.  (Patient not taking: Reported on 7/20/2021) 100 strip 3    Lancets MISC 1 each by Does not apply route 3 times daily (Patient not taking: Reported on 3/4/2021) 600 each 1    Alcohol Swabs (ALCOHOL PREP) 70 % PADS 1 pad TID (Patient not taking: Reported on 7/20/2021) 100 each 3    Insulin Pen Needle 30G X 8 MM MISC 1 each by Does not apply route daily (Patient not taking: Reported on 3/4/2021) 100 each 3    scopolamine (TRANSDERM-SCOP) transdermal patch Place 1 patch onto the skin every 72 hours (Patient not taking: Reported on 4/2/2020) 3 patch 0    levETIRAcetam (KEPPRA) 500 MG tablet Take 1 tablet by mouth 2 times daily for 7 days (Patient not taking: Reported on 4/2/2020) 60 tablet 0    atorvastatin (LIPITOR) 40 MG tablet Take 1 tablet by mouth nightly (Patient not taking: Reported on 4/2/2020) 30 tablet 3    busPIRone (BUSPAR) 10 MG tablet Take 10 mg by mouth 3 times daily (Patient not taking: Reported on 1/31/2022)      lamoTRIgine (LAMICTAL) 25 MG tablet Take 25 mg by mouth daily (Patient not taking: Reported on 1/31/2022)       No current facility-administered medications for this visit. Allergies   Allergen Reactions    Latex     Prozac [Fluoxetine Hcl] Other (See Comments)     Seizures        REVIEW OF SYSTEMS:     Review of Systems   Constitutional: Negative for activity change, chills, fever and unexpected weight change. HENT: Negative for congestion, hearing loss and sore throat. Eyes: Negative for discharge and visual disturbance. Respiratory: Negative for cough, chest tightness, shortness of breath and wheezing. Cardiovascular: Negative for chest pain, palpitations and leg swelling. Gastrointestinal: Negative for abdominal pain, diarrhea, nausea and vomiting. Endocrine: Negative for polydipsia and polyphagia. Genitourinary: Negative for decreased urine volume, difficulty urinating, dysuria, frequency and vaginal pain. Musculoskeletal: Negative for arthralgias, back pain, joint swelling and neck stiffness. Skin: Negative for rash. Allergic/Immunologic: Negative for environmental allergies. Neurological: Negative for dizziness, tremors, seizures, syncope, facial asymmetry, speech difficulty, weakness, light-headedness, numbness and headaches. Hematological: Negative for adenopathy. Psychiatric/Behavioral: Negative for agitation, confusion, hallucinations and suicidal ideas.         VITALS  /83   Pulse 91   Temp 98.4 °F (36.9 °C) (Temporal)   Ht 5' 2\" (1.575 m)   Wt 150 lb (68 kg)   SpO2 98%   BMI 27.44 kg/m²      PHYSICAL EXAMINATION:     Physical Exam     Constitutional: Well developed, well nourished and in no acute distress. Head:  normocephalic, atraumatic. Neck: supple, no carotid bruits, thyroid not palpable  Respiratory: Clear to auscultation bilaterally with no use of accessory muscles during respiration. Cardiovascular: normal rate, regular rhythm, no murmur, gallop, rub. Abdomen: Soft, nontender, nondistended, normal bowel sounds, no hepatomegaly or splenomegaly  Extremities:  peripheral pulses palpable, no pedal edema or calf pain with palpation  Psych: normal affect      NEUROLOGICAL EXAMINATION:     Mental status   Alert and oriented; intact memory with no confusion, speech or language problems; no hallucinations or delusions     Cranial nerves   II - visual fields intact to confrontation                                                III, IV, VI - extra-ocular muscles full: no pupillary defect; no ELIO, no nystagmus, no ptosis , mild right homonymous hemianopia  V - normal facial sensation                                                               VII - normal facial symmetry                                                             VIII - intact hearing                                                                             IX, X - symmetrical palate                                                                  XI - symmetrical shoulder shrug                                                       XII - midline tongue without atrophy or fasciculation     Motor function  Normal muscle bulk and tone  Muscle strength: normal power 5/5  fine motor movements     Sensory function Intact to touch, pin prick, vibration, proprioception in bilateral upper and lower extremities. Cerebellar Intact fine motor movement. No involuntary movements or tremors     Reflex function Intact 2+ DTR and symmetric.  Negative Babinski     Gait                  Normal station and gait           PRIOR TESTS AND IMAGING: Following images and Labs were reviewed by the examiner             EEG 2/18/20  Interpretation  This EEG was abnormal in wakefulness and drowsiness. Frequent intrusions of polymorphic delta and theta frequencies were seen. Clinical correlation  This EEG was abnormal suggestive of mild encephalopathy of nonspecific etiology. No abnormal epileptiform activity was seen. MRI brain without contrast 2/18/2020  Impression   Possible new acute left posterior cerebral artery territory infarction. Sensitivity and specificity are limited due to local magnetic susceptibility   artifact from postsurgical and hemorrhagic change. Left occipital craniectomy with interval decrease in size of the surgical bed   and intraparenchymal hemorrhage. Residual or recurrent cavernoma is not   excluded. CT head without contrast 10/2/2020  Impression    No acute intracranial abnormality. MRI brain without contrast 8/19/2020  Impression    Previous left occipital lobe surgery with hemosiderin and signal changes in    the surgical bed related to the patient's known cavernoma. Residual    cavernoma cannot be excluded given this signal abnormality. No obvious new    finding. ASSESSMENT / PLAN:       18-year-old female with history of headache, left occipital cavernoma status post resection, status post IVH. -Recent fall without loss of consciousness  -Left occipital cavernous status post resection 1/8/2020  -Right homonymous hemianopia improving  -Episodic headaches       · Patient presented on 2/18/20 with headache, dizziness and seeing spots in her right peripheral vision and not feeling good. Her blood sugars were in 800s at that time. MRI Brain show possible new acute left PCA territory infarction, but the sensitivity and specificity limited due to local magnetic susceptibility artifact from the postsurgical and hemorrhagic changes. It showed left occipital craniectomy with interval decrease in size of surgical bed and intraparenchymal hemorrhage.   CT head was repeated after that which showed stable left occipital hemorrhage and left parieto-occipital craniotomy. · History of seizures, last seizure was when patient was 23years old. Was prescribed Keppra during recent hospitalization, does not want to take any AED at present. · Bipolar disorder  · Hyperglycemia     Plan:  · Continue magnesium oxide 400 mg QD  · Tyelnol 500 mg twice daily PRN for the headaches  · Will add naproxen sodium 550 mg twice daily as needed for mild to moderate headache. No more than 14 days in a month avoid medication overuse headache. Take with food/Protonix to avoid acid reflux. · Gabapentin 400 mg tid  day, we will increase dose to 600 mg 3 times daily, as patient was having benefit with gabapentin         Discussed all possible side effects of the medication with the patient including dizziness drowsiness and increase sedation. .     CT head with without contrast  .  EEG to rule out seizure  · Benadryl 25 mg twice daily PRN with Phenergan 12.5 mg twice daily as needed for nausea vomiting associated with severe headaches. · vitamin D-30 on 7/16/2020     · Patient was supposed to be taking Keppra for seizure prophylaxis as per last hospital admission, but she weaned herself off of Keppra as it was worsening her migraine headaches and does not want to continue  that. She does not want to take any medications for seizures at present. · Recommend an AED considering prior history of seizures but as per patient she does not want to take any medications right now, she was prescribed Lamictal by her psychologist/ psychiatrist for mood disorder but as per patient the medication was recalled and she does not know the reason. · She said she will discuss with her psychologist about seizure medications too and will decide about that after discussing with the psychologist. Has not yet discussed with psychologist yet.   · Seizure Precautions:       counseled the patient with regard to seizure precautions for injury prevention and reinforced their rationale. No driving for at least 6 months, no activity at dangerous heights, no operation of dangerous machinery, no baths, no swimming. Caution (preferably accompanied) with cooking or near fires. The patient expressed understanding. · Recommend driving evaluation-prior to start driving  · Continue to follow-up with neurosurgery  · Follow-up in clinic in 6 weeks       · Instructed patient to call the clinic if symptoms worsens or  any other new symptoms. Ms. Edis Hartley received counseling on the following healthy behaviors: medical compliance, smoking cessation, blood pressure control, regular follow up with primary doctor.          Electronically signed by Justin Simpson MD on 1/31/2022 at 3:38 PM

## 2022-02-01 ENCOUNTER — HOSPITAL ENCOUNTER (OUTPATIENT)
Dept: CT IMAGING | Age: 27
Discharge: HOME | End: 2022-02-03
Payer: MEDICARE

## 2022-02-01 ENCOUNTER — TELEPHONE (OUTPATIENT)
Dept: NEUROLOGY | Age: 27
End: 2022-02-01

## 2022-02-01 DIAGNOSIS — D18.00 CAVERNOMA: ICD-10-CM

## 2022-02-01 PROCEDURE — 6360000004 HC RX CONTRAST MEDICATION: Performed by: STUDENT IN AN ORGANIZED HEALTH CARE EDUCATION/TRAINING PROGRAM

## 2022-02-01 PROCEDURE — 70470 CT HEAD/BRAIN W/O & W/DYE: CPT

## 2022-02-01 RX ADMIN — IOPAMIDOL 75 ML: 755 INJECTION, SOLUTION INTRAVENOUS at 13:41

## 2022-02-01 NOTE — TELEPHONE ENCOUNTER
Please call pt with results     CT OF THE HEAD WITH AND WITHOUT CONTRAST  2/1/2022 1:23 pm       TECHNIQUE:   CT of the head/brain was performed without and with the administration of   intravenous contrast. Multiplanar reformatted images are provided for review. Dose modulation, iterative reconstruction, and/or weight based adjustment of   the mA/kV was utilized to reduce the radiation dose to as low as reasonably   achievable.       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  There is a small area of encephalomalacia in the left occipital lobe   beneath the craniotomy site.  High attenuation area is noted along the   anteromedial margin of the resection site, corresponding with areas of   hemosiderin staining on the previous MRI, likely related to old blood   products.  No areas of abnormal enhancement are noted in the operative bed.       No other areas of abnormal enhancement.  There are no areas of acute   hemorrhage, mass, or midline shift.       No abnormal extra-axial fluid collections.  The ventricles are proportional   to the cerebral sulci.       ORBITS: The visualized portion of the orbits demonstrate no acute abnormality.       SINUSES: The visualized paranasal sinuses and mastoid air cells demonstrate   no acute abnormality.       SOFT TISSUES/SKULL:  No fracture.  No areas of abnormal soft tissue swelling.           Impression   1. Postsurgical changes are noted from left occipital craniotomy with   resection site in the left occipital lobe for previous cavernoma resection.    High attenuation area along the anteromedial margin of the resection site is   stable, likely corresponding with hemosiderin products when reviewing the   previous MRI.  No evidence of a recurrent mass.       RECOMMENDATIONS:   Unavailable

## 2022-02-07 NOTE — TELEPHONE ENCOUNTER
I called the patient did not        Electronically signed by Kristine Lomax MD on 2/7/2022 at 10:16 AM

## 2022-02-09 ENCOUNTER — HOSPITAL ENCOUNTER (OUTPATIENT)
Dept: NEUROLOGY | Age: 27
Discharge: HOME | End: 2022-02-09
Payer: MEDICARE

## 2022-02-09 DIAGNOSIS — G43.009 MIGRAINE WITHOUT AURA AND WITHOUT STATUS MIGRAINOSUS, NOT INTRACTABLE: ICD-10-CM

## 2022-02-09 DIAGNOSIS — Z87.898 HISTORY OF SEIZURES: ICD-10-CM

## 2022-02-09 PROCEDURE — 95816 EEG AWAKE AND DROWSY: CPT | Performed by: PSYCHIATRY & NEUROLOGY

## 2022-02-09 PROCEDURE — 95816 EEG AWAKE AND DROWSY: CPT

## 2022-02-09 NOTE — PROCEDURES
EEG REPORT       Patient: Tod Darnell Age: 27 y.o. MRN: [id number]    Date: 2/9/2022  Referring Provider: Josy Canela MD    History: This routine 30 minute scalp EEG was recorded with video- monitoring for a 27 [facility] who presented with encephalopathy. This EEG was performed to evaluate for focal and epileptiform abnormalities. Tod Darnell   Current Outpatient Medications   Medication Sig Dispense Refill    gabapentin (NEURONTIN) 300 MG capsule Take 2 capsules by mouth 3 times daily for 75 days.  90 capsule 4    naproxen sodium (ANAPROX) 550 MG tablet Take 1 tablet by mouth 2 times daily as needed for Pain (Headache) 20 tablet 4    diphenhydrAMINE (BENADRYL ALLERGY) 25 MG capsule Take 1 capsule by mouth 2 times daily as needed (Severe headache with nausea and vomiting.) 28 capsule 4    promethazine (PHENERGAN) 12.5 MG tablet Take 1 tablet by mouth 2 times daily as needed for Nausea (moderate to severe headache) 28 tablet 4    ondansetron (ZOFRAN) 4 MG tablet Take 1 tablet by mouth every 12 hours as needed for Nausea or Vomiting 30 tablet 4    acetaminophen (TYLENOL) 500 MG tablet Take 1 tablet by mouth 2 times daily as needed for Pain (severe headaches) 28 tablet 6    chlorhexidine (PERIDEX) 0.12 % solution Take 15 mLs by mouth 2 times daily (Patient not taking: Reported on 7/20/2021)      butalbital-acetaminophen-caffeine (FIORICET, ESGIC) -40 MG per tablet Take 1 tablet by mouth every 4 hours as needed for Headaches (Patient not taking: Reported on 4/2/2020) 30 tablet 0    insulin lispro (HUMALOG) 100 UNIT/ML injection vial Inject 0-12 Units into the skin 3 times daily (with meals) (Patient not taking: Reported on 7/7/2020) 1 vial 3    Magnesium Oxide (MAG-OXIDE) 200 MG TABS Take 1 tablet by mouth 2 times daily 60 tablet 0    insulin detemir (LEVEMIR FLEXTOUCH) 100 UNIT/ML injection pen Inject 25 Units into the skin nightly (Patient not taking: Reported on 3/4/2021) 5 pen 3    well as temporal electrodes were included. The patient was not sleep deprived. This recording was obtained during wakefulness. EEG Description: The background was asymmetric. The activity over the right hemisphere was well-modulated consisted of up to 12 Hz of alpha activity of 25 to 35 µV. The activity over the left posterior quadrant consisted of 7 to 8 Hz of polymorphic activity. This asymmetry between the 2 hemispheres persisted throughout the recording. The background was continuous and showed reactivity. Frequent left temporal 3 to 4 Hz of polymorphic delta slowing was seen. Sleep architecture was not seen. Activation procedures were not performed. The EKG channel demonstrated a normal sinus rhythm. Interpretation  This EEG was abnormal due to continuous left posterior quadrant polymorphic theta slowing and frequent left temporal polymorphic delta slowing. Clinical correlation  This EEG was abnormal.  Continuous left posterior quadrant polymorphic theta slowing suggested underlying structural defect. No abnormal epileptiform activity was seen. Narinder Combs MD  Neurology PGY-3 Resident  02/09/22    I have personally reviewed the EEG of Tristan Wiseman. I agree with the documentation by the resident with changes made to the note as needed.        Herlinda Banks MD  Diplomate, American Board of Psychiatry and Neurology  Diplomate, American Board of Clinical Neurophysiology  Diplomate, American Board of Epilepsy

## 2022-04-10 ENCOUNTER — HOSPITAL ENCOUNTER (EMERGENCY)
Age: 27
Discharge: HOME | End: 2022-04-10
Attending: EMERGENCY MEDICINE
Payer: MEDICARE

## 2022-04-10 VITALS
TEMPERATURE: 97.9 F | HEIGHT: 62 IN | DIASTOLIC BLOOD PRESSURE: 101 MMHG | WEIGHT: 155 LBS | RESPIRATION RATE: 16 BRPM | OXYGEN SATURATION: 98 % | HEART RATE: 123 BPM | SYSTOLIC BLOOD PRESSURE: 148 MMHG | BODY MASS INDEX: 28.52 KG/M2

## 2022-04-10 DIAGNOSIS — K04.7 DENTAL ABSCESS: ICD-10-CM

## 2022-04-10 DIAGNOSIS — K08.89 PAIN, DENTAL: Primary | ICD-10-CM

## 2022-04-10 DIAGNOSIS — K02.9 DENTAL CARIES: ICD-10-CM

## 2022-04-10 PROCEDURE — 6370000000 HC RX 637 (ALT 250 FOR IP)

## 2022-04-10 PROCEDURE — 99283 EMERGENCY DEPT VISIT LOW MDM: CPT

## 2022-04-10 RX ORDER — IBUPROFEN 800 MG/1
800 TABLET ORAL EVERY 8 HOURS PRN
Qty: 60 TABLET | Refills: 0 | Status: ON HOLD | OUTPATIENT
Start: 2022-04-10 | End: 2022-06-21

## 2022-04-10 RX ORDER — AMOXICILLIN AND CLAVULANATE POTASSIUM 875; 125 MG/1; MG/1
1 TABLET, FILM COATED ORAL 2 TIMES DAILY
Qty: 20 TABLET | Refills: 0 | Status: SHIPPED | OUTPATIENT
Start: 2022-04-10 | End: 2022-04-20

## 2022-04-10 RX ORDER — OXYCODONE HYDROCHLORIDE AND ACETAMINOPHEN 5; 325 MG/1; MG/1
1 TABLET ORAL EVERY 6 HOURS PRN
Qty: 9 TABLET | Refills: 0 | Status: SHIPPED | OUTPATIENT
Start: 2022-04-10 | End: 2022-04-13

## 2022-04-10 RX ORDER — IBUPROFEN 800 MG/1
800 TABLET ORAL ONCE
Status: COMPLETED | OUTPATIENT
Start: 2022-04-10 | End: 2022-04-10

## 2022-04-10 RX ADMIN — BENZOCAINE 1 EACH: 220 GEL, DENTIFRICE DENTAL at 08:41

## 2022-04-10 RX ADMIN — IBUPROFEN 800 MG: 800 TABLET, FILM COATED ORAL at 08:41

## 2022-04-10 ASSESSMENT — ENCOUNTER SYMPTOMS
ABDOMINAL DISTENTION: 0
FACIAL SWELLING: 0
SHORTNESS OF BREATH: 0
COUGH: 0
SORE THROAT: 0
ABDOMINAL PAIN: 0
NAUSEA: 1
VOMITING: 1
TROUBLE SWALLOWING: 0

## 2022-04-10 ASSESSMENT — PAIN DESCRIPTION - DESCRIPTORS: DESCRIPTORS: THROBBING

## 2022-04-10 ASSESSMENT — PAIN DESCRIPTION - ORIENTATION: ORIENTATION: RIGHT;LOWER

## 2022-04-10 ASSESSMENT — PAIN DESCRIPTION - LOCATION: LOCATION: TEETH

## 2022-04-10 ASSESSMENT — PAIN SCALES - GENERAL
PAINLEVEL_OUTOF10: 10
PAINLEVEL_OUTOF10: 10

## 2022-04-10 ASSESSMENT — PAIN DESCRIPTION - PAIN TYPE: TYPE: ACUTE PAIN

## 2022-04-10 ASSESSMENT — PAIN - FUNCTIONAL ASSESSMENT: PAIN_FUNCTIONAL_ASSESSMENT: 0-10

## 2022-04-10 NOTE — ED PROVIDER NOTES
[id number]     Emergency Department     Faculty Attestation    I performed a history and physical examination of the patient and discussed management with the resident. I reviewed the residents note and agree with the documented findings and plan of care. Any areas of disagreement are noted on the chart. I was personally present for the key portions of any procedures. I have documented in the chart those procedures where I was not present during the key portions. I have reviewed the emergency nurses triage note. I agree with the chief complaint, past medical history, past surgical history, allergies, medications, social and family history as documented unless otherwise noted below. For Physician Assistant/ Nurse Practitioner cases/documentation I have personally evaluated this patient and have completed at least one if not all key elements of the E/M (history, physical exam, and MDM). Additional findings are as noted. I have personally seen and evaluated the patient. I find the patient's history and physical exam are consistent with the NP/PA documentation. I agree with the care provided, treatment rendered, disposition and follow-up plan. This patient was evaluated in the Emergency Department for symptoms described in the history of present illness. The patient was evaluated in the context of the global COVID-19 pandemic, which necessitated consideration that the patient might be at risk for infection with the SARS-CoV-2 virus that causes COVID-19. Institutional protocols and algorithms that pertain to the evaluation of patients at risk for COVID-19 are in a state of rapid change based on information released by regulatory bodies including the CDC and federal and state organizations. These policies and algorithms were followed during the patient's care in the ED. 68-year-old female presenting with diffuse teeth pain, worst in her lower front teeth. Teeth are loose.   She has had jaw pain, but has no difficulty opening or closing her mouth. She tried eating something yesterday that made her teeth hurt worse because there was direct pressure on them. She has not seen a dentist recently. She denies any fever. No IVDA. No tongue swelling or difficulty swallowing. Exam:  General: Sitting on the bed, awake, alert and in no acute distress  CV: normal rate and regular rhythm, improved from triage  Lungs: Breathing comfortably on room air with no tachypnea, hypoxia, or increased work of breathing  HEENT: Oropharynx is clear, widespread periodontal disease. Small dental abscess below tooth 28, punctate and draining. Gums are red and irritated across entire mouth. Small aphthous ulcer at the meeting point of the gums and the buccal mucosa of the lip over tooth 7.  Multiple dental caries. No sublingual edema. No submandibular fullness. Plan:  Dental infection, acute on chronic. Multiple loose teeth. Patient would benefit from prompt dental follow-up. Will treat with antibiotics to prevent further infection, anti-inflammatories, topical numbing agents, small quantity of opiate pain medication, and referred to the dental clinic for definitive management.         Denia Araujo MD   Attending Emergency  Physician    (Please note that portions of this note were completed with a voice recognition program. Efforts were made to edit the dictations but occasionally words are mis-transcribed.)              Denia Araujo MD  04/10/22 [id number]

## 2022-04-10 NOTE — ED NOTES
SW scheduled patient for an emergent appointment at the [facility] for tomorrow, 4/11/2022 at 11am, which was given to her in writing. Patient has her own transportation to get to the appointment. Referral faxed. Education provided about the importance of ongoing preventive dental care, patient voices understanding. Rocio Garcia.  Susannah Mcallister, [place]  04/10/22 [id number]

## 2022-04-10 NOTE — ED PROVIDER NOTES
[facility] ED  Emergency Department Encounter  EmergencyMedicine Resident     Pt Emmanuel Askew  MRN: [id number]  [place] 1995  Date of evaluation: 4/10/22  PCP:  Ronel Linda    This patient was evaluated in the Emergency Department for symptoms described in the history of present illness. The patient was evaluated in the context of the global COVID-19 pandemic, which necessitated consideration that the patient might be at risk for infection with the SARS-CoV-2 virus that causes COVID-19. Institutional protocols and algorithms that pertain to the evaluation of patients at risk for COVID-19 are in a state of rapid change based on information released by regulatory bodies including the CDC and federal and state organizations. These policies and algorithms were followed during the patient's care in the ED. CHIEF COMPLAINT       Chief Complaint   Patient presents with    Dental Pain     right lower       HISTORY OF PRESENT ILLNESS  (Location/Symptom, Timing/Onset, Context/Setting, Quality, Duration, Modifying Factors, Severity.)      Erin Ramsay is a 27 y.o. female who presents with widespread dental pain, more severe in front bottom teeth, which has been present for the past 2-3 months. Patient says she was told she has gingivitis and now has loose teeth and can hardly eat. Patient states she has seen a few dentists but that she \"didn't like any of them\" and states they didn't do anything for her. Patient does not brush or floss teeth consistently and says it is now too painful to do so. Also has headache and some nausea/vomiting over the past couple of days. Pt is a smoker and occasionally uses marijuana, denies any methamphetamine or cocaine use.      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 has a past surgical history that includes craniotomy (01/08/2020) and craniotomy (Left, 1/8/2020). Social History     Socioeconomic History    Marital status: Single     Spouse name: Not on file    Number of children: Not on file    Years of education: Not on file    Highest education level: Not on file   Occupational History    Not on file   Tobacco Use    Smoking status: Current Some Day Smoker     Packs/day: 0.30     Years: 9.00     Pack years: 2.70     Types: Cigarettes    Smokeless tobacco: Never Used   Vaping Use    Vaping Use: Unknown   Substance and Sexual Activity    Alcohol use: Yes     Comment: Occassionally    Drug use: Yes     Types: Marijuana [address])    Sexual activity: Yes     Partners: Male   Other Topics Concern    Not on file   Social History Narrative    Not on file     Social Determinants of Health     Financial Resource Strain:     Difficulty of Paying Living Expenses: Not on file   Food Insecurity:     Worried About Running Out of Food in the Last Year: Not on file    Genet of Food in the Last Year: Not on file   Transportation Needs:     Lack of Transportation (Medical): Not on file    Lack of Transportation (Non-Medical):  Not on file   Physical Activity:     Days of Exercise per Week: Not on file    Minutes of Exercise per Session: Not on file   Stress:     Feeling of Stress : Not on file   Social Connections:     Frequency of Communication with Friends and Family: Not on file    Frequency of Social Gatherings with Friends and Family: Not on file    Attends [employer] Services: Not on file    Active Member of Clubs or Organizations: Not on file    Attends Club or Organization Meetings: Not on file    Marital Status: Not on file   Intimate Partner Violence:     Fear of Current or Ex-Partner: Not on file    Emotionally Abused: Not on file    Physically Abused: Not on file    Sexually Abused: Not on file   Housing Stability:     Unable to Pay for Housing in the Last Year: Not on file    Number of Places Lived in the Last Year: Not on file    Unstable Housing in the Last Year: Not on file       History reviewed. No pertinent family history. Allergies:  Latex and Prozac [fluoxetine hcl]    Home Medications:  Prior to Admission medications    Medication Sig Start Date End Date Taking? Authorizing Provider   amoxicillin-clavulanate (AUGMENTIN) 875-125 MG per tablet Take 1 tablet by mouth 2 times daily for 10 days 4/10/22 4/20/22 Yes Emilio Thurman MD   ibuprofen (ADVIL;MOTRIN) 800 MG tablet Take 1 tablet by mouth every 8 hours as needed for Pain 4/10/22 4/30/22 Yes Emilio Thurman MD   oxyCODONE-acetaminophen (PERCOCET) 5-325 MG per tablet Take 1 tablet by mouth every 6 hours as needed for Pain for up to 3 days. Intended supply: 3 days. Take lowest dose possible to manage pain 4/10/22 4/13/22 Yes Emilio Thurman MD   gabapentin (NEURONTIN) 300 MG capsule Take 2 capsules by mouth 3 times daily for 75 days.  1/31/22 4/16/22  Nicolasa Kline MD   naproxen sodium (ANAPROX) 550 MG tablet Take 1 tablet by mouth 2 times daily as needed for Pain (Headache) 7/20/21   Preeti Connor MD   diphenhydrAMINE (BENADRYL ALLERGY) 25 MG capsule Take 1 capsule by mouth 2 times daily as needed (Severe headache with nausea and vomiting.) 7/20/21   Preeti Connor MD   promethazine (PHENERGAN) 12.5 MG tablet Take 1 tablet by mouth 2 times daily as needed for Nausea (moderate to severe headache) 7/20/21   Preeti Connor MD   ondansetron (ZOFRAN) 4 MG tablet Take 1 tablet by mouth every 12 hours as needed for Nausea or Vomiting 7/20/21   Preeti Connor MD   acetaminophen (TYLENOL) 500 MG tablet Take 1 tablet by mouth 2 times daily as needed for Pain (severe headaches) 3/4/21   Preeti Connor MD   chlorhexidine (PERIDEX) 0.12 % solution Take 15 mLs by mouth 2 times daily  Patient not taking: Reported on 7/20/2021 2/28/20   Historical Provider, MD   butalbital-acetaminophen-caffeine (FIORICET, ESGIC) -40 MG per tablet Take 1 tablet by mouth every 4 hours as needed for Headaches  Patient not taking: Reported on 4/2/2020 2/25/20   Kaur Hollis MD   insulin lispro (HUMALOG) 100 UNIT/ML injection vial Inject 0-12 Units into the skin 3 times daily (with meals)  Patient not taking: Reported on 7/7/2020 2/25/20   Kaur Hollis MD   Magnesium Oxide (MAG-OXIDE) 200 MG TABS Take 1 tablet by mouth 2 times daily 2/25/20   Kaur Hollis MD   insulin detemir (LEVEMIR FLEXTOUCH) 100 UNIT/ML injection pen Inject 25 Units into the skin nightly  Patient not taking: Reported on 3/4/2021 2/25/20   Kaur Hollis MD   insulin aspart (NOVOLOG FLEXPEN) 100 UNIT/ML injection pen Inject 5 Units into the skin 3 times daily (before meals)  Patient not taking: Reported on 3/4/2021 2/25/20   Kaur Hollis MD   glucose monitoring kit (FREESTYLE) monitoring kit 1 kit by Does not apply route daily  Patient not taking: Reported on 7/20/2021 2/25/20   Kaur Hollis MD   blood glucose monitor strips Test 3 times a day & as needed for symptoms of irregular blood glucose.   Patient not taking: Reported on 7/20/2021 2/25/20   Kaur Hollis MD   Lancets MISC 1 each by Does not apply route 3 times daily  Patient not taking: Reported on 3/4/2021 2/25/20   Kaur Hollis MD   Alcohol Swabs (ALCOHOL PREP) 70 % PADS 1 pad TID  Patient not taking: Reported on 7/20/2021 2/25/20   Kaur Hollis MD   Insulin Pen Needle 30G X 8 MM MISC 1 each by Does not apply route daily  Patient not taking: Reported on 3/4/2021 2/25/20   Kaur Hollis MD   scopolamine (TRANSDERM-SCOP) transdermal patch Place 1 patch onto the skin every 72 hours  Patient not taking: Reported on 4/2/2020 1/29/20   Lorin Westbrook MD   levETIRAcetam (KEPPRA) 500 MG tablet Take 1 tablet by mouth 2 times daily for 7 days  Patient not taking: Reported on 4/2/2020 1/10/20 2/26/20  Collette Levo, MD   atorvastatin (LIPITOR) 40 MG tablet Take 1 tablet is soft. Neurological:      Mental Status: She is alert. DIFFERENTIAL  DIAGNOSIS     PLAN (LABS / IMAGING / EKG):  No orders of the defined types were placed in this encounter. MEDICATIONS ORDERED:  Orders Placed This Encounter   Medications    ibuprofen (ADVIL;MOTRIN) tablet 800 mg    benzocaine (LOLLICAINE) 20 % dental swab    amoxicillin-clavulanate (AUGMENTIN) 875-125 MG per tablet     Sig: Take 1 tablet by mouth 2 times daily for 10 days     Dispense:  20 tablet     Refill:  0    ibuprofen (ADVIL;MOTRIN) 800 MG tablet     Sig: Take 1 tablet by mouth every 8 hours as needed for Pain     Dispense:  60 tablet     Refill:  0    oxyCODONE-acetaminophen (PERCOCET) 5-325 MG per tablet     Sig: Take 1 tablet by mouth every 6 hours as needed for Pain for up to 3 days. Intended supply: 3 days. Take lowest dose possible to manage pain     Dispense:  9 tablet     Refill:  0       DDX: Periodontal disease, dental caries, dental abscess    DIAGNOSTIC RESULTS / EMERGENCY DEPARTMENT COURSE / MDM   LAB RESULTS:  No results found for this visit on 04/10/22. IMPRESSION: Stage 3/4 Periodontal Disease    CONSULTS:  None    FINAL IMPRESSION      1. Pain, dental    2. Dental caries    3. Dental abscess          DISPOSITION / PLAN     DISPOSITION Decision To Discharge 04/10/2022 08:32:19 AM    Patient was evaluated and treatment plan was discussed thoroughly with patient. Patient informed that there is advanced periodontal disease and she need to follow up with a dentist. Dentist appointment arranged by [facility] for tomorrow at 11:00am with transportation provided. In the ED Benzocaine dental swab was given in addition to Ibuprofen which provided some pain relief. Patient was discharged with Augmentin and informed to take the entire course of antibiotics. She was also provided with Ibuprofen and Percocet's.  She was informed that she should not combine her Ibuprofen and Naproxen, and should not be taking additional Tylenol with her Percocet's, as her Percocet's already contain Tylenol. PATIENT REFERRED TO:  No follow-up provider specified. DISCHARGE MEDICATIONS:  New Prescriptions    AMOXICILLIN-CLAVULANATE (AUGMENTIN) 875-125 MG PER TABLET    Take 1 tablet by mouth 2 times daily for 10 days    IBUPROFEN (ADVIL;MOTRIN) 800 MG TABLET    Take 1 tablet by mouth every 8 hours as needed for Pain    OXYCODONE-ACETAMINOPHEN (PERCOCET) 5-325 MG PER TABLET    Take 1 tablet by mouth every 6 hours as needed for Pain for up to 3 days. Intended supply: 3 days.  Take lowest dose possible to manage pain       Keyla Coleman MD  Emergency Medicine Resident    (Please note that portions of thisnote were completed with a voice recognition program.  Efforts were made to edit the dictations but occasionally words are mis-transcribed.)        Blease Blizzard, MD  Resident  04/10/22 [id number]

## 2022-04-10 NOTE — ED NOTES
Patient presents to ED c/o right lower dental pain for months now. Patient tried to go to dentist but they did not take her insurance.  Patient reports bad breath now secondary to the infection in her tooth     Ligia Bazan RN  04/10/22 [id number]

## 2022-06-01 ENCOUNTER — HOSPITAL ENCOUNTER (EMERGENCY)
Age: 27
Discharge: HOME | End: 2022-06-01
Attending: EMERGENCY MEDICINE
Payer: MEDICARE

## 2022-06-01 VITALS
HEART RATE: 77 BPM | TEMPERATURE: 98.4 F | RESPIRATION RATE: 16 BRPM | SYSTOLIC BLOOD PRESSURE: 143 MMHG | DIASTOLIC BLOOD PRESSURE: 84 MMHG | OXYGEN SATURATION: 99 %

## 2022-06-01 DIAGNOSIS — R73.9 HYPERGLYCEMIA: ICD-10-CM

## 2022-06-01 DIAGNOSIS — R51.9 NONINTRACTABLE EPISODIC HEADACHE, UNSPECIFIED HEADACHE TYPE: Primary | ICD-10-CM

## 2022-06-01 LAB
-: ABNORMAL
ABSOLUTE EOS #: 0.3 K/UL (ref 0–0.44)
ABSOLUTE IMMATURE GRANULOCYTE: 0.06 K/UL (ref 0–0.3)
ABSOLUTE LYMPH #: 4.19 K/UL (ref 1.1–3.7)
ABSOLUTE MONO #: 0.85 K/UL (ref 0.1–1.2)
ANION GAP SERPL CALCULATED.3IONS-SCNC: 14 MMOL/L (ref 9–17)
BASOPHILS # BLD: 1 % (ref 0–2)
BASOPHILS ABSOLUTE: 0.07 K/UL (ref 0–0.2)
BILIRUBIN URINE: NEGATIVE
BUN BLDV-MCNC: 20 MG/DL (ref 6–20)
CALCIUM SERPL-MCNC: 9.7 MG/DL (ref 8.6–10.4)
CHLORIDE BLD-SCNC: 91 MMOL/L (ref 98–107)
CO2: 26 MMOL/L (ref 20–31)
COLOR: YELLOW
CREAT SERPL-MCNC: 0.89 MG/DL (ref 0.5–0.9)
EOSINOPHILS RELATIVE PERCENT: 2 % (ref 1–4)
EPITHELIAL CELLS UA: ABNORMAL /HPF (ref 0–5)
GFR AFRICAN AMERICAN: >60 ML/MIN
GFR NON-AFRICAN AMERICAN: >60 ML/MIN
GFR SERPL CREATININE-BSD FRML MDRD: ABNORMAL ML/MIN/{1.73_M2}
GLUCOSE BLD-MCNC: 351 MG/DL (ref 65–105)
GLUCOSE BLD-MCNC: 477 MG/DL (ref 70–99)
GLUCOSE URINE: ABNORMAL
HCG(URINE) PREGNANCY TEST: NEGATIVE
HCT VFR BLD CALC: 35.6 % (ref 36.3–47.1)
HEMOGLOBIN: 12 G/DL (ref 11.9–15.1)
IMMATURE GRANULOCYTES: 0 %
KETONES, URINE: NEGATIVE
LEUKOCYTE ESTERASE, URINE: NEGATIVE
LYMPHOCYTES # BLD: 31 % (ref 24–43)
MAGNESIUM: 1.5 MG/DL (ref 1.6–2.6)
MCH RBC QN AUTO: 27.8 PG (ref 25.2–33.5)
MCHC RBC AUTO-ENTMCNC: 33.7 G/DL (ref 28.4–34.8)
MCV RBC AUTO: 82.4 FL (ref 82.6–102.9)
MONOCYTES # BLD: 6 % (ref 3–12)
NITRITE, URINE: NEGATIVE
NRBC AUTOMATED: 0 PER 100 WBC
PDW BLD-RTO: 12.4 % (ref 11.8–14.4)
PH UA: 6.5 (ref 5–8)
PLATELET # BLD: 196 K/UL (ref 138–453)
PMV BLD AUTO: 12.2 FL (ref 8.1–13.5)
POTASSIUM SERPL-SCNC: 3.5 MMOL/L (ref 3.7–5.3)
PROTEIN UA: NEGATIVE
RBC # BLD: 4.32 M/UL (ref 3.95–5.11)
RBC # BLD: ABNORMAL 10*6/UL
RBC UA: ABNORMAL /HPF (ref 0–4)
SARS-COV-2, RAPID: NOT DETECTED
SEG NEUTROPHILS: 60 % (ref 36–65)
SEGMENTED NEUTROPHILS ABSOLUTE COUNT: 7.92 K/UL (ref 1.5–8.1)
SODIUM BLD-SCNC: 131 MMOL/L (ref 135–144)
SPECIFIC GRAVITY UA: 1.03 (ref 1–1.03)
SPECIMEN DESCRIPTION: NORMAL
TURBIDITY: CLEAR
URINE HGB: NEGATIVE
UROBILINOGEN, URINE: NORMAL
WBC # BLD: 13.4 K/UL (ref 3.5–11.3)
WBC UA: ABNORMAL /HPF (ref 0–5)

## 2022-06-01 PROCEDURE — 82565 ASSAY OF CREATININE: CPT

## 2022-06-01 PROCEDURE — 99284 EMERGENCY DEPT VISIT MOD MDM: CPT

## 2022-06-01 PROCEDURE — 6360000002 HC RX W HCPCS: Performed by: STUDENT IN AN ORGANIZED HEALTH CARE EDUCATION/TRAINING PROGRAM

## 2022-06-01 PROCEDURE — 84520 ASSAY OF UREA NITROGEN: CPT

## 2022-06-01 PROCEDURE — 96375 TX/PRO/DX INJ NEW DRUG ADDON: CPT

## 2022-06-01 PROCEDURE — 6370000000 HC RX 637 (ALT 250 FOR IP): Performed by: STUDENT IN AN ORGANIZED HEALTH CARE EDUCATION/TRAINING PROGRAM

## 2022-06-01 PROCEDURE — 80051 ELECTROLYTE PANEL: CPT

## 2022-06-01 PROCEDURE — 82948 REAGENT STRIP/BLOOD GLUCOSE: CPT

## 2022-06-01 PROCEDURE — 81025 URINE PREGNANCY TEST: CPT

## 2022-06-01 PROCEDURE — 96374 THER/PROPH/DIAG INJ IV PUSH: CPT

## 2022-06-01 PROCEDURE — 83605 ASSAY OF LACTIC ACID: CPT

## 2022-06-01 PROCEDURE — 82803 BLOOD GASES ANY COMBINATION: CPT

## 2022-06-01 PROCEDURE — 81001 URINALYSIS AUTO W/SCOPE: CPT

## 2022-06-01 PROCEDURE — 96372 THER/PROPH/DIAG INJ SC/IM: CPT

## 2022-06-01 PROCEDURE — 85014 HEMATOCRIT: CPT

## 2022-06-01 PROCEDURE — 82330 ASSAY OF CALCIUM: CPT

## 2022-06-01 PROCEDURE — 87635 SARS-COV-2 COVID-19 AMP PRB: CPT

## 2022-06-01 PROCEDURE — 83735 ASSAY OF MAGNESIUM: CPT

## 2022-06-01 PROCEDURE — 85025 COMPLETE CBC W/AUTO DIFF WBC: CPT

## 2022-06-01 PROCEDURE — 82947 ASSAY GLUCOSE BLOOD QUANT: CPT

## 2022-06-01 PROCEDURE — 2580000003 HC RX 258: Performed by: STUDENT IN AN ORGANIZED HEALTH CARE EDUCATION/TRAINING PROGRAM

## 2022-06-01 PROCEDURE — 80048 BASIC METABOLIC PNL TOTAL CA: CPT

## 2022-06-01 RX ORDER — KETOROLAC TROMETHAMINE 30 MG/ML
30 INJECTION, SOLUTION INTRAMUSCULAR; INTRAVENOUS ONCE
Status: COMPLETED | OUTPATIENT
Start: 2022-06-01 | End: 2022-06-01

## 2022-06-01 RX ORDER — PROCHLORPERAZINE EDISYLATE 5 MG/ML
10 INJECTION INTRAMUSCULAR; INTRAVENOUS ONCE
Status: COMPLETED | OUTPATIENT
Start: 2022-06-01 | End: 2022-06-01

## 2022-06-01 RX ORDER — ONDANSETRON 2 MG/ML
4 INJECTION INTRAMUSCULAR; INTRAVENOUS ONCE
Status: COMPLETED | OUTPATIENT
Start: 2022-06-01 | End: 2022-06-01

## 2022-06-01 RX ORDER — SODIUM CHLORIDE, SODIUM LACTATE, POTASSIUM CHLORIDE, AND CALCIUM CHLORIDE .6; .31; .03; .02 G/100ML; G/100ML; G/100ML; G/100ML
1000 INJECTION, SOLUTION INTRAVENOUS ONCE
Status: COMPLETED | OUTPATIENT
Start: 2022-06-01 | End: 2022-06-01

## 2022-06-01 RX ORDER — DIPHENHYDRAMINE HYDROCHLORIDE 50 MG/ML
25 INJECTION INTRAMUSCULAR; INTRAVENOUS ONCE
Status: COMPLETED | OUTPATIENT
Start: 2022-06-01 | End: 2022-06-01

## 2022-06-01 RX ADMIN — ONDANSETRON 4 MG: 2 INJECTION INTRAMUSCULAR; INTRAVENOUS at 17:11

## 2022-06-01 RX ADMIN — POTASSIUM BICARBONATE 40 MEQ: 782 TABLET, EFFERVESCENT ORAL at 19:08

## 2022-06-01 RX ADMIN — DIPHENHYDRAMINE HYDROCHLORIDE 25 MG: 50 INJECTION, SOLUTION INTRAMUSCULAR; INTRAVENOUS at 17:10

## 2022-06-01 RX ADMIN — KETOROLAC TROMETHAMINE 30 MG: 30 INJECTION, SOLUTION INTRAMUSCULAR; INTRAVENOUS at 17:11

## 2022-06-01 RX ADMIN — SODIUM CHLORIDE, POTASSIUM CHLORIDE, SODIUM LACTATE AND CALCIUM CHLORIDE 1000 ML: 600; 310; 30; 20 INJECTION, SOLUTION INTRAVENOUS at 17:11

## 2022-06-01 RX ADMIN — Medication 5 UNITS: at 17:25

## 2022-06-01 RX ADMIN — PROCHLORPERAZINE EDISYLATE 10 MG: 5 INJECTION INTRAMUSCULAR; INTRAVENOUS at 17:11

## 2022-06-01 NOTE — ED TRIAGE NOTES
Pt states she has a hx of Gestational Diabetics and was seen at [facility] last week fir hyperglycemia and prescribed Insulin but never filled it.  Today has a ha and feels \"ill\"

## 2022-06-01 NOTE — ED PROVIDER NOTES
15.  Speech is fluent and comprehension is normal.  Repeat glucose is 351. Remainder patient's lab work is been reviewed and do not demonstrate clinically significant or other diagnostic findings. Impression: Diabetes mellitus poorly controlled. Plan: Patient will be discharged with instructions to fill her prescription and take her medications as directed. She is also advised to return emergency department should her symptoms worsen or progress otherwise to follow-up with her primary care provider.             Cristofer Gomez MD  06/01/22 Jonna Maldonado

## 2022-06-01 NOTE — ED PROVIDER NOTES
[address]  ED  Emergency Department Encounter  Emergency Medicine Resident     Pt Name: Tye Galaviz  MRN: [id number]  [place] 1995  Date of evaluation: 6/1/22  PCP:  Ana Ch       Chief Complaint   Patient presents with    Hyperglycemia    Headache       HISTORY OFPRESENT ILLNESS  (Location/Symptom, Timing/Onset, Context/Setting, Quality, Duration, Modifying Factors,Severity.)      Tye Galaviz is a 32 y.o. female who presents with chief complaint of headache for the past 4 days. Says it was gradual onset and she says she notices flashing lights in the peripheral vision on the right side. No abdominal pain, nausea or vomiting, neck pain/stiffness, fevers chills. Says she has a history of diabetes and should be on metformin but does not take it. Was recently admitted and discharged from [address] [place] [address] 17 facility, where she was placed on sliding scale insulin, but she never filled them. PAST MEDICAL / SURGICAL / SOCIAL / FAMILY HISTORY      has a past medical history of Cerebral vascular malformation, Diabetes mellitus ([facility] [address] Seizure ([facility] [address] and Traumatic hemorrhage of left cerebrum without loss of consciousness ([facility] [address] has a past surgical history that includes craniotomy (01/08/2020) and craniotomy (Left, 1/8/2020). Social:  reports that she has been smoking cigarettes. She has a 2.70 pack-year smoking history. She has never used smokeless tobacco. She reports current alcohol use. She reports current drug use. Drug: Marijuana Garon Kettle). Family Hx: No family history on file. Allergies:  Latex and Prozac [fluoxetine hcl]    Home Medications:  Prior to Admission medications    Medication Sig Start Date End Date Taking?  Authorizing Provider   ibuprofen (ADVIL;MOTRIN) 800 MG tablet Take 1 tablet by mouth every 8 hours as needed for Pain 4/10/22 4/30/22  Ace Woods MD   gabapentin (NEURONTIN) 300 MG capsule Take 2 capsules by mouth 3 times daily for 75 days. 1/31/22 4/16/22  Dar Ji MD   naproxen sodium (ANAPROX) 550 MG tablet Take 1 tablet by mouth 2 times daily as needed for Pain (Headache) 7/20/21   Hue Muñoz MD   diphenhydrAMINE (BENADRYL ALLERGY) 25 MG capsule Take 1 capsule by mouth 2 times daily as needed (Severe headache with nausea and vomiting.) 7/20/21   Hue Muñoz MD   promethazine (PHENERGAN) 12.5 MG tablet Take 1 tablet by mouth 2 times daily as needed for Nausea (moderate to severe headache) 7/20/21   Hue Muñoz MD   ondansetron (ZOFRAN) 4 MG tablet Take 1 tablet by mouth every 12 hours as needed for Nausea or Vomiting 7/20/21   Hue Muñoz MD   acetaminophen (TYLENOL) 500 MG tablet Take 1 tablet by mouth 2 times daily as needed for Pain (severe headaches) 3/4/21   Hue Muñzo MD   chlorhexidine (PERIDEX) 0.12 % solution Take 15 mLs by mouth 2 times daily  Patient not taking: Reported on 7/20/2021 2/28/20   Historical Provider, MD   butalbital-acetaminophen-caffeine (FIORICET, ESGIC) -40 MG per tablet Take 1 tablet by mouth every 4 hours as needed for Headaches  Patient not taking: Reported on 4/2/2020 2/25/20   Deepa Murillo MD   insulin lispro (HUMALOG) 100 UNIT/ML injection vial Inject 0-12 Units into the skin 3 times daily (with meals)  Patient not taking: Reported on 7/7/2020 2/25/20   Deepa Murillo MD   Magnesium Oxide (MAG-OXIDE) 200 MG TABS Take 1 tablet by mouth 2 times daily 2/25/20   Deepa Murillo MD   insulin detemir (LEVEMIR FLEXTOUCH) 100 UNIT/ML injection pen Inject 25 Units into the skin nightly  Patient not taking: Reported on 3/4/2021 2/25/20   Deepa Murillo MD   insulin aspart (NOVOLOG FLEXPEN) 100 UNIT/ML injection pen Inject 5 Units into the skin 3 times daily (before meals)  Patient not taking: Reported on 3/4/2021 2/25/20   Deepa Murillo MD   glucose monitoring kit (FREESTYLE) monitoring kit 1 kit by Does not apply route daily  Patient not taking: Reported on 7/20/2021 2/25/20   Luisito Rios Sailaja Worthy MD   blood glucose monitor strips Test 3 times a day & as needed for symptoms of irregular blood glucose. Patient not taking: Reported on 7/20/2021 2/25/20   Ivonne Lopez MD   Lancets MISC 1 each by Does not apply route 3 times daily  Patient not taking: Reported on 3/4/2021 2/25/20   Ivonne Lopez MD   Alcohol Swabs (ALCOHOL PREP) 70 % PADS 1 pad TID  Patient not taking: Reported on 7/20/2021 2/25/20   Ivonne Lopez MD   Insulin Pen Needle 30G X 8 MM MISC 1 each by Does not apply route daily  Patient not taking: Reported on 3/4/2021 2/25/20   Ivonne Lopez MD   scopolamine (TRANSDERM-SCOP) transdermal patch Place 1 patch onto the skin every 72 hours  Patient not taking: Reported on 4/2/2020 1/29/20   Fermin Cartagena MD   levETIRAcetam (KEPPRA) 500 MG tablet Take 1 tablet by mouth 2 times daily for 7 days  Patient not taking: Reported on 4/2/2020 1/10/20 2/26/20  Yesenia Hollins MD   atorvastatin (LIPITOR) 40 MG tablet Take 1 tablet by mouth nightly  Patient not taking: Reported on 4/2/2020 1/10/20   Yesenia Hollins MD   busPIRone (BUSPAR) 10 MG tablet Take 10 mg by mouth 3 times daily  Patient not taking: Reported on 1/31/2022    Historical Provider, MD   lamoTRIgine (LAMICTAL) 25 MG tablet Take 25 mg by mouth daily  Patient not taking: Reported on 1/31/2022    Historical Provider, MD   docusate sodium (COLACE) 100 MG capsule Take 100 mg by mouth 2 times daily as needed for Constipation     Historical Provider, MD   etonogestrel (NEXPLANON) 68 MG implant 68 mg by Subdermal route once    Historical Provider, MD       REVIEW OFSYSTEMS    (2-9 systems for level 4, 10 or more for level 5)      Review of Systems   Constitutional: Negative for appetite change, chills, fatigue and fever. HENT: Negative for congestion, rhinorrhea, sneezing and sore throat. Eyes: Negative for visual disturbance. Respiratory: Negative for cough and shortness of breath.     Cardiovascular: Negative for chest pain and leg swelling. Gastrointestinal: Negative for abdominal pain, diarrhea, nausea and vomiting. Genitourinary: Negative for dysuria. Musculoskeletal: Negative for myalgias, neck pain and neck stiffness. Skin: Negative for rash and wound. Neurological: Positive for headaches. Negative for dizziness, syncope and light-headedness. Psychiatric/Behavioral: Negative for dysphoric mood and suicidal ideas. PHYSICAL EXAM   (up to 7 for level 4, 8 or more forlevel 5)      INITIAL VITALS:   Vitals:    06/01/22 1634   BP: (!) 143/84   Pulse: 77   Resp: 16   Temp: 98.4 °F (36.9 °C)   SpO2: 99%        Physical Exam  Vitals and nursing note reviewed. Constitutional:       General: She is not in acute distress. Appearance: Normal appearance. She is not ill-appearing or diaphoretic. HENT:      Head: Normocephalic. Nose: Nose normal.      Mouth/Throat:      Mouth: Mucous membranes are moist.      Pharynx: Oropharynx is clear. Eyes:      Extraocular Movements: Extraocular movements intact. Conjunctiva/sclera: Conjunctivae normal.      Pupils: Pupils are equal, round, and reactive to light. Cardiovascular:      Rate and Rhythm: Normal rate and regular rhythm. Pulses: Normal pulses. Heart sounds: Normal heart sounds. Pulmonary:      Effort: Pulmonary effort is normal. No respiratory distress. Breath sounds: Normal breath sounds. No wheezing or rales. Chest:      Chest wall: No tenderness. Abdominal:      General: There is no distension. Palpations: Abdomen is soft. Tenderness: There is no abdominal tenderness. There is no guarding or rebound. Musculoskeletal:         General: Normal range of motion. Cervical back: Normal range of motion and neck supple. No rigidity. Skin:     General: Skin is warm. Capillary Refill: Capillary refill takes less than 2 seconds. Neurological:      General: No focal deficit present.       Mental Status: She is alert and oriented to person, place, and time. Cranial Nerves: No cranial nerve deficit. Sensory: No sensory deficit. Motor: No weakness. Gait: Gait normal.   Psychiatric:         Mood and Affect: Mood normal.         Behavior: Behavior normal.         DIFFERENTIAL  DIAGNOSIS     Initial MDM/Plan: 32 y.o. female who presents with hyperglycemia and headache, noncompliant with medications. Gradual onset, no thunderclap quality. No neurological complaints. VSS. PE negative for meningismus, no neuro deficits. Abd soft, nondistended nontender. Lungs CTAB. Suspect HA 2/2 hyperglycemia 2/2 noncompliance. Will adminster migraine cocktail, IV fluids, subQ regular insulin.       DIAGNOSTIC RESULTS / EMERGENCYDEPARTMENT COURSE / MDM     LABS:  Labs Reviewed   CBC WITH AUTO DIFFERENTIAL - Abnormal; Notable for the following components:       Result Value    WBC 13.4 (*)     Hematocrit 35.6 (*)     MCV 82.4 (*)     Absolute Lymph # 4.19 (*)     All other components within normal limits   BASIC METABOLIC PANEL - Abnormal; Notable for the following components:    Glucose 477 (*)     Sodium 131 (*)     Potassium 3.5 (*)     Chloride 91 (*)     All other components within normal limits   MAGNESIUM - Abnormal; Notable for the following components:    Magnesium 1.5 (*)     All other components within normal limits   URINALYSIS WITH MICROSCOPIC - Abnormal; Notable for the following components:    Glucose, Ur 3+ (*)     Specific Gravity, UA 1.031 (*)     All other components within normal limits   ELECTROLYTES PLUS - Abnormal; Notable for the following components:    POC Sodium 136 (*)     POC Chloride 97 (*)     All other components within normal limits   POC GLUCOSE FINGERSTICK - Abnormal; Notable for the following components:    POC Glucose 351 (*)     All other components within normal limits   VENOUS BLOOD GAS, POINT OF CARE - Abnormal; Notable for the following components:    pH, Sarbjit 7.453 (*)     pCO2, Sarbjit 40.9 (*) Positive Base Excess, Sarbjit 4 (*)     O2 Sat, Sarbjit 86 (*)     All other components within normal limits   LACTIC ACID,POINT OF CARE - Abnormal; Notable for the following components:    POC Lactic Acid 1.99 (*)     All other components within normal limits   POCT GLUCOSE - Abnormal; Notable for the following components:    POC Glucose 489 (*)     All other components within normal limits   COVID-19, RAPID   PREGNANCY, URINE   HGB/HCT   CALCIUM, IONIC (POC)   CREATININE W/GFR POINT OF CARE   UREA N (POC)   POCT GLUCOSE   POCT GLUCOSE       RADIOLOGY:  No results found. EMERGENCY DEPARTMENT COURSE:  ED Course as of 06/06/22 1538   Wed Jun 01, 2022   1743 Sodium(!): 131  Corrected for hyperglycemia- 137 [JT]   1814 Care assumed from dr. Cordero Age received migraine cocktail and insulin repeat poc glucose and followup covid [BG]   1856 POCT Glucose [MO]      ED Course User Index  [BG] Alexandre Molina DO  [JT] Heidi Damian MD  [MO] Stephanie Edwards MD        PROCEDURES:  None    CONSULTS:  None      FINAL IMPRESSION      1. Nonintractable episodic headache, unspecified headache type    2. Hyperglycemia          DISPOSITION / PLAN     DISPOSITION Decision To Discharge 06/01/2022 06:53:45 PM      PATIENT REFERRED TO:  Candace Valle  [address] [facility] [address] [phone]    Call today  For follow-up and reevaluation 1 to 2 days.     [facility]  [address] [place] [address]  [phone]  Go to   If symptoms worsen, As needed      DISCHARGE MEDICATIONS:  Discharge Medication List as of 6/1/2022  6:54 PM          Heidi Damian MD  Emergency Medicine Resident    (Please note that portions of this note were completed with a voice recognition program.Efforts were made to edit the dictations but occasionally words are mis-transcribed.)        Heidi Damian MD  Resident  06/06/22 8401

## 2022-06-01 NOTE — ED NOTES
Pt sleeping and states she has been awake for days and just wants to rest for now. Will attempt PO meds before dispo.       Meka Matamoros, RAMON  06/01/22 7584

## 2022-06-02 LAB
ANION GAP: 11 MMOL/L (ref 7–16)
GFR SERPL CREATININE-BSD FRML MDRD: NORMAL ML/MIN/{1.73_M2}
GLUCOSE BLD-MCNC: 489 MG/DL (ref 74–100)
HCO3 VENOUS: 28.6 MMOL/L (ref 22–29)
O2 SAT, VEN: 86 % (ref 60–85)
PCO2, VEN: 40.9 MM HG (ref 41–51)
PH VENOUS: 7.45 (ref 7.32–7.43)
PO2, VEN: 49.7 MM HG (ref 30–50)
POC BUN: 21 MG/DL (ref 8–26)
POC CHLORIDE: 97 MMOL/L (ref 98–107)
POC CREATININE: 0.89 MG/DL (ref 0.51–1.19)
POC HEMATOCRIT: 39 % (ref 36–46)
POC HEMOGLOBIN: 13.4 G/DL (ref 12–16)
POC IONIZED CALCIUM: 1.21 MMOL/L (ref 1.15–1.33)
POC LACTIC ACID: 1.99 MMOL/L (ref 0.56–1.39)
POC POTASSIUM: 3.6 MMOL/L (ref 3.5–4.5)
POC SODIUM: 136 MMOL/L (ref 138–146)
POC TCO2: 29 MMOL/L (ref 22–30)
POSITIVE BASE EXCESS, VEN: 4 (ref 0–3)

## 2022-06-06 ASSESSMENT — ENCOUNTER SYMPTOMS
VOMITING: 0
DIARRHEA: 0
ABDOMINAL PAIN: 0
COUGH: 0
SHORTNESS OF BREATH: 0
SORE THROAT: 0
RHINORRHEA: 0
NAUSEA: 0

## 2022-06-21 ENCOUNTER — APPOINTMENT (OUTPATIENT)
Dept: GENERAL RADIOLOGY | Age: 27
DRG: 420 | End: 2022-06-21
Payer: MEDICARE

## 2022-06-21 ENCOUNTER — HOSPITAL ENCOUNTER (INPATIENT)
Age: 27
LOS: 2 days | Discharge: HOME | DRG: 420 | End: 2022-06-25
Attending: EMERGENCY MEDICINE | Admitting: INTERNAL MEDICINE
Payer: MEDICARE

## 2022-06-21 DIAGNOSIS — E83.42 HYPOMAGNESEMIA: ICD-10-CM

## 2022-06-21 DIAGNOSIS — G44.049 CHRONIC PAROXYSMAL HEMICRANIA, NOT INTRACTABLE: ICD-10-CM

## 2022-06-21 DIAGNOSIS — R73.9 HYPERGLYCEMIA: Primary | ICD-10-CM

## 2022-06-21 DIAGNOSIS — Z91.14 NONCOMPLIANCE WITH MEDICATION REGIMEN: ICD-10-CM

## 2022-06-21 DIAGNOSIS — E87.6 HYPOKALEMIA: ICD-10-CM

## 2022-06-21 DIAGNOSIS — I63.9 ACUTE CVA (CEREBROVASCULAR ACCIDENT) (HCC): ICD-10-CM

## 2022-06-21 LAB
-: ABNORMAL
ABSOLUTE EOS #: 0.27 K/UL (ref 0–0.44)
ABSOLUTE IMMATURE GRANULOCYTE: 0.04 K/UL (ref 0–0.3)
ABSOLUTE LYMPH #: 3.19 K/UL (ref 1.1–3.7)
ABSOLUTE MONO #: 0.63 K/UL (ref 0.1–1.2)
ALBUMIN SERPL-MCNC: 4.2 G/DL (ref 3.5–5.2)
ALBUMIN/GLOBULIN RATIO: 1.5 (ref 1–2.5)
ALP BLD-CCNC: 138 U/L (ref 35–104)
ALT SERPL-CCNC: 17 U/L (ref 5–33)
ANION GAP SERPL CALCULATED.3IONS-SCNC: 14 MMOL/L (ref 9–17)
ANION GAP SERPL CALCULATED.3IONS-SCNC: 18 MMOL/L (ref 9–17)
ANION GAP: 15 MMOL/L (ref 7–16)
AST SERPL-CCNC: 21 U/L
BACTERIA: ABNORMAL
BASOPHILS # BLD: 0 % (ref 0–2)
BASOPHILS ABSOLUTE: 0.04 K/UL (ref 0–0.2)
BETA-HYDROXYBUTYRATE: 0.33 MMOL/L (ref 0.02–0.27)
BILIRUB SERPL-MCNC: 0.5 MG/DL (ref 0.3–1.2)
BILIRUBIN URINE: NEGATIVE
BUN BLDV-MCNC: 12 MG/DL (ref 6–20)
BUN BLDV-MCNC: 14 MG/DL (ref 6–20)
CALCIUM SERPL-MCNC: 9.2 MG/DL (ref 8.6–10.4)
CALCIUM SERPL-MCNC: 9.5 MG/DL (ref 8.6–10.4)
CHLORIDE BLD-SCNC: 100 MMOL/L (ref 98–107)
CHLORIDE BLD-SCNC: 95 MMOL/L (ref 98–107)
CHP ED QC CHECK: YES
CO2: 21 MMOL/L (ref 20–31)
CO2: 27 MMOL/L (ref 20–31)
COLOR: YELLOW
CREAT SERPL-MCNC: 0.89 MG/DL (ref 0.5–0.9)
CREAT SERPL-MCNC: 0.89 MG/DL (ref 0.5–0.9)
EOSINOPHILS RELATIVE PERCENT: 2 % (ref 1–4)
EPITHELIAL CELLS UA: ABNORMAL /HPF (ref 0–5)
GFR AFRICAN AMERICAN: >60 ML/MIN
GFR AFRICAN AMERICAN: >60 ML/MIN
GFR NON-AFRICAN AMERICAN: >60 ML/MIN
GFR SERPL CREATININE-BSD FRML MDRD: >60 ML/MIN
GFR SERPL CREATININE-BSD FRML MDRD: ABNORMAL ML/MIN/{1.73_M2}
GFR SERPL CREATININE-BSD FRML MDRD: ABNORMAL ML/MIN/{1.73_M2}
GFR SERPL CREATININE-BSD FRML MDRD: NORMAL ML/MIN/{1.73_M2}
GLUCOSE BLD-MCNC: 155 MG/DL (ref 65–105)
GLUCOSE BLD-MCNC: 160 MG/DL (ref 70–99)
GLUCOSE BLD-MCNC: 327 MG/DL (ref 65–105)
GLUCOSE BLD-MCNC: 328 MG/DL (ref 65–105)
GLUCOSE BLD-MCNC: 433 MG/DL (ref 70–99)
GLUCOSE BLD-MCNC: 438 MG/DL
GLUCOSE URINE: 4
HCG QUALITATIVE: NEGATIVE
HCO3 VENOUS: 23.9 MMOL/L (ref 22–29)
HCT VFR BLD CALC: 41.9 % (ref 36.3–47.1)
HEMOGLOBIN: 13.8 G/DL (ref 11.9–15.1)
IMMATURE GRANULOCYTES: 0 %
KETONES, URINE: NEGATIVE
LACTIC ACID, WHOLE BLOOD: 2.2 MMOL/L (ref 0.7–2.1)
LACTIC ACID, WHOLE BLOOD: 2.2 MMOL/L (ref 0.7–2.1)
LAMOTRIGINE LEVEL: <1 UG/ML (ref 3–15)
LEUKOCYTE ESTERASE, URINE: NEGATIVE
LIPASE: 33 U/L (ref 13–60)
LYMPHOCYTES # BLD: 29 % (ref 24–43)
MAGNESIUM: 1 MG/DL (ref 1.6–2.6)
MAGNESIUM: 1.9 MG/DL (ref 1.6–2.6)
MCH RBC QN AUTO: 28.4 PG (ref 25.2–33.5)
MCHC RBC AUTO-ENTMCNC: 32.9 G/DL (ref 28.4–34.8)
MCV RBC AUTO: 86.2 FL (ref 82.6–102.9)
MONOCYTES # BLD: 6 % (ref 3–12)
NITRITE, URINE: NEGATIVE
NRBC AUTOMATED: 0 PER 100 WBC
O2 SAT, VEN: 87 % (ref 60–85)
PCO2, VEN: 33.1 MM HG (ref 41–51)
PDW BLD-RTO: 13.2 % (ref 11.8–14.4)
PH UA: 6 (ref 5–8)
PH VENOUS: 7.47 (ref 7.32–7.43)
PLATELET # BLD: 203 K/UL (ref 138–453)
PMV BLD AUTO: 12.4 FL (ref 8.1–13.5)
PO2, VEN: 48.7 MM HG (ref 30–50)
POC BUN: 14 MG/DL (ref 8–26)
POC CHLORIDE: 100 MMOL/L (ref 98–107)
POC CREATININE: 0.88 MG/DL (ref 0.51–1.19)
POC HEMATOCRIT: 47 % (ref 36–46)
POC HEMOGLOBIN: 15.8 G/DL (ref 12–16)
POC IONIZED CALCIUM: 1.1 MMOL/L (ref 1.15–1.33)
POC LACTIC ACID: 1.94 MMOL/L (ref 0.56–1.39)
POC POTASSIUM: 3.1 MMOL/L (ref 3.5–4.5)
POC SODIUM: 138 MMOL/L (ref 138–146)
POC TCO2: 24 MMOL/L (ref 22–30)
POSITIVE BASE EXCESS, VEN: 1 (ref 0–3)
POTASSIUM SERPL-SCNC: 3.3 MMOL/L (ref 3.7–5.3)
POTASSIUM SERPL-SCNC: 3.7 MMOL/L (ref 3.7–5.3)
PROTEIN UA: NEGATIVE
RBC # BLD: 4.86 M/UL (ref 3.95–5.11)
RBC UA: ABNORMAL /HPF (ref 0–2)
SARS-COV-2, RAPID: NOT DETECTED
SEG NEUTROPHILS: 63 % (ref 36–65)
SEGMENTED NEUTROPHILS ABSOLUTE COUNT: 6.89 K/UL (ref 1.5–8.1)
SODIUM BLD-SCNC: 134 MMOL/L (ref 135–144)
SODIUM BLD-SCNC: 141 MMOL/L (ref 135–144)
SPECIFIC GRAVITY UA: 1.01 (ref 1–1.03)
SPECIMEN DESCRIPTION: NORMAL
TOTAL PROTEIN: 7 G/DL (ref 6.4–8.3)
TROPONIN, HIGH SENSITIVITY: 13 NG/L (ref 0–14)
TROPONIN, HIGH SENSITIVITY: 8 NG/L (ref 0–14)
TSH SERPL DL<=0.05 MIU/L-ACNC: 1.52 UIU/ML (ref 0.3–5)
TURBIDITY: CLEAR
URINE HGB: ABNORMAL
UROBILINOGEN, URINE: NORMAL
WBC # BLD: 11.1 K/UL (ref 3.5–11.3)
WBC UA: ABNORMAL /HPF (ref 0–5)

## 2022-06-21 PROCEDURE — 82565 ASSAY OF CREATININE: CPT

## 2022-06-21 PROCEDURE — 96361 HYDRATE IV INFUSION ADD-ON: CPT

## 2022-06-21 PROCEDURE — 83690 ASSAY OF LIPASE: CPT

## 2022-06-21 PROCEDURE — 96365 THER/PROPH/DIAG IV INF INIT: CPT

## 2022-06-21 PROCEDURE — G0378 HOSPITAL OBSERVATION PER HR: HCPCS

## 2022-06-21 PROCEDURE — 87635 SARS-COV-2 COVID-19 AMP PRB: CPT

## 2022-06-21 PROCEDURE — 80051 ELECTROLYTE PANEL: CPT

## 2022-06-21 PROCEDURE — 82330 ASSAY OF CALCIUM: CPT

## 2022-06-21 PROCEDURE — 6370000000 HC RX 637 (ALT 250 FOR IP): Performed by: STUDENT IN AN ORGANIZED HEALTH CARE EDUCATION/TRAINING PROGRAM

## 2022-06-21 PROCEDURE — 80048 BASIC METABOLIC PNL TOTAL CA: CPT

## 2022-06-21 PROCEDURE — 96372 THER/PROPH/DIAG INJ SC/IM: CPT

## 2022-06-21 PROCEDURE — 84443 ASSAY THYROID STIM HORMONE: CPT

## 2022-06-21 PROCEDURE — 80175 DRUG SCREEN QUAN LAMOTRIGINE: CPT

## 2022-06-21 PROCEDURE — 84703 CHORIONIC GONADOTROPIN ASSAY: CPT

## 2022-06-21 PROCEDURE — 82803 BLOOD GASES ANY COMBINATION: CPT

## 2022-06-21 PROCEDURE — 82947 ASSAY GLUCOSE BLOOD QUANT: CPT

## 2022-06-21 PROCEDURE — 71045 X-RAY EXAM CHEST 1 VIEW: CPT

## 2022-06-21 PROCEDURE — 82010 KETONE BODYS QUAN: CPT

## 2022-06-21 PROCEDURE — 96366 THER/PROPH/DIAG IV INF ADDON: CPT

## 2022-06-21 PROCEDURE — 81001 URINALYSIS AUTO W/SCOPE: CPT

## 2022-06-21 PROCEDURE — 6360000002 HC RX W HCPCS: Performed by: STUDENT IN AN ORGANIZED HEALTH CARE EDUCATION/TRAINING PROGRAM

## 2022-06-21 PROCEDURE — 96375 TX/PRO/DX INJ NEW DRUG ADDON: CPT

## 2022-06-21 PROCEDURE — 80053 COMPREHEN METABOLIC PANEL: CPT

## 2022-06-21 PROCEDURE — 93005 ELECTROCARDIOGRAM TRACING: CPT | Performed by: STUDENT IN AN ORGANIZED HEALTH CARE EDUCATION/TRAINING PROGRAM

## 2022-06-21 PROCEDURE — 85025 COMPLETE CBC W/AUTO DIFF WBC: CPT

## 2022-06-21 PROCEDURE — 83605 ASSAY OF LACTIC ACID: CPT

## 2022-06-21 PROCEDURE — G0108 DIAB MANAGE TRN  PER INDIV: HCPCS

## 2022-06-21 PROCEDURE — 83735 ASSAY OF MAGNESIUM: CPT

## 2022-06-21 PROCEDURE — 84484 ASSAY OF TROPONIN QUANT: CPT

## 2022-06-21 PROCEDURE — 2500000003 HC RX 250 WO HCPCS: Performed by: STUDENT IN AN ORGANIZED HEALTH CARE EDUCATION/TRAINING PROGRAM

## 2022-06-21 PROCEDURE — 2580000003 HC RX 258: Performed by: STUDENT IN AN ORGANIZED HEALTH CARE EDUCATION/TRAINING PROGRAM

## 2022-06-21 PROCEDURE — 84520 ASSAY OF UREA NITROGEN: CPT

## 2022-06-21 PROCEDURE — 99285 EMERGENCY DEPT VISIT HI MDM: CPT

## 2022-06-21 PROCEDURE — 85014 HEMATOCRIT: CPT

## 2022-06-21 RX ORDER — SCOLOPAMINE TRANSDERMAL SYSTEM 1 MG/1
1 PATCH, EXTENDED RELEASE TRANSDERMAL
Status: DISCONTINUED | OUTPATIENT
Start: 2022-06-21 | End: 2022-06-25 | Stop reason: HOSPADM

## 2022-06-21 RX ORDER — PROMETHAZINE HYDROCHLORIDE 12.5 MG/1
12.5 TABLET ORAL 2 TIMES DAILY PRN
Status: DISCONTINUED | OUTPATIENT
Start: 2022-06-21 | End: 2022-06-25 | Stop reason: HOSPADM

## 2022-06-21 RX ORDER — SODIUM CHLORIDE 0.9 % (FLUSH) 0.9 %
5-40 SYRINGE (ML) INJECTION EVERY 12 HOURS SCHEDULED
Status: DISCONTINUED | OUTPATIENT
Start: 2022-06-21 | End: 2022-06-25 | Stop reason: HOSPADM

## 2022-06-21 RX ORDER — SODIUM CHLORIDE 9 MG/ML
INJECTION, SOLUTION INTRAVENOUS PRN
Status: DISCONTINUED | OUTPATIENT
Start: 2022-06-21 | End: 2022-06-25 | Stop reason: HOSPADM

## 2022-06-21 RX ORDER — ACETAMINOPHEN 500 MG
500 TABLET ORAL EVERY 8 HOURS PRN
Status: DISCONTINUED | OUTPATIENT
Start: 2022-06-21 | End: 2022-06-21

## 2022-06-21 RX ORDER — MAGNESIUM SULFATE IN WATER 40 MG/ML
2000 INJECTION, SOLUTION INTRAVENOUS ONCE
Status: COMPLETED | OUTPATIENT
Start: 2022-06-21 | End: 2022-06-21

## 2022-06-21 RX ORDER — FAMOTIDINE 10 MG/ML
20 INJECTION, SOLUTION INTRAVENOUS ONCE
Status: COMPLETED | OUTPATIENT
Start: 2022-06-21 | End: 2022-06-21

## 2022-06-21 RX ORDER — DIPHENHYDRAMINE HYDROCHLORIDE 50 MG/ML
25 INJECTION INTRAMUSCULAR; INTRAVENOUS ONCE
Status: COMPLETED | OUTPATIENT
Start: 2022-06-21 | End: 2022-06-21

## 2022-06-21 RX ORDER — ASPIRIN 81 MG/1
324 TABLET, CHEWABLE ORAL ONCE
Status: COMPLETED | OUTPATIENT
Start: 2022-06-21 | End: 2022-06-21

## 2022-06-21 RX ORDER — INSULIN LISPRO 100 [IU]/ML
0-6 INJECTION, SOLUTION INTRAVENOUS; SUBCUTANEOUS NIGHTLY
Status: DISCONTINUED | OUTPATIENT
Start: 2022-06-21 | End: 2022-06-25 | Stop reason: HOSPADM

## 2022-06-21 RX ORDER — ATORVASTATIN CALCIUM 40 MG/1
40 TABLET, FILM COATED ORAL NIGHTLY
Status: DISCONTINUED | OUTPATIENT
Start: 2022-06-21 | End: 2022-06-25 | Stop reason: HOSPADM

## 2022-06-21 RX ORDER — LEVETIRACETAM 500 MG/1
500 TABLET ORAL 2 TIMES DAILY
Status: DISCONTINUED | OUTPATIENT
Start: 2022-06-21 | End: 2022-06-25 | Stop reason: HOSPADM

## 2022-06-21 RX ORDER — OXYCODONE HYDROCHLORIDE 5 MG/1
10 TABLET ORAL EVERY 4 HOURS PRN
Status: DISCONTINUED | OUTPATIENT
Start: 2022-06-21 | End: 2022-06-25

## 2022-06-21 RX ORDER — GABAPENTIN 300 MG/1
600 CAPSULE ORAL 3 TIMES DAILY
Status: DISCONTINUED | OUTPATIENT
Start: 2022-06-21 | End: 2022-06-25 | Stop reason: HOSPADM

## 2022-06-21 RX ORDER — INSULIN GLARGINE 100 [IU]/ML
25 INJECTION, SOLUTION SUBCUTANEOUS NIGHTLY
Status: DISCONTINUED | OUTPATIENT
Start: 2022-06-21 | End: 2022-06-24

## 2022-06-21 RX ORDER — CHLORHEXIDINE GLUCONATE 0.12 MG/ML
15 RINSE ORAL 2 TIMES DAILY
Status: DISCONTINUED | OUTPATIENT
Start: 2022-06-21 | End: 2022-06-25 | Stop reason: HOSPADM

## 2022-06-21 RX ORDER — ONDANSETRON 2 MG/ML
4 INJECTION INTRAMUSCULAR; INTRAVENOUS ONCE
Status: COMPLETED | OUTPATIENT
Start: 2022-06-21 | End: 2022-06-21

## 2022-06-21 RX ORDER — FENTANYL CITRATE 50 UG/ML
25 INJECTION, SOLUTION INTRAMUSCULAR; INTRAVENOUS
Status: DISCONTINUED | OUTPATIENT
Start: 2022-06-21 | End: 2022-06-25 | Stop reason: HOSPADM

## 2022-06-21 RX ORDER — ACETAMINOPHEN 500 MG
1000 TABLET ORAL ONCE
Status: COMPLETED | OUTPATIENT
Start: 2022-06-21 | End: 2022-06-21

## 2022-06-21 RX ORDER — ACETAMINOPHEN 325 MG/1
650 TABLET ORAL EVERY 4 HOURS PRN
Status: DISCONTINUED | OUTPATIENT
Start: 2022-06-21 | End: 2022-06-25 | Stop reason: HOSPADM

## 2022-06-21 RX ORDER — LANOLIN ALCOHOL/MO/W.PET/CERES
800 CREAM (GRAM) TOPICAL
Status: COMPLETED | OUTPATIENT
Start: 2022-06-21 | End: 2022-06-21

## 2022-06-21 RX ORDER — SODIUM CHLORIDE 0.9 % (FLUSH) 0.9 %
5-40 SYRINGE (ML) INJECTION PRN
Status: DISCONTINUED | OUTPATIENT
Start: 2022-06-21 | End: 2022-06-25 | Stop reason: HOSPADM

## 2022-06-21 RX ORDER — DIPHENHYDRAMINE HCL 25 MG
25 TABLET ORAL 2 TIMES DAILY PRN
Status: DISCONTINUED | OUTPATIENT
Start: 2022-06-21 | End: 2022-06-25 | Stop reason: HOSPADM

## 2022-06-21 RX ORDER — ONDANSETRON 4 MG/1
4 TABLET, FILM COATED ORAL EVERY 12 HOURS PRN
Status: DISCONTINUED | OUTPATIENT
Start: 2022-06-21 | End: 2022-06-25 | Stop reason: HOSPADM

## 2022-06-21 RX ORDER — POTASSIUM CHLORIDE 20 MEQ/1
40 TABLET, EXTENDED RELEASE ORAL PRN
Status: DISCONTINUED | OUTPATIENT
Start: 2022-06-21 | End: 2022-06-25 | Stop reason: HOSPADM

## 2022-06-21 RX ORDER — BUTALBITAL, ACETAMINOPHEN AND CAFFEINE 50; 325; 40 MG/1; MG/1; MG/1
1 TABLET ORAL EVERY 4 HOURS PRN
Status: DISCONTINUED | OUTPATIENT
Start: 2022-06-21 | End: 2022-06-25 | Stop reason: HOSPADM

## 2022-06-21 RX ORDER — DOCUSATE SODIUM 100 MG/1
100 CAPSULE, LIQUID FILLED ORAL 2 TIMES DAILY PRN
Status: DISCONTINUED | OUTPATIENT
Start: 2022-06-21 | End: 2022-06-25 | Stop reason: HOSPADM

## 2022-06-21 RX ORDER — FENTANYL CITRATE 50 UG/ML
50 INJECTION, SOLUTION INTRAMUSCULAR; INTRAVENOUS
Status: DISCONTINUED | OUTPATIENT
Start: 2022-06-21 | End: 2022-06-25 | Stop reason: HOSPADM

## 2022-06-21 RX ORDER — LAMOTRIGINE 25 MG/1
25 TABLET ORAL DAILY
Status: DISCONTINUED | OUTPATIENT
Start: 2022-06-21 | End: 2022-06-23

## 2022-06-21 RX ORDER — POTASSIUM CHLORIDE 7.45 MG/ML
10 INJECTION INTRAVENOUS PRN
Status: DISCONTINUED | OUTPATIENT
Start: 2022-06-21 | End: 2022-06-25 | Stop reason: HOSPADM

## 2022-06-21 RX ORDER — LANOLIN ALCOHOL/MO/W.PET/CERES
400 CREAM (GRAM) TOPICAL 2 TIMES DAILY
Status: DISCONTINUED | OUTPATIENT
Start: 2022-06-21 | End: 2022-06-25 | Stop reason: HOSPADM

## 2022-06-21 RX ORDER — BUSPIRONE HYDROCHLORIDE 10 MG/1
10 TABLET ORAL 3 TIMES DAILY
Status: DISCONTINUED | OUTPATIENT
Start: 2022-06-21 | End: 2022-06-23

## 2022-06-21 RX ORDER — OXYCODONE HYDROCHLORIDE 5 MG/1
5 TABLET ORAL EVERY 4 HOURS PRN
Status: DISCONTINUED | OUTPATIENT
Start: 2022-06-21 | End: 2022-06-25

## 2022-06-21 RX ORDER — INSULIN LISPRO 100 [IU]/ML
8 INJECTION, SOLUTION INTRAVENOUS; SUBCUTANEOUS ONCE
Status: COMPLETED | OUTPATIENT
Start: 2022-06-21 | End: 2022-06-21

## 2022-06-21 RX ORDER — SODIUM CHLORIDE, SODIUM LACTATE, POTASSIUM CHLORIDE, AND CALCIUM CHLORIDE .6; .31; .03; .02 G/100ML; G/100ML; G/100ML; G/100ML
1000 INJECTION, SOLUTION INTRAVENOUS ONCE
Status: COMPLETED | OUTPATIENT
Start: 2022-06-21 | End: 2022-06-21

## 2022-06-21 RX ORDER — ENOXAPARIN SODIUM 100 MG/ML
40 INJECTION SUBCUTANEOUS DAILY
Status: DISCONTINUED | OUTPATIENT
Start: 2022-06-21 | End: 2022-06-25 | Stop reason: HOSPADM

## 2022-06-21 RX ORDER — INSULIN LISPRO 100 [IU]/ML
0-12 INJECTION, SOLUTION INTRAVENOUS; SUBCUTANEOUS
Status: DISCONTINUED | OUTPATIENT
Start: 2022-06-21 | End: 2022-06-25 | Stop reason: HOSPADM

## 2022-06-21 RX ADMIN — MAGNESIUM GLUCONATE 500 MG ORAL TABLET 400 MG: 500 TABLET ORAL at 20:28

## 2022-06-21 RX ADMIN — DIPHENHYDRAMINE HYDROCHLORIDE 25 MG: 50 INJECTION, SOLUTION INTRAMUSCULAR; INTRAVENOUS at 10:53

## 2022-06-21 RX ADMIN — CHLORHEXIDINE GLUCONATE 15 ML: 1.2 SOLUTION ORAL at 20:29

## 2022-06-21 RX ADMIN — INSULIN LISPRO 8 UNITS: 100 INJECTION, SOLUTION INTRAVENOUS; SUBCUTANEOUS at 11:24

## 2022-06-21 RX ADMIN — INSULIN LISPRO 1 UNITS: 100 INJECTION, SOLUTION INTRAVENOUS; SUBCUTANEOUS at 20:40

## 2022-06-21 RX ADMIN — ONDANSETRON 4 MG: 2 INJECTION INTRAMUSCULAR; INTRAVENOUS at 09:30

## 2022-06-21 RX ADMIN — MAGNESIUM SULFATE HEPTAHYDRATE 2000 MG: 40 INJECTION, SOLUTION INTRAVENOUS at 10:53

## 2022-06-21 RX ADMIN — FAMOTIDINE 20 MG: 10 INJECTION, SOLUTION INTRAVENOUS at 09:30

## 2022-06-21 RX ADMIN — OXYCODONE 10 MG: 5 TABLET ORAL at 21:33

## 2022-06-21 RX ADMIN — SODIUM CHLORIDE, SODIUM LACTATE, POTASSIUM CHLORIDE, AND CALCIUM CHLORIDE: 600; 310; 30; 20 INJECTION, SOLUTION INTRAVENOUS at 13:53

## 2022-06-21 RX ADMIN — ACETAMINOPHEN 1000 MG: 500 TABLET ORAL at 12:06

## 2022-06-21 RX ADMIN — POTASSIUM BICARBONATE 40 MEQ: 782 TABLET, EFFERVESCENT ORAL at 10:38

## 2022-06-21 RX ADMIN — ASPIRIN 324 MG: 81 TABLET ORAL at 09:34

## 2022-06-21 RX ADMIN — SODIUM CHLORIDE, PRESERVATIVE FREE 10 ML: 5 INJECTION INTRAVENOUS at 20:28

## 2022-06-21 RX ADMIN — MAGNESIUM GLUCONATE 500 MG ORAL TABLET 800 MG: 500 TABLET ORAL at 11:31

## 2022-06-21 RX ADMIN — SODIUM CHLORIDE, POTASSIUM CHLORIDE, SODIUM LACTATE AND CALCIUM CHLORIDE 1000 ML: 600; 310; 30; 20 INJECTION, SOLUTION INTRAVENOUS at 09:30

## 2022-06-21 RX ADMIN — GABAPENTIN 600 MG: 300 CAPSULE ORAL at 16:51

## 2022-06-21 RX ADMIN — SODIUM CHLORIDE, POTASSIUM CHLORIDE, SODIUM LACTATE AND CALCIUM CHLORIDE 1000 ML: 600; 310; 30; 20 INJECTION, SOLUTION INTRAVENOUS at 10:52

## 2022-06-21 RX ADMIN — GABAPENTIN 600 MG: 300 CAPSULE ORAL at 20:26

## 2022-06-21 RX ADMIN — INSULIN LISPRO 8 UNITS: 100 INJECTION, SOLUTION INTRAVENOUS; SUBCUTANEOUS at 17:03

## 2022-06-21 RX ADMIN — SODIUM CHLORIDE, SODIUM LACTATE, POTASSIUM CHLORIDE, AND CALCIUM CHLORIDE: 600; 310; 30; 20 INJECTION, SOLUTION INTRAVENOUS at 20:22

## 2022-06-21 RX ADMIN — OXYCODONE 10 MG: 5 TABLET ORAL at 16:43

## 2022-06-21 RX ADMIN — INSULIN GLARGINE 25 UNITS: 100 INJECTION, SOLUTION SUBCUTANEOUS at 20:40

## 2022-06-21 ASSESSMENT — PAIN DESCRIPTION - LOCATION
LOCATION: ABDOMEN

## 2022-06-21 ASSESSMENT — PAIN DESCRIPTION - ORIENTATION
ORIENTATION: RIGHT;LOWER
ORIENTATION: LOWER;RIGHT;LEFT

## 2022-06-21 ASSESSMENT — ENCOUNTER SYMPTOMS
SHORTNESS OF BREATH: 0
PHOTOPHOBIA: 0
CONSTIPATION: 0
NAUSEA: 1
VOMITING: 1
ABDOMINAL PAIN: 1
BACK PAIN: 0

## 2022-06-21 ASSESSMENT — PAIN DESCRIPTION - DESCRIPTORS
DESCRIPTORS: ACHING
DESCRIPTORS: TIGHTNESS
DESCRIPTORS: ACHING
DESCRIPTORS: DULL

## 2022-06-21 ASSESSMENT — PAIN SCALES - GENERAL
PAINLEVEL_OUTOF10: 8
PAINLEVEL_OUTOF10: 8
PAINLEVEL_OUTOF10: 10
PAINLEVEL_OUTOF10: 9
PAINLEVEL_OUTOF10: 8

## 2022-06-21 ASSESSMENT — PAIN - FUNCTIONAL ASSESSMENT
PAIN_FUNCTIONAL_ASSESSMENT: 0-10
PAIN_FUNCTIONAL_ASSESSMENT: ACTIVITIES ARE NOT PREVENTED

## 2022-06-21 ASSESSMENT — PAIN DESCRIPTION - PAIN TYPE: TYPE: ACUTE PAIN

## 2022-06-21 ASSESSMENT — PAIN DESCRIPTION - FREQUENCY: FREQUENCY: CONTINUOUS

## 2022-06-21 NOTE — PROGRESS NOTES
Inpatient Diabetes  Education     Type and Reason for Visit: Patient Education  Met with patient at bedside. 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 She used insulin in past/ pen device, but also admits she does not like to give her self injections. She could ID lantus insulin with dose of 14 units. She has also been RX of metformin and farxiga. She stated tried metformin but stopped due to side effects of diarrhea. She stated she never picked up [address] from pharmacy. She thought lantus was causing GI upset and frequently and blurry vision. She has BG meter at home and stated often checks and BG have been over 200 at home. No low blood glucose. She has also had high BG and was seen at [facility] 6/3- 6/4. Hemoglobin A1C 4.4 - 6.4 % >15.5 High     Average glucose mg/dL >398    Resulting [address] LAB   Specimen Collected: 06/03/22 10:28 PM Last Resulted: 06/04/22  9:39 AM      She has pcp Dr Blas Chavez, but did not have follow up appt as planned on 6/9. She was also referred to Dr Vicky Gottron for endo consult, but has not been scheduled or seen. Writer did call to Dr Blas Chavez office and she is still active patient and can be scheduled/ seen for care. Call to [facility] [address] spoke with Bill Sanders on her home list for diabetes  - Pt did  [place] 5 mg daily on 6/5/2022, Lantus pen, dose is 10 units daily   Updated home medication list with rx of farxiga    Patient is poor historian of her home medications and her behavior, as patient stated not picking up farxiga, but pharmacy has the medication as pickup.       Verbally reviewed following information with patient  Survival skills Diagnosis, A1C, Blood glucose targets, hypo and hyperglycemia, importance of home blood glucose monitoring, heathy eating  plate method for CHO control portions, be active as recommended by health care providers, take medications oral and or insulin as directed. Reinforced the symptoms of high blood glucose are not side effects of lantus or metformin medication, encouraged PCP follow up in  community for ongoing management. Written educational materials provided  _x__ [employer] as available \" \"Diabetes and you\"  _x__ Be safe with Needles teaching sheet /  [facility] [place]  _x__ healthy eating plate method handout     Out patient diabetes education  contact number [email or web address] - [phone] to patient and placed on the discharge summary.       Sweetie Mcdaniel RN

## 2022-06-21 NOTE — ED NOTES
The following labs were labeled with patient stickers & tubed to lab;    [x]Lavender   []On Ice  [x]Blue  [x]Green/ Yellow  [x]Green/ Black []On Ice  []Pink  []Red  []Yellow    []COVID-19 Swab []Rapid    []Urine Sample  []Pelvic Cultures    []Blood Cultures       Antione Green RN  06/21/22 1771

## 2022-06-21 NOTE — ED PROVIDER NOTES
[facility]     Emergency Department     Faculty Note/ Attestation      Pt Name: Kei Tran                                       MRN: [id number]  [place] 1995  Date of evaluation: 6/21/2022  Patients PCP:    Candace Valle    Attestation  I performed a history and physical examination of the patient/ or directly observed  and discussed management with the resident. I reviewed the residents note and agree with the documented findings and plan of care. Any areas of disagreement are noted on the chart. I was personally present for the key portions of any procedures. I have documented in the chart those procedures where I was not present during the key portions. I have reviewed the emergency nurses triage note. I agree with the chief complaint, past medical history, past surgical history, allergies, medications, social and family history as documented unless otherwise noted below. For Physician Assistant/ Nurse Practitioner cases/documentation I have personally evaluated this patient and have completed at least one if not all key elements of the E/M (history, physical exam, and MDM). Additional findings are as noted. This patient was evaluated in the Emergency Department for symptoms described in the history of present illness. The patient was evaluated in the context of the global COVID-19 pandemic, which necessitated consideration that the patient might be at risk for infection with the SARS-CoV-2 virus that causes COVID-19. Institutional protocols and algorithms that pertain to the evaluation of patients at risk for COVID-19 are in a state of rapid change based on information released by regulatory bodies including the CDC and federal and state organizations. These policies and algorithms were followed during the patient's care in the ED.      Initial Screens:        Claudia Coma Scale  Eye Opening: Spontaneous  Best Verbal Response: Oriented  Best Motor Response: Obeys commands  [place] Coma Scale Score: 15    Vitals:    Vitals:    06/21/22 0911 06/21/22 0916 06/21/22 0921   BP: (!) 159/112 (!) 131/99 (!) 131/99   Pulse: (!) 125 (!) 110 (!) 112   Resp: 15 13 20   Temp:  98.5 °F (36.9 °C)    TempSrc:  Oral    SpO2: 98% 96% 100%   Weight:  159 lb (72.1 kg)    Height:  5' 2\" (1.575 m)        Chief Complaint      Chief Complaint   Patient presents with    Dizziness    Abdominal Pain     x1 week    Chest Pain          height is 5' 2\" (1.575 m) and weight is 159 lb (72.1 kg). Her oral temperature is 98.5 °F (36.9 °C). Her blood pressure is 131/99 (abnormal) and her pulse is 112 (abnormal). Her respiration is 20 and oxygen saturation is 100%. DIAGNOSTIC RESULTS       RADIOLOGY:   XR CHEST PORTABLE   Final Result   No acute cardiopulmonary disease.                LABS:  Labs Reviewed   LACTIC ACID - Abnormal; Notable for the following components:       Result Value    Lactic Acid, Whole Blood 2.2 (*)     All other components within normal limits   ELECTROLYTES PLUS - Abnormal; Notable for the following components:    POC Potassium 3.1 (*)     All other components within normal limits   HGB/HCT - Abnormal; Notable for the following components:    POC Hematocrit 47 (*)     All other components within normal limits   CALCIUM, IONIC (POC) - Abnormal; Notable for the following components:    POC Ionized Calcium 1.10 (*)     All other components within normal limits   VENOUS BLOOD GAS, POINT OF CARE - Abnormal; Notable for the following components:    pH, Sarbjit 7.466 (*)     pCO2, Sarbjit 33.1 (*)     O2 Sat, Sarbjit 87 (*)     All other components within normal limits   LACTIC ACID,POINT OF CARE - Abnormal; Notable for the following components:    POC Lactic Acid 1.94 (*)     All other components within normal limits   POCT GLUCOSE - Normal   BETA-HYDROXYBUTYRATE   LIPASE   CBC WITH AUTO DIFFERENTIAL   COMPREHENSIVE METABOLIC PANEL   URINALYSIS WITH REFLEX TO CULTURE   HCG, SERUM, QUALITATIVE   TROPONIN   MAGNESIUM   LAMOTRIGINE LEVEL   TSH WITH REFLEX   LACTIC ACID   CREATININE W/GFR POINT OF CARE   UREA N (POC)   POC CHEMISTRY (NA,K,ICA,GLU,CALC HCT/HGB,LACTATE,CREA,CL)         EMERGENCY DEPARTMENT COURSE:     -------------------------      BP: (!) 131/99, Temp: 98.5 °F (36.9 °C), Heart Rate: (!) 112, Resp: 20    System Problem List     Patient Active Problem List   Diagnosis    B[facility]    Recurrent depression ([facility]    SAH (subarachnoid hemorrhage) (HCC)    Intraparenchymal hematoma of brain (HCC)    Intraparenchymal hemorrhage of brain (HCC)    History of seizures    Cavernoma    Status post craniotomy    Occipital subdural bleed (HCC)    Headache    Hyperglycemia    Electrolyte abnormality    History of surgery of head    History of seizure    Headache disorder    Bandemia    Acute cystitis without hematuria    MRSA infection    Newly diagnosed diabetes (HCC)-HbA1c of 13.1       Comments  Chronic Prob List noted    No notes of EC Admission Criteria type on file. Baljinder Prasad MD,, MD, F.A.C.E.P.   Attending Emergency Physician         Baljinder Prasad MD  06/21/22 2067

## 2022-06-21 NOTE — ED NOTES
Patient to ED with c/o abdominal pain, nausea and diarrhea x1 week. Patient reports chest pain during triage, states it started when she came into ED room 8. Patient placed in gown and on full cardiac monitor. Dr Luis Bishop to bedside. Patient reports that she takes lantus and metformin for DM, she states she is concerned that she is not tolerating her lantus well and stopped taking it. Patient reports that she believes she is having seizures at night while she is asleep, she states he had seizures as a child and is not currently taking any medication. Patient states she did not come in last week when her abdomen began hurting and she stopped taking her lantus due to not wanting to miss father's day gatherings with family. Patient is alert and oriented x4, NAD at this time.        Harpreet Tripp RN  06/21/22 6905

## 2022-06-21 NOTE — CARE COORDINATION
Case Management Initial Discharge Plan  Hero Said,             Met with:patient to discuss discharge plans. Information verified: address, contacts, phone number, , insurance Yes  Insurance Provider: [employer]     Emergency Contact/Next of Kin name & number: pinky braswell   Who are involved in patient's support system? S.o., family      PCP: aCndace Valle  Date of last visit: 1 month      Discharge Planning    Living Arrangements:    staying with boyfriend      Patient able to perform ADL's:Independent    Current Services (outpatient & in home) none  DME equipment: none  DME provider: none    Is patient receiving oral anticoagulation therapy?  no        Does patient have any issues/concerns obtaining medications? No      Is there a preferred Pharmacy after hours or on weekends? [facility] [address] Needed:   f/u pcp         Evaluation: no    Expected Discharge date:       Patient expects to be discharged to:   boyfriends house    If home: is the family and/or caregiver wiling & able to provide support at home? yes  Who will be providing this support? s.o      Transportation provider: s.o. Transportation arrangements needed for discharge: No    Readmission Risk              Risk of Unplanned Readmission:  0             Does patient have a readmission risk score greater than 14?: No  If yes, follow-up appointment must be made within 7 days of discharge. Goals of Care: safe transition plan       Educated yes on transitional options, provided freedom of choice and are agreeable with plan      Discharge Plan: return home with s.o.            Electronically signed by Karla Liu RN on 22 at 5:31 PM EDT

## 2022-06-21 NOTE — FLOWSHEET NOTE
Pt is unable to accurately state which medications she takes for her diabetes. She states that she has allergies to metformin and lantus, alternately. Description of non-compliance changes regarding when she has or has not last taken these medications. Additionally, pt is unable to state whether she takes humalog and states that she lost her farxiga after it was dispensed. During this time pt becomes agitated as by narrator's questions to verify the pt's recollection. Pt states that she has trouble remembering because of her stroke. Narrator attempts to be encouraging, but continues attempting to verify medications. Pt states that she is having sharp RLQ abdominal pain and wants it evaluated. Agitation continues and narrator leaves room.

## 2022-06-21 NOTE — ED NOTES
Attempted to call report to [facility] x2.      Aylin Mason RN  06/21/22 0414

## 2022-06-21 NOTE — ED PROVIDER NOTES
[facility]  Emergency Department Encounter  EmergencyMedicine Resident     Pt Ely Askew  MRN: [id number]  [place] 1995  Date of evaluation: 6/21/22  PCP:  Jenni Ganser Matus    This patient was evaluated in the Emergency Department for symptoms described in the history of present illness. The patient was evaluated in the context of the global COVID-19 pandemic, which necessitated consideration that the patient might be at risk for infection with the SARS-CoV-2 virus that causes COVID-19. Institutional protocols and algorithms that pertain to the evaluation of patients at risk for COVID-19 are in a state of rapid change based on information released by regulatory bodies including the CDC and federal and state organizations. These policies and algorithms were followed during the patient's care in the ED. CHIEF COMPLAINT       Chief Complaint   Patient presents with    Dizziness    Abdominal Pain     x1 week    Chest Pain   Abdominal pain, lightheadedness, chest pain    HISTORY OF PRESENT ILLNESS  (Location/Symptom, Timing/Onset, Context/Setting, Quality, Duration, Modifying Factors, Severity.)      Rosalva Guallpa is a 32 y.o. female who presents with several complaints. Patient states that she has been having abdominal pain generalized in nature cramping associated with lightheadedness and nausea vomiting for the last 1 week. Patient stopped taking her Lantus. She felt it was giving her irritation she thought she was \"allergic to it\". Patient is a poorly controlled diabetic she states she also has been not stopped her metformin because she heard about a refill for this medication. Patient was seen earlier this month at [facility] for uncontrolled type 2 diabetes in the setting of hyperglycemia. She is also complaining of chest pain that is substernal nonradiating no diaphoresis no syncope no shortness of breath at this time.     PAST MEDICAL / SURGICAL / SOCIAL / FAMILY HISTORY has a past medical history of Cerebral vascular malformation, Diabetes mellitus ([facility] [address] Seizure ([facility] [address] and Traumatic hemorrhage of left cerebrum without loss of consciousness ([facility] [address] has a past surgical history that includes craniotomy (01/08/2020) and craniotomy (Left, 1/8/2020). Social History     Socioeconomic History    Marital status: Single     Spouse name: Not on file    Number of children: Not on file    Years of education: Not on file    Highest education level: Not on file   Occupational History    Not on file   Tobacco Use    Smoking status: Current Some Day Smoker     Packs/day: 0.30     Years: 9.00     Pack years: 2.70     Types: Cigarettes    Smokeless tobacco: Never Used   Vaping Use    Vaping Use: Unknown   Substance and Sexual Activity    Alcohol use: Yes     Comment: Occassionally    Drug use: Yes     Types: Marijuana Hulon Morris)    Sexual activity: Yes     Partners: Male   Other Topics Concern    Not on file   Social History Narrative    Not on file     Social Determinants of Health     Financial Resource Strain:     Difficulty of Paying Living Expenses: Not on file   Food Insecurity:     Worried About Running Out of Food in the Last Year: Not on file    Genet of Food in the Last Year: Not on file   Transportation Needs:     Lack of Transportation (Medical): Not on file    Lack of Transportation (Non-Medical):  Not on file   Physical Activity:     Days of Exercise per Week: Not on file    Minutes of Exercise per Session: Not on file   Stress:     Feeling of Stress : Not on file   Social Connections:     Frequency of Communication with Friends and Family: Not on file    Frequency of Social Gatherings with Friends and Family: Not on file    Attends Rastafari Services: Not on file    Active Member of Clubs or Organizations: Not on file    Attends Club or Organization Meetings: Not on file    Marital Status: Not on file   Intimate Partner Violence:     Fear of Current or Ex-Partner: Not on file    Emotionally Abused: Not on file    Physically Abused: Not on file    Sexually Abused: Not on file   Housing Stability:     Unable to Pay for Housing in the Last Year: Not on file    Number of Places Lived in the Last Year: Not on file    Unstable Housing in the Last Year: Not on file       History reviewed. No pertinent family history. Allergies:  Latex and Prozac [fluoxetine hcl]    Home Medications:  Prior to Admission medications    Medication Sig Start Date End Date Taking? Authorizing Provider   ibuprofen (ADVIL;MOTRIN) 800 MG tablet Take 1 tablet by mouth every 8 hours as needed for Pain 4/10/22 4/30/22  Jas Sutherland MD   gabapentin (NEURONTIN) 300 MG capsule Take 2 capsules by mouth 3 times daily for 75 days.  1/31/22 4/16/22  Tawana Chery MD   naproxen sodium (ANAPROX) 550 MG tablet Take 1 tablet by mouth 2 times daily as needed for Pain (Headache) 7/20/21   Moriah Logan MD   diphenhydrAMINE (BENADRYL ALLERGY) 25 MG capsule Take 1 capsule by mouth 2 times daily as needed (Severe headache with nausea and vomiting.) 7/20/21   Moriah Logan MD   promethazine (PHENERGAN) 12.5 MG tablet Take 1 tablet by mouth 2 times daily as needed for Nausea (moderate to severe headache) 7/20/21   Moriah Logan MD   ondansetron (ZOFRAN) 4 MG tablet Take 1 tablet by mouth every 12 hours as needed for Nausea or Vomiting 7/20/21   Moriah Loagn MD   acetaminophen (TYLENOL) 500 MG tablet Take 1 tablet by mouth 2 times daily as needed for Pain (severe headaches) 3/4/21   Moriah Logan MD   chlorhexidine (PERIDEX) 0.12 % solution Take 15 mLs by mouth 2 times daily  Patient not taking: Reported on 7/20/2021 2/28/20   Historical Provider, MD   butalbital-acetaminophen-caffeine (FIORICET, ESGIC) -40 MG per tablet Take 1 tablet by mouth every 4 hours as needed for Headaches  Patient not taking: Reported on 4/2/2020 2/25/20   Martha Alanis MD   insulin lispro (HUMALOG) 100 UNIT/ML injection vial Inject 0-12 Units into the skin 3 times daily (with meals)  Patient not taking: Reported on 7/7/2020 2/25/20   Nicole Meeks MD   Magnesium Oxide (MAG-OXIDE) 200 MG TABS Take 1 tablet by mouth 2 times daily 2/25/20   Nicole Meeks MD   insulin detemir (LEVEMIR FLEXTOUCH) 100 UNIT/ML injection pen Inject 25 Units into the skin nightly  Patient not taking: Reported on 3/4/2021 2/25/20   Nicole Meeks MD   insulin aspart (NOVOLOG FLEXPEN) 100 UNIT/ML injection pen Inject 5 Units into the skin 3 times daily (before meals)  Patient not taking: Reported on 3/4/2021 2/25/20   Nicole Meeks MD   glucose monitoring kit (FREESTYLE) monitoring kit 1 kit by Does not apply route daily  Patient not taking: Reported on 7/20/2021 2/25/20   Nicole Meeks MD   blood glucose monitor strips Test 3 times a day & as needed for symptoms of irregular blood glucose.   Patient not taking: Reported on 7/20/2021 2/25/20   Nicole Meeks MD   Lancets MISC 1 each by Does not apply route 3 times daily  Patient not taking: Reported on 3/4/2021 2/25/20   Nicole Meeks MD   Alcohol Swabs (ALCOHOL PREP) 70 % PADS 1 pad TID  Patient not taking: Reported on 7/20/2021 2/25/20   Nicole Meeks MD   Insulin Pen Needle 30G X 8 MM MISC 1 each by Does not apply route daily  Patient not taking: Reported on 3/4/2021 2/25/20   Nicole Meeks MD   scopolamine (TRANSDERM-SCOP) transdermal patch Place 1 patch onto the skin every 72 hours  Patient not taking: Reported on 4/2/2020 1/29/20   Gigi Rowe MD   levETIRAcetam (KEPPRA) 500 MG tablet Take 1 tablet by mouth 2 times daily for 7 days  Patient not taking: Reported on 4/2/2020 1/10/20 2/26/20  Jamal Kehr, MD   atorvastatin (LIPITOR) 40 MG tablet Take 1 tablet by mouth nightly  Patient not taking: Reported on 4/2/2020 1/10/20   Jamal Kehr, MD   busPIRone (BUSPAR) 10 MG tablet Take 10 mg by mouth 3 times daily  Patient not taking: Reported on 1/31/2022    Historical Provider, MD   lamoTRIgine (LAMICTAL) 25 MG tablet Take 25 mg by mouth daily  Patient not taking: Reported on 1/31/2022    Historical Provider, MD   docusate sodium (COLACE) 100 MG capsule Take 100 mg by mouth 2 times daily as needed for Constipation     Historical Provider, MD   etonogestrel (NEXPLANON) 68 MG implant 68 mg by Subdermal route once    Historical Provider, MD       REVIEW OF SYSTEMS    (2-9 systems for level 4, 10 or more for level 5)      Review of Systems   Constitutional: Positive for activity change and appetite change. Negative for chills, diaphoresis and fever. HENT: Negative for congestion. Eyes: Negative for photophobia. Respiratory: Negative for shortness of breath. Cardiovascular: Positive for chest pain. Gastrointestinal: Positive for abdominal pain, nausea and vomiting. Negative for constipation. Endocrine: Positive for polyuria. Genitourinary: Positive for frequency. Negative for dysuria. Musculoskeletal: Negative for back pain. Skin: Negative for rash and wound. Allergic/Immunologic: Positive for environmental allergies. Neurological: Negative for headaches. Hematological: Negative for adenopathy. Psychiatric/Behavioral: Negative for agitation and confusion. PHYSICAL EXAM   (up to 7 for level 4, 8 or more for level 5)      INITIAL VITALS:   /87   Pulse 82   Temp 97.9 °F (36.6 °C)   Resp 18   Ht 5' 2\" (1.575 m)   Wt 147 lb 4.3 oz (66.8 kg)   SpO2 100%   BMI 26.94 kg/m²     Physical Exam  Vitals reviewed. Constitutional:       General: She is not in acute distress. Appearance: Normal appearance. She is not toxic-appearing. HENT:      Head: Normocephalic and atraumatic. Right Ear: External ear normal.      Left Ear: External ear normal.      Nose: Nose normal.      Mouth/Throat:      Pharynx: Oropharynx is clear. Eyes:      Conjunctiva/sclera: Conjunctivae normal.   Cardiovascular:      Rate and Rhythm: Normal rate.       Pulses: Normal pulses. Pulmonary:      Effort: Pulmonary effort is normal.   Abdominal:      Palpations: Abdomen is soft. Tenderness: There is no guarding. Musculoskeletal:         General: Normal range of motion. Cervical back: Normal range of motion. Skin:     General: Skin is warm. Capillary Refill: Capillary refill takes less than 2 seconds. Neurological:      Mental Status: She is alert and oriented to person, place, and time. Psychiatric:         Mood and Affect: Mood normal.         DIFFERENTIAL  DIAGNOSIS     PLAN (LABS / IMAGING / EKG):  Orders Placed This Encounter   Procedures    COVID-19, Rapid    XR CHEST PORTABLE    Beta-Hydroxybutyrate    Lipase    CBC with Auto Differential    Comprehensive Metabolic Panel    Urinalysis with Reflex to Culture    HCG Qualitative, Serum    Troponin    Magnesium    Lamotrigine Level    TSH with Reflex    Lactic Acid    ELECTROLYTES PLUS    Hemoglobin and hematocrit, blood    CALCIUM, IONIC (POC)    Lactic Acid    Troponin    Microscopic Urinalysis    Basic Metabolic Panel    Magnesium    CBC with Auto Differential    Lactic Acid    Magnesium    Basic Metabolic Panel    ADULT DIET;  Regular    Vital signs per unit routine    Notify physician    Notify physician    Up as tolerated    Full Code    Inpatient consult to Internal Medicine    Inpatient consult to Diabetes educator    OT eval and treat    PT eval and treat    POC CHEMISTRY (NA,K,ICA,GLU,CALC HCT/HGB,LACTATE,CREA,CL)    Venous Blood Gas, POC    Creatinine W/GFR Point of Care    POCT urea (BUN)    Lactic Acid, POC    POCT Glucose    POCT Glucose    POC Glucose Fingerstick    EKG 12 Lead    Place in Observation Service    Place in Observation Service       MEDICATIONS ORDERED:  Orders Placed This Encounter   Medications    lactated ringers bolus    ondansetron (ZOFRAN) injection 4 mg    famotidine (PEPCID) injection 20 mg    aspirin chewable tablet 324 mg    potassium bicarb-citric acid (EFFER-K) effervescent tablet 40 mEq    magnesium oxide (MAG-OX) tablet 800 mg    diphenhydrAMINE (BENADRYL) injection 25 mg    magnesium sulfate 2000 mg in 50 mL IVPB premix    lactated ringers bolus    insulin lispro (HUMALOG) injection vial 8 Units    acetaminophen (TYLENOL) tablet 1,000 mg    docusate sodium (COLACE) capsule 100 mg    DISCONTD: etonogestrel (NEXPLANON) implant 68 mg    busPIRone (BUSPAR) tablet 10 mg    lamoTRIgine (LAMICTAL) tablet 25 mg    levETIRAcetam (KEPPRA) tablet 500 mg    atorvastatin (LIPITOR) tablet 40 mg    scopolamine (TRANSDERM-SCOP) transdermal patch 1 patch    butalbital-acetaminophen-caffeine (FIORICET, ESGIC) per tablet 1 tablet    DISCONTD: insulin lispro (HUMALOG) injection vial 0-12 Units    magnesium oxide (MAG-OX) tablet 400 mg    chlorhexidine (PERIDEX) 0.12 % solution 15 mL    diphenhydrAMINE (BENADRYL) tablet 25 mg    promethazine (PHENERGAN) tablet 12.5 mg    ondansetron (ZOFRAN) tablet 4 mg    gabapentin (NEURONTIN) capsule 600 mg    DISCONTD: acetaminophen (TYLENOL) tablet 500 mg    insulin glargine (LANTUS) injection vial 25 Units    sodium chloride flush 0.9 % injection 5-40 mL    sodium chloride flush 0.9 % injection 5-40 mL    0.9 % sodium chloride infusion    enoxaparin (LOVENOX) injection 40 mg     Order Specific Question:   Indication of Use     Answer:   Prophylaxis-DVT/PE    acetaminophen (TYLENOL) tablet 650 mg    OR Linked Order Group     oxyCODONE (ROXICODONE) immediate release tablet 5 mg     oxyCODONE (ROXICODONE) immediate release tablet 10 mg    OR Linked Order Group     fentaNYL (SUBLIMAZE) injection 25 mcg     fentaNYL (SUBLIMAZE) injection 50 mcg    insulin lispro (HUMALOG) injection vial 0-12 Units    insulin lispro (HUMALOG) injection vial 0-6 Units    lactated ringers 1,000 mL infusion    OR Linked Order Group     potassium chloride (KLOR-CON M) extended release tablet 40 mEq     potassium bicarb-citric acid (EFFER-K) effervescent tablet 40 mEq     potassium chloride 10 mEq/100 mL IVPB (Peripheral Line)       DDX: Upper glycemia, DKA, pancreatitis, urgency, UTI, ACS, electrolyte disturbance, other    DIAGNOSTIC RESULTS / EMERGENCY DEPARTMENT COURSE / MDM   LAB RESULTS:  Results for orders placed or performed during the hospital encounter of 06/21/22   COVID-19, Rapid    Specimen: Nasopharyngeal Swab   Result Value Ref Range    Specimen Description . NASOPHARYNGEAL SWAB     SARS-CoV-2, Rapid Not Detected Not Detected   Beta-Hydroxybutyrate   Result Value Ref Range    Beta-Hydroxybutyrate 0.33 (H) 0.02 - 0.27 mmol/L   Lipase   Result Value Ref Range    Lipase 33 13 - 60 U/L   CBC with Auto Differential   Result Value Ref Range    WBC 11.1 3.5 - 11.3 k/uL    RBC 4.86 3.95 - 5.11 m/uL    Hemoglobin 13.8 11.9 - 15.1 g/dL    Hematocrit 41.9 36.3 - 47.1 %    MCV 86.2 82.6 - 102.9 fL    MCH 28.4 25.2 - 33.5 pg    MCHC 32.9 28.4 - 34.8 g/dL    RDW 13.2 11.8 - 14.4 %    Platelets 676 183 - 808 k/uL    MPV 12.4 8.1 - 13.5 fL    NRBC Automated 0.0 0.0 per 100 WBC    Seg Neutrophils 63 36 - 65 %    Lymphocytes 29 24 - 43 %    Monocytes 6 3 - 12 %    Eosinophils % 2 1 - 4 %    Basophils 0 0 - 2 %    Immature Granulocytes 0 0 %    Segs Absolute 6.89 1.50 - 8.10 k/uL    Absolute Lymph # 3.19 1.10 - 3.70 k/uL    Absolute Mono # 0.63 0.10 - 1.20 k/uL    Absolute Eos # 0.27 0.00 - 0.44 k/uL    Basophils Absolute 0.04 0.00 - 0.20 k/uL    Absolute Immature Granulocyte 0.04 0.00 - 0.30 k/uL   Comprehensive Metabolic Panel   Result Value Ref Range    Glucose 433 (HH) 70 - 99 mg/dL    BUN 14 6 - 20 mg/dL    CREATININE 0.89 0.50 - 0.90 mg/dL    Calcium 9.2 8.6 - 10.4 mg/dL    Sodium 134 (L) 135 - 144 mmol/L    Potassium 3.3 (L) 3.7 - 5.3 mmol/L    Chloride 95 (L) 98 - 107 mmol/L    CO2 21 20 - 31 mmol/L    Anion Gap 18 (H) 9 - 17 mmol/L    Alkaline Phosphatase 138 (H) 35 - 104 U/L    ALT 17 5 - 33 U/L    AST 21 <32 U/L    Total Bilirubin 0.50 0.3 - 1.2 mg/dL    Total Protein 7.0 6.4 - 8.3 g/dL    Albumin 4.2 3.5 - 5.2 g/dL    Albumin/Globulin Ratio 1.5 1.0 - 2.5    GFR Non-African American >60 >60 mL/min    GFR African American >60 >60 mL/min    GFR Comment         Urinalysis with Reflex to Culture    Specimen: Urine   Result Value Ref Range    Color, UA Yellow Yellow    Turbidity UA Clear Clear    Glucose, Ur 4 (A) NEGATIVE    Bilirubin Urine NEGATIVE NEGATIVE    Ketones, Urine NEGATIVE NEGATIVE    Specific Gravity, UA 1.010 1.005 - 1.030    Urine Hgb TRACE (A) NEGATIVE    pH, UA 6.0 5.0 - 8.0    Protein, UA NEGATIVE NEGATIVE    Urobilinogen, Urine Normal Normal    Nitrite, Urine NEGATIVE NEGATIVE    Leukocyte Esterase, Urine NEGATIVE NEGATIVE   HCG Qualitative, Serum   Result Value Ref Range    hCG Qual NEGATIVE NEGATIVE   Troponin   Result Value Ref Range    Troponin, High Sensitivity 13 0 - 14 ng/L   Magnesium   Result Value Ref Range    Magnesium 1.0 (L) 1.6 - 2.6 mg/dL   Lamotrigine Level   Result Value Ref Range    Lamotrigine Lvl <1.0 (L) 3.0 - 15.0 ug/mL   TSH with Reflex   Result Value Ref Range    TSH 1.52 0.30 - 5.00 uIU/mL   Lactic Acid   Result Value Ref Range    Lactic Acid, Whole Blood 2.2 (H) 0.7 - 2.1 mmol/L   ELECTROLYTES PLUS   Result Value Ref Range    POC Sodium 138 138 - 146 mmol/L    POC Potassium 3.1 (L) 3.5 - 4.5 mmol/L    POC Chloride 100 98 - 107 mmol/L    POC TCO2 24 22 - 30 mmol/L    Anion Gap 15 7 - 16 mmol/L   Hemoglobin and hematocrit, blood   Result Value Ref Range    POC Hemoglobin 15.8 12.0 - 16.0 g/dL    POC Hematocrit 47 (H) 36 - 46 %   CALCIUM, IONIC (POC)   Result Value Ref Range    POC Ionized Calcium 1.10 (L) 1.15 - 1.33 mmol/L   Lactic Acid   Result Value Ref Range    Lactic Acid, Whole Blood 2.2 (H) 0.7 - 2.1 mmol/L   Troponin   Result Value Ref Range    Troponin, High Sensitivity 8 0 - 14 ng/L   Microscopic Urinalysis   Result Value Ref Range    -          WBC, UA 5 TO 10 0 - 5 /HPF    RBC, UA 5 TO 10 0 - 2 /HPF    Epithelial Cells UA 2 TO 5 0 - 5 /HPF    Bacteria, UA FEW (A) None   Basic Metabolic Panel   Result Value Ref Range    Glucose 160 (H) 70 - 99 mg/dL    BUN 12 6 - 20 mg/dL    CREATININE 0.89 0.50 - 0.90 mg/dL    Calcium 9.5 8.6 - 10.4 mg/dL    Sodium 141 135 - 144 mmol/L    Potassium 3.7 3.7 - 5.3 mmol/L    Chloride 100 98 - 107 mmol/L    CO2 27 20 - 31 mmol/L    Anion Gap 14 9 - 17 mmol/L    GFR Non-African American >60 >60 mL/min    GFR African American >60 >60 mL/min    GFR Comment         Magnesium   Result Value Ref Range    Magnesium 1.9 1.6 - 2.6 mg/dL   Venous Blood Gas, POC   Result Value Ref Range    pH, Sarbjit 7.466 (H) 7.320 - 7.430    pCO2, Sarbjit 33.1 (L) 41.0 - 51.0 mm Hg    pO2, Sarbjit 48.7 30.0 - 50.0 mm Hg    HCO3, Venous 23.9 22.0 - 29.0 mmol/L    Positive Base Excess, Sarbjit 1 0.0 - 3.0    O2 Sat, Sarbjit 87 (H) 60.0 - 85.0 %   Creatinine W/GFR Point of Care   Result Value Ref Range    POC Creatinine 0.88 0.51 - 1.19 mg/dL    GFR Comment >60 >60 mL/min    GFR Non-African American >60 >60 mL/min    GFR Comment         POCT urea (BUN)   Result Value Ref Range    POC BUN 14 8 - 26 mg/dL   Lactic Acid, POC   Result Value Ref Range    POC Lactic Acid 1.94 (H) 0.56 - 1.39 mmol/L   POCT Glucose   Result Value Ref Range    Glucose 438 mg/dL    QC OK?  yes    POC Glucose Fingerstick   Result Value Ref Range    POC Glucose 327 (H) 65 - 105 mg/dL   EKG 12 Lead   Result Value Ref Range    Ventricular Rate 108 BPM    Atrial Rate 108 BPM    P-R Interval 144 ms    QRS Duration 82 ms    Q-T Interval 340 ms    QTc Calculation (Bazett) 455 ms    P Axis 38 degrees    R Axis 48 degrees    T Axis 51 degrees       IMPRESSION: Patient is a alert uncomfortable nontoxic 17-year-old female presenting with abdominal pain and lightheadedness for 1 week no loss of consciousness no significant chest pain on examination she is resting in the cot in supine position she is alert oriented interactive and cooperative with a GCS of 15 she is not tachypneic at this time. No occasional respirations there is mildly dehydrated lungs are clear heart is regular rhythm mildly tachycardic abdomen abdomen is soft no rebound rigidity or peritoneal signs moving all extremities no focal neurodeficits. Plan will be lab work-up labs imaging IV fluids antiemetics analgesia. Frequent reevaluation. RADIOLOGY:  XR CHEST PORTABLE    Result Date: 6/21/2022  EXAMINATION: ONE XRAY VIEW OF THE CHEST 6/21/2022 9:18 am COMPARISON: Two-view chest from 01/04/2020 HISTORY: ORDERING SYSTEM PROVIDED HISTORY: cp TECHNOLOGIST PROVIDED HISTORY: cp Reason for Exam: upright port History of seizure disorder and diabetes. FINDINGS: Overlying ECG monitor leads and gown snaps. Cardiomediastinal shadow WNL and unchanged. No localized pulmonary opacity or blunting of the costophrenic angles. No pneumothorax. Bones intact. No acute cardiopulmonary disease. EKG      All EKG's are interpreted by the Emergency Department Physician who either signs or Co-signs this chart in the absence of a cardiologist.    EMERGENCY DEPARTMENT COURSE:  ED Course as of 06/21/22 1536   Tue Jun 21, 2022   0915 EKG shows sinus tachycardia at a rate of 108 normal axis QTC is 455 ND interval is 144 QRS duration is 82 ms nonspecific EKG. [BG]   0924 Venous Blood Gas, POC(!):    pH, Sarbjit 7.466(!)   pCO2, Sarbjit 33.1(!)   pO2, Sarbjit 48.7   HCO3, Venous 23.9   Positive Base Excess, Sarbjit 1   O2 Sat, Sarbjit 87(!) [BG]   0946 Reevaluated urticaria on volar aspect of right forearm near iv site.  Will give benadryl and recheck, lung clear resting comfortably nad [BG]   1200 Troponin:    Troponin, High Sensitivity 8 [BG]   1213 IM recommending obs admission [BG]   1314 Comprehensive Metabolic Panel(!!):    GLUCOSE, FASTING,(!!)   BUN,BUNPL 14   Creatinine 0.89   CALCIUM, SERUM, 691895 9.2   Sodium 134(!)   Potassium 3.3(!)   Chloride 95(!)   CO2 21   Anion Gap 18(!) Alk Phos 138(!)   ALT 17   AST 21   Bilirubin 0.50   Total Protein 7.0   Albumin 4.2   ALBUMIN/GLOBULIN RATIO 1.5   GFR Non-African American >60   GFR  >60   GFR Comment      [BG]      ED Course User Index  [BG] Kaila Carbajal DO         No notes of EC Admission Criteria type on file. PROCEDURES:      CONSULTS:  IP CONSULT TO INTERNAL MEDICINE  IP CONSULT TO DIABETES EDUCATOR    CRITICAL CARE:      FINAL IMPRESSION      1. Hyperglycemia    2. Noncompliance with medication regimen    3. Hypokalemia    4.  Hypomagnesemia          DISPOSITION / PLAN     DISPOSITION  admit      PATIENT REFERRED TO:  [facility] DIABETES EDUCATION  [address] [place] [id number] [facility] [address] [phone]  Call today  As needed, Follow up education on diabetes self management      DISCHARGE MEDICATIONS:  Current Discharge Medication List          Kaila Carbajal DO  Emergency Medicine Resident    (Please note that portions of thisnote were completed with a voice recognition program.  Efforts were made to edit the dictations but occasionally words are mis-transcribed.)        Kalia Carbajal DO  Resident  06/21/22 0948

## 2022-06-21 NOTE — ED NOTES
Patient is resting on cart, RR even and unlabored. Side rails up x2, call light within reach, will continue with plan of care.      Princess Jade RN  06/21/22 [id number]

## 2022-06-21 NOTE — ED NOTES
The following labs were labeled with patient stickers & tubed to lab;    [x]Lavender   []On Ice  []Blue  [x]Green/ Yellow  [x]Green/ Black []On Ice  []Pink  []Red  []Yellow    []COVID-19 Swab []Rapid    []Urine Sample  []Pelvic Cultures    []Blood Cultures       Chad Anthony RN  06/21/22 8145

## 2022-06-21 NOTE — ED NOTES
ED to inpatient nurses report    Chief Complaint   Patient presents with    Dizziness    Abdominal Pain     x1 week    Chest Pain      Present to ED from home  LOC: alert and orientated to name, place, date  Vital signs   Vitals:    06/21/22 1218 06/21/22 1232 06/21/22 1247 06/21/22 1317   BP: (!) 123/94 112/85 113/88 (!) 120/93   Pulse: 73 80 79 77   Resp: 17 20 15 20   Temp:       TempSrc:       SpO2: 99% 98% 99% 100%   Weight:       Height:          Oxygen Baseline RA    Current needs required RA   LDAs:   Peripheral IV 06/21/22 Right Forearm (Active)   Site Assessment Clean, dry & intact 06/21/22 7580   Line Status Blood return noted;Brisk blood return;Flushed;Normal saline locked 06/21/22 0921     Mobility: Requires assistance * 1  Pending ED orders: pending repeat BMP and Mag  Present condition: stable  Code Status: FULL    Consults:  []  Hospitalist  Completed  [] yes [] no  [x]  Medicine  Completed  [] yes [x] No  []  Cardiology  Completed  [] yes [] No  []  GI   Completed  [] yes [] No  []  Neurology  Completed  [] yes [] No  []  Nephrology Completed  [] yes [] No  []  Vascular  Completed  [] yes [] No   []  Surgery  Completed  [] yes [] No   []  Urology  Completed  [] yes [] No   []  Plastics  Completed  [] yes [] No   []  ENT  Completed  [] yes [] No   [x]  Other - Diabetic educator  Completed  [] yes [x] No  Pertinent event(s)   Pertinent event(s) - dizziness with standing, one assist  Electronically signed by Funmi Alan RN on 6/21/2022 at 2:09 PM            Funmi Alan [address] [place] [address]  06/21/22 1412

## 2022-06-21 NOTE — ED NOTES
The following labs were labeled with patient stickers & tubed to lab;    []Lavender   []On Ice  []Blue  []Green/ Yellow  []Green/ Black []On Ice  []Pink  []Red  []Yellow    [x]COVID-19 Swab [x]Rapid    []Urine Sample  []Pelvic Cultures    []Blood Cultures       Rosenda Lind RN  06/21/22 8025

## 2022-06-21 NOTE — ED NOTES
Patient is resting on cart, RR even and unlabored. Side rails up x2, call light within reach, will continue with plan of care.      Funmi Alan RN  06/21/22 0112

## 2022-06-22 ENCOUNTER — APPOINTMENT (OUTPATIENT)
Dept: MRI IMAGING | Age: 27
DRG: 420 | End: 2022-06-22
Payer: MEDICARE

## 2022-06-22 ENCOUNTER — APPOINTMENT (OUTPATIENT)
Dept: ULTRASOUND IMAGING | Age: 27
DRG: 420 | End: 2022-06-22
Payer: MEDICARE

## 2022-06-22 LAB
ABSOLUTE EOS #: 0.24 K/UL (ref 0–0.4)
ABSOLUTE EOS #: 0.33 K/UL (ref 0–0.44)
ABSOLUTE IMMATURE GRANULOCYTE: 0 K/UL (ref 0–0.3)
ABSOLUTE IMMATURE GRANULOCYTE: 0.04 K/UL (ref 0–0.3)
ABSOLUTE LYMPH #: 5.55 K/UL (ref 1–4.8)
ABSOLUTE LYMPH #: 6.3 K/UL (ref 1.1–3.7)
ABSOLUTE MONO #: 0.47 K/UL (ref 0.1–0.8)
ABSOLUTE MONO #: 0.63 K/UL (ref 0.1–1.2)
ALBUMIN SERPL-MCNC: 3.5 G/DL (ref 3.5–5.2)
ALBUMIN/GLOBULIN RATIO: 1.5 (ref 1–2.5)
ALP BLD-CCNC: 115 U/L (ref 35–104)
ALT SERPL-CCNC: 16 U/L (ref 5–33)
ANION GAP SERPL CALCULATED.3IONS-SCNC: 14 MMOL/L (ref 9–17)
AST SERPL-CCNC: 19 U/L
BASOPHILS # BLD: 0 % (ref 0–2)
BASOPHILS # BLD: 1 % (ref 0–2)
BASOPHILS ABSOLUTE: 0 K/UL (ref 0–0.2)
BASOPHILS ABSOLUTE: 0.08 K/UL (ref 0–0.2)
BILIRUB SERPL-MCNC: 0.16 MG/DL (ref 0.3–1.2)
BILIRUBIN DIRECT: <0.08 MG/DL
BILIRUBIN, INDIRECT: ABNORMAL MG/DL (ref 0–1)
BUN BLDV-MCNC: 12 MG/DL (ref 6–20)
CALCIUM SERPL-MCNC: 9 MG/DL (ref 8.6–10.4)
CHLORIDE BLD-SCNC: 101 MMOL/L (ref 98–107)
CHOLESTEROL/HDL RATIO: 7
CHOLESTEROL: 188 MG/DL
CO2: 23 MMOL/L (ref 20–31)
CREAT SERPL-MCNC: 0.84 MG/DL (ref 0.5–0.9)
EKG ATRIAL RATE: 108 BPM
EKG P AXIS: 38 DEGREES
EKG P-R INTERVAL: 144 MS
EKG Q-T INTERVAL: 340 MS
EKG QRS DURATION: 82 MS
EKG QTC CALCULATION (BAZETT): 455 MS
EKG R AXIS: 48 DEGREES
EKG T AXIS: 51 DEGREES
EKG VENTRICULAR RATE: 108 BPM
EOSINOPHILS RELATIVE PERCENT: 2 % (ref 1–4)
EOSINOPHILS RELATIVE PERCENT: 3 % (ref 1–4)
GFR AFRICAN AMERICAN: >60 ML/MIN
GFR NON-AFRICAN AMERICAN: >60 ML/MIN
GFR SERPL CREATININE-BSD FRML MDRD: ABNORMAL ML/MIN/{1.73_M2}
GLUCOSE BLD-MCNC: 137 MG/DL (ref 65–105)
GLUCOSE BLD-MCNC: 162 MG/DL (ref 70–99)
GLUCOSE BLD-MCNC: 211 MG/DL (ref 65–105)
GLUCOSE BLD-MCNC: 217 MG/DL (ref 65–105)
GLUCOSE BLD-MCNC: 311 MG/DL (ref 65–105)
HCT VFR BLD CALC: 35.9 % (ref 36.3–47.1)
HCT VFR BLD CALC: 38.9 % (ref 36.3–47.1)
HDLC SERPL-MCNC: 27 MG/DL
HEMOGLOBIN: 12 G/DL (ref 11.9–15.1)
HEMOGLOBIN: 13.2 G/DL (ref 11.9–15.1)
IMMATURE GRANULOCYTES: 0 %
IMMATURE GRANULOCYTES: 0 %
LACTIC ACID, WHOLE BLOOD: 1.7 MMOL/L (ref 0.7–2.1)
LDL CHOLESTEROL DIRECT: 105 MG/DL
LDL CHOLESTEROL: ABNORMAL MG/DL (ref 0–130)
LYMPHOCYTES # BLD: 47 % (ref 24–44)
LYMPHOCYTES # BLD: 49 % (ref 24–43)
MAGNESIUM: 1.2 MG/DL (ref 1.6–2.6)
MCH RBC QN AUTO: 28.6 PG (ref 25.2–33.5)
MCH RBC QN AUTO: 29.1 PG (ref 25.2–33.5)
MCHC RBC AUTO-ENTMCNC: 33.4 G/DL (ref 28.4–34.8)
MCHC RBC AUTO-ENTMCNC: 33.9 G/DL (ref 28.4–34.8)
MCV RBC AUTO: 85.7 FL (ref 82.6–102.9)
MCV RBC AUTO: 85.7 FL (ref 82.6–102.9)
MONOCYTES # BLD: 4 % (ref 1–7)
MONOCYTES # BLD: 5 % (ref 3–12)
MORPHOLOGY: NORMAL
NRBC AUTOMATED: 0 PER 100 WBC
NRBC AUTOMATED: 0 PER 100 WBC
PDW BLD-RTO: 13.3 % (ref 11.8–14.4)
PDW BLD-RTO: 13.3 % (ref 11.8–14.4)
PLATELET # BLD: 159 K/UL (ref 138–453)
PLATELET # BLD: 169 K/UL (ref 138–453)
PMV BLD AUTO: 11.5 FL (ref 8.1–13.5)
PMV BLD AUTO: 12.3 FL (ref 8.1–13.5)
POTASSIUM SERPL-SCNC: 3.6 MMOL/L (ref 3.7–5.3)
RBC # BLD: 4.19 M/UL (ref 3.95–5.11)
RBC # BLD: 4.54 M/UL (ref 3.95–5.11)
SEG NEUTROPHILS: 43 % (ref 36–65)
SEG NEUTROPHILS: 47 % (ref 36–66)
SEGMENTED NEUTROPHILS ABSOLUTE COUNT: 5.54 K/UL (ref 1.8–7.7)
SEGMENTED NEUTROPHILS ABSOLUTE COUNT: 5.6 K/UL (ref 1.5–8.1)
SODIUM BLD-SCNC: 138 MMOL/L (ref 135–144)
TOTAL PROTEIN: 5.9 G/DL (ref 6.4–8.3)
TRIGL SERPL-MCNC: 406 MG/DL
WBC # BLD: 11.8 K/UL (ref 3.5–11.3)
WBC # BLD: 13 K/UL (ref 3.5–11.3)

## 2022-06-22 PROCEDURE — G0378 HOSPITAL OBSERVATION PER HR: HCPCS

## 2022-06-22 PROCEDURE — 76705 ECHO EXAM OF ABDOMEN: CPT

## 2022-06-22 PROCEDURE — 80048 BASIC METABOLIC PNL TOTAL CA: CPT

## 2022-06-22 PROCEDURE — 6370000000 HC RX 637 (ALT 250 FOR IP): Performed by: STUDENT IN AN ORGANIZED HEALTH CARE EDUCATION/TRAINING PROGRAM

## 2022-06-22 PROCEDURE — 82947 ASSAY GLUCOSE BLOOD QUANT: CPT

## 2022-06-22 PROCEDURE — 2580000003 HC RX 258: Performed by: STUDENT IN AN ORGANIZED HEALTH CARE EDUCATION/TRAINING PROGRAM

## 2022-06-22 PROCEDURE — G0378 HOSPITAL OBSERVATION PER HR: HCPCS | Performed by: EMERGENCY MEDICINE

## 2022-06-22 PROCEDURE — 83721 ASSAY OF BLOOD LIPOPROTEIN: CPT

## 2022-06-22 PROCEDURE — 70551 MRI BRAIN STEM W/O DYE: CPT

## 2022-06-22 PROCEDURE — 83605 ASSAY OF LACTIC ACID: CPT

## 2022-06-22 PROCEDURE — 36415 COLL VENOUS BLD VENIPUNCTURE: CPT

## 2022-06-22 PROCEDURE — 80076 HEPATIC FUNCTION PANEL: CPT

## 2022-06-22 PROCEDURE — 93010 ELECTROCARDIOGRAM REPORT: CPT | Performed by: INTERNAL MEDICINE

## 2022-06-22 PROCEDURE — 85025 COMPLETE CBC W/AUTO DIFF WBC: CPT

## 2022-06-22 PROCEDURE — 83735 ASSAY OF MAGNESIUM: CPT

## 2022-06-22 PROCEDURE — 80061 LIPID PANEL: CPT

## 2022-06-22 RX ADMIN — OXYCODONE 10 MG: 5 TABLET ORAL at 22:44

## 2022-06-22 RX ADMIN — GABAPENTIN 600 MG: 300 CAPSULE ORAL at 19:49

## 2022-06-22 RX ADMIN — CHLORHEXIDINE GLUCONATE 15 ML: 1.2 SOLUTION ORAL at 08:18

## 2022-06-22 RX ADMIN — MAGNESIUM GLUCONATE 500 MG ORAL TABLET 400 MG: 500 TABLET ORAL at 19:49

## 2022-06-22 RX ADMIN — OXYCODONE 10 MG: 5 TABLET ORAL at 05:31

## 2022-06-22 RX ADMIN — MAGNESIUM GLUCONATE 500 MG ORAL TABLET 400 MG: 500 TABLET ORAL at 08:17

## 2022-06-22 RX ADMIN — DESMOPRESSIN ACETATE 40 MG: 0.2 TABLET ORAL at 19:49

## 2022-06-22 RX ADMIN — OXYCODONE 10 MG: 5 TABLET ORAL at 16:22

## 2022-06-22 RX ADMIN — SODIUM CHLORIDE, SODIUM LACTATE, POTASSIUM CHLORIDE, AND CALCIUM CHLORIDE: 600; 310; 30; 20 INJECTION, SOLUTION INTRAVENOUS at 05:30

## 2022-06-22 RX ADMIN — SODIUM CHLORIDE, PRESERVATIVE FREE 10 ML: 5 INJECTION INTRAVENOUS at 19:50

## 2022-06-22 RX ADMIN — INSULIN LISPRO 2 UNITS: 100 INJECTION, SOLUTION INTRAVENOUS; SUBCUTANEOUS at 22:01

## 2022-06-22 RX ADMIN — OXYCODONE 10 MG: 5 TABLET ORAL at 09:46

## 2022-06-22 RX ADMIN — GABAPENTIN 600 MG: 300 CAPSULE ORAL at 08:18

## 2022-06-22 RX ADMIN — INSULIN LISPRO 4 UNITS: 100 INJECTION, SOLUTION INTRAVENOUS; SUBCUTANEOUS at 18:20

## 2022-06-22 RX ADMIN — SODIUM CHLORIDE, SODIUM LACTATE, POTASSIUM CHLORIDE, AND CALCIUM CHLORIDE: 600; 310; 30; 20 INJECTION, SOLUTION INTRAVENOUS at 00:57

## 2022-06-22 RX ADMIN — INSULIN GLARGINE 25 UNITS: 100 INJECTION, SOLUTION SUBCUTANEOUS at 22:00

## 2022-06-22 ASSESSMENT — PAIN DESCRIPTION - LOCATION
LOCATION: ABDOMEN
LOCATION: ABDOMEN

## 2022-06-22 ASSESSMENT — PAIN SCALES - GENERAL
PAINLEVEL_OUTOF10: 10
PAINLEVEL_OUTOF10: 7
PAINLEVEL_OUTOF10: 8
PAINLEVEL_OUTOF10: 4
PAINLEVEL_OUTOF10: 8

## 2022-06-22 ASSESSMENT — PAIN DESCRIPTION - ORIENTATION
ORIENTATION: RIGHT;LOWER
ORIENTATION: RIGHT;UPPER

## 2022-06-22 ASSESSMENT — PAIN DESCRIPTION - DESCRIPTORS: DESCRIPTORS: DISCOMFORT

## 2022-06-22 NOTE — PROGRESS NOTES
Physical Therapy        Physical Therapy Cancel Note      DATE: 2022    NAME: Kei Tran  MRN: [id number]   : 1995      Patient not seen this date for Physical Therapy due to:    Patient Declined: sleeping, PT to attempt later as able      Electronically signed by Rosalba Gordillo PT on 2022 at 1:28 PM

## 2022-06-22 NOTE — CONSULTS
medication. Patient is not sure if she is taking her antiseizure medication. EE2022  This EEG was abnormal due to continuous left posterior quadrant polymorphic theta slowing and frequent left temporal polymorphic delta slowing. Past Medical History:     Past Medical History:   Diagnosis Date    Cerebral vascular malformation     [email or web address]    Diabetes mellitus ([address]     Seizure ([address]     Traumatic hemorrhage of left cerebrum without loss of consciousness ([address]     @ [facility]        Past Surgical History:     Past Surgical History:   Procedure Laterality Date    CRANIOTOMY  2020    LEFT OCCIPITAL CRANIOTOMY    CRANIOTOMY Left 2020    LEFT OCCIPITAL CRANIOTOMY, 1ST VASCULAR LESION WITH [place] NAVIGATION (LATERAL WITH ROMERO BAG, HOLLINGSWORTH HEADHOLDER, MICROSCOPE) performed by William Angela DO at [place] [address]        Medications Prior to Admission:     Prior to Admission medications    Medication Sig Start Date End Date Taking?  Authorizing Provider   naproxen sodium (ANAPROX) 550 MG tablet Take 1 tablet by mouth 2 times daily as needed for Pain (Headache) 21   Kaushal Barker MD   promethazine (PHENERGAN) 12.5 MG tablet Take 1 tablet by mouth 2 times daily as needed for Nausea (moderate to severe headache) 21   Kaushal Barker MD   ondansetron (ZOFRAN) 4 MG tablet Take 1 tablet by mouth every 12 hours as needed for Nausea or Vomiting 21   Kaushal Barker MD   insulin lispro (HUMALOG) 100 UNIT/ML injection vial Inject 0-12 Units into the skin 3 times daily (with meals)  Patient not taking: Reported on 2020   Kia Leggett MD   Magnesium Oxide (MAG-OXIDE) 200 MG TABS Take 1 tablet by mouth 2 times daily 20   Kia Leggett MD   insulin detemir (LEVEMIR FLEXTOUCH) 100 UNIT/ML injection pen Inject 25 Units into the skin nightly  Patient not taking: Reported on 3/4/2021 2/25/20   Kia Leggett MD   insulin aspart (NOVOLOG FLEXPEN) 100 UNIT/ML injection pen Inject 5 Units into the skin 3 times daily (before meals)  Patient not taking: Reported on 3/4/2021 2/25/20   Nicole Meeks MD   glucose monitoring kit (FREESTYLE) monitoring kit 1 kit by Does not apply route daily  Patient not taking: Reported on 7/20/2021 2/25/20   Nicole Meeks MD   blood glucose monitor strips Test 3 times a day & as needed for symptoms of irregular blood glucose. Patient not taking: Reported on 7/20/2021 2/25/20   Nicole Meeks MD   Lancets MISC 1 each by Does not apply route 3 times daily  Patient not taking: Reported on 3/4/2021 2/25/20   Nicole Meeks MD   Alcohol Swabs (ALCOHOL PREP) 70 % PADS 1 pad TID  Patient not taking: Reported on 7/20/2021 2/25/20   Nicole Meeks MD   Insulin Pen Needle 30G X 8 MM MISC 1 each by Does not apply route daily  Patient not taking: Reported on 3/4/2021 2/25/20   Nicole Meeks MD   scopolamine (TRANSDERM-SCOP) transdermal patch Place 1 patch onto the skin every 72 hours  Patient not taking: Reported on 4/2/2020 1/29/20   Gigi Rowe MD   levETIRAcetam (KEPPRA) 500 MG tablet Take 1 tablet by mouth 2 times daily for 7 days  Patient not taking: Reported on 4/2/2020 1/10/20 2/26/20  Jamal Kehr, MD   lamoTRIgine (LAMICTAL) 25 MG tablet Take 25 mg by mouth daily  Patient not taking: Reported on 1/31/2022    Historical Provider, MD        Allergies:     Latex and Prozac [fluoxetine hcl]    Social History:     Tobacco:    reports that she has been smoking cigarettes. She has a 2.70 pack-year smoking history. She has never used smokeless tobacco.  Alcohol:      reports current alcohol use. Drug Use:  reports current drug use. Drug: Marijuana Swati Gouty). Family History:     History reviewed. No pertinent family history.     Review of Systems:       Constitutional Negative for fever and chills   HEENT Negative for ear discharge, ear pain, nosebleed   Eyes Negative for photophobia, pain and discharge   Respiratory Negative for hemoptysis and sputum Cardiovascular Negative for orthopnea, claudication and PND   Gastrointestinal Negative for abdominal pain, diarrhea, blood in stool   Musculoskeletal Negative for joint pain, negative for myalgia   Skin Negative for rash or itching   hematology Negative for ecchymosis, anemia   Psychiatric Negative for suicidal ideation, anxiety, depression, hallucinations       Physical Exam:   BP (!) 139/94   Pulse 83   Temp 97.7 °F (36.5 °C) (Oral)   Resp 18   Ht 5' 2\" (1.575 m)   Wt 147 lb 4.3 oz (66.8 kg)   SpO2 97%   BMI 26.94 kg/m²   Temp (24hrs), Av.7 °F (36.5 °C), Min:97.7 °F (36.5 °C), Max:97.7 °F (36.5 °C)        General examination:      General Appearance:  alert, well appearing, and in no acute distress  HEENT: Normocephalic, atraumatic, moist mucus membranes  Neck: supple, no carotid bruits, (-) nuchal rigidity  Lungs:  Respirations unlabored, chest wall no deformity, BS normal  Cardiovascular: normal rate, regular rhythm  Abdomen: Soft, nontender, nondistended, normal bowel sounds  Skin: No gross lesions, rashes, bruising or bleeding on exposed skin area  Extremities:  peripheral pulses palpable, clubbing or edema  Psych: normal affect    NEUROLOGIC EXAMINATION    Mental status   Alert and oriented x 3; following all commands;   speech is fluent, no dysarthria, aphasia.       Cranial nerves   II - visual fields intact to confrontation; pupils reactive  III, IV, VI - extraocular muscles intact; no ELIO; no nystagmus; no ptosis   V - normal facial sensation                                                               VII - normal facial symmetry                                                             VIII - intact hearing                                                                             IX, X - symmetrical palate elevation                                               XI - symmetrical shoulder shrug                                                       XII - midline tongue without atrophy or fasciculation     Motor function  Strength:   5/5 RUE, 5/5 RLE  5/5 LUE, 5/5  LLE  Normal bulk and tone. Sensory function Intact to touch, pin, vibration, proprioception throughout     Cerebellar Intact finger-nose-finger testing. Intact heel-shin testing. No dysdiadochokinesia present. No tremors                        Reflex function 2/4 symmetric throughout . Downgoing plantar response bilaterally. (-)Garcia's sign bilaterally      Gait                  Normal station and gait. Normal Tandem, tip toes and heel walking             Diagnostics:      Laboratory Testing:  CBC:   Recent Labs     06/21/22  0919 06/22/22  0657 06/22/22  1645   WBC 11.1 13.0* 11.8*   HGB 13.8 12.0 13.2    159 169     BMP:    Recent Labs     06/21/22  0919 06/21/22  1351 06/22/22  0657   * 141 138   K 3.3* 3.7 3.6*   CL 95* 100 101   CO2 21 27 23   BUN 14 12 12   CREATININE 0.89 0.89 0.84   GLUCOSE 433* 160* 162*         Lab Results   Component Value Date    CHOL 188 06/22/2022    LDLCHOLESTEROL      06/22/2022    HDL 27 (L) 06/22/2022    TRIG 406 (H) 06/22/2022    ALT 16 06/22/2022    AST 19 06/22/2022    TSH 1.52 06/21/2022    INR 1.0 02/18/2020    LABA1C 13.1 (H) 02/19/2020       No results found for: PHENYTOIN, PHENYTOIN, VALPROATE, CBMZ      Imaging/Diagnostics:        EEG :  2/9/2022  This EEG was abnormal due to continuous left posterior quadrant polymorphic theta slowing and frequent left temporal polymorphic delta slowing.     Clinical correlation  This EEG was abnormal.  Continuous left posterior quadrant polymorphic theta slowing suggested underlying structural defect. No abnormal epileptiform activity was seen. CTA head and neck : Pending       MRI Brain 06/23/22  Cortical based restricted diffusion identified left parasagittal parietal   lobe and occipital lobe.  This could be related to acute stroke     Postsurgical changes left occipital lobe status post cavernoma resection.    Adjacent areas of late identified and minimal area of gradient signal mild   may represent postsurgical changes versus residual cavernoma. 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 MRI showed restricted diffusion in left parietal lobe, occipital lobe. Lamotrigine  25 mg daily  Continue Keppra 500 mg twice daily for now  We will hold off doing EEG  CTA head and neck, CT venogram   Echo with bubble study  Aspirin 81  Lipid profile/hemoglobin A1c  Lipitor 80 adjust as per lipid profile.   Neuro ophthalmic evaluation            Electronically signed by Reba Dick MD on 6/23/2022 at 1:14 AM      Herrera Campbell MD   PGY 3 Neurology Resident  6/23/2022 at 1:14 AM

## 2022-06-23 ENCOUNTER — APPOINTMENT (OUTPATIENT)
Dept: CT IMAGING | Age: 27
DRG: 420 | End: 2022-06-23
Payer: MEDICARE

## 2022-06-23 ENCOUNTER — APPOINTMENT (OUTPATIENT)
Dept: INTERVENTIONAL RADIOLOGY/VASCULAR | Age: 27
DRG: 420 | End: 2022-06-23
Payer: MEDICARE

## 2022-06-23 PROBLEM — I63.9 ACUTE CVA (CEREBROVASCULAR ACCIDENT) (HCC): Status: ACTIVE | Noted: 2022-06-23

## 2022-06-23 PROBLEM — E66.3 OVERWEIGHT (BMI 25.0-29.9): Status: ACTIVE | Noted: 2022-06-23

## 2022-06-23 PROBLEM — K59.00 CONSTIPATION: Status: ACTIVE | Noted: 2022-06-23

## 2022-06-23 PROBLEM — R42 DIZZINESS: Status: ACTIVE | Noted: 2022-06-23

## 2022-06-23 PROBLEM — Z72.0 TOBACCO ABUSE: Status: ACTIVE | Noted: 2022-06-23

## 2022-06-23 PROBLEM — F41.9 ANXIETY: Status: ACTIVE | Noted: 2022-06-23

## 2022-06-23 LAB
APPEARANCE CSF: CLEAR
CRYPTOCOCCUS NEOFORMANS/GATTI CSF FILM ARR.: NOT DETECTED
CYTOMEGALOVIRUS (CMV) CSF FILM ARRAY: NOT DETECTED
ENTEROVIRUS CSF FILM ARRAY: NOT DETECTED
ESCHERICHIA COLI K1 CSF FILM ARRAY: NOT DETECTED
ESTIMATED AVERAGE GLUCOSE: 332 MG/DL
GLUCOSE BLD-MCNC: 154 MG/DL (ref 65–105)
GLUCOSE BLD-MCNC: 198 MG/DL (ref 65–105)
GLUCOSE BLD-MCNC: 233 MG/DL (ref 65–105)
GLUCOSE, CSF: 125 MG/DL (ref 40–70)
HAEMOPHILUS INFLUENZA CSF FILM ARRAY: NOT DETECTED
HBA1C MFR BLD: 13.2 % (ref 4–6)
HHV-6 (HERPESVIRUS 6) CSF FILM ARRAY: NOT DETECTED
HSV-1 CSF FILM ARRAY: NOT DETECTED
HSV-2 CSF FILM ARRAY: NOT DETECTED
LISTERIA MONOCYTOGENES CSF FILM ARRAY: NOT DETECTED
LV EF: 58 %
LVEF MODALITY: NORMAL
NEISSERIA MENIGITIDIS CSF FILM ARRAY: NOT DETECTED
PARECHOVIRUS CSF FILM ARRAY: NOT DETECTED
PROTEIN CSF: 29.4 MG/DL (ref 15–45)
RBC CSF: 0 /MM3
SPECIMEN DESCRIPTION: NORMAL
STREPTOCOCCUS AGALACTIAE CSF FILM ARRAY: NOT DETECTED
STREPTOCOCCUS PNEUMONIAE CSF FILM ARRAY: NOT DETECTED
TUBE NUMBER CSF: 3
VARICELLA-ZOSTER CSF FILM ARRAY: NOT DETECTED
VOLUME CSF: 5
WBC CSF: 4 /MM3
XANTHOCHROMIA: NORMAL

## 2022-06-23 PROCEDURE — 89050 BODY FLUID CELL COUNT: CPT

## 2022-06-23 PROCEDURE — 6370000000 HC RX 637 (ALT 250 FOR IP): Performed by: STUDENT IN AN ORGANIZED HEALTH CARE EDUCATION/TRAINING PROGRAM

## 2022-06-23 PROCEDURE — 1200000000 HC SEMI PRIVATE

## 2022-06-23 PROCEDURE — 99223 1ST HOSP IP/OBS HIGH 75: CPT | Performed by: INTERNAL MEDICINE

## 2022-06-23 PROCEDURE — 6360000002 HC RX W HCPCS: Performed by: STUDENT IN AN ORGANIZED HEALTH CARE EDUCATION/TRAINING PROGRAM

## 2022-06-23 PROCEDURE — 95819 EEG AWAKE AND ASLEEP: CPT

## 2022-06-23 PROCEDURE — 95819 EEG AWAKE AND ASLEEP: CPT | Performed by: PSYCHIATRY & NEUROLOGY

## 2022-06-23 PROCEDURE — 86255 FLUORESCENT ANTIBODY SCREEN: CPT

## 2022-06-23 PROCEDURE — 82945 GLUCOSE OTHER FLUID: CPT

## 2022-06-23 PROCEDURE — 36415 COLL VENOUS BLD VENIPUNCTURE: CPT

## 2022-06-23 PROCEDURE — 87483 CNS DNA AMP PROBE TYPE 12-25: CPT

## 2022-06-23 PROCEDURE — 93306 TTE W/DOPPLER COMPLETE: CPT

## 2022-06-23 PROCEDURE — 99222 1ST HOSP IP/OBS MODERATE 55: CPT | Performed by: PSYCHIATRY & NEUROLOGY

## 2022-06-23 PROCEDURE — 94761 N-INVAS EAR/PLS OXIMETRY MLT: CPT

## 2022-06-23 PROCEDURE — 97166 OT EVAL MOD COMPLEX 45 MIN: CPT

## 2022-06-23 PROCEDURE — 2580000003 HC RX 258: Performed by: STUDENT IN AN ORGANIZED HEALTH CARE EDUCATION/TRAINING PROGRAM

## 2022-06-23 PROCEDURE — G0108 DIAB MANAGE TRN  PER INDIV: HCPCS

## 2022-06-23 PROCEDURE — 2709999900 HC NON-CHARGEABLE SUPPLY

## 2022-06-23 PROCEDURE — 84157 ASSAY OF PROTEIN OTHER: CPT

## 2022-06-23 PROCEDURE — 83036 HEMOGLOBIN GLYCOSYLATED A1C: CPT

## 2022-06-23 PROCEDURE — 87070 CULTURE OTHR SPECIMN AEROBIC: CPT

## 2022-06-23 PROCEDURE — 6370000000 HC RX 637 (ALT 250 FOR IP): Performed by: INTERNAL MEDICINE

## 2022-06-23 PROCEDURE — 82947 ASSAY GLUCOSE BLOOD QUANT: CPT

## 2022-06-23 PROCEDURE — 009U3ZX DRAINAGE OF SPINAL CANAL, PERCUTANEOUS APPROACH, DIAGNOSTIC: ICD-10-PCS | Performed by: RADIOLOGY

## 2022-06-23 PROCEDURE — 70498 CT ANGIOGRAPHY NECK: CPT

## 2022-06-23 PROCEDURE — 86341 ISLET CELL ANTIBODY: CPT

## 2022-06-23 PROCEDURE — 97162 PT EVAL MOD COMPLEX 30 MIN: CPT

## 2022-06-23 PROCEDURE — 87015 SPECIMEN INFECT AGNT CONCNTJ: CPT

## 2022-06-23 PROCEDURE — 62328 DX LMBR SPI PNXR W/FLUOR/CT: CPT

## 2022-06-23 PROCEDURE — 88112 CYTOPATH CELL ENHANCE TECH: CPT

## 2022-06-23 PROCEDURE — 87205 SMEAR GRAM STAIN: CPT

## 2022-06-23 PROCEDURE — 6360000004 HC RX CONTRAST MEDICATION: Performed by: FAMILY MEDICINE

## 2022-06-23 PROCEDURE — 97530 THERAPEUTIC ACTIVITIES: CPT

## 2022-06-23 RX ORDER — POLYETHYLENE GLYCOL 3350 17 G/17G
17 POWDER, FOR SOLUTION ORAL DAILY
Status: DISCONTINUED | OUTPATIENT
Start: 2022-06-23 | End: 2022-06-25 | Stop reason: HOSPADM

## 2022-06-23 RX ORDER — DEXTROSE MONOHYDRATE 50 MG/ML
100 INJECTION, SOLUTION INTRAVENOUS PRN
Status: DISCONTINUED | OUTPATIENT
Start: 2022-06-23 | End: 2022-06-25 | Stop reason: HOSPADM

## 2022-06-23 RX ORDER — KETOROLAC TROMETHAMINE 15 MG/ML
15 INJECTION, SOLUTION INTRAMUSCULAR; INTRAVENOUS ONCE
Status: COMPLETED | OUTPATIENT
Start: 2022-06-23 | End: 2022-06-23

## 2022-06-23 RX ORDER — GLUCAGON 1 MG/ML
1 KIT INJECTION PRN
Status: DISCONTINUED | OUTPATIENT
Start: 2022-06-23 | End: 2022-06-25 | Stop reason: HOSPADM

## 2022-06-23 RX ADMIN — KETOROLAC TROMETHAMINE 15 MG: 15 INJECTION, SOLUTION INTRAMUSCULAR; INTRAVENOUS at 10:25

## 2022-06-23 RX ADMIN — INSULIN LISPRO 4 UNITS: 100 INJECTION, SOLUTION INTRAVENOUS; SUBCUTANEOUS at 17:11

## 2022-06-23 RX ADMIN — PROMETHAZINE HYDROCHLORIDE 12.5 MG: 12.5 TABLET ORAL at 10:25

## 2022-06-23 RX ADMIN — LEVETIRACETAM 500 MG: 500 TABLET, FILM COATED ORAL at 12:19

## 2022-06-23 RX ADMIN — OXYCODONE 10 MG: 5 TABLET ORAL at 10:25

## 2022-06-23 RX ADMIN — MAGNESIUM GLUCONATE 500 MG ORAL TABLET 400 MG: 500 TABLET ORAL at 12:17

## 2022-06-23 RX ADMIN — BUSPIRONE HYDROCHLORIDE 10 MG: 10 TABLET ORAL at 12:17

## 2022-06-23 RX ADMIN — POLYETHYLENE GLYCOL 3350 17 G: 17 POWDER, FOR SOLUTION ORAL at 17:08

## 2022-06-23 RX ADMIN — SODIUM CHLORIDE, PRESERVATIVE FREE 10 ML: 5 INJECTION INTRAVENOUS at 21:16

## 2022-06-23 RX ADMIN — OXYCODONE 10 MG: 5 TABLET ORAL at 05:47

## 2022-06-23 RX ADMIN — MAGNESIUM GLUCONATE 500 MG ORAL TABLET 400 MG: 500 TABLET ORAL at 20:46

## 2022-06-23 RX ADMIN — CHLORHEXIDINE GLUCONATE 15 ML: 1.2 SOLUTION ORAL at 20:46

## 2022-06-23 RX ADMIN — GABAPENTIN 600 MG: 300 CAPSULE ORAL at 20:45

## 2022-06-23 RX ADMIN — INSULIN GLARGINE 25 UNITS: 100 INJECTION, SOLUTION SUBCUTANEOUS at 21:55

## 2022-06-23 RX ADMIN — GABAPENTIN 600 MG: 300 CAPSULE ORAL at 12:17

## 2022-06-23 RX ADMIN — IOPAMIDOL 90 ML: 755 INJECTION, SOLUTION INTRAVENOUS at 08:38

## 2022-06-23 RX ADMIN — OXYCODONE 10 MG: 5 TABLET ORAL at 17:08

## 2022-06-23 RX ADMIN — ONDANSETRON HYDROCHLORIDE 4 MG: 4 TABLET, FILM COATED ORAL at 09:10

## 2022-06-23 ASSESSMENT — PAIN SCALES - GENERAL
PAINLEVEL_OUTOF10: 7
PAINLEVEL_OUTOF10: 9
PAINLEVEL_OUTOF10: 7

## 2022-06-23 ASSESSMENT — PULMONARY FUNCTION TESTS
PIF_VALUE: 1
PIF_VALUE: 1

## 2022-06-23 NOTE — PROGRESS NOTES
OBS/CDU   RESIDENT NOTE      Patients PCP is:  Candace Valle        SUBJECTIVE      No acute events overnight. Does note some dizziness and stuttering. No numbness, weakness, tingling. Abd pain improved. Tolerating PO. Had MRI yesterday afternoon. Neurology consulted. PHYSICAL EXAM      General: NAD, AO X 3  Heent: EMOI, PERRL  Neck: SUPPLE, NO JVD  Cardiovascular: RRR, S1S2  Pulmonary: CTAB, NO SOB  Abdomen: SOFT, NTTP, ND, +BS  Extremities: +2/4 PULSES DISTAL, NO SWELLING  Neuro / Psych: NO NUMBNESS OR TINGLING, MENTATION AT BASELINE    PERTINENT TEST /EXAMS      I have reviewed all available laboratory results.     MEDICATIONS CURRENT   docusate sodium (COLACE) capsule 100 mg, BID PRN  busPIRone (BUSPAR) tablet 10 mg, TID  lamoTRIgine (LAMICTAL) tablet 25 mg, Daily  levETIRAcetam (KEPPRA) tablet 500 mg, BID  atorvastatin (LIPITOR) tablet 40 mg, Nightly  scopolamine (TRANSDERM-SCOP) transdermal patch 1 patch, Q72H  butalbital-acetaminophen-caffeine (FIORICET, ESGIC) per tablet 1 tablet, Q4H PRN  magnesium oxide (MAG-OX) tablet 400 mg, BID  chlorhexidine (PERIDEX) 0.12 % solution 15 mL, BID  diphenhydrAMINE (BENADRYL) tablet 25 mg, BID PRN  promethazine (PHENERGAN) tablet 12.5 mg, BID PRN  ondansetron (ZOFRAN) tablet 4 mg, Q12H PRN  gabapentin (NEURONTIN) capsule 600 mg, TID  insulin glargine (LANTUS) injection vial 25 Units, Nightly  sodium chloride flush 0.9 % injection 5-40 mL, 2 times per day  sodium chloride flush 0.9 % injection 5-40 mL, PRN  0.9 % sodium chloride infusion, PRN  enoxaparin (LOVENOX) injection 40 mg, Daily  acetaminophen (TYLENOL) tablet 650 mg, Q4H PRN  oxyCODONE (ROXICODONE) immediate release tablet 5 mg, Q4H PRN   Or  oxyCODONE (ROXICODONE) immediate release tablet 10 mg, Q4H PRN  fentaNYL (SUBLIMAZE) injection 25 mcg, Q1H PRN   Or  fentaNYL (SUBLIMAZE) injection 50 mcg, Q1H PRN  insulin lispro (HUMALOG) injection vial 0-12 Units, TID WC  insulin lispro (HUMALOG) injection vial 0-6 Units, Nightly  lactated ringers 1,000 mL infusion, Continuous  potassium chloride (KLOR-CON M) extended release tablet 40 mEq, PRN   Or  potassium bicarb-citric acid (EFFER-K) effervescent tablet 40 mEq, PRN   Or  potassium chloride 10 mEq/100 mL IVPB (Peripheral Line), PRN        All medication charted and reviewed. CONSULTS      IP CONSULT TO INTERNAL MEDICINE  IP CONSULT TO DIABETES EDUCATOR  IP CONSULT TO NEUROLOGY    ASSESSMENT/PLAN       Suha Orellana is a 32 y.o. female who presents with        · Uncontrolled hyperglycemia  ? Patient has been noncompliant   ? Glucose monitoring  ? Insulin sliding scale  ? Added back patient's Lantus  · Abdominal pain  ? Further evaluated with gallbladder ultrasound. ? Contracted gallbladder and possible sludge in gallbladder. Will reassess clinically tomorrow. · Concern for CVA. ? MRI performed. - concern for acute stroke based on cortical based restricted diffussion L parasagittal parietal lobe and occipital lobe  ? Neurology consulted. Appreciate input. · Continue home medications and pain control  · Monitor vitals, labs, and imaging  · DISPO: pending consults and clinical improvement  ·     --  Kamran Huerta MD  Emergency Medicine Resident Physician     This dictation was generated by voice recognition computer software. Although all attempts are made to edit the dictation for accuracy, there may be errors in the transcription that are not intended.

## 2022-06-23 NOTE — H&P
[facility]  Office: [address], DO, Ned Alys, DO, Tj Chiu, DO, Kayce Birch Blood, DO, Salvador Cohen MD, Fatuma Shannon MD, Reva Ontiveros MD, Duran Ziegler MD, Fox Jones MD, Shana Galarza MD, Cassie Pavon MD, Viola Tello, DO, Christy Don MD,  Rhina Muller MD, Colleen lCay MD, Coye Meigs, DO, Dario Lizama MD, Sherif Early MD, Adonis Valdivia DO, Wei Fraser MD, Thania Isaac MD, Malaika Kiran, [facility], [facility] Jamiearturo, CNP, Dav Moreland, CNP, Gwendolyn Prasad, CNP, Yazmin Wang, CNP, Gudelia Bhakta, CNP, [address] PA-C, María Herrera, DNP, Chinyere Maria, CNP, Luiza Salmon, CNP, Kieran Lee, CNP, Rina Wynn, CNS, Misael Elias, DNP, Pranav Sahu, CNP, Haim Lorenzo, CNP, Haven Day, CNP         [facility]   [address] [facility]    HISTORY AND PHYSICAL EXAMINATION            Date:   6/23/2022  Patient name:  Dusty Senior  Date of admission:  6/21/2022  9:04 AM  MRN:   [id number]  Account:  [de-identified]  YOB: 1995  PCP:    Kurtis Valle  Room:   [address] [place] [address]  Code Status:    Full Code    Chief Complaint:     Chief Complaint   Patient presents with    Dizziness    Abdominal Pain     x1 week    Chest Pain       History Obtained From:     patient, electronic medical record    History of Present Illness:     Dusty Senior is a 32y.o. year old female who initially presented to our hospital for evaluation of abdominal pain. Pt was found to be hyperglycemic with glucose in the 470's. She was admitted and started on Lanuts 25 units with ISS with improvement in her glucose. Her abdominal pain also resolved. Patient was complaining of dizziness so MRI of her brain showed a cortical based restricted diffusion identified left parasagittal parietal lobe and occipital lobe that could be related to an acute stroke so patient was transferred out of observation to our service. Currently, pt is resting in bed. She is comfortable in no acute distress. She is complaining of some blurry vision which she says has been ongoing for a week. She also is having some stuttering and word finding difficulties which she said just started . She also is complaining of dizziness made worse when standing. She denies any fevers, chills, nausea, vomiting or diarrhea. Past Medical History:     Past Medical History:   Diagnosis Date    Cerebral vascular malformation     [email or web address]    Diabetes mellitus ([facility]     Seizure ([facility]     Traumatic hemorrhage of left cerebrum without loss of consciousness ([facility] [facility]     @ [facility]        Past Surgical History:     Past Surgical History:   Procedure Laterality Date    CRANIOTOMY  01/08/2020    LEFT OCCIPITAL CRANIOTOMY    CRANIOTOMY Left 1/8/2020    LEFT OCCIPITAL CRANIOTOMY, 1ST VASCULAR LESION WITH RemoteReality NAVIGATION (LATERAL WITH ROMERO BAG, HOLLINGSWORTH HEADHOLDER, MICROSCOPE) performed by Cristofer Cruz DO at [place] [address]        Medications Prior to Admission:     Prior to Admission medications    Medication Sig Start Date End Date Taking?  Authorizing Provider   naproxen sodium (ANAPROX) 550 MG tablet Take 1 tablet by mouth 2 times daily as needed for Pain (Headache) 7/20/21   Sharonda Luque MD   promethazine (PHENERGAN) 12.5 MG tablet Take 1 tablet by mouth 2 times daily as needed for Nausea (moderate to severe headache) 7/20/21   Sharonda Luque MD   ondansetron (ZOFRAN) 4 MG tablet Take 1 tablet by mouth every 12 hours as needed for Nausea or Vomiting 7/20/21   Sharonda Luque MD   insulin lispro (HUMALOG) 100 UNIT/ML injection vial Inject 0-12 Units into the skin 3 times daily (with meals)  Patient not taking: Reported on 7/7/2020 2/25/20   Clark Wasserman MD   Magnesium Oxide (MAG-OXIDE) 200 MG TABS Take 1 tablet by mouth 2 times daily 2/25/20   Clark Wasserman MD   insulin detemir (LEVEMIR FLEXTOUCH) 100 UNIT/ML injection pen Inject 25 Units into the skin nightly  Patient not taking: Reported on 3/4/2021 2/25/20   Kia Leggett MD   insulin aspart (NOVOLOG FLEXPEN) 100 UNIT/ML injection pen Inject 5 Units into the skin 3 times daily (before meals)  Patient not taking: Reported on 3/4/2021 2/25/20   Kia Leggett MD   glucose monitoring kit (FREESTYLE) monitoring kit 1 kit by Does not apply route daily  Patient not taking: Reported on 7/20/2021 2/25/20   Kia Leggett MD   blood glucose monitor strips Test 3 times a day & as needed for symptoms of irregular blood glucose. Patient not taking: Reported on 7/20/2021 2/25/20   Kia Leggett MD   Lancets MISC 1 each by Does not apply route 3 times daily  Patient not taking: Reported on 3/4/2021 2/25/20   Kia Leggett MD   Alcohol Swabs (ALCOHOL PREP) 70 % PADS 1 pad TID  Patient not taking: Reported on 7/20/2021 2/25/20   Kia Leggett MD   Insulin Pen Needle 30G X 8 MM MISC 1 each by Does not apply route daily  Patient not taking: Reported on 3/4/2021 2/25/20   Kia Leggett MD   scopolamine (TRANSDERM-SCOP) transdermal patch Place 1 patch onto the skin every 72 hours  Patient not taking: Reported on 4/2/2020 1/29/20   Jun Morfin MD   levETIRAcetam (KEPPRA) 500 MG tablet Take 1 tablet by mouth 2 times daily for 7 days  Patient not taking: Reported on 4/2/2020 1/10/20 2/26/20  Henry Orta MD   lamoTRIgine (LAMICTAL) 25 MG tablet Take 25 mg by mouth daily  Patient not taking: Reported on 1/31/2022    Historical Provider, MD        Allergies:     Latex and Prozac [fluoxetine hcl]    Social History:     Tobacco:    reports that she has been smoking cigarettes. She has a 4.20 pack-year smoking history. She has never used smokeless tobacco.  Alcohol:      reports current alcohol use. Drug Use:  reports current drug use. Drug: Marijuana Champ Cue).     Family History:     Family History   Problem Relation Age of Onset    Colon Cancer Mother     No Known Problems Father     No Known Problems Brother  Diabetes Maternal Grandfather        Review of Systems:     Positive and Negative as described in HPI.     CONSTITUTIONAL:  negative for fevers, chills, sweats, fatigue, weight loss  HEENT:  + vision changes described as blurring, no vision loss, hearing changes, runny nose, throat pain  RESPIRATORY:  negative for shortness of breath, cough, congestion, wheezing  CARDIOVASCULAR:  negative for chest pain, palpitations  GASTROINTESTINAL:  +nausea,+ vomiting, denies diarrhea, admits to constipation, denies change in bowel habits, abdominal pain   GENITOURINARY:  negative for difficulty of urination, burning with urination, frequency   INTEGUMENT:  negative for rash, skin lesions, easy bruising   HEMATOLOGIC/LYMPHATIC:  negative for swelling/edema   ALLERGIC/IMMUNOLOGIC:  negative for urticaria , itching  ENDOCRINE:  negative increase in drinking, increase in urination, hot or cold intolerance  MUSCULOSKELETAL:  negative joint pains, muscle aches, swelling of joints  NEUROLOGICAL:  negative for headaches, dizziness, lightheadedness, numbness, pain, tingling extremities  BEHAVIOR/PSYCH:  negative for depression, anxiety    Physical Exam:   BP (!) 157/104   Pulse 77   Temp 98.1 °F (36.7 °C) (Oral)   Resp 16   Ht 5' 2\" (1.575 m)   Wt 147 lb 4.3 oz (66.8 kg)   SpO2 96%   BMI 26.94 kg/m²   Temp (24hrs), Av °F (36.7 °C), Min:97.7 °F (36.5 °C), Max:98.1 °F (36.7 °C)    Recent Labs     22  1126 22  1621 22  2149 22  0710   POCGLU 311* 211* 217* 154*     No intake or output data in the 24 hours ending 22 1302    General Appearance: alert, well appearing, and in no acute distress  Mental status: oriented to person, place, and time  Head: normocephalic, atraumatic  Eye: no icterus, redness, pupils equal and reactive, extraocular eye movements intact, conjunctiva clear  Ear: normal external ear, no discharge, hearing intact  Nose: no drainage noted  Mouth: mucous membranes moist  Neck: supple, no carotid bruits, thyroid not palpable  Lungs: Bilateral equal air entry, clear to ausculation, no wheezing, rales or rhonchi, normal effort  Cardiovascular: normal rate, regular rhythm, no murmur, gallop, rub  Abdomen: Soft, nontender, nondistended, normal bowel sounds, no hepatomegaly or splenomegaly  Neurologic: There are no focal motor or sensory deficits, normal muscle tone and bulk, no abnormal sensation, she is having difficulty with  speech, but her cranial nerves II through XII grossly intact  Skin: No gross lesions, rashes, bruising or bleeding on exposed skin area  Extremities: peripheral pulses palpable, no pedal edema or calf pain with palpation  Psych: normal affect    Investigations:      Laboratory Testing:  Recent Results (from the past 24 hour(s))   POC Glucose Fingerstick    Collection Time: 06/22/22  4:21 PM   Result Value Ref Range    POC Glucose 211 (H) 65 - 105 mg/dL   CBC with Auto Differential    Collection Time: 06/22/22  4:45 PM   Result Value Ref Range    WBC 11.8 (H) 3.5 - 11.3 k/uL    RBC 4.54 3.95 - 5.11 m/uL    Hemoglobin 13.2 11.9 - 15.1 g/dL    Hematocrit 38.9 36.3 - 47.1 %    MCV 85.7 82.6 - 102.9 fL    MCH 29.1 25.2 - 33.5 pg    MCHC 33.9 28.4 - 34.8 g/dL    RDW 13.3 11.8 - 14.4 %    Platelets 340 037 - 291 k/uL    MPV 11.5 8.1 - 13.5 fL    NRBC Automated 0.0 0.0 per 100 WBC    Immature Granulocytes 0 0 %    Seg Neutrophils 47 36 - 66 %    Lymphocytes 47 (H) 24 - 44 %    Monocytes 4 1 - 7 %    Eosinophils % 2 1 - 4 %    Basophils 0 0 - 2 %    Absolute Immature Granulocyte 0.00 0.00 - 0.30 k/uL    Segs Absolute 5.54 1.8 - 7.7 k/uL    Absolute Lymph # 5.55 (H) 1.0 - 4.8 k/uL    Absolute Mono # 0.47 0.1 - 0.8 k/uL    Absolute Eos # 0.24 0.0 - 0.4 k/uL    Basophils Absolute 0.00 0.0 - 0.2 k/uL    Morphology Normal    LIPID PANEL    Collection Time: 06/22/22  8:24 PM   Result Value Ref Range    Cholesterol 188 <200 mg/dL    HDL 27 (L) >40 mg/dL    LDL Cholesterol      0 - 130 mg/dL    Chol/HDL Ratio 7.0 (H) <5    Triglycerides 406 (H) <150 mg/dL   LDL Cholesterol, Direct    Collection Time: 06/22/22  8:24 PM   Result Value Ref Range    LDL Direct 105 (H) <100 mg/dL   POC Glucose Fingerstick    Collection Time: 06/22/22  9:49 PM   Result Value Ref Range    POC Glucose 217 (H) 65 - 105 mg/dL   POC Glucose Fingerstick    Collection Time: 06/23/22  7:10 AM   Result Value Ref Range    POC Glucose 154 (H) 65 - 105 mg/dL       Imaging/Diagnostics:  US GALLBLADDER RUQ    Result Date: 6/22/2022  1. Contracted gallbladder limiting assessment. Question sludge in the gallbladder. 2.  No ductal dilatation. No gallbladder wall thickening or pericholecystic fluid. No definite evidence of acute cholecystitis. Other findings as above. XR CHEST PORTABLE    Result Date: 6/21/2022  No acute cardiopulmonary disease. MRI BRAIN WO CONTRAST    Result Date: 6/22/2022  Cortical based restricted diffusion identified left parasagittal parietal lobe and occipital lobe. This could be related to acute stroke Postsurgical changes left occipital lobe status post cavernoma resection. Adjacent areas of late identified and minimal area of gradient signal mild may represent postsurgical changes versus residual cavernoma. Assessment :      Hospital Problems           Last Modified POA    * (Principal) Acute CVA (cerebrovascular accident) ([address] 6/23/2022 Yes    Dizziness 6/23/2022 Yes    Overweight (BMI 25.0-29.9) 6/23/2022 Yes    Tobacco abuse 6/23/2022 Yes    Constipation 6/23/2022 Yes    Anxiety 6/23/2022 Yes    History of craniotomy 6/23/2022 Yes    Uncontrolled diabetes mellitus with hyperglycemia ([address] 6/23/2022 Yes          Plan:     Patient status inpatient in the Med/Surge    Restricted diffusion in left parasagittal parietal lobe and occipital lobe concerning for acute CVA: Check CTA head and nec, CT venogram. Echo with bubble study, Lipid profile, Hgb A1c.  Start high intensity statin, ASA 81 mg daily. Neurology consultd  Dizziness: likely 2/2 above. Symptomatic treatment. History of Seizure disorder: Continue  Keppra 500 mg BID. Pt does not want to take lamictal says she was never on it  Uncontrolled hyperglycemia: better controlled, continue Lantus 25 units WHS with correctional insulin. Check A1c. Was started on Farxiga outpt but did not get prescription filled. Could not tolerate metformin. Primary hypertension: Allow for elevated BP pending final neurology recommendations and stroke workup. Abdominal pain: resolved. Constipation: start bowel regimen  Tobacco abuse: discuss importance of tobacco cessation  Overweight BMI 29: recommend weight loss and lifestyle modification  Anxiety: pt says she does not take buspar  DVT ppx  PTOT,activity as tolerated  Med rec done. - does not take Lamictal, buspar, phenergan, benadryl    Consultations:   IP CONSULT TO INTERNAL MEDICINE  IP CONSULT TO DIABETES EDUCATOR  IP CONSULT TO NEUROLOGY  IP CONSULT TO DIABETES EDUCATOR     Patient is admitted as inpatient status because of co-morbidities listed above, severity of signs and symptoms as outlined, requirement for current medical therapies and most importantly because of direct risk to patient if care not provided in a hospital setting. Expected length of stay > 48 hours.     Karin Flores DO  6/23/2022  1:02 PM    Copy sent to Dr. Rah Charles

## 2022-06-23 NOTE — PROGRESS NOTES
Physical Therapy  Facility/Department: [address] ORTHO/MED SURG  Physical Therapy Initial Assessment    Name: Kei Samuel  : 1995  MRN: [id number]  Date of Service: 2022  Chief Complaint   Patient presents with    Dizziness    Abdominal Pain     x1 week    Chest Pain     Discharge Recommendations:  Patient would benefit from continued therapy after discharge      Patient Diagnosis(es): The primary encounter diagnosis was Hyperglycemia. Diagnoses of Noncompliance with medication regimen, Hypokalemia, and Hypomagnesemia were also pertinent to this visit. Past Medical History:  has a past medical history of Cerebral vascular malformation, Diabetes mellitus ([facility] [address] Seizure ([facility] [address] and Traumatic hemorrhage of left cerebrum without loss of consciousness ([facility] [address] Past Surgical History:  has a past surgical history that includes craniotomy (2020) and craniotomy (Left, 2020). Assessment   Body Structures, Functions, Activity Limitations Requiring Skilled Therapeutic Intervention: Decreased functional mobility ; Decreased strength;Decreased endurance; Increased pain  Assessment: The pt ambulated 35 ft without a device x CGA. She was mildly unsteady and had a c/o of a severe headache.  She could benefit from a continuation of PT for gait and strengthening following her DC  Therapy Prognosis: Good  Decision Making: Medium Complexity  Requires PT Follow-Up: Yes  Activity Tolerance  Activity Tolerance: Patient limited by fatigue;Patient limited by endurance     Plan   Plan  Plan:  (5-6x wk)  Current Treatment Recommendations: Strengthening,Balance training,Functional mobility training,Endurance training,Gait training,Stair training,Pain management,Safety education & training  Safety Devices  Type of Devices: Nurse notified,Left in bed,Call light within reach     Restrictions  Restrictions/Precautions  Restrictions/Precautions: Up as Tolerated     Subjective   General  Patient assessed for rehabilitation services?: Yes  Response To Previous Treatment: Not applicable  Family / Caregiver Present: No  Follows Commands: Within Functional Limits  Subjective  Subjective: Pt reports a headache of 10/10         Social/Functional History  Social/Functional History  Lives With: Significant other  Type of Home: House  Home Layout: Two level,Able to Live on Main level with bedroom/bathroom  Home Access: Level entry  Bathroom Shower/Tub: Tub/Shower unit  Bathroom Toilet: Standard  Home Equipment:  (No DME at baseline)  ADL Assistance: [address]: Independent  Homemaking Responsibilities: Yes  Ambulation Assistance: Independent  Transfer Assistance: Independent  Active : Yes  Mode of Transportation: Car  Occupation: Unemployed  Leisure & Hobbies: likes to bowl  IADL Comments: The pt states that she normally stays with her mother but has been staying with her boyfriend because her mother is in assisted.   Vision/Hearing  Vision  Vision: Impaired (Pt states that she's suppose to wear glasses but doesn't)  Hearing  Hearing: Within functional limits    Cognition   Orientation  Overall Orientation Status: Within Functional Limits  Cognition  Overall Cognitive Status: WFL     Objective     AROM RLE (degrees)  RLE AROM: WNL  AROM LLE (degrees)  LLE AROM : WNL  AROM RUE (degrees)  RUE AROM : WFL  AROM LUE (degrees)  LUE AROM : WFL  Strength RLE  Strength RLE: WFL  Strength LLE  Strength LLE: WFL  Strength RUE  Strength RUE: WFL  Strength LUE  Strength LUE: WFL      Bed mobility  Supine to Sit: Contact guard assistance  Sit to Supine: Contact guard assistance  Scooting: Stand by assistance  Transfers  Sit to Stand: Contact guard assistance  Stand to sit: Contact guard assistance  Ambulation  Surface: level tile  Device: No Device  Assistance: Contact guard assistance  Distance: amb 35 ft without a device x CGA     Balance  Posture: Good  Sitting - Static: Good  Sitting - Dynamic: Fair  Standing - Static: Good  Standing - Dynamic: Fair     OutComes Score     AM-PAC Score  AM-PAC Inpatient Mobility Raw Score : 20 (06/23/22 1020)  AM-PAC Inpatient T-Scale Score : 47.67 (06/23/22 1020)  Mobility Inpatient CMS 0-100% Score: 35.83 (06/23/22 1020)  Mobility Inpatient CMS G-Code Modifier : CJ (06/23/22 1020)     Goals  Short Term Goals  Time Frame for Short term goals: 10 visits  Short term goal 1: transfers with SBA  Short term goal 2: amb 150 ft without a deviec x SBA  Short term goal 3: ascend/descend 4 steps with SBA  Short term goal 4: 20 min exercise program x SBA  Patient Goals   Patient goals : return home     Education  Patient Education  Education Given To: Patient  Education Provided: Role of Therapy;Plan of Care  Education Method: Verbal  Barriers to Learning: None  Education Outcome: Verbalized understanding    Therapy Time   Individual Concurrent Group Co-treatment   Time In 0930         Time Out 0951         Minutes 21             1 of [facility], PT

## 2022-06-23 NOTE — PROGRESS NOTES
Occupational Therapy  Facility/Department: [address] ORTHO/MED SURG  Occupational Therapy Initial Assessment      Name: Arron Moreland  : 1995  MRN: [id number]  Date of Service: 2022    Chief Complaint   Patient presents with    Dizziness    Abdominal Pain     x1 week    Chest Pain     Discharge Recommendations:  Patient would benefit from continued therapy after discharge  OT Equipment Recommendations  Equipment Needed: No     Patient Diagnosis(es): The primary encounter diagnosis was Hyperglycemia. Diagnoses of Noncompliance with medication regimen, Hypokalemia, and Hypomagnesemia were also pertinent to this visit. Past Medical History:  has a past medical history of Cerebral vascular malformation, Diabetes mellitus ([facility] [address] Seizure ([facility] [address] and Traumatic hemorrhage of left cerebrum without loss of consciousness ([facility] [address] Past Surgical History:  has a past surgical history that includes craniotomy (2020) and craniotomy (Left, 2020). Assessment   Performance deficits / Impairments: Decreased functional mobility ; Decreased endurance;Decreased ADL status; Decreased balance;Decreased strength;Decreased safe awareness;Decreased high-level IADLs  Assessment: Pt currently has deficits / impairments which impact her ability to return to prior living situation / prior level of functioning. Pt will benefit from continued participation in Acute OT and Post-Acute OT services to improve those skills / functions.   Prognosis: Fair  Decision Making: Medium Complexity  REQUIRES OT FOLLOW-UP: Yes  Activity Tolerance  Activity Tolerance: Patient Tolerated treatment well;Treatment limited secondary to agitation;Patient limited by pain        Plan   Plan  Times per Week: 4-6x/week  Current Treatment Recommendations: Strengthening,Balance training,Functional mobility training,Endurance training,Neuromuscular re-education,Equipment evaluation, education, & procurement,Patient/Caregiver education & training,Safety education & training,Pain management,Self-Care / ADL,Home management training     Restrictions  Restrictions/Precautions  Restrictions/Precautions: Up as Tolerated    Subjective   General  Patient assessed for rehabilitation services?: Yes  Family / Caregiver Present: No  Subjective  Subjective: RN approved Pt to be seen for OT evaluation / PT evaluation. General Comment  Comments: Pt was agitated / irratable throughout. Reported that she has a HA and just wants to sleep. Social/Functional History  Social/Functional History  Lives With: Significant other  Type of Home: House  Home Layout: Two level,Able to Live on Main level with bedroom/bathroom  Home Access: Level entry  Bathroom Shower/Tub: Tub/Shower unit  Bathroom Toilet: Standard  Home Equipment:  (No DME at baseline)  ADL Assistance: [address]: Independent  Homemaking Responsibilities: Yes  Ambulation Assistance: Independent  Transfer Assistance: Independent  Active : Yes  Mode of Transportation: Car  Occupation: Unemployed  [address]: likes to [facility]  IADL Comments: The pt states that she normally stays with her mother but has been staying with her boyfriend because her mother is in longterm. Objective   Vision Exceptions: Wears glasses for reading  Hearing: Within functional limits       Safety Devices  Type of Devices: Nurse notified; Left in bed;Call light within reach;Gait belt  Restraints  Restraints Initially in Place: No     Bed Mobility Training  Bed Mobility Training: Yes  Overall Level of Assistance: Stand-by assistance  Interventions: Verbal cues (Min to [employer] / Encourage for active participation)  Supine to Sit: Stand-by assistance  Sit to Supine: Stand-by assistance  Scooting: Stand-by assistance    Balance  Sitting: Intact  Standing: High guard (Intermittent UE support)  Transfer Training  Transfer Training: Yes  Overall Level of Assistance: Contact-guard assistance (Without use of AD)  Interventions: Visual cues (Min to [employer] / Encourage for active participation)  Sit to Stand: Contact-guard assistance (Without use of AD)  Stand to Sit: Contact-guard assistance (Without use of AD)  Toilet Transfer: Stand-by assistance;Contact-guard assistance (CGA progressing to AD. Standard toilet (no use of grab bar). )    Gait  Overall Level of Assistance: Contact-guard assistance (Without use of AD for functional mobility in-room)  Interventions: Verbal cues (Min Cues / Encouragement participation and overall safety)  Stance: Weight shift     ADL  Additional Comments: Pt was agreeable only to minimal activity / assessment (ADL transfers / in-room functional mobility), was not agreeable to participate in ADLs. Activity Tolerance  Activity Tolerance: Patient limited by fatigue;Patient limited by endurance     Cognition  Overall Cognitive Status: [facility]    AM-[facility] Score  AM-[facility] Inpatient Daily Activity Raw Score: 20 (06/23/22 1334)  AM-PAC Inpatient ADL T-Scale Score : 42.03 (06/23/22 1334)  ADL Inpatient CMS 0-100% Score: 38.32 (06/23/22 1334)  ADL Inpatient CMS G-Code Modifier : CJ (06/23/22 1334)    Goals  Short Term Goals  Time Frame for Short term goals: By Discharge  Short Term Goal 1: Pt will demo Mod I and Good Integration of EC / Ax pacing during all functional tasks. Short Term Goal 2: Pt will maintain Good Balance while completing UB / LB ADLs. Short Term Goal 3: Pt will demo Mod I and Correct Use of AE / DME during Bathroom / ADL Tasks. Short Term Goal 4: Pt will complete Functional Mobility (in-room / in-home) with Mod I with Good Safety Awareness.        Therapy Time   Individual Concurrent Group Co-treatment   Time In 0930         Time Out 0951         Minutes [address], OTR/L

## 2022-06-23 NOTE — CARE COORDINATION
Transition planning  Spoke with patient, she states plan is return home either to her parent's or boyfriend's home.

## 2022-06-23 NOTE — PROGRESS NOTES
Inpatient Diabetes  Education     Type and Reason for Visit: Patient Education  Follow up education visit at bedside - 238, consult for insulin teaching. Patient was expressing she feels her blurry vision get worse with talking lantus. Writer clarified lantus side effects and hyperglycemia symptoms. Verbally reviewed insulin role. Patient needed to have LP and education session cut short. Plan for follow up education at time of discharge to review home diabetes medication plan.     Breanna Hunter RN

## 2022-06-23 NOTE — PROCEDURES
Date: 6/23/2022  Referring physician: Dr. Kim Madison    Indication  Patient aged 32 y with seizures. EEG done to assess for epileptiform activity. Introduction  This routine 25-minute EEG was recorded using the [employer] 10-20 System on a [employer] workstation at 256 samples/s. Automated spike and seizure detection algorithms were applied. Description  During the maximal alert state, a well-regulated, symmetric, and reactive 8-9 Hz posterior dominant rhythm was seen. No consistent focal slowing or interhemispheric asymmetry was noted. Stage I and stage II sleep were observed. There were no interictal epileptiform discharges or electrographic seizures. Activations  Hyperventilation was not performed. Intermittent photic stimulation was performed and demonstrated no posterior driving response. Impression  Abnormal awake EEG. Please note the abnormality is due to possible irregular heart rate. Please correlate with formal EKG testing. No epileptiform discharges were identified. Please note the absence of such activity on this record cannot conclusively rule out an epileptic disorder. If such is still clinically suspected, a repeat study with sleep deprivation and/or prolonged sampling may be helpful. Lanette Tenorio MD  Epilepsy Board Certified. Neurology Board Certified.     Electronically Signed

## 2022-06-23 NOTE — BRIEF OP NOTE
Brief Postoperative Note Lumbar Puncture    Filiberto Perez  YOB: 1995  [id number]    Pre-operative Diagnosis: Abnormal MRI    Post-operative Diagnosis: Same    Procedure: Fluoro guided Lumbar Puncture    Anesthesia: 1% Lidocaine    Surgeons/Assistants: Kaiser Atkinson PA-C    Complications: None    EBL: Minimal    Specimens: Obtained and sent to lab    Fluoroscopy guided lumbar puncture. 20 gauge spinal needle. L3-L4. 9 ml clear CSF obtained. Dressing applied. Instructions given.     Electronically signed by SONAL Gilmore on 6/23/2022 at 4:15 PM

## 2022-06-23 NOTE — PROGRESS NOTES
[facility]  CDU / OBSERVATION eNCOUnter  Attending NOte       I performed a history and physical examination of the patient and discussed management with the resident. I reviewed the residents note and agree with the documented findings and plan of care. Any areas of disagreement are noted on the chart. I was personally present for the key portions of any procedures. I have documented in the chart those procedures where I was not present during the key portions. I have reviewed the nurses notes. I agree with the chief complaint, past medical history, past surgical history, allergies, medications, social and family history as documented unless otherwise noted below. The Family history, social history, and ROS are effectively unchanged since admission unless noted elsewhere in the chart. MRI showing cortical based restricted diffusion in the left parasagittal parietal lobe and occipital lobe. Concern for acute stroke. Neurology following and more extensive testing in progress. Plan to transfer to Internal Medicine as the patient's needs exceed the Observation Unit's capabilities.      Abhijit Bland MD  Attending Emergency  Physician

## 2022-06-23 NOTE — H&P
[address]  CDU / OBSERVATION ENCOUNTER  ATTENDING NOTE     Pt Name: Chipper Severs  MRN: [id number]  [place] 1995  Date of evaluation: 6/22/22  Patient's PCP is :  Ana Ch       Chief Complaint   Patient presents with    Dizziness    Abdominal Pain     x1 week    Chest Pain         HISTORY OF PRESENT ILLNESS    Chipper Severs is a 32 y.o. female who presents with abdominal pain generalized cramping in nature. Some lightheadedness, nausea vomiting and symptoms over the past week. Patient stopped taking her Lantus. She felt it was giving her difficulty and she is subsequently been hyperglycemic. Patient is a poorly controlled diabetic and states she is not taking medications because of intolerance. Patient was seen earlier this month at [facility] also for type 2 diabetes uncontrolled. Patient is complaining of chest pain that is substernal nonradiating. Patient was admitted for glucose monitoring and reassessment. On my evaluation patient is eating Western Guillermina toast covered in syrup and feeling comfortable. Sugars were still somewhat elevated but better. Patient's abdominal pain had improved. Location/Symptom: Pain, high sugars, nausea  Timing/Onset: Over days  Provocation: Unclear  Quality: Centralize symptoms  Radiation: None  Severity: Moderate  Timing/Duration: Over days  Modifying Factors: Unclear    REVIEW OF SYSTEMS     Start earlier but explained to me in my initial evaluation that she was having difficulty with word finding and history of prior CVA. Constitutional: Positive for activity change and appetite change. Negative for chills, diaphoresis and fever. HENT: Negative for congestion. Eyes: Negative for photophobia. Respiratory: Negative for shortness of breath. Cardiovascular: Positive for chest pain. Gastrointestinal: Positive for abdominal pain, nausea and vomiting. Negative for constipation.    Endocrine: Positive for polyuria. Genitourinary: Positive for frequency. Negative for dysuria. Musculoskeletal: Negative for back pain. Skin: Negative for rash and wound. Allergic/Immunologic: Positive for environmental allergies. Neurological: Negative for headaches. Hematological: Negative for adenopathy. Psychiatric/Behavioral: Negative for agitation and confusion. (PQRS) Advance directives on face sheet per hospital policy. 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 I have reviewed the past medical history with the patient and it is  pertinent to this complaint. SURGICAL HISTORY      has a past surgical history that includes craniotomy (01/08/2020) and craniotomy (Left, 1/8/2020). I have reviewed and agree with Surgical History entered and it is  pertinent to this complaint.      CURRENT MEDICATIONS     docusate sodium (COLACE) capsule 100 mg, BID PRN  busPIRone (BUSPAR) tablet 10 mg, TID  lamoTRIgine (LAMICTAL) tablet 25 mg, Daily  levETIRAcetam (KEPPRA) tablet 500 mg, BID  atorvastatin (LIPITOR) tablet 40 mg, Nightly  scopolamine (TRANSDERM-SCOP) transdermal patch 1 patch, Q72H  butalbital-acetaminophen-caffeine (FIORICET, ESGIC) per tablet 1 tablet, Q4H PRN  magnesium oxide (MAG-OX) tablet 400 mg, BID  chlorhexidine (PERIDEX) 0.12 % solution 15 mL, BID  diphenhydrAMINE (BENADRYL) tablet 25 mg, BID PRN  promethazine (PHENERGAN) tablet 12.5 mg, BID PRN  ondansetron (ZOFRAN) tablet 4 mg, Q12H PRN  gabapentin (NEURONTIN) capsule 600 mg, TID  insulin glargine (LANTUS) injection vial 25 Units, Nightly  sodium chloride flush 0.9 % injection 5-40 mL, 2 times per day  sodium chloride flush 0.9 % injection 5-40 mL, PRN  0.9 % sodium chloride infusion, PRN  enoxaparin (LOVENOX) injection 40 mg, Daily  acetaminophen (TYLENOL) tablet 650 mg, Q4H PRN  oxyCODONE (ROXICODONE) immediate release tablet 5 mg, Q4H PRN   Or  oxyCODONE (ROXICODONE) immediate release tablet 10 mg, Q4H PRN  fentaNYL (SUBLIMAZE) injection 25 mcg, Q1H PRN   Or  fentaNYL (SUBLIMAZE) injection 50 mcg, Q1H PRN  insulin lispro (HUMALOG) injection vial 0-12 Units, TID WC  insulin lispro (HUMALOG) injection vial 0-6 Units, Nightly  lactated ringers 1,000 mL infusion, Continuous  potassium chloride (KLOR-CON M) extended release tablet 40 mEq, PRN   Or  potassium bicarb-citric acid (EFFER-K) effervescent tablet 40 mEq, PRN   Or  potassium chloride 10 mEq/100 mL IVPB (Peripheral Line), PRN        All medication charted and reviewed. ALLERGIES     is allergic to latex and prozac [fluoxetine hcl]. FAMILY HISTORY     has no family status information on file. family history is not on file. The patient denies any pertinent family history. I have reviewed and agree with the family history entered. I have reviewed the Family History and it is not significant to the case    SOCIAL HISTORY      reports that she has been smoking cigarettes. She has a 2.70 pack-year smoking history. She has never used smokeless tobacco. She reports current alcohol use. She reports current drug use. Drug: Marijuana Mitchel Semen). I have reviewed and agree with all Social.  There are no  concerns for substance abuse/use. PHYSICAL EXAM     INITIAL VITALS:  height is 5' 2\" (1.575 m) and weight is 147 lb 4.3 oz (66.8 kg). Her oral temperature is 97.7 °F (36.5 °C). Her blood pressure is 139/94 (abnormal) and her pulse is 83. Her respiration is 18 and oxygen saturation is 97%.       CONSTITUTIONAL: AOx4, no apparent distress, appears stated age     HEAD: normocephalic, atraumatic   EYES: PERRLA, EOMI    ENT: moist mucous membranes, uvula midline   NECK: supple, symmetric   BACK: symmetric   LUNGS: clear to auscultation bilaterally   CARDIOVASCULAR: regular rate and rhythm, no murmurs, rubs or gallops   ABDOMEN: soft, non-tender, non-distended with normal active bowel sounds   NEUROLOGIC:  MAEx4, no focal sensory or motor deficits   MUSCULOSKELETAL: no clubbing, cyanosis or edema   SKIN: no rash or wounds       DIFFERENTIAL DIAGNOSIS/MDM:     States she is much improved but with ongoing neurologic complaint MRI was ordered. Also gallbladder ultrasound was ordered. Patient's results did return with possible CVA. Neurology has been consulted. Sludge in gallbladder. Patient will need to reassessed     DIAGNOSTIC RESULTS     EKG: All EKG's are interpreted by the Observation Physician who either signs or Co-signs this chart in the absence of a cardiologist.    EKG Interpretation    Interpreted by observation physician    Rhythm: normal sinus   Rate: normal  Axis: normal  Ectopy: none  Conduction: normal  ST Segments: no acute change  T Waves: no acute change  Q Waves: none    Clinical Impression: no acute changes    Johan Vo MD         RADIOLOGY:   I directly visualized the following  images and reviewed the radiologist interpretations:    US GALLBLADDER RUQ    Result Date: 6/22/2022  EXAMINATION: RIGHT UPPER QUADRANT ULTRASOUND 6/22/2022 2:26 pm COMPARISON: None. HISTORY: ORDERING SYSTEM PROVIDED HISTORY: abd pain TECHNOLOGIST PROVIDED HISTORY: abd pain FINDINGS: LIVER:  The liver demonstrates normal echogenicity without evidence of intrahepatic biliary ductal dilatation. Normal hepatopetal flow is noted in the portal vein. BILIARY SYSTEM:  The gallbladder appears contracted limiting assessment. No discernible gallstones are present. Question sludge in the gallbladder. No gallbladder wall thickening or positive sonographic Márquez sign. Common bile duct is within normal limits measuring 6.59 mm. RIGHT KIDNEY: The right kidney is grossly unremarkable without evidence of hydronephrosis. PANCREAS:  Visualized portions of the pancreas are unremarkable.   Pancreas suboptimally visualized. OTHER: No evidence of right upper quadrant ascites. 1.  Contracted gallbladder limiting assessment. Question sludge in the gallbladder. 2.  No ductal dilatation. No gallbladder wall thickening or pericholecystic fluid. No definite evidence of acute cholecystitis. Other findings as above. XR CHEST PORTABLE    Result Date: 6/21/2022  EXAMINATION: ONE XRAY VIEW OF THE CHEST 6/21/2022 9:18 am COMPARISON: Two-view chest from 01/04/2020 HISTORY: ORDERING SYSTEM PROVIDED HISTORY: cp TECHNOLOGIST PROVIDED HISTORY: cp Reason for Exam: upright port History of seizure disorder and diabetes. FINDINGS: Overlying ECG monitor leads and gown snaps. Cardiomediastinal shadow WNL and unchanged. No localized pulmonary opacity or blunting of the costophrenic angles. No pneumothorax. Bones intact. No acute cardiopulmonary disease. MRI BRAIN WO CONTRAST    Result Date: 6/22/2022  EXAMINATION: MRI OF THE BRAIN WITHOUT CONTRAST  6/22/2022 3:50 pm TECHNIQUE: Multiplanar multisequence MRI of the brain was performed without the administration of intravenous contrast. COMPARISON: CT head 02/01/2022 MRI brain 08/18/2020 HISTORY: ORDERING SYSTEM PROVIDED HISTORY: hx cva - aphasia TECHNOLOGIST PROVIDED HISTORY: hx cva - aphasia Reason for Exam: hx cva - aphasia FINDINGS: INTRACRANIAL STRUCTURES/VENTRICLES: There is areas of restricted diffusion identified left posterior parietal and temporal lobe stool lobe and area and restricted diffusion parietal lobe noted. These appear predominant cortical based posterior changes identified left occipital lobe with overlying surgical defects. Areas of gliosis encephalomalacia identified just deep to the surgical site. There is evidence of gradient echo signal identified likely corresponds to site of prior cavernoma versus operative bed otherwise, no shift of midline structure. Basal cisterns are patent.  ORBITS: The visualized portion of the orbits demonstrate no acute abnormality. SINUSES: Mild paranasal sinus mucosal thickening. Bobby Gutter BONES/SOFT TISSUES: Left occipital craniotomy identified. Cortical based restricted diffusion identified left parasagittal parietal lobe and occipital lobe. This could be related to acute stroke Postsurgical changes left occipital lobe status post cavernoma resection. Adjacent areas of late identified and minimal area of gradient signal mild may represent postsurgical changes versus residual cavernoma. LABS:  I have reviewed and interpreted all available lab results.   Labs Reviewed   BETA-HYDROXYBUTYRATE - Abnormal; Notable for the following components:       Result Value    Beta-Hydroxybutyrate 0.33 (*)     All other components within normal limits   COMPREHENSIVE METABOLIC PANEL - Abnormal; Notable for the following components:    Glucose 433 (*)     Sodium 134 (*)     Potassium 3.3 (*)     Chloride 95 (*)     Anion Gap 18 (*)     Alkaline Phosphatase 138 (*)     All other components within normal limits   URINALYSIS WITH REFLEX TO CULTURE - Abnormal; Notable for the following components:    Glucose, Ur 4 (*)     Urine Hgb TRACE (*)     All other components within normal limits   MAGNESIUM - Abnormal; Notable for the following components:    Magnesium 1.0 (*)     All other components within normal limits   LAMOTRIGINE LEVEL - Abnormal; Notable for the following components:    Lamotrigine Lvl <1.0 (*)     All other components within normal limits   LACTIC ACID - Abnormal; Notable for the following components:    Lactic Acid, Whole Blood 2.2 (*)     All other components within normal limits   ELECTROLYTES PLUS - Abnormal; Notable for the following components:    POC Potassium 3.1 (*)     All other components within normal limits   HGB/HCT - Abnormal; Notable for the following components:    POC Hematocrit 47 (*)     All other components within normal limits   CALCIUM, IONIC (POC) - Abnormal; Notable for the following components: POC Ionized Calcium 1.10 (*)     All other components within normal limits   LACTIC ACID - Abnormal; Notable for the following components:    Lactic Acid, Whole Blood 2.2 (*)     All other components within normal limits   MICROSCOPIC URINALYSIS - Abnormal; Notable for the following components:    Bacteria, UA FEW (*)     All other components within normal limits   BASIC METABOLIC PANEL - Abnormal; Notable for the following components:    Glucose 160 (*)     All other components within normal limits   CBC WITH AUTO DIFFERENTIAL - Abnormal; Notable for the following components:    WBC 13.0 (*)     Hematocrit 35.9 (*)     Lymphocytes 49 (*)     Absolute Lymph # 6.30 (*)     All other components within normal limits   MAGNESIUM - Abnormal; Notable for the following components:    Magnesium 1.2 (*)     All other components within normal limits   BASIC METABOLIC PANEL - Abnormal; Notable for the following components:    Glucose 162 (*)     Potassium 3.6 (*)     All other components within normal limits   HEPATIC FUNCTION PANEL - Abnormal; Notable for the following components:    Alkaline Phosphatase 115 (*)     Total Bilirubin 0.16 (*)     Total Protein 5.9 (*)     All other components within normal limits   CBC WITH AUTO DIFFERENTIAL - Abnormal; Notable for the following components:    WBC 11.8 (*)     Lymphocytes 47 (*)     Absolute Lymph # 5.55 (*)     All other components within normal limits   LIPID PANEL - Abnormal; Notable for the following components:    HDL 27 (*)     Chol/HDL Ratio 7.0 (*)     Triglycerides 406 (*)     All other components within normal limits   LDL CHOLESTEROL, DIRECT - Abnormal; Notable for the following components:    LDL Direct 105 (*)     All other components within normal limits   VENOUS BLOOD GAS, POINT OF CARE - Abnormal; Notable for the following components:    pH, Sarbjit 7.466 (*)     pCO2, Sarbjit 33.1 (*)     O2 Sat, Sarbjit 87 (*)     All other components within normal limits   LACTIC tomorrow. · Concern for CVA. · MRI performed. · Neurology consulted. Appreciate input. · Will follow recommendations. Patient without acute deficit noted  · Continue home medications and pain control  · Monitor vitals, labs, and imaging  · DISPO: pending consults and clinical improvement    CONSULTS:    IP CONSULT TO INTERNAL MEDICINE  IP CONSULT TO DIABETES EDUCATOR  IP CONSULT TO NEUROLOGY    PROCEDURES:  Not indicated        PATIENT REFERRED TO:    [place] [id number] [place] [id number] [phone]  Call today  As needed, Follow up education on diabetes self management      --  Suzette Ledesma MD   Emergency Medicine Attending    This dictation was generated by voice recognition computer software. Although all attempts are made to edit the dictation for accuracy, there may be errors in the transcription that are not intended.

## 2022-06-23 NOTE — PROGRESS NOTES
[facility]  CDU / OBSERVATION ENCOUNTER  ATTENDING NOTE       Patient with possible CVA. Patient with positive MRI. Neurology consulted. Patient also with glucose requiring close monitoring. Patient also with abdominal pain and possible sludge in gallbladder. Patient with multiple symptom involvement requiring close monitoring and possible intervention.   Will admit to inpatient     Jennette Sandifer MD  Attending Emergency  Physician

## 2022-06-24 LAB
ANION GAP SERPL CALCULATED.3IONS-SCNC: 14 MMOL/L (ref 9–17)
BUN BLDV-MCNC: 16 MG/DL (ref 6–20)
CALCIUM SERPL-MCNC: 9.6 MG/DL (ref 8.6–10.4)
CHLORIDE BLD-SCNC: 102 MMOL/L (ref 98–107)
CO2: 24 MMOL/L (ref 20–31)
CREAT SERPL-MCNC: 1.11 MG/DL (ref 0.5–0.9)
GFR AFRICAN AMERICAN: >60 ML/MIN
GFR NON-AFRICAN AMERICAN: 58 ML/MIN
GFR SERPL CREATININE-BSD FRML MDRD: ABNORMAL ML/MIN/{1.73_M2}
GLUCOSE BLD-MCNC: 158 MG/DL (ref 65–105)
GLUCOSE BLD-MCNC: 209 MG/DL (ref 65–105)
GLUCOSE BLD-MCNC: 239 MG/DL (ref 65–105)
GLUCOSE BLD-MCNC: 256 MG/DL (ref 70–99)
GLUCOSE BLD-MCNC: 334 MG/DL (ref 65–105)
GLUCOSE BLD-MCNC: 403 MG/DL (ref 65–105)
POTASSIUM SERPL-SCNC: 4 MMOL/L (ref 3.7–5.3)
SODIUM BLD-SCNC: 140 MMOL/L (ref 135–144)

## 2022-06-24 PROCEDURE — 99232 SBSQ HOSP IP/OBS MODERATE 35: CPT | Performed by: INTERNAL MEDICINE

## 2022-06-24 PROCEDURE — 6370000000 HC RX 637 (ALT 250 FOR IP): Performed by: INTERNAL MEDICINE

## 2022-06-24 PROCEDURE — 99232 SBSQ HOSP IP/OBS MODERATE 35: CPT | Performed by: PSYCHIATRY & NEUROLOGY

## 2022-06-24 PROCEDURE — 80048 BASIC METABOLIC PNL TOTAL CA: CPT

## 2022-06-24 PROCEDURE — 36415 COLL VENOUS BLD VENIPUNCTURE: CPT

## 2022-06-24 PROCEDURE — 2580000003 HC RX 258: Performed by: INTERNAL MEDICINE

## 2022-06-24 PROCEDURE — 2580000003 HC RX 258: Performed by: STUDENT IN AN ORGANIZED HEALTH CARE EDUCATION/TRAINING PROGRAM

## 2022-06-24 PROCEDURE — 82947 ASSAY GLUCOSE BLOOD QUANT: CPT

## 2022-06-24 PROCEDURE — G0108 DIAB MANAGE TRN  PER INDIV: HCPCS

## 2022-06-24 PROCEDURE — 1200000000 HC SEMI PRIVATE

## 2022-06-24 PROCEDURE — 6370000000 HC RX 637 (ALT 250 FOR IP): Performed by: STUDENT IN AN ORGANIZED HEALTH CARE EDUCATION/TRAINING PROGRAM

## 2022-06-24 RX ORDER — INSULIN GLARGINE 100 [IU]/ML
30 INJECTION, SOLUTION SUBCUTANEOUS NIGHTLY
Status: DISCONTINUED | OUTPATIENT
Start: 2022-06-24 | End: 2022-06-25 | Stop reason: HOSPADM

## 2022-06-24 RX ORDER — SODIUM CHLORIDE 450 MG/100ML
INJECTION, SOLUTION INTRAVENOUS CONTINUOUS
Status: DISCONTINUED | OUTPATIENT
Start: 2022-06-24 | End: 2022-06-25

## 2022-06-24 RX ADMIN — INSULIN LISPRO 2 UNITS: 100 INJECTION, SOLUTION INTRAVENOUS; SUBCUTANEOUS at 12:54

## 2022-06-24 RX ADMIN — OXYCODONE 10 MG: 5 TABLET ORAL at 09:30

## 2022-06-24 RX ADMIN — INSULIN LISPRO 4 UNITS: 100 INJECTION, SOLUTION INTRAVENOUS; SUBCUTANEOUS at 17:47

## 2022-06-24 RX ADMIN — MAGNESIUM GLUCONATE 500 MG ORAL TABLET 400 MG: 500 TABLET ORAL at 09:29

## 2022-06-24 RX ADMIN — INSULIN LISPRO 8 UNITS: 100 INJECTION, SOLUTION INTRAVENOUS; SUBCUTANEOUS at 09:46

## 2022-06-24 RX ADMIN — INSULIN GLARGINE 30 UNITS: 100 INJECTION, SOLUTION SUBCUTANEOUS at 21:43

## 2022-06-24 RX ADMIN — DESMOPRESSIN ACETATE 40 MG: 0.2 TABLET ORAL at 21:46

## 2022-06-24 RX ADMIN — LEVETIRACETAM 500 MG: 500 TABLET, FILM COATED ORAL at 09:29

## 2022-06-24 RX ADMIN — CHLORHEXIDINE GLUCONATE 15 ML: 1.2 SOLUTION ORAL at 09:32

## 2022-06-24 RX ADMIN — SODIUM CHLORIDE, PRESERVATIVE FREE 10 ML: 5 INJECTION INTRAVENOUS at 21:53

## 2022-06-24 RX ADMIN — BUTALBITAL, ACETAMINOPHEN AND CAFFEINE 1 TABLET: 50; 325; 40 TABLET ORAL at 11:43

## 2022-06-24 RX ADMIN — SODIUM CHLORIDE: 4.5 INJECTION, SOLUTION INTRAVENOUS at 09:57

## 2022-06-24 RX ADMIN — CHLORHEXIDINE GLUCONATE 15 ML: 1.2 SOLUTION ORAL at 21:46

## 2022-06-24 RX ADMIN — GABAPENTIN 600 MG: 300 CAPSULE ORAL at 09:28

## 2022-06-24 RX ADMIN — GABAPENTIN 600 MG: 300 CAPSULE ORAL at 14:00

## 2022-06-24 RX ADMIN — SODIUM CHLORIDE, PRESERVATIVE FREE 10 ML: 5 INJECTION INTRAVENOUS at 09:30

## 2022-06-24 RX ADMIN — GABAPENTIN 600 MG: 300 CAPSULE ORAL at 21:46

## 2022-06-24 RX ADMIN — OXYCODONE 10 MG: 5 TABLET ORAL at 21:50

## 2022-06-24 RX ADMIN — INSULIN LISPRO 6 UNITS: 100 INJECTION, SOLUTION INTRAVENOUS; SUBCUTANEOUS at 21:46

## 2022-06-24 RX ADMIN — LEVETIRACETAM 500 MG: 500 TABLET, FILM COATED ORAL at 21:47

## 2022-06-24 RX ADMIN — POLYETHYLENE GLYCOL 3350 17 G: 17 POWDER, FOR SOLUTION ORAL at 10:17

## 2022-06-24 RX ADMIN — MAGNESIUM GLUCONATE 500 MG ORAL TABLET 400 MG: 500 TABLET ORAL at 21:46

## 2022-06-24 ASSESSMENT — PAIN DESCRIPTION - LOCATION
LOCATION: HEAD
LOCATION: GENERALIZED
LOCATION: HEAD

## 2022-06-24 ASSESSMENT — PAIN SCALES - GENERAL
PAINLEVEL_OUTOF10: 8
PAINLEVEL_OUTOF10: 0
PAINLEVEL_OUTOF10: 7
PAINLEVEL_OUTOF10: 10

## 2022-06-24 ASSESSMENT — PAIN DESCRIPTION - DESCRIPTORS
DESCRIPTORS: ACHING
DESCRIPTORS: ACHING
DESCRIPTORS: ACHING;THROBBING

## 2022-06-24 ASSESSMENT — PAIN - FUNCTIONAL ASSESSMENT
PAIN_FUNCTIONAL_ASSESSMENT: PREVENTS OR INTERFERES WITH MANY ACTIVE NOT PASSIVE ACTIVITIES
PAIN_FUNCTIONAL_ASSESSMENT: PREVENTS OR INTERFERES SOME ACTIVE ACTIVITIES AND ADLS

## 2022-06-24 NOTE — PROGRESS NOTES
parasagittal parietal lobe and occipital lobe that could be related to an acute stroke so patient was transferred out of observation to our service. Currently, pt is resting in bed. She is comfortable in no acute distress. She is complaining of some blurry vision which she says has been ongoing for a week. She also is having some stuttering and word finding difficulties which she said just started . She also is complaining of dizziness made worse when standing. She denies any fevers, chills, nausea, vomiting or diarrhea. \"    Review of Systems:     Constitutional:  negative for chills, fevers, sweats  Respiratory:  negative for cough, dyspnea on exertion, shortness of breath, wheezing  Cardiovascular:  negative for chest pain, chest pressure/discomfort, lower extremity edema, palpitations  Gastrointestinal:  negative for abdominal pain, constipation, diarrhea, nausea, vomiting  Neurological:  negative for dizziness, + headache    Medications: Allergies:     Allergies   Allergen Reactions    Latex     Prozac [Fluoxetine Hcl] Other (See Comments)     Seizures       Current Meds:   Scheduled Meds:    insulin glargine  30 Units SubCUTAneous Nightly    polyethylene glycol  17 g Oral Daily    levETIRAcetam  500 mg Oral BID    atorvastatin  40 mg Oral Nightly    scopolamine  1 patch TransDERmal Q72H    magnesium oxide  400 mg Oral BID    chlorhexidine  15 mL Mouth/Throat BID    gabapentin  600 mg Oral TID    sodium chloride flush  5-40 mL IntraVENous 2 times per day    [Held by provider] enoxaparin  40 mg SubCUTAneous Daily    insulin lispro  0-12 Units SubCUTAneous TID     insulin lispro  0-6 Units SubCUTAneous Nightly     Continuous Infusions:    sodium chloride 75 mL/hr at 06/24/22 0957    dextrose      sodium chloride       PRN Meds: glucose, dextrose bolus **OR** dextrose bolus, glucagon (rDNA), dextrose, docusate sodium, butalbital-acetaminophen-caffeine, diphenhydrAMINE, promethazine, ondansetron, sodium chloride flush, sodium chloride, acetaminophen, oxyCODONE **OR** oxyCODONE, fentanNYL **OR** fentanNYL, potassium chloride **OR** potassium alternative oral replacement **OR** potassium chloride    Data:     Past Medical History:   has a past medical history of Cerebral vascular malformation, Diabetes mellitus ([facility] [address] Seizure ([facility] [address] and Traumatic hemorrhage of left cerebrum without loss of consciousness ([facility] [address] Social History:   reports that she has been smoking cigarettes. She has a 4.20 pack-year smoking history. She has never used smokeless tobacco. She reports current alcohol use. She reports current drug use. Drug: Marijuana Rima Garcia). Family History:   Family History   Problem Relation Age of Onset    Colon Cancer Mother     No Known Problems Father     No Known Problems Brother     Diabetes Maternal Grandfather        Vitals:  /76   Pulse (!) 104   Temp 98 °F (36.7 °C) (Oral)   Resp 20   Ht 5' 2\" (1.575 m)   Wt 147 lb 4.3 oz (66.8 kg)   SpO2 93%   BMI 26.94 kg/m²   Temp (24hrs), Av.4 °F (36.9 °C), Min:98 °F (36.7 °C), Max:98.8 °F (37.1 °C)    Recent Labs     22  1536 22  2101 22  0623 22  0943   POCGLU 233* 198* 239* 334*       I/O (24Hr):   No intake or output data in the 24 hours ending 22 1003    Labs:  Hematology:  Recent Labs     22  0657 22  1645   WBC 13.0* 11.8*   RBC 4.19 4.54   HGB 12.0 13.2   HCT 35.9* 38.9   MCV 85.7 85.7   MCH 28.6 29.1   MCHC 33.4 33.9   RDW 13.3 13.3    169   MPV 12.3 11.5     Chemistry:  Recent Labs     22  1102 22  1351 22  0657 22  0445   NA  --  141 138 140   K  --  3.7 3.6* 4.0   CL  --  100 101 102   CO2  --  27 23 24   GLUCOSE  --  160* 162* 256*   BUN  --  12 12 16   CREATININE  --  0.89 0.84 1.11*   MG  --  1.9 1.2*  --    ANIONGAP  --  14 14 14   LABGLOM  --  >60 >60 58*   GFRAA  --  >60 >60 >60   CALCIUM  --  9.5 9.0 9.6   TROPHS 8  --   --   -- LACTACIDWB 2.2*  --  1.7  --      Recent Labs     06/22/22  0924 06/22/22  1126 06/22/22  1621 06/22/22 2024 06/22/22  2149 06/23/22  0710 06/23/22  1315 06/23/22  1536 06/23/22  2101 06/24/22  0623 06/24/22  0943   PROT 5.9*  --   --   --   --   --   --   --   --   --   --    LABALBU 3.5  --   --   --   --   --   --   --   --   --   --    LABA1C  --   --   --   --   --   --  13.2*  --   --   --   --    AST 19  --   --   --   --   --   --   --   --   --   --    ALT 16  --   --   --   --   --   --   --   --   --   --    ALKPHOS 115*  --   --   --   --   --   --   --   --   --   --    BILITOT 0.16*  --   --   --   --   --   --   --   --   --   --    BILIDIR <0.08  --   --   --   --   --   --   --   --   --   --    CHOL  --   --   --  188  --   --   --   --   --   --   --    HDL  --   --   --  27*  --   --   --   --   --   --   --    LDLCHOLESTEROL  --   --   --        --   --   --   --   --   --   --    CHOLHDLRATIO  --   --   --  7.0*  --   --   --   --   --   --   --    TRIG  --   --   --  406*  --   --   --   --   --   --   --    POCGLU  --    < >   < >  --  217* 154*  --  233* 198* 239* 334*    < > = values in this interval not displayed. ABG:  Lab Results   Component Value Date    POCPH 7.458 02/18/2020    POCPCO2 39.1 02/18/2020    POCPO2 96.0 02/18/2020    POCHCO3 27.7 02/18/2020    NBEA NOT REPORTED 02/18/2020    PBEA 4 02/18/2020    QTM3DYL 29 02/18/2020    VWUO3PAG 98 02/18/2020    FIO2 21.0 02/18/2020     Lab Results   Component Value Date/Time    SPECIAL NOT REPORTED 02/28/2020 07:22 PM     Lab Results   Component Value Date/Time    CULTURE NO GROWTH 15 HOURS 06/23/2022 04:35 PM       Radiology:  US GALLBLADDER RUQ    Result Date: 6/23/2022  1. Contracted gallbladder limiting assessment. Question sludge in the gallbladder. 2.  No ductal dilatation. No gallbladder wall thickening or pericholecystic fluid. No definite evidence of acute cholecystitis. Other findings as above.      XR CHEST PORTABLE    Result Date: 6/21/2022  No acute cardiopulmonary disease. IR LUMBAR PUNCTURE FOR DIAGNOSIS    Result Date: 6/23/2022  Successful fluoroscopic-guided lumbar puncture. CTA HEAD NECK W CONTRAST    Result Date: 6/23/2022  1. No apparent arterial high grade stenosis, occlusion or aneurysm within the head or neck. 2. No dural venous sinus thrombosis. MRI BRAIN WO CONTRAST    Result Date: 6/22/2022  Cortical based restricted diffusion identified left parasagittal parietal lobe and occipital lobe. This could be related to acute stroke Postsurgical changes left occipital lobe status post cavernoma resection. Adjacent areas of late identified and minimal area of gradient signal mild may represent postsurgical changes versus residual cavernoma. Echo:  Summary  Left ventricle is normal in size. Global left ventricular systolic function  is normal. Calculated ejection fraction 58% by Heart Model. Negative bubble study, no shunt noted. Trivial pulmonic insufficiency. EEG:   Impression  Abnormal awake EEG. Please note the abnormality is due to possible irregular heart rate. Please correlate with formal EKG testing.      No epileptiform discharges were identified. Please note the absence of such activity on this record cannot conclusively rule out an epileptic disorder.     Physical Examination:        General appearance:  drowsy, cooperative and no distress  Mental Status:  oriented to person, place and time and flat affect  Lungs:  clear to auscultation bilaterally, normal effort  Heart:  regular rate and rhythm, no murmur  Abdomen:  soft, nontender, nondistended, normal bowel sounds, no masses, hepatomegaly, splenomegaly  Extremities:  no edema, redness, tenderness in the calves  Skin:  no gross lesions, rashes, induration    Assessment:        Hospital Problems           Last Modified POA    * (Principal) Acute CVA (cerebrovascular accident) ([facility] [address] 6/23/2022 Yes    Dizziness 6/23/2022 Yes Overweight (BMI 25.0-29.9) 6/23/2022 Yes    Tobacco abuse 6/23/2022 Yes    Constipation 6/23/2022 Yes    Anxiety 6/23/2022 Yes    Hyperglycemia 6/23/2022 Yes    History of craniotomy 6/23/2022 Yes    Uncontrolled diabetes mellitus with hyperglycemia ([facility] [address] 6/23/2022 Yes          Plan:        Acute CVA? Restricted diffusion in left parasagittal parietal lobe and occipital lobe- Con't ASA, statin, Echo:  EF 58%, negative bubble study, Neurology following, LP performed yesterday  Hx seizures- on Keppra  DM2- uncontrolled HbA1C: 13.2, increase Lantus 30 units qhs, SSI  Abd pain- resolved  HA- Fioricet, or Oxycodone prn, may be related to LP?   HTN- allow some permissive HTN, pt should be on a low dose ACE-I  Anxiety- stable  Tobacco- cessation encouraged  DVT proph  PT/OT    An Hunter MD  6/24/2022  10:03 AM

## 2022-06-24 NOTE — PLAN OF CARE
No acute events overnight. VSS. Plan of care carried out per provider orders. Pt comfort and safety maintained.   Problem: Chronic Conditions and Co-morbidities  Goal: Patient's chronic conditions and co-morbidity symptoms are monitored and maintained or improved  6/24/2022 0613 by Jamal Mason RN  Outcome: Progressing  6/23/2022 1725 by Sejal Goyal RN  Outcome: Progressing     Problem: Pain  Goal: Verbalizes/displays adequate comfort level or baseline comfort level  6/24/2022 0613 by Jamal Mason RN  Outcome: Progressing  6/23/2022 1725 by Sejal Goyal RN  Outcome: Progressing     Problem: Safety - Adult  Goal: Free from fall injury  6/24/2022 0613 by Jamal Mason RN  Outcome: Progressing  6/23/2022 1725 by Sejal Goyal RN  Outcome: Progressing     Problem: Discharge Planning  Goal: Discharge to home or other facility with appropriate resources  6/24/2022 0613 by Jamal Mason RN  Outcome: Progressing  6/23/2022 1725 by Sejal Goyal RN  Outcome: Progressing

## 2022-06-24 NOTE — PLAN OF CARE
Problem: Chronic Conditions and Co-morbidities  Goal: Patient's chronic conditions and co-morbidity symptoms are monitored and maintained or improved  6/24/2022 1722 by Meryle Jakes, RN  Outcome: Progressing  6/24/2022 1714 by Meryle Jakes, RN  Outcome: Progressing  6/24/2022 0613 by Arnoldo Randall RN  Outcome: Progressing     Problem: Pain  Goal: Verbalizes/displays adequate comfort level or baseline comfort level  6/24/2022 1722 by Meryle Jakes, RN  Outcome: Progressing  6/24/2022 1714 by Meryle Jakes, RN  Outcome: Progressing  6/24/2022 0613 by Arnoldo Randall RN  Outcome: Progressing     Problem: Safety - Adult  Goal: Free from fall injury  6/24/2022 1722 by Meryle Jakes, RN  Outcome: Progressing  6/24/2022 1714 by Meryle Jakes, RN  Outcome: Progressing  6/24/2022 0613 by Arnoldo Randall RN  Outcome: Progressing     Problem: Discharge Planning  Goal: Discharge to home or other facility with appropriate resources  6/24/2022 1722 by Meryle Jakes, RN  Outcome: Progressing  6/24/2022 1714 by Meryle Jakes, RN  Outcome: Progressing  6/24/2022 0613 by Arnoldo Randall RN  Outcome: Progressing     Problem: Pain  Goal: Verbalizes/displays adequate comfort level or baseline comfort level  6/24/2022 1722 by Meryle Jakes, RN  Outcome: Progressing  6/24/2022 1714 by Meryle Jakes, RN  Outcome: Progressing  6/24/2022 0613 by Arnoldo Randall RN  Outcome: Progressing     Problem: Safety - Adult  Goal: Free from fall injury  6/24/2022 1722 by Meryle Jakes, RN  Outcome: Progressing  6/24/2022 1714 by Meryle Jakes, RN  Outcome: Progressing  6/24/2022 0613 by Arnoldo Randall RN  Outcome: Progressing

## 2022-06-24 NOTE — PROGRESS NOTES
----- Message from Jenn Hoffman MA sent at 1/12/2022 10:04 AM CST -----  She's canceling her surgery. Thanks  ----- Message -----  From: Cynthia Santiago LPN  Sent: 1/10/2022   9:38 AM CST  To: [facility] Staff    Patient is scheduled for surgery on 1/25/22 and needs clearance for her PCP. Thanks Dorota        CDU Transfer Summary        Patient:  Gustavo Collins  YOB: 1995    MRN: [id number]   Acct: [de-identified]    Primary Care Physician: Swathi Valle    Admit date:  6/21/2022  9:04 AM  Transfer date: No discharge date for patient encounter. 6/23/22    Transfer Diagnoses:     1.) Acute stroke  2.) Hyperglycemia  3.) Abd pain, improved           Medication List      CONTINUE taking these medications    Alcohol Prep 70 % Pads  1 pad TID     glucose monitoring kit  1 kit by Does not apply route daily     Insulin Pen Needle 30G X 8 MM Misc  1 each by Does not apply route daily     Lancets Misc  1 each by Does not apply route 3 times daily        ASK your doctor about these medications    blood glucose test strips  Test 3 times a day & as needed for symptoms of irregular blood glucose.      insulin aspart 100 UNIT/ML injection pen  Commonly known as: NovoLOG FlexPen  Inject 5 Units into the skin 3 times daily (before meals)     insulin detemir 100 UNIT/ML injection pen  Commonly known as: Levemir FlexTouch  Inject 25 Units into the skin nightly     insulin lispro 100 UNIT/ML injection vial  Commonly known as: HUMALOG  Inject 0-12 Units into the skin 3 times daily (with meals)     lamoTRIgine 25 MG tablet  Commonly known as: LAMICTAL     levETIRAcetam 500 MG tablet  Commonly known as: KEPPRA  Take 1 tablet by mouth 2 times daily for 7 days     Magnesium Oxide 200 MG Tabs  Commonly known as: Mag-Oxide  Take 1 tablet by mouth 2 times daily     naproxen sodium 550 MG tablet  Commonly known as: ANAPROX  Take 1 tablet by mouth 2 times daily as needed for Pain (Headache)     ondansetron 4 MG tablet  Commonly known as: ZOFRAN  Take 1 tablet by mouth every 12 hours as needed for Nausea or Vomiting     promethazine 12.5 MG tablet  Commonly known as: PHENERGAN  Take 1 tablet by mouth 2 times daily as needed for Nausea (moderate to severe headache)     scopolamine transdermal patch  Commonly known as: TRANSDERM-SCOP  Place 1 patch onto the skin every 72 hours            Diet:  ADULT DIET; Regular; 4 carb choices (60 gm/meal), advance as tolerated     Activity:  As tolerated    Consultants: IP CONSULT TO INTERNAL MEDICINE  IP CONSULT TO DIABETES EDUCATOR  IP CONSULT TO NEUROLOGY  IP CONSULT TO DIABETES EDUCATOR    Procedures:  Not indicated     Diagnostic Test:   Results for orders placed or performed during the hospital encounter of 06/21/22   COVID-19, Rapid    Specimen: Nasopharyngeal Swab   Result Value Ref Range    Specimen Description . NASOPHARYNGEAL SWAB     SARS-CoV-2, Rapid Not Detected Not Detected   Meningitis Encephalitis Panel CSF, Molecular    Specimen: CSF   Result Value Ref Range    Specimen Description . CSF     ESCHERICHIA COLI K1 CSF FILM ARRAY Not Detected Not Detected    HAEMOPHILUS INFLUENZA CSF FILM ARRAY Not Detected Not Detected    LISTERIA MONOCYTOGENES CSF FILM ARRAY Not Detected Not Detected    NEISSERIA MENIGITIDIS CSF FILM ARRAY Not Detected Not Detected    STREPTOCOCCUS AGALACTIAE CSF FILM ARRAY Not Detected Not Detected    STREPTOCOCCUS PNEUMONIAE CSF FILM ARRAY Not Detected Not Detected    CYTOMEGALOVIRUS (CMV) CSF FILM ARRAY Not Detected Not Detected    ENTEROVIRUS CSF FILM ARRAY Not Detected Not Detected    HSV-1 CSF FILM ARRAY Not Detected Not Detected    HSV-2 CSF FILM ARRAY Not Detected Not Detected    HHV-6 (HERPESVIRUS 6) CSF FILM ARRAY Not Detected Not Detected    PARECHOVIRUS CSF FILM ARRAY Not Detected Not Detected    VARICELLA-ZOSTER CSF FILM ARRAY Not Detected Not Detected    CRYPTOCOCCUS NEOFORMANS/RAMIREZ CSF FILM ARR. Not Detected Not Detected   Culture, CSF    Specimen: CSF   Result Value Ref Range    Specimen Description . CSF     Direct Exam RARE NEUTROPHILS (A)     Direct Exam NO ORGANISMS SEEN     Direct Exam       Gram stain made from cytocentrifuged specimen. Organisms and cells will be concentrated.     Culture PENDING    Beta-Hydroxybutyrate   Result Value Ref Range Beta-Hydroxybutyrate 0.33 (H) 0.02 - 0.27 mmol/L   Lipase   Result Value Ref Range    Lipase 33 13 - 60 U/L   CBC with Auto Differential   Result Value Ref Range    WBC 11.1 3.5 - 11.3 k/uL    RBC 4.86 3.95 - 5.11 m/uL    Hemoglobin 13.8 11.9 - 15.1 g/dL    Hematocrit 41.9 36.3 - 47.1 %    MCV 86.2 82.6 - 102.9 fL    MCH 28.4 25.2 - 33.5 pg    MCHC 32.9 28.4 - 34.8 g/dL    RDW 13.2 11.8 - 14.4 %    Platelets 638 035 - 292 k/uL    MPV 12.4 8.1 - 13.5 fL    NRBC Automated 0.0 0.0 per 100 WBC    Seg Neutrophils 63 36 - 65 %    Lymphocytes 29 24 - 43 %    Monocytes 6 3 - 12 %    Eosinophils % 2 1 - 4 %    Basophils 0 0 - 2 %    Immature Granulocytes 0 0 %    Segs Absolute 6.89 1.50 - 8.10 k/uL    Absolute Lymph # 3.19 1.10 - 3.70 k/uL    Absolute Mono # 0.63 0.10 - 1.20 k/uL    Absolute Eos # 0.27 0.00 - 0.44 k/uL    Basophils Absolute 0.04 0.00 - 0.20 k/uL    Absolute Immature Granulocyte 0.04 0.00 - 0.30 k/uL   Comprehensive Metabolic Panel   Result Value Ref Range    Glucose 433 (HH) 70 - 99 mg/dL    BUN 14 6 - 20 mg/dL    CREATININE 0.89 0.50 - 0.90 mg/dL    Calcium 9.2 8.6 - 10.4 mg/dL    Sodium 134 (L) 135 - 144 mmol/L    Potassium 3.3 (L) 3.7 - 5.3 mmol/L    Chloride 95 (L) 98 - 107 mmol/L    CO2 21 20 - 31 mmol/L    Anion Gap 18 (H) 9 - 17 mmol/L    Alkaline Phosphatase 138 (H) 35 - 104 U/L    ALT 17 5 - 33 U/L    AST 21 <32 U/L    Total Bilirubin 0.50 0.3 - 1.2 mg/dL    Total Protein 7.0 6.4 - 8.3 g/dL    Albumin 4.2 3.5 - 5.2 g/dL    Albumin/Globulin Ratio 1.5 1.0 - 2.5    GFR Non-African American >60 >60 mL/min    GFR African American >60 >60 mL/min    GFR Comment         Urinalysis with Reflex to Culture    Specimen: Urine   Result Value Ref Range    Color, UA Yellow Yellow    Turbidity UA Clear Clear    Glucose, Ur 4 (A) NEGATIVE    Bilirubin Urine NEGATIVE NEGATIVE    Ketones, Urine NEGATIVE NEGATIVE    Specific Gravity, UA 1.010 1.005 - 1.030    Urine Hgb TRACE (A) NEGATIVE    pH, UA 6.0 5.0 - 8.0 Protein, UA NEGATIVE NEGATIVE    Urobilinogen, Urine Normal Normal    Nitrite, Urine NEGATIVE NEGATIVE    Leukocyte Esterase, Urine NEGATIVE NEGATIVE   HCG Qualitative, Serum   Result Value Ref Range    hCG Qual NEGATIVE NEGATIVE   Troponin   Result Value Ref Range    Troponin, High Sensitivity 13 0 - 14 ng/L   Magnesium   Result Value Ref Range    Magnesium 1.0 (L) 1.6 - 2.6 mg/dL   Lamotrigine Level   Result Value Ref Range    Lamotrigine Lvl <1.0 (L) 3.0 - 15.0 ug/mL   TSH with Reflex   Result Value Ref Range    TSH 1.52 0.30 - 5.00 uIU/mL   Lactic Acid   Result Value Ref Range    Lactic Acid, Whole Blood 2.2 (H) 0.7 - 2.1 mmol/L   ELECTROLYTES PLUS   Result Value Ref Range    POC Sodium 138 138 - 146 mmol/L    POC Potassium 3.1 (L) 3.5 - 4.5 mmol/L    POC Chloride 100 98 - 107 mmol/L    POC TCO2 24 22 - 30 mmol/L    Anion Gap 15 7 - 16 mmol/L   Hemoglobin and hematocrit, blood   Result Value Ref Range    POC Hemoglobin 15.8 12.0 - 16.0 g/dL    POC Hematocrit 47 (H) 36 - 46 %   CALCIUM, IONIC (POC)   Result Value Ref Range    POC Ionized Calcium 1.10 (L) 1.15 - 1.33 mmol/L   Lactic Acid   Result Value Ref Range    Lactic Acid, Whole Blood 2.2 (H) 0.7 - 2.1 mmol/L   Troponin   Result Value Ref Range    Troponin, High Sensitivity 8 0 - 14 ng/L   Microscopic Urinalysis   Result Value Ref Range    -          WBC, UA 5 TO 10 0 - 5 /HPF    RBC, UA 5 TO 10 0 - 2 /HPF    Epithelial Cells UA 2 TO 5 0 - 5 /HPF    Bacteria, UA FEW (A) None   Basic Metabolic Panel   Result Value Ref Range    Glucose 160 (H) 70 - 99 mg/dL    BUN 12 6 - 20 mg/dL    CREATININE 0.89 0.50 - 0.90 mg/dL    Calcium 9.5 8.6 - 10.4 mg/dL    Sodium 141 135 - 144 mmol/L    Potassium 3.7 3.7 - 5.3 mmol/L    Chloride 100 98 - 107 mmol/L    CO2 27 20 - 31 mmol/L    Anion Gap 14 9 - 17 mmol/L    GFR Non-African American >60 >60 mL/min    GFR African American >60 >60 mL/min    GFR Comment         Magnesium   Result Value Ref Range    Magnesium 1.9 1.6 - 2.6 mg/dL   CBC with Auto Differential   Result Value Ref Range    WBC 13.0 (H) 3.5 - 11.3 k/uL    RBC 4.19 3.95 - 5.11 m/uL    Hemoglobin 12.0 11.9 - 15.1 g/dL    Hematocrit 35.9 (L) 36.3 - 47.1 %    MCV 85.7 82.6 - 102.9 fL    MCH 28.6 25.2 - 33.5 pg    MCHC 33.4 28.4 - 34.8 g/dL    RDW 13.3 11.8 - 14.4 %    Platelets 702 820 - 013 k/uL    MPV 12.3 8.1 - 13.5 fL    NRBC Automated 0.0 0.0 per 100 WBC    Seg Neutrophils 43 36 - 65 %    Lymphocytes 49 (H) 24 - 43 %    Monocytes 5 3 - 12 %    Eosinophils % 3 1 - 4 %    Basophils 1 0 - 2 %    Immature Granulocytes 0 0 %    Segs Absolute 5.60 1.50 - 8.10 k/uL    Absolute Lymph # 6.30 (H) 1.10 - 3.70 k/uL    Absolute Mono # 0.63 0.10 - 1.20 k/uL    Absolute Eos # 0.33 0.00 - 0.44 k/uL    Basophils Absolute 0.08 0.00 - 0.20 k/uL    Absolute Immature Granulocyte 0.04 0.00 - 0.30 k/uL   Lactic Acid   Result Value Ref Range    Lactic Acid, Whole Blood 1.7 0.7 - 2.1 mmol/L   Magnesium   Result Value Ref Range    Magnesium 1.2 (L) 1.6 - 2.6 mg/dL   Basic Metabolic Panel   Result Value Ref Range    Glucose 162 (H) 70 - 99 mg/dL    BUN 12 6 - 20 mg/dL    CREATININE 0.84 0.50 - 0.90 mg/dL    Calcium 9.0 8.6 - 10.4 mg/dL    Sodium 138 135 - 144 mmol/L    Potassium 3.6 (L) 3.7 - 5.3 mmol/L    Chloride 101 98 - 107 mmol/L    CO2 23 20 - 31 mmol/L    Anion Gap 14 9 - 17 mmol/L    GFR Non-African American >60 >60 mL/min    GFR African American >60 >60 mL/min    GFR Comment         Hepatic Function Panel   Result Value Ref Range    Albumin 3.5 3.5 - 5.2 g/dL    Alkaline Phosphatase 115 (H) 35 - 104 U/L    ALT 16 5 - 33 U/L    AST 19 <32 U/L    Total Bilirubin 0.16 (L) 0.3 - 1.2 mg/dL    Bilirubin, Direct <0.08 <0.31 mg/dL    Bilirubin, Indirect Can not be calculated 0.00 - 1.00 mg/dL    Total Protein 5.9 (L) 6.4 - 8.3 g/dL    Albumin/Globulin Ratio 1.5 1.0 - 2.5   CBC with Auto Differential   Result Value Ref Range    WBC 11.8 (H) 3.5 - 11.3 k/uL    RBC 4.54 3.95 - 5.11 m/uL    Hemoglobin 13.2 11.9 - 15.1 g/dL    Hematocrit 38.9 36.3 - 47.1 %    MCV 85.7 82.6 - 102.9 fL    MCH 29.1 25.2 - 33.5 pg    MCHC 33.9 28.4 - 34.8 g/dL    RDW 13.3 11.8 - 14.4 %    Platelets 839 966 - 149 k/uL    MPV 11.5 8.1 - 13.5 fL    NRBC Automated 0.0 0.0 per 100 WBC    Immature Granulocytes 0 0 %    Seg Neutrophils 47 36 - 66 %    Lymphocytes 47 (H) 24 - 44 %    Monocytes 4 1 - 7 %    Eosinophils % 2 1 - 4 %    Basophils 0 0 - 2 %    Absolute Immature Granulocyte 0.00 0.00 - 0.30 k/uL    Segs Absolute 5.54 1.8 - 7.7 k/uL    Absolute Lymph # 5.55 (H) 1.0 - 4.8 k/uL    Absolute Mono # 0.47 0.1 - 0.8 k/uL    Absolute Eos # 0.24 0.0 - 0.4 k/uL    Basophils Absolute 0.00 0.0 - 0.2 k/uL    Morphology Normal    LIPID PANEL   Result Value Ref Range    Cholesterol 188 <200 mg/dL    HDL 27 (L) >40 mg/dL    LDL Cholesterol      0 - 130 mg/dL    Chol/HDL Ratio 7.0 (H) <5    Triglycerides 406 (H) <150 mg/dL   LDL Cholesterol, Direct   Result Value Ref Range    LDL Direct 105 (H) <100 mg/dL   Hemoglobin A1C   Result Value Ref Range    Hemoglobin A1C 13.2 (H) 4.0 - 6.0 %    Estimated Avg Glucose 332 mg/dL   Glucose, CSF   Result Value Ref Range    Glucose,  (H) 40 - 70 mg/dL   Protein, CSF   Result Value Ref Range    Protein, CSF 29.4 15.0 - 45.0 mg/dL   Cell Count with Differential, CSF   Result Value Ref Range    Volume, CSF 5     Appearance, CSF CLEAR     Xanthochromia ABSENT     WBC, CSF 4 <5 /mm3    RBC, CSF 0 0 /mm3    Tube Number, CSF 3    Venous Blood Gas, POC   Result Value Ref Range    pH, Sarbjit 7.466 (H) 7.320 - 7.430    pCO2, Sarbjit 33.1 (L) 41.0 - 51.0 mm Hg    pO2, Sarbjit 48.7 30.0 - 50.0 mm Hg    HCO3, Venous 23.9 22.0 - 29.0 mmol/L    Positive Base Excess, Sarbjit 1 0.0 - 3.0    O2 Sat, Sarbjit 87 (H) 60.0 - 85.0 %   Creatinine W/GFR Point of Care   Result Value Ref Range    POC Creatinine 0.88 0.51 - 1.19 mg/dL    GFR Comment >60 >60 mL/min    GFR Non-African American >60 >60 mL/min    GFR Comment         POCT urea (BUN) Result Value Ref Range    POC BUN 14 8 - 26 mg/dL   Lactic Acid, POC   Result Value Ref Range    POC Lactic Acid 1.94 (H) 0.56 - 1.39 mmol/L   POCT Glucose   Result Value Ref Range    Glucose 438 mg/dL    QC OK? yes    POC Glucose Fingerstick   Result Value Ref Range    POC Glucose 327 (H) 65 - 105 mg/dL   POC Glucose Fingerstick   Result Value Ref Range    POC Glucose 328 (H) 65 - 105 mg/dL   POC Glucose Fingerstick   Result Value Ref Range    POC Glucose 155 (H) 65 - 105 mg/dL   POC Glucose Fingerstick   Result Value Ref Range    POC Glucose 137 (H) 65 - 105 mg/dL   POC Glucose Fingerstick   Result Value Ref Range    POC Glucose 311 (H) 65 - 105 mg/dL   POC Glucose Fingerstick   Result Value Ref Range    POC Glucose 211 (H) 65 - 105 mg/dL   POC Glucose Fingerstick   Result Value Ref Range    POC Glucose 217 (H) 65 - 105 mg/dL   POC Glucose Fingerstick   Result Value Ref Range    POC Glucose 154 (H) 65 - 105 mg/dL   POC Glucose Fingerstick   Result Value Ref Range    POC Glucose 233 (H) 65 - 105 mg/dL   POC Glucose Fingerstick   Result Value Ref Range    POC Glucose 198 (H) 65 - 105 mg/dL   EKG 12 Lead   Result Value Ref Range    Ventricular Rate 108 BPM    Atrial Rate 108 BPM    P-R Interval 144 ms    QRS Duration 82 ms    Q-T Interval 340 ms    QTc Calculation (Bazett) 455 ms    P Axis 38 degrees    R Axis 48 degrees    T Axis 51 degrees     US GALLBLADDER RUQ    Result Date: 6/23/2022  EXAMINATION: RIGHT UPPER QUADRANT ULTRASOUND 6/22/2022 2:26 pm COMPARISON: None. HISTORY: ORDERING SYSTEM PROVIDED HISTORY: abd pain TECHNOLOGIST PROVIDED HISTORY: abd pain FINDINGS: LIVER:  The liver demonstrates normal echogenicity without evidence of intrahepatic biliary ductal dilatation. Normal hepatopetal flow is noted in the portal vein. BILIARY SYSTEM:  The gallbladder appears contracted limiting assessment. No discernible gallstones are present. Question sludge in the gallbladder.   No gallbladder wall thickening or positive sonographic Márquez sign. Common bile duct is within normal limits measuring 6.59 mm. RIGHT KIDNEY: The right kidney is grossly unremarkable without evidence of hydronephrosis. PANCREAS:  Visualized portions of the pancreas are unremarkable. Pancreas suboptimally visualized. OTHER: No evidence of right upper quadrant ascites. 1.  Contracted gallbladder limiting assessment. Question sludge in the gallbladder. 2.  No ductal dilatation. No gallbladder wall thickening or pericholecystic fluid. No definite evidence of acute cholecystitis. Other findings as above. XR CHEST PORTABLE    Result Date: 6/21/2022  EXAMINATION: ONE XRAY VIEW OF THE CHEST 6/21/2022 9:18 am COMPARISON: Two-view chest from 01/04/2020 HISTORY: ORDERING SYSTEM PROVIDED HISTORY: cp TECHNOLOGIST PROVIDED HISTORY: cp Reason for Exam: upright port History of seizure disorder and diabetes. FINDINGS: Overlying ECG monitor leads and gown snaps. Cardiomediastinal shadow WNL and unchanged. No localized pulmonary opacity or blunting of the costophrenic angles. No pneumothorax. Bones intact. No acute cardiopulmonary disease. MRI BRAIN WO CONTRAST    Result Date: 6/22/2022  EXAMINATION: MRI OF THE BRAIN WITHOUT CONTRAST  6/22/2022 3:50 pm TECHNIQUE: Multiplanar multisequence MRI of the brain was performed without the administration of intravenous contrast. COMPARISON: CT head 02/01/2022 MRI brain 08/18/2020 HISTORY: ORDERING SYSTEM PROVIDED HISTORY: hx cva - aphasia TECHNOLOGIST PROVIDED HISTORY: hx cva - aphasia Reason for Exam: hx cva - aphasia FINDINGS: INTRACRANIAL STRUCTURES/VENTRICLES: There is areas of restricted diffusion identified left posterior parietal and temporal lobe stool lobe and area and restricted diffusion parietal lobe noted. These appear predominant cortical based posterior changes identified left occipital lobe with overlying surgical defects.   Areas of gliosis encephalomalacia identified just deep to the surgical site. There is evidence of gradient echo signal identified likely corresponds to site of prior cavernoma versus operative bed otherwise, no shift of midline structure. Basal cisterns are patent. ORBITS: The visualized portion of the orbits demonstrate no acute abnormality. SINUSES: Mild paranasal sinus mucosal thickening. Sangeetha Red BONES/SOFT TISSUES: Left occipital craniotomy identified. Cortical based restricted diffusion identified left parasagittal parietal lobe and occipital lobe. This could be related to acute stroke Postsurgical changes left occipital lobe status post cavernoma resection. Adjacent areas of late identified and minimal area of gradient signal mild may represent postsurgical changes versus residual cavernoma. Physical Exam:    General appearance - NAD, AOx 3   Lungs -CTAB, no R/R/R  Heart - RRR, no M/R/G  Abdomen - Soft, NT/ND  Neurological:  MAEx4, No focal motor deficit, sensory loss  Extremities - Cap refil <2 sec in all ext., no edema  Skin -warm, dry      Hospital Course:  Clinical course has improved, labs and imaging reviewed. Curry Hunter originally presented to the hospital on 6/21/2022  9:04 AM with uncontrolled hyperglycemia, abd pain. At that time it was determined that She required further observation and diabetes education. Developed dizziness and stuttering and MRI obtained with concern for acute stroke. Neurology consulted. After MRI results came back it was deemed pt needed higher level of care than observation unit so was transferred to [facility] pending rest of neurology workup. Pt discussed directly with the admitting team    Disposition: Transfer  Condition: Good    Time Spent: 0 day      --  Olga Lidia Clement MD  Emergency Medicine Attending Physician    This dictation was generated by voice recognition computer software.   Although all attempts are made to edit the dictation for accuracy, there may be errors in the transcription that are not intended.

## 2022-06-24 NOTE — PROGRESS NOTES
Neurology Resident Progress Note      SUBJECTIVE:  This is a 32 y.o.  female admitted 6/21/2022 for Hypokalemia [E87.6]  Hypomagnesemia [E83.42]  Hyperglycemia [R73.9]  Noncompliance with medication regimen [Z91.14]  Acute CVA (cerebrovascular accident) [facility]) [I63.9]  This is a follow-up neurology progress note. The patient was seen and examined and the chart was reviewed. There were no acute events overnight. ROS  Constitutional: no fever, chills, fatigue  HENT: No change in vision or hearing   Respiratory: No cough, SOB, wheezing. Cardiovascular:  No chest pain, palpitations, leg swelling. Gastrointestinal: No nausea, vomiting, diarrhea. Genitourinary: No increased frequency, urgency. Musculoskeletal: No myalgia or arthralgia. Skin: No rashes or scarring or bruises. Neurological: No headache, paresthesia, or focal weakness. Endo/Heme/Allergies: Negative for itchy eyes or runny nose. Psychiatric/Behavioral: No anxiety or depressed mood.      HPI  See H&P     polyethylene glycol  17 g Oral Daily    levETIRAcetam  500 mg Oral BID    atorvastatin  40 mg Oral Nightly    scopolamine  1 patch TransDERmal Q72H    magnesium oxide  400 mg Oral BID    chlorhexidine  15 mL Mouth/Throat BID    gabapentin  600 mg Oral TID    insulin glargine  25 Units SubCUTAneous Nightly    sodium chloride flush  5-40 mL IntraVENous 2 times per day    [Held by provider] enoxaparin  40 mg SubCUTAneous Daily    insulin lispro  0-12 Units SubCUTAneous TID WC    insulin lispro  0-6 Units SubCUTAneous Nightly       Past Medical History:   Diagnosis Date    Cerebral vascular malformation     @[email or web address]    Diabetes mellitus ([address]     [facility] [address]     Traumatic hemorrhage of left cerebrum without loss of consciousness ([facility] [address]     @ [employer]       Past Surgical History:   Procedure Laterality Date    CRANIOTOMY  01/08/2020    LEFT OCCIPITAL CRANIOTOMY    CRANIOTOMY Left 1/8/2020    LEFT OCCIPITAL CRANIOTOMY, 1ST VASCULAR LESION WITH URBAN NAVIGATION (LATERAL WITH ROMERO BAG, HOLLINGSWORTH HEADHOLDER, MICROSCOPE) performed by Seda Freeman DO at [address] [place] [address] [address]:      Blood pressure 115/76, pulse (!) 104, temperature 98 °F (36.7 °C), temperature source Oral, resp. rate 16, height 5' 2\" (1.575 m), weight 147 lb 4.3 oz (66.8 kg), SpO2 93 %, not currently breastfeeding. General Examination    General Resting comfortably in bed   Head Normocephalic, without obvious abnormality   Neck Supple, symmetrical. Good ROM. No midline or paraspinal tenderness. Lungs Respirations unlabored, no wheezing   Chest Wall No deformity   Heart RRR, no murmur   Abdomen Soft. Non-tender, non-distended   Extremities No cyanosis or edema or warmth. Pulses 2+ and symmetric   Skin: Skin  turgor normal, no rashes or lesions     Mental status  Speech Alert. Oriented to person, place, and time. Speech is fluent without paraphasic errors  Good repetition and naming  Can do 1 step, 2 step, and cross-body commands  Can spell world backwards. Language appropriate. No hallucinations or delusions. No SI/HI. Cranial nerves   II - VFF, visual threat intact  III, IV, VI - extra-ocular muscles full. No nystagmus. Pupils symmetric and responsive.    V - sensation symmetric         VII -  No facial droop or asymmetric NLF  VIII - intact hearing to conversational tone          IX, X - symmetrical palate elevation   XI - 5/5 strength symmetric  XII - tongue midline   Motor function  Strength: grossly 5/5 in b/l              Deltoid, biceps, triceps, wrist flexion, wrist extension             Hip flexion/extension, knee flexion/extension, plantar flexion  Bulk: grossly normal no atrophy  Tone: symmetric b/l arms and legs  Abnormal movements: No abnormal movements or tremor   Sensory function Symmetric to touch in all extremities bilaterally   Cerebellar No dysmetria or dysdiadochocinesia    Reflex function DTR:        2+ b/l symmetric in biceps, brachioradialis, patellar, calcaneal  Babinski b/l plantar downgoing   Gait                  Not assessed       Investigations:      Laboratory Testing:  Recent Results (from the past 24 hour(s))   Hemoglobin A1C    Collection Time: 06/23/22  1:15 PM   Result Value Ref Range    Hemoglobin A1C 13.2 (H) 4.0 - 6.0 %    Estimated Avg Glucose 332 mg/dL   POC Glucose Fingerstick    Collection Time: 06/23/22  3:36 PM   Result Value Ref Range    POC Glucose 233 (H) 65 - 105 mg/dL   Meningitis Encephalitis Panel CSF, Molecular    Collection Time: 06/23/22  4:35 PM    Specimen: CSF   Result Value Ref Range    Specimen Description . CSF     ESCHERICHIA COLI K1 CSF FILM ARRAY Not Detected Not Detected    HAEMOPHILUS INFLUENZA CSF FILM ARRAY Not Detected Not Detected    LISTERIA MONOCYTOGENES CSF FILM ARRAY Not Detected Not Detected    NEISSERIA MENIGITIDIS CSF FILM ARRAY Not Detected Not Detected    STREPTOCOCCUS AGALACTIAE CSF FILM ARRAY Not Detected Not Detected    STREPTOCOCCUS PNEUMONIAE CSF FILM ARRAY Not Detected Not Detected    CYTOMEGALOVIRUS (CMV) CSF FILM ARRAY Not Detected Not Detected    ENTEROVIRUS CSF FILM ARRAY Not Detected Not Detected    HSV-1 CSF FILM ARRAY Not Detected Not Detected    HSV-2 CSF FILM ARRAY Not Detected Not Detected    HHV-6 (HERPESVIRUS 6) CSF FILM ARRAY Not Detected Not Detected    PARECHOVIRUS CSF FILM ARRAY Not Detected Not Detected    VARICELLA-ZOSTER CSF FILM ARRAY Not Detected Not Detected    CRYPTOCOCCUS NEOFORMANS/RAMIREZ CSF FILM ARR.  Not Detected Not Detected   Glucose, CSF    Collection Time: 06/23/22  4:35 PM   Result Value Ref Range    Glucose,  (H) 40 - 70 mg/dL   Protein, CSF    Collection Time: 06/23/22  4:35 PM   Result Value Ref Range    Protein, CSF 29.4 15.0 - 45.0 mg/dL   Cell Count with Differential, CSF    Collection Time: 06/23/22  4:35 PM   Result Value Ref Range    Volume, CSF 5     Appearance, CSF CLEAR     Xanthochromia ABSENT     WBC, CSF 4 <5 /mm3 RBC, CSF 0 0 /mm3    Tube Number, CSF 3    Culture, CSF    Collection Time: 06/23/22  4:35 PM    Specimen: CSF   Result Value Ref Range    Specimen Description . CSF     Direct Exam RARE NEUTROPHILS (A)     Direct Exam NO ORGANISMS SEEN     Direct Exam       Gram stain made from cytocentrifuged specimen. Organisms and cells will be concentrated. Culture NO GROWTH 15 HOURS    POC Glucose Fingerstick    Collection Time: 06/23/22  9:01 PM   Result Value Ref Range    POC Glucose 198 (H) 65 - 105 mg/dL   Basic Metabolic Panel w/ Reflex to MG    Collection Time: 06/24/22  4:45 AM   Result Value Ref Range    Glucose 256 (H) 70 - 99 mg/dL    BUN 16 6 - 20 mg/dL    CREATININE 1.11 (H) 0.50 - 0.90 mg/dL    Calcium 9.6 8.6 - 10.4 mg/dL    Sodium 140 135 - 144 mmol/L    Potassium 4.0 3.7 - 5.3 mmol/L    Chloride 102 98 - 107 mmol/L    CO2 24 20 - 31 mmol/L    Anion Gap 14 9 - 17 mmol/L    GFR Non-African American 58 (L) >60 mL/min    GFR African American >60 >60 mL/min    GFR Comment         POC Glucose Fingerstick    Collection Time: 06/24/22  6:23 AM   Result Value Ref Range    POC Glucose 239 (H) 65 - 105 mg/dL       Imaging/Diagnostics:  US GALLBLADDER RUQ    Result Date: 6/23/2022  EXAMINATION: RIGHT UPPER QUADRANT ULTRASOUND 6/22/2022 2:26 pm COMPARISON: None. HISTORY: ORDERING SYSTEM PROVIDED HISTORY: abd pain TECHNOLOGIST PROVIDED HISTORY: abd pain FINDINGS: LIVER:  The liver demonstrates normal echogenicity without evidence of intrahepatic biliary ductal dilatation. Normal hepatopetal flow is noted in the portal vein. BILIARY SYSTEM:  The gallbladder appears contracted limiting assessment. No discernible gallstones are present. Question sludge in the gallbladder. No gallbladder wall thickening or positive sonographic Márquez sign. Common bile duct is within normal limits measuring 6.59 mm. RIGHT KIDNEY: The right kidney is grossly unremarkable without evidence of hydronephrosis.  PANCREAS:  Visualized portions of for a total of 9 mL. The samples were labeled appropriately. Estimated blood loss was less than 1 mL. The needle was then removed and dressing was applied at the puncture site. The patient tolerated the procedure well and left the department in stable condition. Successful fluoroscopic-guided lumbar puncture. CTA HEAD NECK W CONTRAST    Result Date: 6/23/2022  EXAMINATION: CTA OF THE HEAD AND NECK WITH CONTRAST 6/23/2022 8:34 am: TECHNIQUE: CTA of the head and neck was performed with the administration of intravenous contrast. Multiplanar reformatted images are provided for review. MIP images are provided for review. Stenosis of the internal carotid arteries measured using NASCET criteria. Automated exposure control, iterative reconstruction, and/or weight based adjustment of the mA/kV was utilized to reduce the radiation dose to as low as reasonably achievable. COMPARISON: MR brain 06/22/2022 HISTORY: ORDERING SYSTEM PROVIDED HISTORY: left occipital lobe infract FINDINGS: CTA NECK: AORTIC ARCH/ARCH VESSELS: No dissection or arterial injury. No significant stenosis of the brachiocephalic or subclavian arteries. CAROTID ARTERIES: No dissection, arterial injury, or hemodynamically significant stenosis by NASCET criteria. VERTEBRAL ARTERIES: No dissection, arterial injury, or significant stenosis. SOFT TISSUES: The lung apices are clear. No cervical or superior mediastinal lymphadenopathy. The larynx and pharynx are unremarkable. No acute abnormality of the salivary and thyroid glands. BONES: No acute osseous abnormality. CTA HEAD: ANTERIOR CIRCULATION: No significant stenosis of the intracranial internal carotid, anterior cerebral, or middle cerebral arteries. No aneurysm. POSTERIOR CIRCULATION: No significant stenosis of the vertebral, basilar, or posterior cerebral arteries. No aneurysm. OTHER: No dural venous sinus thrombosis or focal high-grade stenosis.  BRAIN: No significant mass effect or midline shift. No extra-axial fluid collection. No large territory acute cortical infarct. 1. No apparent arterial high grade stenosis, occlusion or aneurysm within the head or neck. 2. No dural venous sinus thrombosis. MRI BRAIN WO CONTRAST    Result Date: 6/22/2022  EXAMINATION: MRI OF THE BRAIN WITHOUT CONTRAST  6/22/2022 3:50 pm TECHNIQUE: Multiplanar multisequence MRI of the brain was performed without the administration of intravenous contrast. COMPARISON: CT head 02/01/2022 MRI brain 08/18/2020 HISTORY: ORDERING SYSTEM PROVIDED HISTORY: hx cva - aphasia TECHNOLOGIST PROVIDED HISTORY: hx cva - aphasia Reason for Exam: hx cva - aphasia FINDINGS: INTRACRANIAL STRUCTURES/VENTRICLES: There is areas of restricted diffusion identified left posterior parietal and temporal lobe stool lobe and area and restricted diffusion parietal lobe noted. These appear predominant cortical based posterior changes identified left occipital lobe with overlying surgical defects. Areas of gliosis encephalomalacia identified just deep to the surgical site. There is evidence of gradient echo signal identified likely corresponds to site of prior cavernoma versus operative bed otherwise, no shift of midline structure. Basal cisterns are patent. ORBITS: The visualized portion of the orbits demonstrate no acute abnormality. SINUSES: Mild paranasal sinus mucosal thickening. Cloteal Smith BONES/SOFT TISSUES: Left occipital craniotomy identified. Cortical based restricted diffusion identified left parasagittal parietal lobe and occipital lobe. This could be related to acute stroke Postsurgical changes left occipital lobe status post cavernoma resection. Adjacent areas of late identified and minimal area of gradient signal mild may represent postsurgical changes versus residual cavernoma.        Assessment & Differential Dx:      Primary Problem  Acute CVA (cerebrovascular accident) [facility])    Active Hospital Problems    Diagnosis Date Noted    Acute CVA (cerebrovascular accident) ([facility] [address] [I63.9] 06/23/2022     Priority: Medium    Dizziness [R42] 06/23/2022     Priority: Medium    Overweight (BMI 25.0-29. 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, postoperative ride home and I am assuming opiate, headache, depression, bipolar disorder, possible seizure disorder presented to the ED with chief complaint of headache, nausea, vomiting, for 1 week,  -Patient reported that she has staring like episodes which was reported by her friend and mom for the past 3 weeks, 1 or 2 episodes when she woke up from sleep and noticed to have a tongue bite,  -Patient has a poorly controlled DM, not taking metformin,  -Patient is not taking any seizure medications  -MRI showed restricted diffusion in parietal and occipital lobes,  -Last EEG was 2/9/2022:  Abnormal due to continuous left posterior quadrant polymorphic theta slowing and frequent left temporal polymorphic delta slowing    Impression:  -Subacute onset of speech aphasia and headache  -MRI hyperintensity in occipital and parasagittal cortex    -Differential diagnosis includes infectious/inflammatory etiology, can also be seen by nonketotic hyperglycemia    Plan:     -Patient is agreeable for spinal tap to check CSF count, protein, glucose, IgG index, autoimmune encephalitis panel  -EEG brain:  -Follow-up MRI scan of the brain in 3 to 4 weeks with and without contrast  -CSF study: Rare neutrophils, normal cell counts and differential, normal protein, glucose 125, negative meningitis encephalitis panel  -A1c: 13.2  - we will sign off this pt for now, please don't hesitate to contact regarding further questions    Morena Garcia MD, MD, 6/24/2022 8:54 AM

## 2022-06-24 NOTE — PROGRESS NOTES
Inpatient Diabetes  Education     Type and Reason for Visit: Patient Education  Follow up education visit at bedside - 238, consult for insulin teaching. Patient was expressing she feels her blurry vision get worse with talking lantus. Writer clarified lantus side effects and hyperglycemia symptoms. Verbally reviewed insulin role. Use teaching I pad and reviewed type 2 diabetes dx and symptoms and role of insulin for control. Patient has lantus pen and has used pen -she needs home dose plan reviewed. Discussed inpatient dose has bee 25 units once daily. Discussed oral medication Farxiga 5 mg - how this medication works, need to drink lots of water and avoid dehydration. Need for PCP follow up- patient did call and stated appt at PCP office on 6/29/22   Explained need to stay compliant with home medication plan, check BG and follow up with PCP     Paient willing to follow up for outpatient support - writer sent letter to PCP for out patient referral and will send follow up fax to office also. Appt for follow up provided on 7/5/22 at 3 pm VV      Plan for follow up education at time of discharge to review home diabetes medication plan.     Cathi Rios RN

## 2022-06-24 NOTE — PROGRESS NOTES
Physical Therapy         Physical Therapy Cancel Note      DATE: 2022    NAME: Denisse Ortiz  MRN: [id number]   : 1995      Patient not seen this date for Physical Therapy due to:    Refused. Patient states, \"I'm not doing it. My head hurts. \"      Electronically signed by Denia Miller PT on 2022 at 9:57 AM

## 2022-06-25 VITALS
BODY MASS INDEX: 27.1 KG/M2 | DIASTOLIC BLOOD PRESSURE: 58 MMHG | WEIGHT: 147.27 LBS | HEIGHT: 62 IN | TEMPERATURE: 97.8 F | HEART RATE: 94 BPM | RESPIRATION RATE: 18 BRPM | SYSTOLIC BLOOD PRESSURE: 96 MMHG | OXYGEN SATURATION: 97 %

## 2022-06-25 LAB
GLUCOSE BLD-MCNC: 150 MG/DL (ref 65–105)
GLUCOSE BLD-MCNC: 254 MG/DL (ref 65–105)

## 2022-06-25 PROCEDURE — 6370000000 HC RX 637 (ALT 250 FOR IP): Performed by: STUDENT IN AN ORGANIZED HEALTH CARE EDUCATION/TRAINING PROGRAM

## 2022-06-25 PROCEDURE — 99239 HOSP IP/OBS DSCHRG MGMT >30: CPT | Performed by: INTERNAL MEDICINE

## 2022-06-25 PROCEDURE — 6370000000 HC RX 637 (ALT 250 FOR IP): Performed by: INTERNAL MEDICINE

## 2022-06-25 PROCEDURE — 82947 ASSAY GLUCOSE BLOOD QUANT: CPT

## 2022-06-25 RX ORDER — ATORVASTATIN CALCIUM 40 MG/1
40 TABLET, FILM COATED ORAL NIGHTLY
Qty: 30 TABLET | Refills: 0 | Status: SHIPPED | OUTPATIENT
Start: 2022-06-25 | End: 2022-09-04

## 2022-06-25 RX ORDER — INSULIN DETEMIR 100 [IU]/ML
30 INJECTION, SOLUTION SUBCUTANEOUS NIGHTLY
Qty: 5 PEN | Refills: 3 | Status: SHIPPED | OUTPATIENT
Start: 2022-06-25

## 2022-06-25 RX ORDER — INSULIN ASPART 100 [IU]/ML
5 INJECTION, SOLUTION INTRAVENOUS; SUBCUTANEOUS
Qty: 5 PEN | Refills: 1 | Status: SHIPPED | OUTPATIENT
Start: 2022-06-25

## 2022-06-25 RX ORDER — BUTALBITAL, ACETAMINOPHEN AND CAFFEINE 50; 325; 40 MG/1; MG/1; MG/1
1 TABLET ORAL EVERY 6 HOURS PRN
Qty: 12 TABLET | Refills: 0 | Status: SHIPPED | OUTPATIENT
Start: 2022-06-25

## 2022-06-25 RX ORDER — VITS A,C,E/LUTEIN/MINERALS 300MCG-200
2 TABLET ORAL 2 TIMES DAILY
Qty: 120 TABLET | Refills: 0 | Status: SHIPPED | OUTPATIENT
Start: 2022-06-25

## 2022-06-25 RX ORDER — BUTALBITAL, ACETAMINOPHEN AND CAFFEINE 50; 325; 40 MG/1; MG/1; MG/1
1 TABLET ORAL EVERY 6 HOURS PRN
Qty: 12 TABLET | Refills: 0 | Status: SHIPPED | OUTPATIENT
Start: 2022-06-25 | End: 2022-06-25

## 2022-06-25 RX ADMIN — MAGNESIUM GLUCONATE 500 MG ORAL TABLET 400 MG: 500 TABLET ORAL at 08:59

## 2022-06-25 RX ADMIN — CHLORHEXIDINE GLUCONATE 15 ML: 1.2 SOLUTION ORAL at 08:59

## 2022-06-25 RX ADMIN — INSULIN LISPRO 6 UNITS: 100 INJECTION, SOLUTION INTRAVENOUS; SUBCUTANEOUS at 11:36

## 2022-06-25 RX ADMIN — GABAPENTIN 600 MG: 300 CAPSULE ORAL at 13:25

## 2022-06-25 RX ADMIN — GABAPENTIN 600 MG: 300 CAPSULE ORAL at 08:59

## 2022-06-25 RX ADMIN — INSULIN LISPRO 2 UNITS: 100 INJECTION, SOLUTION INTRAVENOUS; SUBCUTANEOUS at 08:59

## 2022-06-25 RX ADMIN — BUTALBITAL, ACETAMINOPHEN AND CAFFEINE 1 TABLET: 50; 325; 40 TABLET ORAL at 13:25

## 2022-06-25 NOTE — PROGRESS NOTES
[facility]  Office: [address], DO, Dory Herbert, DO, Loree Miller, DO, Eduardo Vaughan Blood, DO, Marcia Aviles MD, Humberto Maldonado MD, Debbie St MD, Cipriano Johnson MD, Melanie Aguilar MD, Power Washburn MD, Gómez King MD, Becky Botello, DO, Srinivas Reyes MD,  Jason Sam, DO, Robin Bruno MD, Nav Mas MD, Sergio Neal, DO, Fabien Guardado MD, Sal Marie MD, Indigo Stringer DO, Cory Mccauley MD, Rhett Peterson MD, Alferd Felty, CNP, [facility], [place]), CNP, Donald Fuentes, CNP, Gayle Hollis, CNP, Ethyl Mercury, CNP, Victoria Aguirre, CNP, Keri Klein PAFrancieC, Beatriz Childers, DNP, Joaquina Shoemaker, CNP, Kael Knox, CNP, Mauricio Brittle, CNP, Jasmin Maldonado, CNS, Wilfredo Gardner, DNP, Werner Winkler, CNP, Allen Cates, CNP, Kathleen Moise, CNP         [facility]   [address] [facility]    Progress Note    6/25/2022    9:55 AM    Name:   Elo Hidalgo  MRN:     [id number]     Acct:      [de-identified]   Room:   [address] [place] [address] Day:  2  Admit Date:  6/21/2022  9:04 AM    PCP:   Tara Valle  Code Status:  Full Code    Subjective:     C/C:   Chief Complaint   Patient presents with    Dizziness    Abdominal Pain     x1 week    Chest Pain     Interval History Status: improved. Pt was feeling better today, off the floor 3 times. Now she is laying in bed with a slight HA. No N/V. She does want to go home. Brief History:     Per my partner:  Ben  is a 32y.o. year old female who initially presented to our hospital for evaluation of abdominal pain. Pt was found to be hyperglycemic with glucose in the 470's. She was admitted and started on Lanuts 25 units with ISS with improvement in her glucose. Her abdominal pain also resolved.  Patient was complaining of dizziness so MRI of her brain showed a cortical based restricted diffusion identified left parasagittal parietal lobe and occipital lobe that could be related to an acute stroke so patient was transferred out of observation to our service. Currently, pt is resting in bed. She is comfortable in no acute distress. She is complaining of some blurry vision which she says has been ongoing for a week. She also is having some stuttering and word finding difficulties which she said just started . She also is complaining of dizziness made worse when standing. She denies any fevers, chills, nausea, vomiting or diarrhea. \"    Review of Systems:     Constitutional:  negative for chills, fevers, sweats  Respiratory:  negative for cough, dyspnea on exertion, shortness of breath, wheezing  Cardiovascular:  negative for chest pain, chest pressure/discomfort, lower extremity edema, palpitations  Gastrointestinal:  negative for abdominal pain, constipation, diarrhea, nausea, vomiting  Neurological:  negative for dizziness, + headache    Medications: Allergies:     Allergies   Allergen Reactions    Latex     Prozac [Fluoxetine Hcl] Other (See Comments)     Seizures       Current Meds:   Scheduled Meds:    insulin glargine  30 Units SubCUTAneous Nightly    polyethylene glycol  17 g Oral Daily    levETIRAcetam  500 mg Oral BID    atorvastatin  40 mg Oral Nightly    scopolamine  1 patch TransDERmal Q72H    magnesium oxide  400 mg Oral BID    chlorhexidine  15 mL Mouth/Throat BID    gabapentin  600 mg Oral TID    sodium chloride flush  5-40 mL IntraVENous 2 times per day    [Held by provider] enoxaparin  40 mg SubCUTAneous Daily    insulin lispro  0-12 Units SubCUTAneous TID WC    insulin lispro  0-6 Units SubCUTAneous Nightly     Continuous Infusions:    dextrose      sodium chloride       PRN Meds: glucose, dextrose bolus **OR** dextrose bolus, glucagon (rDNA), dextrose, docusate sodium, butalbital-acetaminophen-caffeine, diphenhydrAMINE, promethazine, ondansetron, sodium chloride flush, sodium chloride, acetaminophen, oxyCODONE **OR** oxyCODONE, fentanNYL **OR** fentanNYL, potassium chloride **OR** potassium alternative oral replacement **OR** potassium chloride    Data:     Past Medical History:   has a past medical history of Cerebral vascular malformation, Diabetes mellitus ([facility] Seizure ([facility] [address] [facility] and 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 Social History:   reports that she has been smoking cigarettes. She has a 4.20 pack-year smoking history. She has never used smokeless tobacco. She reports current alcohol use. She reports current drug use. Drug: Marijuana Andrew Coil). Family History:   Family History   Problem Relation Age of Onset    Colon Cancer Mother     No Known Problems Father     No Known Problems Brother     Diabetes Maternal Grandfather        Vitals:  BP (!) 96/58   Pulse 94   Temp 97.8 °F (36.6 °C) (Oral)   Resp 18   Ht 5' 2\" (1.575 m)   Wt 147 lb 4.3 oz (66.8 kg)   SpO2 97%   BMI 26.94 kg/m²   Temp (24hrs), Av.1 °F (36.7 °C), Min:97.8 °F (36.6 °C), Max:98.4 °F (36.9 °C)    Recent Labs     22  1127 22  1548 22  0809   POCGLU 158* 209* 403* 150*       I/O (24Hr):   No intake or output data in the 24 hours ending 22 0955    Labs:  Hematology:  Recent Labs     22  1645   WBC 11.8*   RBC 4.54   HGB 13.2   HCT 38.9   MCV 85.7   MCH 29.1   MCHC 33.9   RDW 13.3      MPV 11.5     Chemistry:  Recent Labs     22  0445      K 4.0      CO2 24   GLUCOSE 256*   BUN 16   CREATININE 1.11*   ANIONGAP 14   LABGLOM 58*   GFRAA >60   CALCIUM 9.6     Recent Labs     22  21422  1315 22  1536 22  0623 22  0943 22  1127 22  1548 22  0809   LABA1C  --   --  13.2*  --   --   --   --   --   --   --    CHOL 188  --   --   --   --   --   --   --   --   --    HDL 27*  --   --   --   --   --   --   --   --   --    LDLCHOLESTEROL       --   --   --   --   --   --   --   --   --    CHOLHDLRATIO 7.0*  -- --   --   --   --   --   --   --   --    TRIG 406*  --   --   --   --   --   --   --   --   --    POCGLU  --    < >  --    < > 239* 334* 158* 209* 403* 150*    < > = values in this interval not displayed. ABG:  Lab Results   Component Value Date    POCPH 7.458 02/18/2020    POCPCO2 39.1 02/18/2020    POCPO2 96.0 02/18/2020    POCHCO3 27.7 02/18/2020    NBEA NOT REPORTED 02/18/2020    PBEA 4 02/18/2020    DWI2LXN 29 02/18/2020    OBPN9KPR 98 02/18/2020    FIO2 21.0 02/18/2020     Lab Results   Component Value Date/Time    SPECIAL NOT REPORTED 02/28/2020 07:22 PM     Lab Results   Component Value Date/Time    CULTURE NO GROWTH 2 DAYS 06/23/2022 04:35 PM       Radiology:  US GALLBLADDER RUQ    Result Date: 6/23/2022  1. Contracted gallbladder limiting assessment. Question sludge in the gallbladder. 2.  No ductal dilatation. No gallbladder wall thickening or pericholecystic fluid. No definite evidence of acute cholecystitis. Other findings as above. XR CHEST PORTABLE    Result Date: 6/21/2022  No acute cardiopulmonary disease. IR LUMBAR PUNCTURE FOR DIAGNOSIS    Result Date: 6/23/2022  Successful fluoroscopic-guided lumbar puncture. CTA HEAD NECK W CONTRAST    Result Date: 6/23/2022  1. No apparent arterial high grade stenosis, occlusion or aneurysm within the head or neck. 2. No dural venous sinus thrombosis. MRI BRAIN WO CONTRAST    Result Date: 6/22/2022  Cortical based restricted diffusion identified left parasagittal parietal lobe and occipital lobe. This could be related to acute stroke Postsurgical changes left occipital lobe status post cavernoma resection. Adjacent areas of late identified and minimal area of gradient signal mild may represent postsurgical changes versus residual cavernoma. Echo:  Summary  Left ventricle is normal in size. Global left ventricular systolic function  is normal. Calculated ejection fraction 58% by Heart Model.   Negative bubble study, no shunt noted.  Trivial pulmonic insufficiency. EEG:   Impression  Abnormal awake EEG. Please note the abnormality is due to possible irregular heart rate. Please correlate with formal EKG testing.      No epileptiform discharges were identified. Please note the absence of such activity on this record cannot conclusively rule out an epileptic disorder. Physical Examination:        General appearance:  awake, cooperative and no distress  Mental Status:  oriented to person, place and time and flat affect  Lungs:  clear to auscultation bilaterally, normal effort  Heart:  regular rate and rhythm, no murmur  Abdomen:  soft, nontender, nondistended, normal bowel sounds, no masses, hepatomegaly, splenomegaly  Extremities:  no edema, redness, tenderness in the calves  Skin:  no gross lesions, rashes, induration    Assessment:        Hospital Problems           Last Modified POA    * (Principal) Acute CVA (cerebrovascular accident) ([address] 6/23/2022 Yes    Dizziness 6/23/2022 Yes    Overweight (BMI 25.0-29.9) 6/23/2022 Yes    Tobacco abuse 6/23/2022 Yes    Constipation 6/23/2022 Yes    Anxiety 6/23/2022 Yes    Hyperglycemia 6/23/2022 Yes    History of craniotomy 6/23/2022 Yes    Uncontrolled diabetes mellitus with hyperglycemia ([address] 6/23/2022 Yes          Plan:        Acute CVA? Restricted diffusion in left parasagittal parietal lobe and occipital lobe- Con't ASA, statin, Echo:  EF 58%, negative bubble study, Neurology following, LP performed yesterday was  Negative, EEG negative for seizures. Neurology wants a repeat MRI brain in 3-4 weeks and f/u at their office  Hx seizures- on Keppra  DM2- uncontrolled HbA1C: 13.2, increase Lantus 30 units qhs, SSI, needs to f/u with PCP in 3 days  Abd pain- resolved  HA- Fioricet prn  HTN-  BP improved  Anxiety- stable  Tobacco- cessation encouraged  DVT proph  PT/OT    Okay to discharge home today.   Pt will f/u with PCP and Neurology    Heavenly Nielsen MD  6/25/2022  9:55 AM

## 2022-06-25 NOTE — PROGRESS NOTES
Pt wants  to talk to her father in law Dr [facility] here and stated she would call the father in law maribel Gonzalez at [phone] to update him

## 2022-06-25 NOTE — PROGRESS NOTES
Pt dc to front door. Per her  Request to ambulate .  Pt left with all belongings and dc home per her boyfriend pt left at 2485

## 2022-06-25 NOTE — PROGRESS NOTES
CLINICAL PHARMACY NOTE: MEDS TO BEDS    Total # of Prescriptions Filled: 5   The following medications were delivered to the patient:  · Bd pen needles   · levemir   · lipitor 40  · Mag oxide   · fioricet     Additional Documentation:

## 2022-06-25 NOTE — DISCHARGE SUMMARY
[facility]  Office: [address], DO, Sonya Yao, DO, Melanie Saul, DO, [facility], DO, Juan Pablo Marvin MD, Maddie Feliciano MD, Tito Szymanski MD, Carine Michele MD, Katt Muller MD, Sepideh Rondon MD, Bree Lewis MD, Lana Pearson, DO, James Bland MD,  Roland Ray DO, Grazyna Denton MD, Alena Sierra MD, Jeannie Banegas, DO, Kia Mayorga MD, Reema Mckeon MD, Geovany Olivarez DO, Shan Zavala MD, Majo Helton MD, Carlos Enrique Sutherland, [facility], [facility], CNP, Shellie Strong, CNP, Elba Almeida, CNP, Rush Pastrana, CNP, Swathi Ordoñez, CNP, Maria Alejandra Martinez PA-C, Dain Joshi, [facility], Andreea Ambriz, CNP, Brandy Yang, CNP, Barrington Kolb, CNP, Madhav Gao, CNS, Barak Daniel, [facility], Aileen Kenney, CNP, Addie Jackson, [facility], [facility], [facility]    Discharge Summary     Patient ID: Kei Samuel  :  1995   MRN: [id number]     ACCOUNT:  [de-identified]   Patient's PCP: Christiano Valle  Admit Date: 2022   Discharge Date: 2022     Length of Stay: 2  Code Status:  Full Code  Admitting Physician: Yesenia Sanchez MD  Discharge Physician: Yesenia Sanchez MD     Active Discharge Diagnoses:     Hospital Problem Lists:  Principal Problem:    Acute CVA (cerebrovascular accident) [facility])  Active Problems:    Dizziness    Overweight (BMI 25.0-29. 9)    Tobacco abuse    Constipation    Anxiety    Hyperglycemia    History of craniotomy    Uncontrolled diabetes mellitus with hyperglycemia ([facility]  Resolved Problems:    * No resolved hospital problems.  *      Admission Condition:  poor     Discharged Condition: fair    Hospital Stay:     Hospital Course:  Kei Samuel is a 32 y.o. female who was admitted for the management of   Acute CVA (cerebrovascular accident) [facility]) , presented to ER with *Dizziness, Abdominal Pain (x1 week), and Chest Pain    Per my partner:  Gold dawson 32 y.o. year old female who initially presented to our hospital for evaluation of abdominal pain. Pt was found to be hyperglycemic with glucose in the 470's. She was admitted and started on Lanuts 25 units with ISS with improvement in her glucose. Her abdominal pain also resolved. Patient was complaining of dizziness so MRI of her brain showed a cortical based restricted diffusion identified left parasagittal parietal lobe and occipital lobe that could be related to an acute stroke so patient was transferred out of observation to our service. Currently, pt is resting in bed. She is comfortable in no acute distress. She is complaining of some blurry vision which she says has been ongoing for a week. She also is having some stuttering and word finding difficulties which she said just started . She also is complaining of dizziness made worse when standing. She denies any fevers, chills, nausea, vomiting or diarrhea. \"    Patient was admitted to the Step down floor. She was started on ASA, statin. Neurology was consulted and ordered an LP and EEG. Neurology wasn't completely sure if this is a new stroke, or just the result of severe hyperglycemia. Pt is very non compliant with insulin and diabetic diet. She drinks a lot of pop. HbA1C 13.2. Her mother is in custodial and she lives at her boyfriend's house a lot and her step dad's house once in a while. Patient had a [address], negative for acute cholecystitis. Her abdominal pain resolved. She had a HA after the LP- given Fioricet. She has had no more blurry vision or neuro deficits and can be discharged home today. Assessment/ Plan       Acute CVA? Restricted diffusion in left parasagittal parietal lobe and occipital lobe- Con't ASA, statin, Echo:  EF 58%, negative bubble study, Neurology following, LP performed yesterday was  Negative, EEG negative for seizures.   Neurology wants a repeat MRI brain in 3-4 weeks and f/u at their office  Hx seizures- on Keppra  DM2- uncontrolled HbA1C: 13.2, increase Lantus 30 units qhs, SSI, needs to f/u with PCP in 3 days  Abd pain- resolved  HA- Fioricet prn  HTN-  BP improved  Anxiety- stable  Tobacco- cessation encouraged  DVT proph  PT/OT     Okay to discharge home today. Pt will f/u with PCP and Neurology, patient given a script for an MRI brain in 3 weeks. Significant therapeutic interventions: BS control, ASA, statin    Significant Diagnostic Studies:   Labs / Micro:  HbA1C: 13.2    Radiology:  US GALLBLADDER RUQ    Result Date: 6/23/2022  1. Contracted gallbladder limiting assessment. Question sludge in the gallbladder. 2.  No ductal dilatation. No gallbladder wall thickening or pericholecystic fluid. No definite evidence of acute cholecystitis. Other findings as above. XR CHEST PORTABLE    Result Date: 6/21/2022  No acute cardiopulmonary disease. IR LUMBAR PUNCTURE FOR DIAGNOSIS    Result Date: 6/23/2022  Successful fluoroscopic-guided lumbar puncture. CTA HEAD NECK W CONTRAST    Result Date: 6/23/2022  1. No apparent arterial high grade stenosis, occlusion or aneurysm within the head or neck. 2. No dural venous sinus thrombosis. MRI BRAIN WO CONTRAST    Result Date: 6/22/2022  Cortical based restricted diffusion identified left parasagittal parietal lobe and occipital lobe. This could be related to acute stroke Postsurgical changes left occipital lobe status post cavernoma resection. Adjacent areas of late identified and minimal area of gradient signal mild may represent postsurgical changes versus residual cavernoma. Consultations:    Consults:     Final Specialist Recommendations/Findings:   IP CONSULT TO INTERNAL MEDICINE  IP CONSULT TO DIABETES EDUCATOR  IP CONSULT TO DIABETES EDUCATOR  IP CONSULT TO IV TEAM      The patient was seen and examined on day of discharge and this discharge summary is in conjunction with any daily progress note from day of discharge.     Discharge plan: Disposition: Home    Physician Follow Up:     [facility] DIABETES EDUCATION  [address] [address] [address]  [address] [place] [address] Drive [address] [phone]  Go on 7/5/2022  3 pm - Follow up education on diabetes self management     Dr. Cely Montilla - 3-4 weeks- Neurology    PCP- Dr. Beatris Dumont in 3-5 days    Requiring Further Evaluation/Follow Up POST HOSPITALIZATION/Incidental Findings: Repeat MRI brain    Diet: regular diet and diabetic diet    Activity: As tolerated    Instructions to Patient: Adhere to DM2 diet, take insulin and check BS    Discharge Medications:      Medication List      CHANGE how you take these medications    butalbital-acetaminophen-caffeine -40 MG per tablet  Commonly known as: FIORICET, ESGIC  Take 1 tablet by mouth every 6 hours as needed for Headaches  What changed: when to take this     dapagliflozin 5 MG tablet  Commonly known as: Farxiga  Take 1 tablet by mouth every morning  What changed: additional instructions     Levemir FlexTouch 100 UNIT/ML injection pen  Generic drug: insulin detemir  Inject 30 Units into the skin nightly  What changed: how much to take     Mag-Oxide 200 MG Tabs  Generic drug: Magnesium Oxide  Take 2 tablets by mouth 2 times daily  What changed: how much to take        CONTINUE taking these medications    Alcohol Prep 70 % Pads  1 pad TID     atorvastatin 40 MG tablet  Commonly known as: LIPITOR  Take 1 tablet by mouth nightly     blood glucose test strips  Test 3 times a day & as needed for symptoms of irregular blood glucose.      glucose monitoring kit  1 kit by Does not apply route daily     insulin aspart 100 UNIT/ML injection pen  Commonly known as: NovoLOG FlexPen  Inject 5 Units into the skin 3 times daily (before meals)     Insulin Pen Needle 30G X 8 MM Misc  1 each by Does not apply route daily     Lancets Misc  1 each by Does not apply route 3 times daily     levETIRAcetam 500 MG tablet  Commonly known as: KEPPRA  Take 1 tablet by mouth 2 times daily for 7 days     ondansetron 4 MG tablet  Commonly known as: ZOFRAN  Take 1 tablet by mouth every 12 hours as needed for Nausea or Vomiting     promethazine 12.5 MG tablet  Commonly known as: PHENERGAN  Take 1 tablet by mouth 2 times daily as needed for Nausea (moderate to severe headache)        STOP taking these medications    insulin lispro 100 UNIT/ML injection vial  Commonly known as: HUMALOG     lamoTRIgine 25 MG tablet  Commonly known as: LAMICTAL     naproxen sodium 550 MG tablet  Commonly known as: ANAPROX     scopolamine transdermal patch  Commonly known as: TRANSDERM-SCOP           Where to Get Your Medications      These medications were sent to [facility] [address] [facility], [address] [facility]  [address] [facility], [place] [address]    Phone: [phone]   atorvastatin 40 MG tablet  dapagliflozin 5 MG tablet  Levemir FlexTouch 100 UNIT/ML injection pen  Mag-Oxide 200 MG Tabs     You can get these medications from any pharmacy    Bring a paper prescription for each of these medications  butalbital-acetaminophen-caffeine -40 MG per tablet         Discharge Procedure Orders   MRI BRAIN WO CONTRAST   Standing Status: Future Standing Exp. Date: 06/25/23   Order Comments: Please copy results to Dr. Jillian Li is the sedation requirement? None        Time Spent on discharge is  32 mins in patient examination, evaluation, counseling as well as medication reconciliation, prescriptions for required medications, discharge plan and follow up. Electronically signed by   An Hunter MD  6/25/2022  1:15 PM      Thank you Dr. Erick Conn for the opportunity to be involved in this patient's care.

## 2022-06-25 NOTE — PROGRESS NOTES
Pt given dc instructions pt verbalized understanding . pt verbalized understanding to do levemir 30 units sq every night and novolog 5 units before meals 3 times a day pt given number to call to schedule outpt MRI  Pt states she is out of novolog insulin notified dr Refugio Morillo to order it and send to  [facility].  Dr Refugio Morillo [facility] and [facility] of [facility] [address]

## 2022-06-26 LAB
CULTURE: ABNORMAL
DIRECT EXAM: ABNORMAL
SPECIMEN DESCRIPTION: ABNORMAL

## 2022-06-27 LAB — SURGICAL PATHOLOGY REPORT: NORMAL

## 2022-06-29 LAB
SEND OUT REPORT: NORMAL
TEST NAME: NORMAL

## 2022-06-30 LAB — CASE NUMBER:: NORMAL

## 2022-07-21 ENCOUNTER — HOSPITAL ENCOUNTER (OUTPATIENT)
Dept: MRI IMAGING | Age: 27
Discharge: HOME | End: 2022-07-23
Payer: MEDICARE

## 2022-07-21 DIAGNOSIS — I63.9 ACUTE CVA (CEREBROVASCULAR ACCIDENT) (HCC): ICD-10-CM

## 2022-07-21 PROCEDURE — 70551 MRI BRAIN STEM W/O DYE: CPT

## 2022-08-02 ENCOUNTER — HOSPITAL ENCOUNTER (OUTPATIENT)
Dept: DIABETES SERVICES | Age: 27
Setting detail: THERAPIES SERIES
Discharge: HOME | End: 2022-08-02
Payer: MEDICARE

## 2022-08-02 VITALS — HEIGHT: 62 IN | BODY MASS INDEX: 26.31 KG/M2 | WEIGHT: 143 LBS

## 2022-08-02 PROCEDURE — G0108 DIAB MANAGE TRN  PER INDIV: HCPCS

## 2022-08-02 NOTE — LETTER
Fortino Livingston to the [facility] diabetes education for a session  department on 8/2/2022. If you have any questions or concerns, please don't hesitate to call.       Cary Caro RN

## 2022-08-02 NOTE — PROGRESS NOTES
Diabetes Self- Management Education Program Assessment -   Also see Diabetic Screening  Patient, Denisse Ortiz,  here for diabetes self-management education  visit/ assessment. Today's visit was in an individual setting. 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 [address] [facility]     [facility]     Family History   Problem Relation Age of Onset    Colon Cancer Mother     No Known Problems Father     No Known Problems Brother     Diabetes Maternal Grandfather      Latex and Prozac [fluoxetine hcl]     There is no immunization history on file for this patient.   Current Medications  Current Outpatient Medications   Medication Sig Dispense Refill    insulin detemir (LEVEMIR FLEXTOUCH) 100 UNIT/ML injection pen Inject 30 Units into the skin nightly 5 pen 3    Magnesium Oxide (MAG-OXIDE) 200 MG TABS Take 2 tablets by mouth 2 times daily 120 tablet 0    insulin aspart (NOVOLOG FLEXPEN) 100 UNIT/ML injection pen Inject 5 Units into the skin 3 times daily (before meals) 5 pen 1    atorvastatin (LIPITOR) 40 MG tablet Take 1 tablet by mouth nightly 30 tablet 0    dapagliflozin (FARXIGA) 5 MG tablet Take 1 tablet by mouth every morning (Patient not taking: Reported on 8/2/2022) 30 tablet 0    Insulin Pen Needle 30G X 8 MM MISC 1 each by Does not apply route daily 100 each 0    butalbital-acetaminophen-caffeine (FIORICET, ESGIC) -40 MG per tablet Take 1 tablet by mouth every 6 hours as needed for Headaches 12 tablet 0    promethazine (PHENERGAN) 12.5 MG tablet Take 1 tablet by mouth 2 times daily as needed for Nausea (moderate to severe headache) 28 tablet 4    ondansetron (ZOFRAN) 4 MG tablet Take 1 tablet by mouth every 12 hours as needed for Nausea or Vomiting 30 tablet 4    glucose monitoring kit (FREESTYLE) monitoring kit 1 kit by Does not apply route daily 1 kit 0    blood glucose monitor strips Test 3 times a day & as needed for symptoms of irregular blood glucose. 100 strip 3    Lancets MISC 1 each by Does not apply route 3 times daily 600 each 1    Alcohol Swabs (ALCOHOL PREP) 70 % PADS 1 pad  each 3    Insulin Pen Needle 30G X 8 MM MISC 1 each by Does not apply route daily 100 each 3    levETIRAcetam (KEPPRA) 500 MG tablet Take 1 tablet by mouth 2 times daily for 7 days (Patient not taking: Reported on 4/2/2020) 60 tablet 0     No current facility-administered medications for this encounter.   :     Comments:  Allergies: Allergies   Allergen Reactions    Latex Hives    Prozac [Fluoxetine Hcl] Other (See Comments)     Seizures         A1C blood level - at goal < 7%   Lab Results   Component Value Date    LABA1C 13.2 (H) 06/23/2022    LABA1C 13.1 (H) 02/19/2020    LABA1C 8.7 (H) 01/05/2020     Lab Results   Component Value Date    CREATININE 1.11 (H) 06/24/2022       Blood pressure ( 130/ 80)  Or less  BP Readings from Last 3 Encounters:   06/25/22 (!) 96/58   06/01/22 (!) 143/84   04/10/22 (!) 148/101        Cholesterol ( LDL under  100)   Lab Results   Component Value Date    LDLCHOLESTEROL      06/22/2022    LDLDIRECT 105 (H) 06/22/2022       Diabetes Self- Management Education Record    Participant Name: Kei Samuel  Referring Provider: Santos Slater   Assessment/Evaluation Ratings:  1=Needs Instruction   4=Demonstrates Understanding/Competency  2=Needs Review   NC=Not Covered    3=Comprehends Key Points  N/A=Not Applicable  Topics/Learning Objectives Pre-session Assess Date:  7/5/22rs    Instr. Date Reinforce Date Post- session Eval Comments   Diabetes disease process & Treatment process: Define diabetes & pre-diabetes; Identify own type of diabetes; role of the pancreas; signs/symptoms; diagnostic criteria; prevention & treatment options; contributing factors.  1 8/2/22rs 7/5/22rs - Gdm in 4 years ago and then diabetes did not go away now staying with her home - on wait list for [facility]  Paid 9   missed alot of work - DKA   Developing strategies to promote health/change behavior: Identify 7 self-care behaviors; Personal health risk factors; Benefits, challenges & strategies for behavioral change;    1         Individualized goal selection. My goal , to help me improve my health, I will:   1. Healthy eating- move to healthier foods -less junk food and regular pop      2. Plan  Follow-up Appointments planned one on one session in 1 - 2 weeks    Instruction Method: [x]Lecture/Discussion  []Power Point Presentation  []Handouts  []Return Demonstration    Education Materials/Equipment Provided (VIA Mail for phone visits)  :    [x]Self-Management - Initial assessment - Enrolment in to ADA  Where do I Begin, Living with Type 2 diabetes ADA home support program and  handout on diabetes education classes. [x]Understanding Diabetes session       Book How to Thrive: A guide for Your Journey with Diabetes ADA booklet -pages 4, 14-19, 22-23        View: Understanding Type 2 Diabetes from Animated Diabetes Patient [email or web address]    [] Healthy Eating and Meal planning  How to Thrive: A guide for Your journey with Diabetes - ADA booklet - pages 20- 21 & 42-43  Handouts: Smarter snacking, Lowdown on low - carb diets, Supermarket savvy, Ready, set, start counting, Reading a nutrition fact label, 7 ways to size up your servings    []   Medications and Prevention of Complications      Book How to Thrive: A guide for Your Journey with Diabetes - ADA booklet -  pages 6-7, 12-13, 24-27 & 28 -35  Handouts: Know your numbers, Vaccinations, Type 2 diabetes and the role of GLP- 1, How to care for your teeth and gums, Caring for your feet, How to pick the right shoes  Individualized Diabetes report card     []  Diet Follow Up- CHO counting and  planning for sick days, holiday, vacations   How to Thrive: A guide for Your journey with Diabetes - ADA booklet  - pages 43- 37  Diabetes Food hub www. diabetesfoodhub.org   Handouts: Sick day rules, Dining out guide, Diabetes and alcohol, Traveling with diabetes, Diabetes disaster preparedness plan, Holidays and special occasions                                                            []  Self care and goal review -  3 month follow -    Handouts: AADE7 Self care behaviors work sheets and personal goal setting worksheet review, DSME support options on line     []Self-Management  Gestational - RN class -Resource materials sent out : care booklet - \" Gestational Diabetes Mellitus ( GDM) toolkit form [place] gestational diabetes postpartum care learning collaborative 2019. \"Simple Guidelines for meal planning with gestational diabetes. SMBG sheets to fax back to M weekly. BD  healthy injection site selection and rotation with 6 mm insulin syringe and 4 mm pen needle. Gestational diabetes handout [facility]. Did you have gestational diabetes when you were pregnant? Handout from [facility]  April 2014    []Self-Management Gestational - RD class - My Food Plan for Gestational diabetes    []Glucose Meter     []Insulin Kit     []Other      Encounter Type Date Start Time End Time Comments No Show Dates   Assessment 7/5/22rs     1530  1600   x    Class 1 - Understanding diabetes 8/2/22 rs 1500 1600  x    Class 2- Nutrition and diabetes          Class 3 - Preventing Complications        Class 4 -  In depth Nutrition and sick day care        Class 5 - 3 month follow up / goal reassessment        Gestational - RN         Gestational - RD        Individual MNT         Shared Med Appt         Yearly Follow-up        Meter Instrx      How to Measure Your Blood Sugar - [facility] Patient Education  [email or web address]    Insulin Instrx      []Pen  []Vial & Syringe   BD Diabetes Care: How to Inject Insulin with a Pen Needle  [email or web address]    Diabetes Care:  How to Inject Insulin with a Syringe  [email or web address]       DSMS Support :   [] MNT      [] Annual update     [] Starting Fresh  adults living with diabetes or pre diabetes. [address] [facility] [phone] call for dates    []  Diabetes Group at  [address] persaud - Free 6 week diabetes education support   classes - use web site interest form found at  FanKave.pt - to enroll       []ADA  Where do I Begin, Living with Type 2 diabetes ADA home support program  Web site: diabetes. org/living    Call: 1800 DIABETES  e-mail: Jonna@Yuuguu. org     []  Internet web sites - ADAWeb site: diabetes. org and diabetesfoodhub.org      Post Education Referrals:      [] [address] information sheet and [address] [facility] , 21       [] Dental care - [facility]     [] [facility] link  phone number - for information and referral to [address] [facility]  Clinically  [address], WEIGHT MANAGEMENT        []Other  Jennifer Lemons, RAMON

## 2022-08-03 NOTE — PROGRESS NOTES
Physician Progress Note      PATIENT:               Bessie Laguerre  CSN #:                  [id number]  :                       1995  ADMIT DATE:       2022 9:04 AM  DISCH DATE:        2022 3:15 PM  RESPONDING  PROVIDER #:        Brianna Niño MD          QUERY TEXT:    Patient admitted with Dizziness, Headache, and DM2 with hyperglycemia. Noted   documentation of Acute CVA in H/P  and Neuro PN  states  Subacute onset   of speech aphasia and headache, can also be seen by nonketotic hyperglycemia. .   If possible, please document in progress notes further status of CVA and   discharge summary if you are evaluating and /or treating any of the following: The medical record reflects the following:  Risk Factors: Aphasia, HX CVA,  Type2 DM with hyperglycemia-noncompliant  Clinical Indicators: MRI Brain Cortical based restricted diffusion identified   left parasagittal parietal lobe and occipital lobe. This could be related to   acute stroke. CTA Head No significant mass effect or midline shift. No   extra-axial fluid collection. No large territory acute cortical infarct. Neuro   consult note Patient with subacute onset of speech aphasia, headache MRI scan   showing hyperintensity in occipital and parasagittal cortex. Differential   diagnosis includes infectious/inflammatory etiology. This finding can also be   seen in nonketotic hyperglycemia. 2D ECHO EF    Treatment: MRI Brain, CTA Head, Neuro consult, 2D ECHO, labs, monitor  Options provided:  -- Acute CVA  confirmed  -- Acute CVA ruled out, headache and dizziness r/t DM2 with hyperglycemia  -- Other - I will add my own diagnosis  -- Disagree - Not applicable / Not valid  -- Disagree - Clinically unable to determine / Unknown  -- Refer to Clinical Documentation Reviewer    PROVIDER RESPONSE TEXT:    After study, Acute CVA ruled out, headache and dizziness r/t DM2 with   hyperglycemia. Query created by:  Candido Shine on 2022 1:22

## 2022-08-09 ENCOUNTER — HOSPITAL ENCOUNTER (EMERGENCY)
Age: 27
Discharge: HOME | End: 2022-08-09
Attending: EMERGENCY MEDICINE
Payer: MEDICARE

## 2022-08-09 ENCOUNTER — APPOINTMENT (OUTPATIENT)
Dept: CT IMAGING | Age: 27
End: 2022-08-09
Payer: MEDICARE

## 2022-08-09 VITALS
DIASTOLIC BLOOD PRESSURE: 99 MMHG | TEMPERATURE: 97.8 F | SYSTOLIC BLOOD PRESSURE: 137 MMHG | HEIGHT: 62 IN | HEART RATE: 94 BPM | OXYGEN SATURATION: 98 % | WEIGHT: 145 LBS | BODY MASS INDEX: 26.68 KG/M2 | RESPIRATION RATE: 11 BRPM

## 2022-08-09 DIAGNOSIS — R19.7 DIARRHEA, UNSPECIFIED TYPE: Primary | ICD-10-CM

## 2022-08-09 DIAGNOSIS — E87.6 HYPOKALEMIA: ICD-10-CM

## 2022-08-09 DIAGNOSIS — R10.31 RIGHT LOWER QUADRANT ABDOMINAL PAIN: ICD-10-CM

## 2022-08-09 DIAGNOSIS — E83.42 HYPOMAGNESEMIA: ICD-10-CM

## 2022-08-09 LAB
ABSOLUTE EOS #: 0.29 K/UL (ref 0–0.44)
ABSOLUTE IMMATURE GRANULOCYTE: 0.05 K/UL (ref 0–0.3)
ABSOLUTE LYMPH #: 3.43 K/UL (ref 1.1–3.7)
ABSOLUTE MONO #: 0.59 K/UL (ref 0.1–1.2)
ALBUMIN SERPL-MCNC: 4.2 G/DL (ref 3.5–5.2)
ALBUMIN/GLOBULIN RATIO: 1.5 (ref 1–2.5)
ALP BLD-CCNC: 161 U/L (ref 35–104)
ALT SERPL-CCNC: 11 U/L (ref 5–33)
ANION GAP SERPL CALCULATED.3IONS-SCNC: 13 MMOL/L (ref 9–17)
AST SERPL-CCNC: 12 U/L
BASOPHILS # BLD: 1 % (ref 0–2)
BASOPHILS ABSOLUTE: 0.08 K/UL (ref 0–0.2)
BILIRUB SERPL-MCNC: 0.35 MG/DL (ref 0.3–1.2)
BILIRUBIN DIRECT: 0.1 MG/DL
BILIRUBIN URINE: NEGATIVE
BILIRUBIN, INDIRECT: 0.25 MG/DL (ref 0–1)
BUN BLDV-MCNC: 16 MG/DL (ref 6–20)
CALCIUM SERPL-MCNC: 9.3 MG/DL (ref 8.6–10.4)
CHLORIDE BLD-SCNC: 93 MMOL/L (ref 98–107)
CO2: 26 MMOL/L (ref 20–31)
COLOR: YELLOW
COMMENT UA: ABNORMAL
CREAT SERPL-MCNC: 0.95 MG/DL (ref 0.5–0.9)
EOSINOPHILS RELATIVE PERCENT: 3 % (ref 1–4)
GFR AFRICAN AMERICAN: >60 ML/MIN
GFR NON-AFRICAN AMERICAN: >60 ML/MIN
GFR SERPL CREATININE-BSD FRML MDRD: ABNORMAL ML/MIN/{1.73_M2}
GLUCOSE BLD-MCNC: 347 MG/DL (ref 70–99)
GLUCOSE URINE: ABNORMAL
HCG QUALITATIVE: NEGATIVE
HCT VFR BLD CALC: 44.5 % (ref 36.3–47.1)
HEMOGLOBIN: 16 G/DL (ref 11.9–15.1)
IMMATURE GRANULOCYTES: 0 %
KETONES, URINE: NEGATIVE
LEUKOCYTE ESTERASE, URINE: NEGATIVE
LIPASE: 24 U/L (ref 13–60)
LYMPHOCYTES # BLD: 31 % (ref 24–43)
MAGNESIUM: 1.3 MG/DL (ref 1.6–2.6)
MCH RBC QN AUTO: 29 PG (ref 25.2–33.5)
MCHC RBC AUTO-ENTMCNC: 36 G/DL (ref 28.4–34.8)
MCV RBC AUTO: 80.8 FL (ref 82.6–102.9)
MONOCYTES # BLD: 5 % (ref 3–12)
NITRITE, URINE: NEGATIVE
NRBC AUTOMATED: 0 PER 100 WBC
PDW BLD-RTO: 12.1 % (ref 11.8–14.4)
PH UA: 7 (ref 5–8)
PLATELET # BLD: 227 K/UL (ref 138–453)
PMV BLD AUTO: 11.2 FL (ref 8.1–13.5)
POTASSIUM SERPL-SCNC: 3.3 MMOL/L (ref 3.7–5.3)
PROTEIN UA: NEGATIVE
RBC # BLD: 5.51 M/UL (ref 3.95–5.11)
RBC # BLD: ABNORMAL 10*6/UL
SEG NEUTROPHILS: 60 % (ref 36–65)
SEGMENTED NEUTROPHILS ABSOLUTE COUNT: 6.8 K/UL (ref 1.5–8.1)
SODIUM BLD-SCNC: 132 MMOL/L (ref 135–144)
SPECIFIC GRAVITY UA: 1.02 (ref 1–1.03)
TOTAL PROTEIN: 7 G/DL (ref 6.4–8.3)
TURBIDITY: CLEAR
URINE HGB: NEGATIVE
UROBILINOGEN, URINE: NORMAL
WBC # BLD: 11.2 K/UL (ref 3.5–11.3)

## 2022-08-09 PROCEDURE — 96375 TX/PRO/DX INJ NEW DRUG ADDON: CPT

## 2022-08-09 PROCEDURE — 6360000002 HC RX W HCPCS: Performed by: STUDENT IN AN ORGANIZED HEALTH CARE EDUCATION/TRAINING PROGRAM

## 2022-08-09 PROCEDURE — 84703 CHORIONIC GONADOTROPIN ASSAY: CPT

## 2022-08-09 PROCEDURE — 83690 ASSAY OF LIPASE: CPT

## 2022-08-09 PROCEDURE — 6360000002 HC RX W HCPCS

## 2022-08-09 PROCEDURE — 93005 ELECTROCARDIOGRAM TRACING: CPT | Performed by: STUDENT IN AN ORGANIZED HEALTH CARE EDUCATION/TRAINING PROGRAM

## 2022-08-09 PROCEDURE — 6360000004 HC RX CONTRAST MEDICATION: Performed by: STUDENT IN AN ORGANIZED HEALTH CARE EDUCATION/TRAINING PROGRAM

## 2022-08-09 PROCEDURE — 2580000003 HC RX 258: Performed by: STUDENT IN AN ORGANIZED HEALTH CARE EDUCATION/TRAINING PROGRAM

## 2022-08-09 PROCEDURE — 96361 HYDRATE IV INFUSION ADD-ON: CPT

## 2022-08-09 PROCEDURE — 83735 ASSAY OF MAGNESIUM: CPT

## 2022-08-09 PROCEDURE — 6370000000 HC RX 637 (ALT 250 FOR IP): Performed by: STUDENT IN AN ORGANIZED HEALTH CARE EDUCATION/TRAINING PROGRAM

## 2022-08-09 PROCEDURE — 80048 BASIC METABOLIC PNL TOTAL CA: CPT

## 2022-08-09 PROCEDURE — 81003 URINALYSIS AUTO W/O SCOPE: CPT

## 2022-08-09 PROCEDURE — 99285 EMERGENCY DEPT VISIT HI MDM: CPT

## 2022-08-09 PROCEDURE — 96365 THER/PROPH/DIAG IV INF INIT: CPT

## 2022-08-09 PROCEDURE — 96372 THER/PROPH/DIAG INJ SC/IM: CPT

## 2022-08-09 PROCEDURE — 74177 CT ABD & PELVIS W/CONTRAST: CPT

## 2022-08-09 PROCEDURE — 85025 COMPLETE CBC W/AUTO DIFF WBC: CPT

## 2022-08-09 PROCEDURE — 80076 HEPATIC FUNCTION PANEL: CPT

## 2022-08-09 RX ORDER — POTASSIUM CHLORIDE 20 MEQ/1
40 TABLET, EXTENDED RELEASE ORAL ONCE
Status: COMPLETED | OUTPATIENT
Start: 2022-08-09 | End: 2022-08-09

## 2022-08-09 RX ORDER — KETOROLAC TROMETHAMINE 30 MG/ML
30 INJECTION, SOLUTION INTRAMUSCULAR; INTRAVENOUS ONCE
Status: COMPLETED | OUTPATIENT
Start: 2022-08-09 | End: 2022-08-09

## 2022-08-09 RX ORDER — 0.9 % SODIUM CHLORIDE 0.9 %
1000 INTRAVENOUS SOLUTION INTRAVENOUS ONCE
Status: COMPLETED | OUTPATIENT
Start: 2022-08-09 | End: 2022-08-09

## 2022-08-09 RX ORDER — DICYCLOMINE HYDROCHLORIDE 10 MG/ML
20 INJECTION INTRAMUSCULAR ONCE
Status: COMPLETED | OUTPATIENT
Start: 2022-08-09 | End: 2022-08-09

## 2022-08-09 RX ORDER — ONDANSETRON 2 MG/ML
4 INJECTION INTRAMUSCULAR; INTRAVENOUS ONCE
Status: COMPLETED | OUTPATIENT
Start: 2022-08-09 | End: 2022-08-09

## 2022-08-09 RX ORDER — IBUPROFEN 800 MG/1
800 TABLET ORAL 2 TIMES DAILY PRN
Qty: 15 TABLET | Refills: 0 | Status: SHIPPED | OUTPATIENT
Start: 2022-08-09 | End: 2022-08-18

## 2022-08-09 RX ORDER — MAGNESIUM SULFATE 1 G/100ML
1000 INJECTION INTRAVENOUS ONCE
Status: COMPLETED | OUTPATIENT
Start: 2022-08-09 | End: 2022-08-09

## 2022-08-09 RX ORDER — MAGNESIUM SULFATE 1 G/100ML
INJECTION INTRAVENOUS
Status: COMPLETED
Start: 2022-08-09 | End: 2022-08-09

## 2022-08-09 RX ADMIN — SODIUM CHLORIDE 1000 ML: 9 INJECTION, SOLUTION INTRAVENOUS at 17:06

## 2022-08-09 RX ADMIN — POTASSIUM CHLORIDE 40 MEQ: 1500 TABLET, EXTENDED RELEASE ORAL at 17:59

## 2022-08-09 RX ADMIN — MAGNESIUM SULFATE 1000 MG: 1 INJECTION INTRAVENOUS at 18:05

## 2022-08-09 RX ADMIN — ONDANSETRON 4 MG: 2 INJECTION INTRAMUSCULAR; INTRAVENOUS at 17:03

## 2022-08-09 RX ADMIN — MAGNESIUM SULFATE HEPTAHYDRATE 1000 MG: 1 INJECTION, SOLUTION INTRAVENOUS at 18:05

## 2022-08-09 RX ADMIN — IOPAMIDOL 75 ML: 755 INJECTION, SOLUTION INTRAVENOUS at 18:51

## 2022-08-09 RX ADMIN — DICYCLOMINE HYDROCHLORIDE 20 MG: 10 INJECTION, SOLUTION INTRAMUSCULAR at 18:27

## 2022-08-09 RX ADMIN — KETOROLAC TROMETHAMINE 30 MG: 30 INJECTION, SOLUTION INTRAMUSCULAR at 17:46

## 2022-08-09 ASSESSMENT — ENCOUNTER SYMPTOMS
NAUSEA: 1
BACK PAIN: 1
EYE DISCHARGE: 0
SHORTNESS OF BREATH: 0
ABDOMINAL PAIN: 1
DIARRHEA: 1
VOMITING: 1
COUGH: 0

## 2022-08-09 ASSESSMENT — PAIN SCALES - GENERAL
PAINLEVEL_OUTOF10: 10
PAINLEVEL_OUTOF10: 8
PAINLEVEL_OUTOF10: 7

## 2022-08-09 ASSESSMENT — PAIN - FUNCTIONAL ASSESSMENT: PAIN_FUNCTIONAL_ASSESSMENT: 0-10

## 2022-08-09 ASSESSMENT — PAIN DESCRIPTION - LOCATION: LOCATION: ABDOMEN;CHEST;FLANK

## 2022-08-09 NOTE — ED TRIAGE NOTES
Pt arrived to ED 12 via triage. Pt co abdominal pain, chest pain and back pain. Pt states that she has been having Rt lower abdominal pain x 2 days. Pt states that she has been having the chest pain x 2 days non radiating. Denies any chest pain on arrival.  Pt states that she had her gallbladder removed previously but still has her appendix. Pt denies any vomiting but states that she has had diarrhea since the abdominal pain started. Pt is resting on stretcher with call light within reach. Breathing is non labored and no acute distress is noted.    Will continue to follow plan of care

## 2022-08-09 NOTE — ED PROVIDER NOTES
[facility] [facility]  Emergency Department  Emergency Medicine Resident Sign-out     Care of Denisse Ortiz was assumed from Dr. Krishna Montes and is being seen for Abdominal Pain, Nausea, Diarrhea, and Chest Pain  . The patient's initial evaluation and plan have been discussed with the prior provider who initially evaluated the patient.      EMERGENCY DEPARTMENT COURSE / MEDICAL DECISION MAKING:       MEDICATIONS GIVEN:  Orders Placed This Encounter   Medications    ondansetron (ZOFRAN) injection 4 mg    0.9 % sodium chloride bolus    ketorolac (TORADOL) injection 30 mg    magnesium sulfate 1000 mg in dextrose 5% 100 mL IVPB    potassium chloride (KLOR-CON M) extended release tablet 40 mEq    magnesium sulfate 1-5 GM/100ML-% IVPB (premix)     Belynda Sanjay M: cabinet override    dicyclomine (BENTYL) injection 20 mg    iopamidol (ISOVUE-370) 76 % injection 75 mL    ibuprofen (ADVIL;MOTRIN) 800 MG tablet     Sig: Take 1 tablet by mouth 2 times daily as needed for Pain     Dispense:  15 tablet     Refill:  0       LABS / RADIOLOGY:     Labs Reviewed   CBC WITH AUTO DIFFERENTIAL - Abnormal; Notable for the following components:       Result Value    RBC 5.51 (*)     Hemoglobin 16.0 (*)     MCV 80.8 (*)     MCHC 36.0 (*)     All other components within normal limits   BASIC METABOLIC PANEL - Abnormal; Notable for the following components:    Glucose 347 (*)     Creatinine 0.95 (*)     Sodium 132 (*)     Potassium 3.3 (*)     Chloride 93 (*)     All other components within normal limits   HEPATIC FUNCTION PANEL - Abnormal; Notable for the following components:    Alkaline Phosphatase 161 (*)     All other components within normal limits   MAGNESIUM - Abnormal; Notable for the following components:    Magnesium 1.3 (*)     All other components within normal limits   URINALYSIS WITH REFLEX TO CULTURE - Abnormal; Notable for the following components:    Glucose, Ur 3+ (*)     All other components within normal limits LIPASE   HCG, SERUM, QUALITATIVE       MRI BRAIN WO CONTRAST    Result Date: 7/24/2022  EXAMINATION: MRI OF THE BRAIN WITHOUT CONTRAST  7/21/2022 5:09 pm TECHNIQUE: Multiplanar multisequence MRI of the brain was performed without the administration of intravenous contrast. COMPARISON: MRI brain performed 06/22/2022. HISTORY: ORDERING SYSTEM PROVIDED HISTORY: Acute CVA (cerebrovascular accident) [facility]) TECHNOLOGIST PROVIDED HISTORY: Please copy results to Dr. Emy Castro What is the sedation requirement?->None Reason for Exam: Acute CVA (cerebrovascular accident). FINDINGS: INTRACRANIAL STRUCTURES/VENTRICLES: The sellar and suprasellar structures, optic chiasm, corpus callosum, pineal gland, tectum, and midline brainstem structures are unremarkable. The craniocervical junction is unremarkable. There is no acute hemorrhage, mass effect, or midline shift. There is satisfactory overall gray-white matter differentiation. There is a remote left temporal occipital infarct. The ventricular structures are symmetric and unremarkable. The infratentorial structures including the cerebellopontine angles and internal auditory canals are unremarkable. There is no abnormal restricted diffusion. There is no abnormal blooming artifact on susceptibility weighted imaging. ORBITS: The visualized portion of the orbits demonstrate no acute abnormality. SINUSES: The visualized paranasal sinuses and mastoid air cells demonstrate no acute abnormality. BONES/SOFT TISSUES: The bone marrow signal intensity appears normal. The soft tissues demonstrate no acute abnormality. No acute intracranial abnormality. Remote left temporal occipital infarct. RECENT VITALS:     Temp: 97.8 °F (36.6 °C),  Heart Rate: 94, Resp: 11, BP: (!) 137/99, SpO2: 98 %      This patient is a 32 y.o.  Female with abdominal pain        ED Course as of 08/09/22 1956   Tue Aug 09, 2022   1732 WBC: 11.2 [AB]   1733 Hemoglobin Quant(!): 16.0 [AB]   1733 hCG Qual: NEGATIVE [AB]   1747 Magnesium(!): 1.3 [AB]   1747 Potassium(!): 3.3 [AB]   1747 GLUCOSE - FASTING(!): 347  Known diabetic [AB]   1747 Creatinine(!): 0.95 [AB]   1747 LFTs within normal limits [AB]   1747 Lipase: 24 [AB]   6213 Will replace potassium and magnesium [AB]   1821 Patient has had no improvement in pain after pain medications. Will add on CT at this time [AB]   1821 Signed out to oncoming resident awaiting urinalysis and CT scan and symptom reevaluation [AB]   1932 CT abdomen unremarkable, patient to be discharged. She states that she will have her significant other pick her up. Patient given strict return precautions. [AN]      ED Course User Index  [AB] Vanessa Calderon DO  [AN] Shamar Paez MD       OUTSTANDING TASKS / RECOMMENDATIONS:    F/U UA and CT a/p     FINAL IMPRESSION:     1. Diarrhea, unspecified type    2. Right lower quadrant abdominal pain    3. Hypokalemia    4.  Hypomagnesemia        DISPOSITION:         DISPOSITION:  [x]  Discharge   []  Transfer -    []  Admission -     []  Against Medical Advice   []  Eloped   FOLLOW-UP: [facility]  [facility]  [phone]    If symptoms worsen    [address] [place] [facility]  [phone]      As needed   DISCHARGE MEDICATIONS: New Prescriptions    IBUPROFEN (ADVIL;MOTRIN) 800 MG TABLET    Take 1 tablet by mouth 2 times daily as needed for Pain          Shamar Paez MD  Emergency Medicine Resident  [facility]        Shamar Paez MD  Resident  08/09/22 [id number]

## 2022-08-09 NOTE — DISCHARGE INSTRUCTIONS
You will be discharged. Will be given ibuprofen for pain control. If it anytime you have worsening symptoms, fever, chills, return to emergency room as soon as possible. Please make sure to follow with your PCP as needed for further management and care. Please take ibuprofen for pain control. CT ABDOMEN PELVIS W IV CONTRAST Additional Contrast? None   Final Result   No acute finding in the abdomen or pelvis. The appendix is normal.  The gallbladder is surgically absent.

## 2022-08-09 NOTE — ED PROVIDER NOTES
[facility] ED  Emergency Department Encounter  Emergency Medicine Resident     Pt Name: Lazaro Vo  MRN: [id number]  Sujeygfjeaneth 1995  Date of evaluation: 8/9/22  PCP:  Ana Ch       Chief Complaint   Patient presents with    Abdominal Pain    Nausea    Diarrhea    Chest Pain       HISTORY OFPRESENT ILLNESS  (Location/Symptom, Timing/Onset, Context/Setting, Quality, Duration, Modifying Factors,Severity.)      Lazaro Vo is a 32 y. o.yo female who presents with multiple complaints. States she is abdominal pain worse in the periumbilical and right lower quadrant regions. States has been worsening over past few days. Has had associated diarrhea. No blood in the diarrhea. Did have some nausea and vomiting yesterday however this seems to have improved. Additionally has some associated chest pain. No shortness of breath, no diaphoresis. States she has never had pain like this previously. States she is a type II diabetic, on insulin, has been taking her insulin appropriately. Additionally states she has some increased urinary frequency but denies any dysuria. Denies any vaginal bleeding or discharge, states she was recently treated for a yeast infection and feels that her symptoms have resolved. PAST MEDICAL / SURGICAL / SOCIAL / FAMILY HISTORY      has a past medical history of Cerebral artery occlusion with cerebral infarction 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 has a past surgical history that includes craniotomy (01/08/2020); craniotomy (Left, 01/08/2020); and brain surgery.      Social History     Socioeconomic History    Marital status: Single     Spouse name: Not on file    Number of children: Not on file    Years of education: Not on file    Highest education level: Not on file   Occupational History    Not on file   Tobacco Use Smoking status: Some Days     Packs/day: 0.30     Years: 14.00     Pack years: 4.20     Types: Cigarettes    Smokeless tobacco: Never   Vaping Use    Vaping Use: Unknown   Substance and Sexual Activity    Alcohol use: Yes     Comment: Occassionally    Drug use: Yes     Types: Marijuana Nellie Ren)    Sexual activity: Yes     Partners: Male   Other Topics Concern    Not on file   Social History Narrative    Not on file     Social Determinants of Health     Financial Resource Strain: Not on file   Food Insecurity: Not on file   Transportation Needs: Not on file   Physical Activity: Not on file   Stress: Not on file   Social Connections: Not on file   Intimate Partner Violence: Not on file   Housing Stability: Not on file       Family History   Problem Relation Age of Onset    Colon Cancer Mother     No Known Problems Father     No Known Problems Brother     Diabetes Maternal Grandfather         Allergies:  Latex and Prozac [fluoxetine hcl]    Home Medications:  Prior to Admission medications    Medication Sig Start Date End Date Taking?  Authorizing Provider   atorvastatin (LIPITOR) 40 MG tablet Take 1 tablet by mouth nightly 6/25/22   Roxana Marroquin MD   dapagliflozin (FARXIGA) 5 MG tablet Take 1 tablet by mouth every morning  Patient not taking: Reported on 8/2/2022 6/25/22   Roxana Marroquin MD   insulin detemir (LEVEMIR FLEXTOUCH) 100 UNIT/ML injection pen Inject 30 Units into the skin nightly 6/25/22   Roxana Marroquin MD   Magnesium Oxide (MAG-OXIDE) 200 MG TABS Take 2 tablets by mouth 2 times daily 6/25/22   Roxana Marroquin MD   Insulin Pen Needle 30G X 8 MM MISC 1 each by Does not apply route daily 6/25/22   Roxana Marroquin MD   butalbital-acetaminophen-caffeine (FIORICET, ESGIC) -40 MG per tablet Take 1 tablet by mouth every 6 hours as needed for Headaches 6/25/22   Roxana Marroquin MD   insulin aspart (NOVOLOG FLEXPEN) 100 UNIT/ML injection pen Inject 5 Units into the skin 3 times daily (before meals) 6/25/22   Sharla De La Fuente MD   promethazine (PHENERGAN) 12.5 MG tablet Take 1 tablet by mouth 2 times daily as needed for Nausea (moderate to severe headache) 7/20/21   Milla Peralta MD   ondansetron [facility]) 4 MG tablet Take 1 tablet by mouth every 12 hours as needed for Nausea or Vomiting 7/20/21   Milla Peralta MD   glucose monitoring kit (FREESTYLE) monitoring kit 1 kit by Does not apply route daily 2/25/20   Monica Arceo MD   blood glucose monitor strips Test 3 times a day & as needed for symptoms of irregular blood glucose. 2/25/20   Monica Arceo MD   Lancets MISC 1 each by Does not apply route 3 times daily 2/25/20   Monica Arceo MD   Alcohol Swabs (ALCOHOL PREP) 70 % PADS 1 pad TID 2/25/20   Monica Arceo MD   Insulin Pen Needle 30G X 8 MM MISC 1 each by Does not apply route daily 2/25/20   Monica Arceo MD   levETIRAcetam (KEPPRA) 500 MG tablet Take 1 tablet by mouth 2 times daily for 7 days  Patient not taking: Reported on 4/2/2020 1/10/20 2/26/20  Ryan Russell MD       REVIEW OFSYSTEMS    (2-9 systems for level 4, 10 or more for level 5)      Review of Systems   Constitutional:  Negative for chills and fever. HENT:  Negative for congestion. Eyes:  Negative for discharge and visual disturbance. Respiratory:  Negative for cough and shortness of breath. Cardiovascular:  Positive for chest pain. Negative for palpitations. Gastrointestinal:  Positive for abdominal pain, diarrhea, nausea and vomiting. Genitourinary:  Positive for frequency. Negative for difficulty urinating, dysuria, vaginal bleeding and vaginal discharge. Musculoskeletal:  Positive for back pain and myalgias. Negative for neck pain. Skin:  Negative for rash and wound. Neurological:  Negative for headaches. Psychiatric/Behavioral:  Negative for behavioral problems and confusion.       PHYSICAL EXAM   (up to 7 for level 4, 8 or more forlevel 5)      INITIAL VITALS:   Vitals:    08/09/22 1612   BP: (!) 142/97   Pulse: (!) 107   Resp: 20   Temp: 97.8 °F (36.6 °C)   TempSrc: Oral   SpO2: 97%   Weight: 145 lb (65.8 kg)   Height: 5' 2\" (1.575 m)         Physical Exam  Vitals reviewed. Constitutional:       General: She is not in acute distress. Appearance: Normal appearance. She is not ill-appearing. HENT:      Head: Normocephalic and atraumatic. Right Ear: External ear normal.      Left Ear: External ear normal.      Nose: Nose normal.      Mouth/Throat:      Mouth: Mucous membranes are moist.   Eyes:      General:         Right eye: No discharge. Left eye: No discharge. Extraocular Movements: Extraocular movements intact. Cardiovascular:      Rate and Rhythm: Regular rhythm. Tachycardia present. Pulses: Normal pulses. Pulmonary:      Effort: Pulmonary effort is normal. No respiratory distress. Abdominal:      Palpations: Abdomen is soft. Comments: Abdomen soft, nondistended. Mildly tender to palpation in the right lower quadrant and periumbilical regions. No tenderness at McBurney's point. No rebound or guarding. Musculoskeletal:      Cervical back: Normal range of motion. Comments: Moving all 4 extremities   Skin:     General: Skin is warm. Capillary Refill: Capillary refill takes less than 2 seconds. Neurological:      General: No focal deficit present. Mental Status: She is alert and oriented to person, place, and time. Cranial Nerves: No cranial nerve deficit.    Psychiatric:         Mood and Affect: Mood normal.         Behavior: Behavior normal.       DIFFERENTIAL  DIAGNOSIS     PLAN (LABS / IMAGING / EKG):  Orders Placed This Encounter   Procedures    CT ABDOMEN PELVIS W IV CONTRAST Additional Contrast? None    CBC with Auto Differential    BMP    Hepatic Function Panel    Lipase    Magnesium    HCG Qualitative, Serum    Urinalysis with Reflex to Culture    EKG 12 Lead       MEDICATIONS ORDERED:  Orders Placed This Encounter   Medications ondansetron (ZOFRAN) injection 4 mg    0.9 % sodium chloride bolus    ketorolac (TORADOL) injection 30 mg    magnesium sulfate 1000 mg in dextrose 5% 100 mL IVPB    potassium chloride (KLOR-CON M) extended release tablet 40 mEq    magnesium sulfate 1-5 GM/100ML-% IVPB (premix)     Susie Preston M: cabinet override    dicyclomine (BENTYL) injection 20 mg       DDX: Gastritis, viral illness, appendicitis, UTI, pyelonephritis, kidney stone, diverticulitis, musculoskeletal pain    Initial MDM/Plan: 32 y.o. female who presents with multiple complaints. Patient began having diarrhea with associated abdominal pain a few days ago, has been progressively worsening. Has had associated urinary frequency, no dysuria. States she recently was treated for a yeast infection and feels that the symptoms have resolved. Additionally states she has had some chest pain. Patient well-appearing initial evaluation, afebrile, stable vital signs, no acute distress. Abdomen is soft, tender to palpation in the periumbilical and right lower quadrant regions, abdomen is soft, no rebound or guarding. Will check basic labs. Will obtain EKG as patient is complaining of some chest pain. Will provide analgesia, fluids. Will reassess.     DIAGNOSTIC RESULTS / EMERGENCYDEPARTMENT COURSE / MDM     LABS:  Labs Reviewed   CBC WITH AUTO DIFFERENTIAL - Abnormal; Notable for the following components:       Result Value    RBC 5.51 (*)     Hemoglobin 16.0 (*)     MCV 80.8 (*)     MCHC 36.0 (*)     All other components within normal limits   BASIC METABOLIC PANEL - Abnormal; Notable for the following components:    Glucose 347 (*)     Creatinine 0.95 (*)     Sodium 132 (*)     Potassium 3.3 (*)     Chloride 93 (*)     All other components within normal limits   HEPATIC FUNCTION PANEL - Abnormal; Notable for the following components:    Alkaline Phosphatase 161 (*)     All other components within normal limits   MAGNESIUM - Abnormal; Notable for the following components:    Magnesium 1.3 (*)     All other components within normal limits   LIPASE   HCG, SERUM, QUALITATIVE   URINALYSIS WITH REFLEX TO CULTURE         RADIOLOGY:  No results found. EKG  EKG Interpretation    Interpreted by me    Rhythm: Sinus tachycardia  Rate: 104  Axis: normal  Ectopy: none  Conduction: normal  ST Segments: no acute change  T Waves: Nonspecific changes  Q Waves: none    Clinical Impression: Nonspecific EKG    All EKG's are interpreted by the Emergency Department Physicianwho either signs or Co-signs this chart in the absence of a cardiologist.    EMERGENCY DEPARTMENT COURSE:  ED Course as of 08/09/22 1828   Tue Aug 09, 2022   1732 WBC: 11.2 [AB]   1733 Hemoglobin Quant(!): 16.0 [AB]   1733 hCG Qual: NEGATIVE [AB]   1747 Magnesium(!): 1.3 [AB]   1747 Potassium(!): 3.3 [AB]   1747 GLUCOSE - FASTING(!): 347  Known diabetic [AB]   1747 Creatinine(!): 0.95 [AB]   1747 LFTs within normal limits [AB]   1747 Lipase: 24 [AB]   1375 Will replace potassium and magnesium [AB]   1821 Patient has had no improvement in pain after pain medications. Will add on CT at this time [AB]   1821 Signed out to oncoming resident awaiting urinalysis and CT scan and symptom reevaluation [AB]      ED Course User Index  [AB] Alessandro Sanchez DO          PROCEDURES:  None    CONSULTS:  None    CRITICAL CARE:  See attending physician note    FINAL IMPRESSION      1. Diarrhea, unspecified type    2. Right lower quadrant abdominal pain          DISPOSITION / PLAN     DISPOSITION    Signed out      PATIENT REFERRED TO:  No follow-up provider specified.     DISCHARGE MEDICATIONS:  New Prescriptions    No medications on file       Hughie Mcardle, DO  Emergency Medicine Resident    (Please note that portions of this note were completed with a voice recognition program.Efforts were made to edit the dictations but occasionally words are mis-transcribed.)        Alessandro Sanchez DO  Resident  08/09/22 1643

## 2022-08-09 NOTE — ED PROVIDER NOTES
[facility]  Emergency Department  Faculty Attestation     I performed a history and physical examination of the patient and discussed management with the resident. I reviewed the residents note and agree with the documented findings and plan of care. Any areas of disagreement are noted on the chart. I was personally present for the key portions of any procedures. I have documented in the chart those procedures where I was not present during the key portions. I have reviewed the emergency nurses triage note. I agree with the chief complaint, past medical history, past surgical history, allergies, medications, social and family history as documented unless otherwise noted below. For Physician Assistant/ Nurse Practitioner cases/documentation I have personally evaluated this patient and have completed at least one if not all key elements of the E/M (history, physical exam, and MDM). Additional findings are as noted. Primary Care Physician:  Rah Charles    Screenings:  [unfilled]    CHIEF COMPLAINT       Chief Complaint   Patient presents with    Abdominal Pain    Nausea    Diarrhea    Chest Pain       RECENT VITALS:   Temp: 97.8 °F (36.6 °C),  Heart Rate: (!) 107, Resp: 20, BP: (!) 142/97    LABS:  Labs Reviewed   CBC WITH AUTO DIFFERENTIAL - Abnormal; Notable for the following components:       Result Value    RBC 5.51 (*)     Hemoglobin 16.0 (*)     MCV 80.8 (*)     MCHC 36.0 (*)     All other components within normal limits   BASIC METABOLIC PANEL   HEPATIC FUNCTION PANEL   LIPASE   MAGNESIUM   HCG, SERUM, QUALITATIVE   URINALYSIS WITH REFLEX TO CULTURE       Radiology  No orders to display         EKG:  EKG Interpretation    Interpreted by me    Rhythm: normal sinus   Rate: Tachycardia  Axis: normal  Ectopy: none  Conduction: normal  ST Segments: no acute change  T Waves:  Inversion V2  Q Waves: none    Clinical Impression: Tachycardia, incomplete right bundle branch block pattern, nonspecific T wave change    Attending Physician Additional  Notes    Patient with multiple complaints. She has been sick for the past 2 days with intermittent episodes of chest heaviness without radiation, coughing with some sputum production, chills, right-sided back pain, diffuse abdominal pain that is now right mid abdomen where she feels a lump. No dysuria frequency or hematuria. She has diarrhea which is unusual for her. She has loss of appetite and vomited 2 days ago but has been taking p.o. now without nausea or vomiting. She is had prior craniotomy, stroke, seizures, cholecystectomy. On exam she is tachycardic, minimally hypertensive, afebrile, anicteric, no respiratory distress. Lungs are clear. Cardiac exam is benign no murmur gallop or rub. No chest wall tenderness. Abdomen is soft with mild diffuse tenderness greatest in the left lower quadrant but also in the right lower quadrant right mid abdomen and subxiphoid region. Negative Rovsing sign. Impression is chest pain, unlikely cardiac, abdominal pain, unlikely appendicitis, uncertain cause consider viral syndrome. Plan is EKG, troponins, chest x-ray, laboratory studies, urinalysis, anticipate CT abdomen, reassess. Miguel Angel Peace.  Jayleen Anthony MD, [facility]  Attending Emergency  Physician               Katharine Guerrero MD  08/09/22 [id number]

## 2022-08-10 LAB
EKG ATRIAL RATE: 104 BPM
EKG P AXIS: 40 DEGREES
EKG P-R INTERVAL: 128 MS
EKG Q-T INTERVAL: 360 MS
EKG QRS DURATION: 84 MS
EKG QTC CALCULATION (BAZETT): 473 MS
EKG R AXIS: 73 DEGREES
EKG T AXIS: 59 DEGREES
EKG VENTRICULAR RATE: 104 BPM

## 2022-08-10 PROCEDURE — 93010 ELECTROCARDIOGRAM REPORT: CPT | Performed by: INTERNAL MEDICINE

## 2022-08-17 ENCOUNTER — OFFICE VISIT (OUTPATIENT)
Dept: NEUROLOGY | Age: 27
End: 2022-08-17
Payer: MEDICARE

## 2022-08-17 VITALS
OXYGEN SATURATION: 97 % | BODY MASS INDEX: 26.68 KG/M2 | WEIGHT: 145 LBS | HEART RATE: 104 BPM | SYSTOLIC BLOOD PRESSURE: 121 MMHG | HEIGHT: 62 IN | DIASTOLIC BLOOD PRESSURE: 80 MMHG

## 2022-08-17 DIAGNOSIS — Z98.890 STATUS POST CRANIOTOMY: ICD-10-CM

## 2022-08-17 DIAGNOSIS — G40.909 SEIZURE DISORDER (HCC): Primary | ICD-10-CM

## 2022-08-17 DIAGNOSIS — H53.8 BLURRY VISION: ICD-10-CM

## 2022-08-17 PROCEDURE — 4004F PT TOBACCO SCREEN RCVD TLK: CPT | Performed by: STUDENT IN AN ORGANIZED HEALTH CARE EDUCATION/TRAINING PROGRAM

## 2022-08-17 PROCEDURE — 99213 OFFICE O/P EST LOW 20 MIN: CPT | Performed by: STUDENT IN AN ORGANIZED HEALTH CARE EDUCATION/TRAINING PROGRAM

## 2022-08-17 PROCEDURE — G8417 CALC BMI ABV UP PARAM F/U: HCPCS | Performed by: STUDENT IN AN ORGANIZED HEALTH CARE EDUCATION/TRAINING PROGRAM

## 2022-08-17 PROCEDURE — G8427 DOCREV CUR MEDS BY ELIG CLIN: HCPCS | Performed by: STUDENT IN AN ORGANIZED HEALTH CARE EDUCATION/TRAINING PROGRAM

## 2022-08-17 RX ORDER — OXCARBAZEPINE 150 MG/1
150 TABLET, FILM COATED ORAL 2 TIMES DAILY
Qty: 60 TABLET | Refills: 3 | Status: SHIPPED | OUTPATIENT
Start: 2022-08-17 | End: 2022-09-04

## 2022-08-17 RX ORDER — GABAPENTIN 400 MG/1
400 CAPSULE ORAL 3 TIMES DAILY
Qty: 90 CAPSULE | Refills: 3 | Status: SHIPPED | OUTPATIENT
Start: 2022-08-17 | End: 2022-09-16

## 2022-08-17 NOTE — PROGRESS NOTES
good. Her blood sugars were in 800s at that time. MRI Brain show possible new acute left PCA territory infarction, but the sensitivity and specificity limited due to local magnetic susceptibility artifact from the postsurgical and hemorrhagic changes. It showed left occipital craniectomy with interval decrease in size of surgical bed and intraparenchymal hemorrhage. CT head was repeated after that which showed stable left occipital hemorrhage and left parieto-occipital craniotomy. Patient has a history of seizures, last seizure was when she was 23years old. She was placed on Keppra for seizure prophylaxis after the surgery, as per patient she weaned her off of [address] [employer] by herself, as she felt worsening of her migraine headaches with Keppra and does not want to take it again. She was prescribed Lamictal by her psychiatrist in the past but never took it and said the medication was \" Recalled\" and does not know the exact reason why. She denied any seizures since age 23, at present she does not want to take any seizure medications and says she would like to discuss with her psychiatrist/psychologist and [employer] and then decide about it.         PREVIOUS WORKUP:     Lab Results   Component Value Date    WBC 11.2 08/09/2022    HGB 16.0 (H) 08/09/2022    HCT 44.5 08/09/2022    MCV 80.8 (L) 08/09/2022     08/09/2022       Past Medical History:   Diagnosis Date    Cerebral artery occlusion with cerebral infarction [facility])     Cerebral vascular malformation     [email or web address]    Diabetes mellitus ([address]     Seizure ([address]     Traumatic hemorrhage of left cerebrum without loss of consciousness ([address]     @ [facility]        Past Surgical History:   Procedure Laterality Date    BRAIN SURGERY      CRANIOTOMY  01/08/2020    LEFT OCCIPITAL CRANIOTOMY    CRANIOTOMY Left 01/08/2020    LEFT OCCIPITAL CRANIOTOMY, 1ST VASCULAR LESION WITH [employer] NAVIGATION (LATERAL WITH ROMERO BAG, HOLLINGSWORTH HEADHOLDER, MICROSCOPE) performed by Sherif Marino DO at [address]. History     Socioeconomic History    Marital status: Single     Spouse name: Not on file    Number of children: Not on file    Years of education: Not on file    Highest education level: Not on file   Occupational History    Not on file   Tobacco Use    Smoking status: Some Days     Packs/day: 0.30     Years: 14.00     Pack years: 4.20     Types: Cigarettes    Smokeless tobacco: Never   Vaping Use    Vaping Use: Unknown   Substance and Sexual Activity    Alcohol use: Yes     Comment: Occassionally    Drug use: Yes     Types: Marijuana Melanie Braydon)    Sexual activity: Yes     Partners: Male   Other Topics Concern    Not on file   Social History Narrative    Not on file     Social Determinants of Health     Financial Resource Strain: Not on file   Food Insecurity: Not on file   Transportation Needs: Not on file   Physical Activity: Not on file   Stress: Not on file   Social Connections: Not on file   Intimate Partner Violence: Not on file   Housing Stability: Not on file        Current Outpatient Medications   Medication Sig Dispense Refill    ibuprofen (ADVIL;MOTRIN) 800 MG tablet Take 1 tablet by mouth 2 times daily as needed for Pain 15 tablet 0    atorvastatin (LIPITOR) 40 MG tablet Take 1 tablet by mouth nightly 30 tablet 0    dapagliflozin (FARXIGA) 5 MG tablet Take 1 tablet by mouth every morning (Patient not taking: Reported on 8/2/2022) 30 tablet 0    insulin detemir (LEVEMIR FLEXTOUCH) 100 UNIT/ML injection pen Inject 30 Units into the skin nightly 5 pen 3    Magnesium Oxide (MAG-OXIDE) 200 MG TABS Take 2 tablets by mouth 2 times daily 120 tablet 0    Insulin Pen Needle 30G X 8 MM MISC 1 each by Does not apply route daily 100 each 0    butalbital-acetaminophen-caffeine (FIORICET, ESGIC) -40 MG per tablet Take 1 tablet by mouth every 6 hours as needed for Headaches 12 tablet 0    insulin aspart (NOVOLOG FLEXPEN) 100 UNIT/ML injection pen Inject 5 Units into the Psychiatric/Behavioral: Negative for behavioral problems and hallucinations. VITALS  Ht 5' 2\" (1.575 m)   Wt 145 lb (65.8 kg)   BMI 26.52 kg/m²      PHYSICAL EXAMINATION:     Physical Exam   General appearance: cooperative  Skin: no rash or skin lesions. HEENT: normocephalic  Optic Fundi: deferred  Neck: supple: no cervcical adenopathy or carotid bruit  Lungs: clear to auscultation  Heart: Regular rate and rhythm, normal S1, S2. No murmurs, clicks or gallops.   Peripheral pulses: radial pulses palpable  Abdominal: BS present, soft, NT, ND  Extremities: no edema    NEUROLOGICAL EXAMINATION:     GENERAL  Appears comfortable and in no distress   HEENT  NC/ AT   HEART  S1 and S2 heard; palpation of pulses: radial pulse    NECK  Supple and no bruits heard   MENTAL STATUS:  Alert, oriented, intact memory, no confusion, normal speech, normal language, no hallucination or delusion   CRANIAL NERVES: II     -      right homonymous hemianopsia   III,IV,VI -  PERR, EOMs full, no ptosis  V     -     Normal facial sensation   VII    -     Normal facial symmetry  VIII   -     Intact hearing   IX,X -     Symmetrical palate  XI    -     Symmetrical shoulder shrug  XII   -     Midline tongue, no atrophy    MOTOR FUNCTION: RUE: Significant for good strength of grade 5/5 in proximal and distal muscle groups   LUE: Significant for good strength of grade 5/5 in proximal and distal muscle groups   RLE: Significant for good strength of grade 5/5 in proximal and distal muscle groups   LLE: Significant for good strength of grade 5/5 in proximal and distal muscle groups      Normal bulk, normal tone and no involuntary movements, no tremor   SENSORY FUNCTION:  Normal touch, normal pin, normal vibration, normal proprioception   CEREBELLAR FUNCTION:  Intact fine motor control over upper limbs and lower limbs   REFLEX FUNCTION:  Symmetric in upper and lower extremities, no Babinski sign   STATION and GAIT  Normal gait and tandem station, normal tip toes and heel walking     IMAGING:     Imaging/Diagnostics:  CT ABDOMEN PELVIS W IV CONTRAST Additional Contrast? None    Result Date: 8/9/2022  EXAMINATION: CT OF THE ABDOMEN AND PELVIS WITH CONTRAST 8/9/2022 6:47 pm TECHNIQUE: CT of the abdomen and pelvis was performed with the administration of intravenous contrast. Multiplanar reformatted images are provided for review. Automated exposure control, iterative reconstruction, and/or weight based adjustment of the mA/kV was utilized to reduce the radiation dose to as low as reasonably achievable. COMPARISON: 08/15/2017 HISTORY: ORDERING SYSTEM PROVIDED HISTORY: Generalized abdominal pain, worse in periumbilical and right lower quadrant regions TECHNOLOGIST PROVIDED HISTORY: Generalized abdominal pain, worse in periumbilical and right lower quadrant regions Decision Support Exception - unselect if not a suspected or confirmed emergency medical condition->Emergency Medical Condition (MA) Reason for Exam: Generalized abdominal pain, worse in periumbilical and right lower quadrant regions FINDINGS: Lower Chest: The lung bases are clear and the heart size is normal. Organs: The liver, spleen, pancreas, adrenal glands and kidneys are normal. The gallbladder is surgically absent. Bile ducts are not dilated. GI/Bowel: The appendix is normal.  Unopacified bowel loops are unremarkable. No bowel obstruction. Pelvis: Dominant follicle in the right ovary. The ovaries, uterus and urinary bladder are otherwise unremarkable. Peritoneum/Retroperitoneum: There is no adenopathy, free air or free fluid. Bones/Soft Tissues: Tiny fat containing umbilical hernia. No acute bone or soft tissue abnormality. No acute finding in the abdomen or pelvis. The appendix is normal.  The gallbladder is surgically absent.      MRI BRAIN WO CONTRAST    Result Date: 7/24/2022  EXAMINATION: MRI OF THE BRAIN WITHOUT CONTRAST  7/21/2022 5:09 pm TECHNIQUE: Multiplanar multisequence MRI of the brain was performed without the administration of intravenous contrast. COMPARISON: MRI brain performed 06/22/2022. HISTORY: ORDERING SYSTEM PROVIDED HISTORY: Acute CVA (cerebrovascular accident) [facility] TECHNOLOGIST PROVIDED HISTORY: Please copy results to Dr. Ronel Cordero What is the sedation requirement?->None Reason for Exam: Acute CVA (cerebrovascular accident). FINDINGS: INTRACRANIAL STRUCTURES/VENTRICLES: The sellar and suprasellar structures, optic chiasm, corpus callosum, pineal gland, tectum, and midline brainstem structures are unremarkable. The craniocervical junction is unremarkable. There is no acute hemorrhage, mass effect, or midline shift. There is satisfactory overall gray-white matter differentiation. There is a remote left temporal occipital infarct. The ventricular structures are symmetric and unremarkable. The infratentorial structures including the cerebellopontine angles and internal auditory canals are unremarkable. There is no abnormal restricted diffusion. There is no abnormal blooming artifact on susceptibility weighted imaging. ORBITS: The visualized portion of the orbits demonstrate no acute abnormality. SINUSES: The visualized paranasal sinuses and mastoid air cells demonstrate no acute abnormality. BONES/SOFT TISSUES: The bone marrow signal intensity appears normal. The soft tissues demonstrate no acute abnormality. No acute intracranial abnormality. Remote left temporal occipital infarct. ASSESSMENT:     32years old female patient with multiple neurological conditions. Migraines, seizures, occpital cavernoma sp resection 2020. Had a recent hospitalization in June 2022 presenting with aphasia and DKA. MRI showing changes favoring DKA to explain the aphasia and the MRI changes. EEG and LP results are negative. Clinically the patient is doing well. Aphasia resolved.  No headaches but she did mention some concerns for seizures and she mentioned she was not taking keppra due to side effects    PLAN:     DC keppra and start trileptal 150 mg orally bid as the patient mentioned irritability and diarrhea with keppra   Repeat MRI brain   Gabapentin 400 mg orally TID for her neuropathy renewed  Ophtalmology referral per patient's request  RTC 3 months to follow up on MRI     Ms. Renetta Marcelo received counseling on the following healthy behaviors: medical compliance, smoking cessation, blood pressure control, regular follow up with primary doctor.         Electronically signed by Yvonne Flowers MD on 8/17/2022 at 3:59 PM

## 2022-08-18 ENCOUNTER — APPOINTMENT (OUTPATIENT)
Dept: GENERAL RADIOLOGY | Age: 27
End: 2022-08-18
Payer: MEDICARE

## 2022-08-18 ENCOUNTER — HOSPITAL ENCOUNTER (OUTPATIENT)
Dept: DIABETES SERVICES | Age: 27
Setting detail: THERAPIES SERIES
Discharge: HOME | End: 2022-08-18
Payer: MEDICARE

## 2022-08-18 ENCOUNTER — HOSPITAL ENCOUNTER (EMERGENCY)
Age: 27
Discharge: HOME | End: 2022-08-18
Attending: EMERGENCY MEDICINE
Payer: MEDICARE

## 2022-08-18 VITALS
SYSTOLIC BLOOD PRESSURE: 131 MMHG | DIASTOLIC BLOOD PRESSURE: 99 MMHG | BODY MASS INDEX: 26.68 KG/M2 | RESPIRATION RATE: 20 BRPM | TEMPERATURE: 98.1 F | HEART RATE: 115 BPM | WEIGHT: 145 LBS | OXYGEN SATURATION: 100 % | HEIGHT: 62 IN

## 2022-08-18 DIAGNOSIS — S63.622A SPRAIN OF INTERPHALANGEAL JOINT OF LEFT THUMB, INITIAL ENCOUNTER: Primary | ICD-10-CM

## 2022-08-18 DIAGNOSIS — E11.9 NEWLY DIAGNOSED DIABETES (HCC): Primary | ICD-10-CM

## 2022-08-18 PROCEDURE — 6370000000 HC RX 637 (ALT 250 FOR IP)

## 2022-08-18 PROCEDURE — 73130 X-RAY EXAM OF HAND: CPT

## 2022-08-18 PROCEDURE — G0108 DIAB MANAGE TRN  PER INDIV: HCPCS

## 2022-08-18 PROCEDURE — 99283 EMERGENCY DEPT VISIT LOW MDM: CPT

## 2022-08-18 RX ORDER — ACETAMINOPHEN 325 MG/1
650 TABLET ORAL ONCE
Status: COMPLETED | OUTPATIENT
Start: 2022-08-18 | End: 2022-08-18

## 2022-08-18 RX ORDER — IBUPROFEN 800 MG/1
800 TABLET ORAL
Qty: 10 TABLET | Refills: 0 | Status: SHIPPED | OUTPATIENT
Start: 2022-08-18 | End: 2022-10-21

## 2022-08-18 RX ADMIN — ACETAMINOPHEN 650 MG: 325 TABLET ORAL at 18:05

## 2022-08-18 ASSESSMENT — PAIN SCALES - GENERAL
PAINLEVEL_OUTOF10: 8
PAINLEVEL_OUTOF10: 10

## 2022-08-18 ASSESSMENT — PAIN - FUNCTIONAL ASSESSMENT
PAIN_FUNCTIONAL_ASSESSMENT: ACTIVITIES ARE NOT PREVENTED
PAIN_FUNCTIONAL_ASSESSMENT: 0-10

## 2022-08-18 ASSESSMENT — ENCOUNTER SYMPTOMS
NAUSEA: 0
DIARRHEA: 0
SHORTNESS OF BREATH: 0
VOMITING: 0
ABDOMINAL PAIN: 0
CONSTIPATION: 0
WHEEZING: 0

## 2022-08-18 ASSESSMENT — PAIN DESCRIPTION - LOCATION
LOCATION: OTHER (COMMENT)
LOCATION: HAND

## 2022-08-18 ASSESSMENT — PAIN DESCRIPTION - FREQUENCY: FREQUENCY: CONTINUOUS

## 2022-08-18 ASSESSMENT — PAIN DESCRIPTION - DESCRIPTORS
DESCRIPTORS: THROBBING;TENDER
DESCRIPTORS: NUMBNESS;THROBBING

## 2022-08-18 ASSESSMENT — PAIN DESCRIPTION - ORIENTATION
ORIENTATION: LEFT
ORIENTATION: LEFT

## 2022-08-18 NOTE — ED TRIAGE NOTES
Patient presented to the ED today with complaints of left thumb pain. Patient stated that a window fell on her thumb at home today. Patient also stated that her thumb is numb and she is unable to move it.

## 2022-08-18 NOTE — DISCHARGE INSTRUCTIONS
Patient initially presented with left hand pain status post trauma earlier this morning. Left hand x-ray was ordered which was unremarkable and negative for any fractures or dislocations. Patient was administered Tylenol for pain. At this point patient is stable for discharge. Patient will be discharged home with Motrin 800 and instructions to rest hand. Patient advised to return to the ED for any worsening symptoms including chest pains, shortness of breath, or abdominal pain.

## 2022-08-18 NOTE — Clinical Note
Vimal Sun was seen and treated in our emergency department on 8/18/2022. She may return to work on 08/19/2022. If you have any questions or concerns, please don't hesitate to call.       Kacey Mojica MD

## 2022-08-18 NOTE — ED PROVIDER NOTES
[address]  ED  Emergency Department Encounter  Emergency Medicine Resident     Pt Name: Alyssia Rousseau  MRN: [id number]  [place] 1995  Date of evaluation: 8/18/22  PCP:  Ana Ch       Chief Complaint   Patient presents with    Hand Injury       HISTORY OFPRESENT ILLNESS  (Location/Symptom, Timing/Onset, Context/Setting, Quality, Duration, Modifying Factors,Severity.)      Alyssia Rousseau is a 32 y.o. female who presents with left hand pain which started 3 AM last night when she was putting a window fan in her windowsill. Patient mentions a window fell on her left hand. She denies hearing any snap or crack however mentions having throbbing pain since then. Range of motion is limited due to pain, sensory intact, swelling noticeable. She took a gabapentin earlier which did not help. PAST MEDICAL / SURGICAL / SOCIAL / FAMILY HISTORY      has a past medical history of Cerebral artery occlusion with cerebral infarction [facility]), Cerebral vascular malformation, Diabetes mellitus ([facility] Seizure ([facility] and Traumatic hemorrhage of left cerebrum without loss of consciousness ([facility] has a past surgical history that includes craniotomy (01/08/2020); craniotomy (Left, 01/08/2020); and brain surgery. Social:  reports that she has been smoking cigarettes. She has a 4.20 pack-year smoking history. She has never used smokeless tobacco. She reports current alcohol use. She reports current drug use. Drug: Marijuana Nellie Ren). Family Hx:   Family History   Problem Relation Age of Onset    Colon Cancer Mother     No Known Problems Father     No Known Problems Brother     Diabetes Maternal Grandfather         Allergies:  Latex and Prozac [fluoxetine hcl]    Home Medications:  Prior to Admission medications    Medication Sig Start Date End Date Taking?  Authorizing Provider   ibuprofen (ADVIL;MOTRIN) 800 MG tablet Take 1 tablet by mouth 3 times daily (with meals) 8/18/22  Yes Danny Parmar not apply route daily 2/25/20   Suzie Marx MD       REVIEW OFSYSTEMS    (2-9 systems for level 4, 10 or more for level 5)      Review of Systems   Respiratory:  Negative for shortness of breath and wheezing. Cardiovascular:  Negative for chest pain and leg swelling. Gastrointestinal:  Negative for abdominal pain, constipation, diarrhea, nausea and vomiting. Musculoskeletal:  Negative for arthralgias. Neurological:  Positive for headaches. Psychiatric/Behavioral:  Negative for agitation and behavioral problems. PHYSICAL EXAM   (up to 7 for level 4, 8 or more forlevel 5)      INITIAL VITALS:   Vitals:    08/18/22 1731   BP: (!) 131/99   Pulse: (!) 115   Resp: 20   Temp: 98.1 °F (36.7 °C)   TempSrc: Oral   SpO2: 100%   Weight: 145 lb (65.8 kg)   Height: 5' 2\" (1.575 m)        Physical Exam  Constitutional:       General: She is not in acute distress. Cardiovascular:      Rate and Rhythm: Normal rate and regular rhythm. Heart sounds: Normal heart sounds. Pulmonary:      Effort: Pulmonary effort is normal.      Breath sounds: Normal breath sounds. Musculoskeletal:         General: Swelling (L thumb) and tenderness (L hand) present. Right lower leg: No edema. Left lower leg: No edema. Skin:     General: Skin is warm and dry. Psychiatric:         Mood and Affect: Mood normal.         Behavior: Behavior normal.       DIFFERENTIAL  DIAGNOSIS       DDX: fracture vs sprain      Initial MDM/Plan: 32 y.o. female who presents with left hand pain after a window fell on her hand. At this time we will order left hand x-ray and administer Tylenol. We will reassess after results. DIAGNOSTIC RESULTS / EMERGENCYDEPARTMENT COURSE / MDM     LABS:  No results found for this visit on 08/18/22. RADIOLOGY:  XR HAND LEFT (MIN 3 VIEWS)    Result Date: 8/18/2022  EXAMINATION: THREE XRAY VIEWS OF THE LEFT HAND 8/18/2022 6:45 pm COMPARISON: None.  HISTORY: Acute pain and bruising after window closed on thumb. FINDINGS: No acute fracture or dislocation. No significant degenerative changes. Soft tissues are unremarkable. No acute osseous abnormality. MEDICATIONS ORDERED:  Orders Placed This Encounter   Medications    acetaminophen (TYLENOL) tablet 650 mg    ibuprofen (ADVIL;MOTRIN) 800 MG tablet     Sig: Take 1 tablet by mouth 3 times daily (with meals)     Dispense:  10 tablet     Refill:  0         PROCEDURES:  None      CONSULTS:  None      EMERGENCY DEPARTMENT COURSE:  ED Course as of 08/18/22 1924   Thu Aug 18, 2022   1905 Left hand x-ray unremarkable [OA]      ED Course User Index  [OA] Teo Porter MD          FINAL IMPRESSION      1. Sprain of interphalangeal joint of left thumb, initial encounter        Patient initially presented with left hand pain status post trauma earlier this morning. Left hand x-ray was ordered which was unremarkable and negative for any fractures or dislocations. Patient was administered Tylenol for pain. At this point patient is stable for discharge. Patient will be discharged home with Motrin 800 and instructions to rest hand.     DISPOSITION / PLAN     DISPOSITION Decision To Discharge 08/18/2022 07:12:14 PM      PATIENT REFERRED TO:  Candace Valle  [address] [facility] [address] [phone]    Schedule an appointment as soon as possible for a visit in 1 week      DISCHARGE MEDICATIONS:  New Prescriptions    IBUPROFEN (ADVIL;MOTRIN) 800 MG TABLET    Take 1 tablet by mouth 3 times daily (with meals)       Teo Porter MD  Emergency Medicine Resident    (Please note that portions of this note were completed with a voice recognition program.Efforts were made to edit the dictations but occasionally words are mis-transcribed.)      Teo Porter MD  Resident  08/18/22 1924       Teo Porter MD  Resident  08/18/22 1924

## 2022-08-18 NOTE — PROGRESS NOTES
Diabetes Self- Management Education Program Assessment -   Also see Diabetic Screening  Patient, Rosalva Guallpa,  here for diabetes self-management education  visit/ assessment. Today's visit was in an individual setting. MEDICAL HISTORY:  Past Medical History:   Diagnosis Date    Cerebral artery occlusion with cerebral infarction [facility])     Cerebral vascular malformation     [email or web address]    Diabetes mellitus ([address]     [facility] [address]     Traumatic hemorrhage of left cerebrum without loss of consciousness ([facility] [address]     @ [facility]     Family History   Problem Relation Age of Onset    Colon Cancer Mother     No Known Problems Father     No Known Problems Brother     Diabetes Maternal Grandfather      Latex and Prozac [fluoxetine hcl]     There is no immunization history on file for this patient. Current Medications  Current Outpatient Medications   Medication Sig Dispense Refill    gabapentin (NEURONTIN) 400 MG capsule Take 1 capsule by mouth 3 times daily for 30 days.  90 capsule 3    OXcarbazepine (TRILEPTAL) 150 MG tablet Take 1 tablet by mouth 2 times daily 60 tablet 3    ibuprofen (ADVIL;MOTRIN) 800 MG tablet Take 1 tablet by mouth 2 times daily as needed for Pain 15 tablet 0    atorvastatin (LIPITOR) 40 MG tablet Take 1 tablet by mouth nightly 30 tablet 0    dapagliflozin (FARXIGA) 5 MG tablet Take 1 tablet by mouth every morning (Patient not taking: No sig reported) 30 tablet 0    insulin detemir (LEVEMIR FLEXTOUCH) 100 UNIT/ML injection pen Inject 30 Units into the skin nightly 5 pen 3    Magnesium Oxide (MAG-OXIDE) 200 MG TABS Take 2 tablets by mouth 2 times daily 120 tablet 0    Insulin Pen Needle 30G X 8 MM MISC 1 each by Does not apply route daily 100 each 0    butalbital-acetaminophen-caffeine (FIORICET, ESGIC) -40 MG per tablet Take 1 tablet by mouth every 6 hours as needed for Headaches 12 tablet 0    insulin aspart (NOVOLOG FLEXPEN) 100 UNIT/ML injection pen Inject 5 Units into the skin 3 times daily (before meals) 5 pen 1    promethazine (PHENERGAN) 12.5 MG tablet Take 1 tablet by mouth 2 times daily as needed for Nausea (moderate to severe headache) (Patient not taking: Reported on 8/17/2022) 28 tablet 4    ondansetron (ZOFRAN) 4 MG tablet Take 1 tablet by mouth every 12 hours as needed for Nausea or Vomiting 30 tablet 4    glucose monitoring kit (FREESTYLE) monitoring kit 1 kit by Does not apply route daily 1 kit 0    blood glucose monitor strips Test 3 times a day & as needed for symptoms of irregular blood glucose. 100 strip 3    Lancets MISC 1 each by Does not apply route 3 times daily 600 each 1    Alcohol Swabs (ALCOHOL PREP) 70 % PADS 1 pad  each 3    Insulin Pen Needle 30G X 8 MM MISC 1 each by Does not apply route daily 100 each 3     No current facility-administered medications for this encounter.   :     Comments:  Allergies: Allergies   Allergen Reactions    Latex Hives    Prozac [Fluoxetine Hcl] Other (See Comments)     Seizures         A1C blood level - at goal < 7%   Lab Results   Component Value Date    LABA1C 13.2 (H) 06/23/2022    LABA1C 13.1 (H) 02/19/2020    LABA1C 8.7 (H) 01/05/2020     Lab Results   Component Value Date    CREATININE 0.95 (H) 08/09/2022       Blood pressure ( 130/ 80)  Or less  BP Readings from Last 3 Encounters:   08/17/22 121/80   08/09/22 (!) 137/99   06/25/22 (!) 96/58        Cholesterol ( LDL under  100)   Lab Results   Component Value Date    LDLCHOLESTEROL      06/22/2022    LDLDIRECT 105 (H) 06/22/2022       Diabetes Self- Management Education Record    Participant Name: Kami Hansen  Referring Provider: Merrick Alvarado   Assessment/Evaluation Ratings:  1=Needs Instruction   4=Demonstrates Understanding/Competency  2=Needs Review   NC=Not Covered    3=Comprehends Key Points  N/A=Not Applicable  Topics/Learning Objectives Pre-session Assess Date:  7/5/22rs    Instr.  Date Reinforce Date Post- session Eval Comments   Diabetes disease process & Treatment process: Define diabetes & pre-diabetes; Identify own type of diabetes; role of the pancreas; signs/symptoms; diagnostic criteria; prevention & treatment options; contributing factors. 1 8/2/22rs    7/5/22rs - Gdm in 4 years ago and then diabetes did not go away  - Dm in family - grand father is type 1    Incorporating nutritional management into lifestyle: Describe effect of type, amount & timing of food on blood glucose; Describe basic meal planning techniques & current nutrition guideline   1  Food stamps  Looking for stable housing - now staying with her home - on wait list for [facility] - has CM     Cook for self and shopping also -  Not big breakfast meal - may be a banana - if up early - but usually up about noon - drink water   And Gatorade zero    Pizza and some veggies - like yogurt - likes meat pork chops, fish,  chicken - green beans and corn and creamed corn broccoli -likes watermelon and strawberries and peaches and granola    No food allergy                    8/18/22 JW food stamps cut off. Gave info re: food pantries. Encouraged her to get back to [employer] to inquire. Pt says she has support of mom, friends and bf. Pt distracted and got off track during session. Cooks own meals and sometimes skips. Unable to get details re: times of meals. Showed healthy eating video. What to eat - Food groups, When to eat - timing of meals and snacks, and How much to eat - portions control. Suggest 1500 calories/ day  3 CHO choices/ meal and 1 CHO choice/snack   CHO choices/  day   grams of protein /day   gram of fat /day     8/2/22rs - stopped all pop and BG have been coming down - stated has seen some wt loss in last few weeks - now at 143lbs   Correctly read food labels & demonstrate CHO counting & portion control with personalized meal plan. Identify dining out strategies, & dietary sick day guidelines.    1 8/18/22 JW simple labels      Incorporating physical activity into lifestyle:   Verbalize effect of exercise on https://youtu. be/UNsQt49kKIQ    [x] Healthy Eating and Meal planning8/18/22 JW   How to Thrive: A guide for Your journey with Diabetes - ADA booklet - pages 20- 21 & 42-43  Handouts: Smarter snacking, Lowdown on low - carb diets, Supermarket savvy, Ready, set, start counting, Reading a nutrition fact label, 7 ways to size up your servings    []   Medications and Prevention of Complications      Book How to Thrive: A guide for Your Journey with Diabetes - ADA booklet -  pages 6-7, 12-13, 24-27 & 28 -35  Handouts: Know your numbers, Vaccinations, Type 2 diabetes and the role of GLP- 1, How to care for your teeth and gums, Caring for your feet, How to pick the right shoes  Individualized Diabetes report card     []  Diet Follow Up- CHO counting and  planning for sick days, holiday, vacations   How to Thrive: A guide for Your journey with Diabetes - ADA booklet  - pages 43- 37  Diabetes Food hub www. diabetesfoodhub.org   Handouts: Sick day rules, Dining out guide, Diabetes and alcohol, Traveling with diabetes, Diabetes disaster preparedness plan, Holidays and special occasions                                                            []  Self care and goal review -  3 month follow -    Handouts: AADE7 Self care behaviors work sheets and personal goal setting worksheet review, DSME support options on line     []Self-Management  Gestational - RN class -Resource materials sent out : care booklet - \" Gestational Diabetes Mellitus ( GDM) toolkit form [place] gestational diabetes postpartum care learning collaborative 2019. \"Simple Guidelines for meal planning with gestational diabetes. SMBG sheets to fax back to MFM weekly. BD  healthy injection site selection and rotation with 6 mm insulin syringe and 4 mm pen needle. Gestational diabetes handout from [facility] 2016. Did you have gestational diabetes when you were pregnant?  Handout from [facility]  April 2014    []Self-Management Gestational - RD class - My Food Plan for Gestational diabetes    []Glucose Meter     []Insulin Kit     []Other      Encounter Type Date Start Time End Time Comments No Show Dates   Assessment 7/5/22rs     1530  1600   x    Class 1 - Understanding diabetes 8/2/22 rs 1500 1600  x    Class 2- Nutrition and diabetes   8/18/22 JW  3:40 4:40 x    Class 3 - Preventing Complications        Class 4 -  In depth Nutrition and sick day care        Class 5 - 3 month follow up / goal reassessment        Gestational - RN         Gestational - RD        Individual MNT         Shared Med Appt         Yearly Follow-up        Meter Instrx      How to Measure Your Blood Sugar - [facility] Patient Education  https://[email or web address] be/[email or web address]    Insulin Instrx      []Pen  []Vial & Syringe   BD Diabetes Care: How to Inject Insulin with a Pen Needle  [email or web address] [email or web address]    Diabetes Care: How to Inject Insulin with a Syringe  [email or web address] be/[email or web address]       DSMS Support :   [] MNT      [] Annual update     [] Starting Fresh  adults living with diabetes or pre diabetes. [address] [facility] [phone] call for dates    []  Diabetes Group at  [place] [address] of persaud - Free 6 week diabetes education support   classes - use web site interest form found at  [employer].pt - to enroll       []ADA  Where do I Begin, Living with Type 2 diabetes ADA home support program  Web site: diabetes. org/living    Call: 1800 DIABETES  e-mail: Ayesha@[employer]. org     []  Internet web sites - ADAWeb site: diabetes. org and diabetesfoodhub.org      Post Education Referrals:      [] [address] information sheet and [address] , 21 [phone]      [] Dental care - [facility]     [] [facility] ([facility]) link  phone number - for information and referral to [address] [facility], WEIGHT MANAGEMENT []Other  Baby Pries, RD, LD

## 2022-08-18 NOTE — ED PROVIDER NOTES
[facility] [facility]     Emergency Department     Faculty Attestation        I performed a history and physical examination of the patient and discussed management with the resident. I reviewed the residents note and agree with the documented findings and plan of care. Any areas of disagreement are noted on the chart. I was personally present for the key portions of any procedures. I have documented in the chart those procedures where I was not present during the key portions. I have reviewed the emergency nurses triage note. I agree with the chief complaint, past medical history, past surgical history, allergies, medications, social and family history as documented unless otherwise noted below. For mid-level providers such as nurse practitioners as well as physicians assistants:    I have personally seen and evaluated the patient. I find the patient's history and physical exam are consistent with NP/PA documentation. I agree with the care provided, treatment rendered, disposition, & follow-up plan. Additional findings are as noted. Vital Signs: BP (!) 131/99   Pulse (!) 115   Temp 98.1 °F (36.7 °C) (Oral)   Resp 20   Ht 5' 2\" (1.575 m)   Wt 145 lb (65.8 kg)   SpO2 100%   BMI 26.52 kg/m²   PCP:  Candace Valle    Pertinent Comments:     Patient had a window fall on her left thumb today. She is right-hand dominant she is right thumb pain some bruising ecchymosis but no gross deformity she has no anatomical snuffbox tenderness. Will x-ray.       Critical Care  None          Jarod Galeano MD    Attending Emergency Medicine Physician          Honorio Black MD  08/18/22 1957

## 2022-08-24 ENCOUNTER — HOSPITAL ENCOUNTER (OUTPATIENT)
Dept: DIABETES SERVICES | Age: 27
Setting detail: THERAPIES SERIES
Discharge: HOME | End: 2022-08-24
Payer: MEDICARE

## 2022-08-24 DIAGNOSIS — E11.9 NEWLY DIAGNOSED DIABETES (HCC): Primary | ICD-10-CM

## 2022-08-24 PROCEDURE — G0108 DIAB MANAGE TRN  PER INDIV: HCPCS
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
## 2022-09-04 ENCOUNTER — HOSPITAL ENCOUNTER (INPATIENT)
Age: 27
LOS: 3 days | Discharge: HOME | DRG: 137 | End: 2022-09-07
Attending: EMERGENCY MEDICINE | Admitting: STUDENT IN AN ORGANIZED HEALTH CARE EDUCATION/TRAINING PROGRAM
Payer: MEDICARE

## 2022-09-04 ENCOUNTER — APPOINTMENT (OUTPATIENT)
Dept: GENERAL RADIOLOGY | Age: 27
DRG: 137 | End: 2022-09-04
Payer: MEDICARE

## 2022-09-04 DIAGNOSIS — U07.1 COVID-19: Primary | ICD-10-CM

## 2022-09-04 DIAGNOSIS — R73.9 HYPERGLYCEMIA: ICD-10-CM

## 2022-09-04 DIAGNOSIS — E87.6 HYPOKALEMIA: ICD-10-CM

## 2022-09-04 DIAGNOSIS — D69.6 THROMBOCYTOPENIA (HCC): ICD-10-CM

## 2022-09-04 DIAGNOSIS — E83.42 HYPOMAGNESEMIA: Chronic | ICD-10-CM

## 2022-09-04 DIAGNOSIS — N17.9 AKI (ACUTE KIDNEY INJURY) (HCC): Chronic | ICD-10-CM

## 2022-09-04 PROBLEM — R56.9 SEIZURES (HCC): Status: ACTIVE | Noted: 2022-09-04

## 2022-09-04 PROBLEM — A09 DIARRHEA OF INFECTIOUS ORIGIN: Status: ACTIVE | Noted: 2022-09-04

## 2022-09-04 PROBLEM — G40.909 SEIZURE DISORDER (HCC): Status: ACTIVE | Noted: 2022-09-04

## 2022-09-04 LAB
ABSOLUTE EOS #: 0 K/UL (ref 0–0.44)
ABSOLUTE IMMATURE GRANULOCYTE: 0.04 K/UL (ref 0–0.3)
ABSOLUTE LYMPH #: 0.43 K/UL (ref 1.1–3.7)
ABSOLUTE MONO #: 0.4 K/UL (ref 0.1–1.2)
ANION GAP SERPL CALCULATED.3IONS-SCNC: 12 MMOL/L (ref 9–17)
ANION GAP SERPL CALCULATED.3IONS-SCNC: 14 MMOL/L (ref 9–17)
ANION GAP: 12 MMOL/L (ref 7–16)
BACTERIA: ABNORMAL
BASOPHILS # BLD: 1 % (ref 0–2)
BASOPHILS ABSOLUTE: 0.04 K/UL (ref 0–0.2)
BETA-HYDROXYBUTYRATE: 0.32 MMOL/L (ref 0.02–0.27)
BETA-HYDROXYBUTYRATE: 0.37 MMOL/L (ref 0.02–0.27)
BILIRUBIN URINE: NEGATIVE
BUN BLDV-MCNC: 14 MG/DL (ref 6–20)
BUN BLDV-MCNC: 20 MG/DL (ref 6–20)
C-REACTIVE PROTEIN: 17.6 MG/L (ref 0–5)
CALCIUM SERPL-MCNC: 8.2 MG/DL (ref 8.6–10.4)
CALCIUM SERPL-MCNC: 8.9 MG/DL (ref 8.6–10.4)
CARBOXYHEMOGLOBIN: 4.9 % (ref 0–5)
CHLORIDE BLD-SCNC: 96 MMOL/L (ref 98–107)
CHLORIDE BLD-SCNC: 99 MMOL/L (ref 98–107)
CHP ED QC CHECK: YES
CO2: 23 MMOL/L (ref 20–31)
CO2: 26 MMOL/L (ref 20–31)
COLOR: YELLOW
CREAT SERPL-MCNC: 1.16 MG/DL (ref 0.5–0.9)
CREAT SERPL-MCNC: 1.36 MG/DL (ref 0.5–0.9)
EOSINOPHILS RELATIVE PERCENT: 0 % (ref 1–4)
EPITHELIAL CELLS UA: ABNORMAL /HPF (ref 0–5)
FIO2: ABNORMAL
GFR AFRICAN AMERICAN: 56 ML/MIN
GFR AFRICAN AMERICAN: >60 ML/MIN
GFR NON-AFRICAN AMERICAN: 46 ML/MIN
GFR NON-AFRICAN AMERICAN: 48 ML/MIN
GFR NON-AFRICAN AMERICAN: 55 ML/MIN
GFR SERPL CREATININE-BSD FRML MDRD: 58 ML/MIN
GFR SERPL CREATININE-BSD FRML MDRD: ABNORMAL ML/MIN/{1.73_M2}
GLUCOSE BLD-MCNC: 247 MG/DL (ref 65–105)
GLUCOSE BLD-MCNC: 255 MG/DL (ref 65–105)
GLUCOSE BLD-MCNC: 275 MG/DL (ref 65–105)
GLUCOSE BLD-MCNC: 301 MG/DL
GLUCOSE BLD-MCNC: 301 MG/DL (ref 65–105)
GLUCOSE BLD-MCNC: 313 MG/DL (ref 70–99)
GLUCOSE BLD-MCNC: 324 MG/DL (ref 74–100)
GLUCOSE BLD-MCNC: 335 MG/DL (ref 70–99)
GLUCOSE URINE: ABNORMAL
HCG QUALITATIVE: NEGATIVE
HCO3 VENOUS: 22.6 MMOL/L (ref 24–30)
HCO3 VENOUS: 24.5 MMOL/L (ref 22–29)
HCT VFR BLD CALC: 38.3 % (ref 36.3–47.1)
HEMOGLOBIN: 12.9 G/DL (ref 11.9–15.1)
IMMATURE GRANULOCYTES: 1 %
KETONES, URINE: ABNORMAL
LEUKOCYTE ESTERASE, URINE: NEGATIVE
LYMPHOCYTES # BLD: 12 % (ref 24–43)
MAGNESIUM: 1 MG/DL (ref 1.6–2.6)
MAGNESIUM: 1 MG/DL (ref 1.6–2.6)
MAGNESIUM: 1.4 MG/DL (ref 1.6–2.6)
MCH RBC QN AUTO: 27.5 PG (ref 25.2–33.5)
MCHC RBC AUTO-ENTMCNC: 33.7 G/DL (ref 28.4–34.8)
MCV RBC AUTO: 81.7 FL (ref 82.6–102.9)
MONOCYTES # BLD: 11 % (ref 3–12)
MORPHOLOGY: ABNORMAL
NITRITE, URINE: NEGATIVE
NRBC AUTOMATED: 0 PER 100 WBC
O2 SAT, VEN: 100 % (ref 60–85)
O2 SAT, VEN: 89 % (ref 60–85)
PATIENT TEMP: 37
PCO2, VEN: 30.6 (ref 39–55)
PCO2, VEN: 36.8 MM HG (ref 41–51)
PDW BLD-RTO: 12.1 % (ref 11.8–14.4)
PH UA: 6 (ref 5–8)
PH VENOUS: 7.43 (ref 7.32–7.43)
PH VENOUS: 7.48 (ref 7.32–7.42)
PLATELET # BLD: 134 K/UL (ref 138–453)
PMV BLD AUTO: 11.8 FL (ref 8.1–13.5)
PO2, VEN: 185 (ref 30–50)
PO2, VEN: 54.3 MM HG (ref 30–50)
POC BUN: 19 MG/DL (ref 8–26)
POC CHLORIDE: 100 MMOL/L (ref 98–107)
POC CREATININE: 1.31 MG/DL (ref 0.51–1.19)
POC HEMATOCRIT: 39 % (ref 36–46)
POC HEMOGLOBIN: 13.3 G/DL (ref 12–16)
POC IONIZED CALCIUM: 1.08 MMOL/L (ref 1.15–1.33)
POC LACTIC ACID: 0.9 MMOL/L (ref 0.56–1.39)
POC POTASSIUM: 4.1 MMOL/L (ref 3.5–4.5)
POC SODIUM: 136 MMOL/L (ref 138–146)
POC TCO2: 25 MMOL/L (ref 22–30)
POSITIVE BASE EXCESS, VEN: 0 (ref 0–3)
POSITIVE BASE EXCESS, VEN: 0.4 MMOL/L (ref 0–2)
POTASSIUM SERPL-SCNC: 2.7 MMOL/L (ref 3.7–5.3)
POTASSIUM SERPL-SCNC: 2.8 MMOL/L (ref 3.7–5.3)
POTASSIUM SERPL-SCNC: 3.7 MMOL/L (ref 3.7–5.3)
PROTEIN UA: ABNORMAL
RBC # BLD: 4.69 M/UL (ref 3.95–5.11)
RBC UA: ABNORMAL /HPF (ref 0–2)
SARS-COV-2, RAPID: DETECTED
SEG NEUTROPHILS: 75 % (ref 36–65)
SEGMENTED NEUTROPHILS ABSOLUTE COUNT: 2.69 K/UL (ref 1.5–8.1)
SODIUM BLD-SCNC: 133 MMOL/L (ref 135–144)
SODIUM BLD-SCNC: 137 MMOL/L (ref 135–144)
SPECIFIC GRAVITY UA: 1.02 (ref 1–1.03)
SPECIMEN DESCRIPTION: ABNORMAL
TURBIDITY: ABNORMAL
URINE HGB: ABNORMAL
UROBILINOGEN, URINE: NORMAL
WBC # BLD: 3.6 K/UL (ref 3.5–11.3)
WBC UA: ABNORMAL /HPF (ref 0–5)

## 2022-09-04 PROCEDURE — 1200000000 HC SEMI PRIVATE

## 2022-09-04 PROCEDURE — 36415 COLL VENOUS BLD VENIPUNCTURE: CPT

## 2022-09-04 PROCEDURE — 82010 KETONE BODYS QUAN: CPT

## 2022-09-04 PROCEDURE — 6360000002 HC RX W HCPCS: Performed by: EMERGENCY MEDICINE

## 2022-09-04 PROCEDURE — 83735 ASSAY OF MAGNESIUM: CPT

## 2022-09-04 PROCEDURE — 85025 COMPLETE CBC W/AUTO DIFF WBC: CPT

## 2022-09-04 PROCEDURE — 82330 ASSAY OF CALCIUM: CPT

## 2022-09-04 PROCEDURE — 82947 ASSAY GLUCOSE BLOOD QUANT: CPT

## 2022-09-04 PROCEDURE — 87635 SARS-COV-2 COVID-19 AMP PRB: CPT

## 2022-09-04 PROCEDURE — 6360000002 HC RX W HCPCS: Performed by: STUDENT IN AN ORGANIZED HEALTH CARE EDUCATION/TRAINING PROGRAM

## 2022-09-04 PROCEDURE — 96375 TX/PRO/DX INJ NEW DRUG ADDON: CPT

## 2022-09-04 PROCEDURE — 83605 ASSAY OF LACTIC ACID: CPT

## 2022-09-04 PROCEDURE — 71045 X-RAY EXAM CHEST 1 VIEW: CPT

## 2022-09-04 PROCEDURE — 6360000002 HC RX W HCPCS: Performed by: INTERNAL MEDICINE

## 2022-09-04 PROCEDURE — 84132 ASSAY OF SERUM POTASSIUM: CPT

## 2022-09-04 PROCEDURE — 82805 BLOOD GASES W/O2 SATURATION: CPT

## 2022-09-04 PROCEDURE — 85014 HEMATOCRIT: CPT

## 2022-09-04 PROCEDURE — 84520 ASSAY OF UREA NITROGEN: CPT

## 2022-09-04 PROCEDURE — 86140 C-REACTIVE PROTEIN: CPT

## 2022-09-04 PROCEDURE — 99221 1ST HOSP IP/OBS SF/LOW 40: CPT | Performed by: INTERNAL MEDICINE

## 2022-09-04 PROCEDURE — 6370000000 HC RX 637 (ALT 250 FOR IP): Performed by: STUDENT IN AN ORGANIZED HEALTH CARE EDUCATION/TRAINING PROGRAM

## 2022-09-04 PROCEDURE — 6360000002 HC RX W HCPCS

## 2022-09-04 PROCEDURE — 81001 URINALYSIS AUTO W/SCOPE: CPT

## 2022-09-04 PROCEDURE — 2580000003 HC RX 258: Performed by: STUDENT IN AN ORGANIZED HEALTH CARE EDUCATION/TRAINING PROGRAM

## 2022-09-04 PROCEDURE — 74018 RADEX ABDOMEN 1 VIEW: CPT

## 2022-09-04 PROCEDURE — 84703 CHORIONIC GONADOTROPIN ASSAY: CPT

## 2022-09-04 PROCEDURE — 99285 EMERGENCY DEPT VISIT HI MDM: CPT

## 2022-09-04 PROCEDURE — 87086 URINE CULTURE/COLONY COUNT: CPT

## 2022-09-04 PROCEDURE — 82803 BLOOD GASES ANY COMBINATION: CPT

## 2022-09-04 PROCEDURE — XW033H6 INTRODUCTION OF OTHER NEW TECHNOLOGY MONOCLONAL ANTIBODY INTO PERIPHERAL VEIN, PERCUTANEOUS APPROACH, NEW TECHNOLOGY GROUP 6: ICD-10-PCS | Performed by: INTERNAL MEDICINE

## 2022-09-04 PROCEDURE — 93005 ELECTROCARDIOGRAM TRACING: CPT

## 2022-09-04 PROCEDURE — 82565 ASSAY OF CREATININE: CPT

## 2022-09-04 PROCEDURE — 96365 THER/PROPH/DIAG IV INF INIT: CPT

## 2022-09-04 PROCEDURE — 6370000000 HC RX 637 (ALT 250 FOR IP)

## 2022-09-04 PROCEDURE — 80048 BASIC METABOLIC PNL TOTAL CA: CPT

## 2022-09-04 PROCEDURE — 2580000003 HC RX 258: Performed by: INTERNAL MEDICINE

## 2022-09-04 PROCEDURE — 99222 1ST HOSP IP/OBS MODERATE 55: CPT | Performed by: STUDENT IN AN ORGANIZED HEALTH CARE EDUCATION/TRAINING PROGRAM

## 2022-09-04 PROCEDURE — 80051 ELECTROLYTE PANEL: CPT

## 2022-09-04 RX ORDER — OXCARBAZEPINE 150 MG/1
150 TABLET, FILM COATED ORAL 2 TIMES DAILY
Status: DISCONTINUED | OUTPATIENT
Start: 2022-09-04 | End: 2022-09-07 | Stop reason: HOSPADM

## 2022-09-04 RX ORDER — SODIUM CHLORIDE 9 MG/ML
1000 INJECTION, SOLUTION INTRAVENOUS CONTINUOUS
Status: DISCONTINUED | OUTPATIENT
Start: 2022-09-04 | End: 2022-09-04

## 2022-09-04 RX ORDER — BUTALBITAL, ACETAMINOPHEN AND CAFFEINE 50; 325; 40 MG/1; MG/1; MG/1
1 TABLET ORAL EVERY 6 HOURS PRN
Status: DISCONTINUED | OUTPATIENT
Start: 2022-09-04 | End: 2022-09-07 | Stop reason: HOSPADM

## 2022-09-04 RX ORDER — ACETAMINOPHEN 325 MG/1
650 TABLET ORAL EVERY 6 HOURS PRN
Status: DISCONTINUED | OUTPATIENT
Start: 2022-09-04 | End: 2022-09-07 | Stop reason: HOSPADM

## 2022-09-04 RX ORDER — MAGNESIUM SULFATE HEPTAHYDRATE 40 MG/ML
2000 INJECTION, SOLUTION INTRAVENOUS ONCE
Status: DISCONTINUED | OUTPATIENT
Start: 2022-09-04 | End: 2022-09-04

## 2022-09-04 RX ORDER — ONDANSETRON 2 MG/ML
4 INJECTION INTRAMUSCULAR; INTRAVENOUS EVERY 6 HOURS PRN
Status: DISCONTINUED | OUTPATIENT
Start: 2022-09-04 | End: 2022-09-07 | Stop reason: HOSPADM

## 2022-09-04 RX ORDER — BEBTELOVIMAB 87.5 MG/ML
175 INJECTION, SOLUTION INTRAVENOUS ONCE
Status: COMPLETED | OUTPATIENT
Start: 2022-09-04 | End: 2022-09-04

## 2022-09-04 RX ORDER — GABAPENTIN 400 MG/1
400 CAPSULE ORAL 3 TIMES DAILY
Status: DISCONTINUED | OUTPATIENT
Start: 2022-09-04 | End: 2022-09-07 | Stop reason: HOSPADM

## 2022-09-04 RX ORDER — ONDANSETRON 4 MG/1
4 TABLET, ORALLY DISINTEGRATING ORAL EVERY 8 HOURS PRN
Status: DISCONTINUED | OUTPATIENT
Start: 2022-09-04 | End: 2022-09-07 | Stop reason: HOSPADM

## 2022-09-04 RX ORDER — INSULIN LISPRO 100 [IU]/ML
0-4 INJECTION, SOLUTION INTRAVENOUS; SUBCUTANEOUS NIGHTLY
Status: DISCONTINUED | OUTPATIENT
Start: 2022-09-04 | End: 2022-09-07 | Stop reason: HOSPADM

## 2022-09-04 RX ORDER — ENOXAPARIN SODIUM 100 MG/ML
40 INJECTION SUBCUTANEOUS DAILY
Status: DISCONTINUED | OUTPATIENT
Start: 2022-09-04 | End: 2022-09-07 | Stop reason: HOSPADM

## 2022-09-04 RX ORDER — INSULIN GLARGINE 100 [IU]/ML
30 INJECTION, SOLUTION SUBCUTANEOUS NIGHTLY
Status: DISCONTINUED | OUTPATIENT
Start: 2022-09-04 | End: 2022-09-04

## 2022-09-04 RX ORDER — SODIUM CHLORIDE 0.9 % (FLUSH) 0.9 %
5-40 SYRINGE (ML) INJECTION ONCE
Status: COMPLETED | OUTPATIENT
Start: 2022-09-04 | End: 2022-09-04

## 2022-09-04 RX ORDER — MAGNESIUM SULFATE IN WATER 40 MG/ML
2000 INJECTION, SOLUTION INTRAVENOUS ONCE
Status: COMPLETED | OUTPATIENT
Start: 2022-09-04 | End: 2022-09-04

## 2022-09-04 RX ORDER — SODIUM CHLORIDE 0.9 % (FLUSH) 0.9 %
5-40 SYRINGE (ML) INJECTION EVERY 12 HOURS SCHEDULED
Status: DISCONTINUED | OUTPATIENT
Start: 2022-09-04 | End: 2022-09-07 | Stop reason: HOSPADM

## 2022-09-04 RX ORDER — INSULIN LISPRO 100 [IU]/ML
0-4 INJECTION, SOLUTION INTRAVENOUS; SUBCUTANEOUS NIGHTLY
Status: DISCONTINUED | OUTPATIENT
Start: 2022-09-04 | End: 2022-09-04

## 2022-09-04 RX ORDER — SODIUM CHLORIDE 0.9 % (FLUSH) 0.9 %
5-40 SYRINGE (ML) INJECTION PRN
Status: DISCONTINUED | OUTPATIENT
Start: 2022-09-04 | End: 2022-09-07 | Stop reason: HOSPADM

## 2022-09-04 RX ORDER — LORAZEPAM 2 MG/ML
0.5 INJECTION INTRAMUSCULAR EVERY 4 HOURS PRN
Status: DISCONTINUED | OUTPATIENT
Start: 2022-09-04 | End: 2022-09-04

## 2022-09-04 RX ORDER — POTASSIUM CHLORIDE 7.45 MG/ML
10 INJECTION INTRAVENOUS ONCE
Status: COMPLETED | OUTPATIENT
Start: 2022-09-04 | End: 2022-09-04

## 2022-09-04 RX ORDER — LANOLIN ALCOHOL/MO/W.PET/CERES
400 CREAM (GRAM) TOPICAL 2 TIMES DAILY
Status: DISCONTINUED | OUTPATIENT
Start: 2022-09-04 | End: 2022-09-07 | Stop reason: HOSPADM

## 2022-09-04 RX ORDER — PROCHLORPERAZINE MALEATE 10 MG
10 TABLET ORAL EVERY 4 HOURS PRN
Status: DISCONTINUED | OUTPATIENT
Start: 2022-09-04 | End: 2022-09-04

## 2022-09-04 RX ORDER — FUROSEMIDE 10 MG/ML
40 INJECTION INTRAMUSCULAR; INTRAVENOUS EVERY 12 HOURS
Status: DISCONTINUED | OUTPATIENT
Start: 2022-09-05 | End: 2022-09-04

## 2022-09-04 RX ORDER — ACETAMINOPHEN 650 MG/1
650 SUPPOSITORY RECTAL EVERY 6 HOURS PRN
Status: DISCONTINUED | OUTPATIENT
Start: 2022-09-04 | End: 2022-09-07 | Stop reason: HOSPADM

## 2022-09-04 RX ORDER — 0.9 % SODIUM CHLORIDE 0.9 %
1000 INTRAVENOUS SOLUTION INTRAVENOUS ONCE
Status: DISCONTINUED | OUTPATIENT
Start: 2022-09-04 | End: 2022-09-07 | Stop reason: HOSPADM

## 2022-09-04 RX ORDER — INSULIN LISPRO 100 [IU]/ML
0-8 INJECTION, SOLUTION INTRAVENOUS; SUBCUTANEOUS
Status: DISCONTINUED | OUTPATIENT
Start: 2022-09-04 | End: 2022-09-07 | Stop reason: HOSPADM

## 2022-09-04 RX ORDER — PROCHLORPERAZINE EDISYLATE 5 MG/ML
10 INJECTION INTRAMUSCULAR; INTRAVENOUS EVERY 4 HOURS PRN
Status: DISCONTINUED | OUTPATIENT
Start: 2022-09-04 | End: 2022-09-04

## 2022-09-04 RX ORDER — INSULIN LISPRO 100 [IU]/ML
5 INJECTION, SOLUTION INTRAVENOUS; SUBCUTANEOUS ONCE
Status: DISCONTINUED | OUTPATIENT
Start: 2022-09-04 | End: 2022-09-04

## 2022-09-04 RX ORDER — INSULIN LISPRO 100 [IU]/ML
0-8 INJECTION, SOLUTION INTRAVENOUS; SUBCUTANEOUS
Status: DISCONTINUED | OUTPATIENT
Start: 2022-09-04 | End: 2022-09-04

## 2022-09-04 RX ORDER — ONDANSETRON HYDROCHLORIDE 8 MG/1
24 TABLET, FILM COATED ORAL EVERY 24 HOURS
Status: DISCONTINUED | OUTPATIENT
Start: 2022-09-04 | End: 2022-09-04

## 2022-09-04 RX ORDER — SODIUM CHLORIDE AND POTASSIUM CHLORIDE .9; .15 G/100ML; G/100ML
SOLUTION INTRAVENOUS CONTINUOUS
Status: DISCONTINUED | OUTPATIENT
Start: 2022-09-04 | End: 2022-09-05

## 2022-09-04 RX ORDER — INSULIN GLARGINE 100 [IU]/ML
30 INJECTION, SOLUTION SUBCUTANEOUS NIGHTLY
Status: DISCONTINUED | OUTPATIENT
Start: 2022-09-04 | End: 2022-09-07 | Stop reason: HOSPADM

## 2022-09-04 RX ORDER — DEXTROSE MONOHYDRATE 100 MG/ML
INJECTION, SOLUTION INTRAVENOUS CONTINUOUS PRN
Status: DISCONTINUED | OUTPATIENT
Start: 2022-09-04 | End: 2022-09-07 | Stop reason: HOSPADM

## 2022-09-04 RX ORDER — SODIUM CHLORIDE 9 MG/ML
INJECTION, SOLUTION INTRAVENOUS PRN
Status: DISCONTINUED | OUTPATIENT
Start: 2022-09-04 | End: 2022-09-07 | Stop reason: HOSPADM

## 2022-09-04 RX ORDER — INSULIN LISPRO 100 [IU]/ML
0-16 INJECTION, SOLUTION INTRAVENOUS; SUBCUTANEOUS
Status: DISCONTINUED | OUTPATIENT
Start: 2022-09-04 | End: 2022-09-04

## 2022-09-04 RX ORDER — POTASSIUM CHLORIDE 20 MEQ/1
20 TABLET, EXTENDED RELEASE ORAL 2 TIMES DAILY WITH MEALS
Status: DISCONTINUED | OUTPATIENT
Start: 2022-09-04 | End: 2022-09-04

## 2022-09-04 RX ORDER — KETOROLAC TROMETHAMINE 30 MG/ML
30 INJECTION, SOLUTION INTRAMUSCULAR; INTRAVENOUS ONCE
Status: COMPLETED | OUTPATIENT
Start: 2022-09-04 | End: 2022-09-04

## 2022-09-04 RX ORDER — POLYETHYLENE GLYCOL 3350 17 G/17G
17 POWDER, FOR SOLUTION ORAL DAILY PRN
Status: DISCONTINUED | OUTPATIENT
Start: 2022-09-04 | End: 2022-09-07 | Stop reason: HOSPADM

## 2022-09-04 RX ORDER — LORAZEPAM 0.5 MG/1
0.5 TABLET ORAL EVERY 4 HOURS PRN
Status: DISCONTINUED | OUTPATIENT
Start: 2022-09-04 | End: 2022-09-04

## 2022-09-04 RX ADMIN — POTASSIUM CHLORIDE AND SODIUM CHLORIDE: 900; 150 INJECTION, SOLUTION INTRAVENOUS at 13:21

## 2022-09-04 RX ADMIN — POTASSIUM BICARBONATE 40 MEQ: 782 TABLET, EFFERVESCENT ORAL at 05:59

## 2022-09-04 RX ADMIN — INSULIN GLARGINE 30 UNITS: 100 INJECTION, SOLUTION SUBCUTANEOUS at 21:20

## 2022-09-04 RX ADMIN — GABAPENTIN 400 MG: 400 CAPSULE ORAL at 21:19

## 2022-09-04 RX ADMIN — Medication 1000 ML: at 08:36

## 2022-09-04 RX ADMIN — ACETAMINOPHEN 650 MG: 325 TABLET ORAL at 13:23

## 2022-09-04 RX ADMIN — INSULIN LISPRO 4 UNITS: 100 INJECTION, SOLUTION INTRAVENOUS; SUBCUTANEOUS at 16:55

## 2022-09-04 RX ADMIN — SODIUM CHLORIDE, PRESERVATIVE FREE 10 ML: 5 INJECTION INTRAVENOUS at 13:24

## 2022-09-04 RX ADMIN — MAGNESIUM GLUCONATE 500 MG ORAL TABLET 400 MG: 500 TABLET ORAL at 16:40

## 2022-09-04 RX ADMIN — BEBTELOVIMAB 175 MG: 87.5 INJECTION, SOLUTION INTRAVENOUS at 16:40

## 2022-09-04 RX ADMIN — MAGNESIUM SULFATE HEPTAHYDRATE 2000 MG: 40 INJECTION, SOLUTION INTRAVENOUS at 08:35

## 2022-09-04 RX ADMIN — OXCARBAZEPINE 150 MG: 150 TABLET, FILM COATED ORAL at 21:19

## 2022-09-04 RX ADMIN — OXCARBAZEPINE 150 MG: 150 TABLET, FILM COATED ORAL at 16:40

## 2022-09-04 RX ADMIN — KETOROLAC TROMETHAMINE 30 MG: 30 INJECTION, SOLUTION INTRAMUSCULAR; INTRAVENOUS at 03:01

## 2022-09-04 RX ADMIN — INSULIN LISPRO 8 UNITS: 100 INJECTION, SOLUTION INTRAVENOUS; SUBCUTANEOUS at 13:30

## 2022-09-04 RX ADMIN — POTASSIUM CHLORIDE AND SODIUM CHLORIDE: 900; 150 INJECTION, SOLUTION INTRAVENOUS at 21:33

## 2022-09-04 RX ADMIN — GABAPENTIN 400 MG: 400 CAPSULE ORAL at 16:40

## 2022-09-04 RX ADMIN — SODIUM CHLORIDE, PRESERVATIVE FREE 10 ML: 5 INJECTION INTRAVENOUS at 16:40

## 2022-09-04 RX ADMIN — ONDANSETRON 4 MG: 2 INJECTION INTRAMUSCULAR; INTRAVENOUS at 13:24

## 2022-09-04 RX ADMIN — POTASSIUM CHLORIDE 10 MEQ: 10 INJECTION, SOLUTION INTRAVENOUS at 06:00

## 2022-09-04 ASSESSMENT — PAIN DESCRIPTION - DESCRIPTORS: DESCRIPTORS: CRUSHING;SHARP

## 2022-09-04 ASSESSMENT — ENCOUNTER SYMPTOMS
CHEST TIGHTNESS: 1
PHOTOPHOBIA: 1
SORE THROAT: 1
VOMITING: 1
DIARRHEA: 0
NAUSEA: 1
SHORTNESS OF BREATH: 1
ABDOMINAL PAIN: 0

## 2022-09-04 ASSESSMENT — PAIN DESCRIPTION - LOCATION
LOCATION: HEAD
LOCATION: ABDOMEN;HEAD
LOCATION: ABDOMEN;HEAD

## 2022-09-04 ASSESSMENT — PAIN SCALES - GENERAL
PAINLEVEL_OUTOF10: 3
PAINLEVEL_OUTOF10: 10

## 2022-09-04 ASSESSMENT — PAIN DESCRIPTION - FREQUENCY: FREQUENCY: CONTINUOUS

## 2022-09-04 ASSESSMENT — PAIN DESCRIPTION - PAIN TYPE: TYPE: ACUTE PAIN

## 2022-09-04 NOTE — LETTER
Fortino Livingston was seen and treated on 9/4/2022 - 09/07/22. In my medical opinion, she should be excused from work till 09/11/22. She may return to work on 09/12/22. If you have any questions or concerns, please don't hesitate to call.       Blanca Guajardo MD

## 2022-09-04 NOTE — ED NOTES
Pt is awake and coherent, not distress no fever. She said, she feels better now.       Elodia Friday, RN  09/04/22 [id number]

## 2022-09-04 NOTE — ED PROVIDER NOTES
FACULTY SIGN-OUT  ADDENDUM     Care of this patient was assumed from previous attending physician. The patient's initial evaluation and plan have been discussed with the prior provider who initially evaluated the patient. Attestation  I was available and discussed any additional care issues that arose and coordinated the management plans with the resident(s) caring for the patient during my duty period. Any areas of disagreement with resident's documentation of care or procedures are noted on the chart. I was personally present for the key portions of any/all procedures, during my duty period. I have documented in the chart those procedures where I was not present during the key portions.        ED COURSE      The patient was given the following medications:  Orders Placed This Encounter   Medications    ketorolac (TORADOL) injection 30 mg    DISCONTD: insulin lispro (HUMALOG) injection vial 0-8 Units    DISCONTD: insulin lispro (HUMALOG) injection vial 0-4 Units    DISCONTD: potassium chloride (KLOR-CON M) extended release tablet 20 mEq    DISCONTD: 0.9 % sodium chloride infusion    DISCONTD: predniSONE (DELTASONE) tablet 102 mg    DISCONTD: ondansetron (ZOFRAN) tablet 24 mg    DISCONTD: etoposide (VEPESID) 86 mg, vinCRIStine (ONCOVIN) 0.68 mg, DOXOrubicin HCl (ADRIAMYCIN) 17 mg in sodium chloride 0.9 % 1,000 mL chemo infusion    DISCONTD: furosemide (LASIX) injection 40 mg    DISCONTD: prochlorperazine (COMPAZINE) tablet 10 mg    DISCONTD: prochlorperazine (COMPAZINE) injection 10 mg    DISCONTD: LORazepam (ATIVAN) tablet 0.5 mg    DISCONTD: LORazepam (ATIVAN) injection 0.5 mg    DISCONTD: cyclophosphamide (CYTOXAN) 1,280 mg in dextrose 5 % 100 mL chemo infusion    potassium bicarb-citric acid (EFFER-K) effervescent tablet 40 mEq    potassium chloride 10 mEq/100 mL IVPB (Peripheral Line)    DISCONTD: insulin lispro (HUMALOG) injection vial 5 Units       RECENT VITALS:   Temp: 98.5 °F (36.9 °C), Heart Rate: (!) 105, Resp: 18, BP: 121/74    MEDICAL DECISION MAKING        Clay Johnson is a 32 y.o. female who presents to the Emergency Department with complaints of headache, fever, covid+. Clinically well. Hypokalemic. Repeat K+ and plan d/c if improved.       Alex Archibald MD  Attending Emergency Physician    (Please note that portions of this note were completed with a voice recognition program.  Efforts were made to edit the dictations but occasionally words are mis-transcribed.)          Alex Archibald MD  09/04/22 8640

## 2022-09-04 NOTE — CONSULTS
[facility] - Initial Consult Note COVID 19 Patient  Today's Date and Time: 9/4/2022, 2:06 PM    Impression :     COVID 19 Confirmed Infection  Unvaccinated individual  Covid tests:  9-4-22: Positive  DM 2  ZEFERINO  Hypokalemia  Diarrhea    Recommendations:   Antibiotic treatment:  Monitor off antibiotics  Covid Rx:    Remdesivir: Not indicated  Decadron: Not indicated  Actemra: Not indicated  Monoclonal antibodies: Bebtelovimab requested 9-4-22      Medical Decision Making/Summary/Discussion:9/4/2022     Patient admitted with COVID 19 infection    Infection Control Recommendations   [place] Precautions  Airborne isolation  Droplet Isolation  Isolate until 9-14-22    Antimicrobial Stewardship Recommendations     Discontinuation of therapy  Coordination of Outpatient Care:   Estimated Length of IV antimicrobials:TBD  Patient will need Midline Catheter Insertion: TBD  Patient will need PICC line Insertion: No  Patient will need: Home IV , [place],  SNF,  LTAC:TBD  Patient will need outpatient wound care:No    Chief complaint/reason for consultation:   Concern for COVID infection      History of Present Illness:   Anne Marie Owens is a 32y.o.-year-old  female who was initially admitted on 9/4/2022. Patient seen at the request of .    INITIAL HISTORY:    Patient presented through ER with complaints of fevers, throbbing headache, dry cough, nausea and abdominal pain x 1 day. She also reported onset of diarrhea    She is unvaccinated for Covid. Is exposed to multiple people at her work for [employer]. Test in ER Covid positive. 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 Patient admitted because of concerns with COVID 19. headache    CURRENT EVALUATION : 9/4/2022    Afebrile   VS stable    Patient exhibiting respiratory distress.  No  Respiratory secretions: No    Patient receiving supplemental oxygen. No  RR 23  02 sat 98        NEWS Score: 0-4 Low risk group; 5-6: Medium risk group; 7 or above: High risk group  Parameters 3 2 1 0 1 2 3   Age    < 72   ? 65   RR ? 8  9-11 12-20  21-24 ? 25   O2 Sats ? 91 92-93 94-95 ? 96      Suppl O2  Yes  No      SBP ? 90  101-110 111-219   ? 220   HR ? 40  41-50 51-90  111-130 ? 131   Consciousness    Alert   Drowsiness, lethargy, or confusion   Temperature ? 35.0 C (95.0 F)  35.1-36.0 C 95.1-96.9 F 36.1-38.0 C 97.0-100.4 F 38.1-39.0 C 100.5-102.3 F ? 39.1 C ? 102.4 F      NEWS Score:  9-4-22: 3 Low risk for ICU care    Overall Daily Picture: Worsening    Presence of secondary bacterial Infection:    No   Additional antibiotics: No    Labs, X rays reviewed: 9/4/2022    BUN: 20  Cr: 1.36  K 2.8      WBC: 3.6  Hb: 12.9  Plat: 134    Absolute Neutrophils: 10.1  Absolute Lymphocytes: 0.43  Neutrophil/Lymphocyte Ratio: 23 High Risk    CRP: 17.6  Ferritin:  LDH:     Pro Calcitonin:      Cultures:  Urine:    Blood:    Sputum :    Wound:      CXR:   9-24-22: Normal CXR  CAT:      Discussed with patient, RN, CC, IM. I have personally reviewed the past medical history, past surgical history, medications, social history, and family history, and I have updated the database accordingly.   Past Medical History:     Past Medical History:   Diagnosis Date    Cerebral artery occlusion with cerebral infarction [facility])     Cerebral vascular malformation     [facility]    Diabetes mellitus ([facility] [address]     Seiz[address] ([facility] [address]     Traumatic hemorrhage of left cerebrum without loss of consciousness ([facility] [address]     @ [facility]       Past Surgical  History:     Past Surgical History:   Procedure Laterality Date    BRAIN SURGERY      CRANIOTOMY  01/08/2020    LEFT OCCIPITAL CRANIOTOMY    CRANIOTOMY Left 01/08/2020    LEFT OCCIPITAL CRANIOTOMY, 1ST VASCULAR LESION WITH [employer] NAVIGATION (LATERAL WITH ROMERO BAG, HOLLINGSWORTH HEADHOLDER, MICROSCOPE) vertigo. Cardiovascular: No chest pain or palpitations. No Shortness of breath. No MCCORMACK  Lung: No Shortness of breath or cough. No sputum production  Abdomen: Nausea, abdominal pain. No vomiting, diarrhea. . No cramps. Genitourinary: No increased urinary frequency, or dysuria. No hematuria. No suprapubic or CVA pain  Musculoskeletal: No muscle aches or pains. No joint effusions, swelling or deformities  Hematologic: No bleeding or bruising. Neurologic: No headache, weakness, numbness, or tingling. Integument: No rash, no ulcers. Psychiatric: No depression. Endocrine: No polyuria, no polydipsia, no polyphagia. Physical Examination :   Patient Vitals for the past 8 hrs:   BP   09/04/22 1105 (!) 124/91     General Appearance: Awake, alert. Looks ill  Head:  Normocephalic, no trauma  Eyes: Pupils equal, round, reactive to light; sclera anicteric; conjunctivae pink. No embolic phenomena. Has Rt homonymous hemianopsia  ENT: Oropharynx clear, without erythema, exudate, or thrush. No tenderness of sinuses. Mouth/throat: mucosa pink and moist. No lesions. Dentition in good repair. Neck:Supple, without lymphadenopathy. Thyroid normal, No bruits. Pulmonary/Chest: Clear to auscultation, without wheezes, rales, or rhonchi. No dullness to percussion. Cardiovascular: Regular rate and rhythm without murmurs, rubs, or gallops. Abdomen: Soft, non tender. Bowel sounds normal. No organomegaly  All four Extremities: No cyanosis, clubbing, edema, or effusions. Neurologic: No gross sensory or motor deficits. Skin: Warm and dry with good turgor. No signs of peripheral arterial or venous insufficiency. No ulcerations. No open wounds.     Medical Decision Making -Laboratory:   I have independently reviewed/ordered the following labs:    CBC with Differential:   Recent Labs     09/04/22  0307   WBC 3.6   HGB 12.9   HCT 38.3   *   LYMPHOPCT 12*   MONOPCT 11     BMP:   Recent Labs     09/04/22 0307 09/04/22  4881 09/04/22  0735   *  --  137   K 2.7*  --  2.8*   CL 96*  --  99   CO2 23  --  26   BUN 14  --  20   CREATININE 1.16* 1.31* 1.36*   MG 1.0*  --  1.0*     Hepatic Function Panel: No results for input(s): PROT, LABALBU, BILIDIR, IBILI, BILITOT, ALKPHOS, ALT, AST in the last 72 hours. No results for input(s): RPR in the last 72 hours. No results for input(s): HIV in the last 72 hours. No results for input(s): BC in the last 72 hours. Lab Results   Component Value Date/Time    MUCUS NOT REPORTED 02/28/2020 07:32 PM    RBC 4.69 09/04/2022 03:07 AM    TRICHOMONAS NOT REPORTED 02/28/2020 07:32 PM    WBC 3.6 09/04/2022 03:07 AM    YEAST NOT REPORTED 02/28/2020 07:32 PM    TURBIDITY Cloudy 09/04/2022 07:40 AM     Lab Results   Component Value Date/Time    CREATININE 1.36 09/04/2022 07:35 AM    CREATININE 1.31 09/04/2022 06:23 AM    GLUCOSE 335 09/04/2022 07:35 AM       Medical Decision Making-Imaging:     EXAMINATION:   ONE XRAY VIEW OF THE CHEST       9/4/2022 2:12 am       COMPARISON:   06/21/2022       HISTORY:   ORDERING SYSTEM PROVIDED HISTORY: concern for covid   TECHNOLOGIST PROVIDED HISTORY:   Concerns for COVID   concern for covid   Reason for Exam: headache fever   upright port       FINDINGS:   Heart size and pulmonary vasculature are normal.  The lungs are clear and   normally expanded. Surrounding osseous and soft tissue structures are   unremarkable. Impression   Normal examination. Medical Decision [id number]:       Medical Decision Making-Other:     Note:  Labs, medications, radiologic studies were reviewed with personal review of films  Large amounts of data were reviewed  Discussed with nursing Staff, Discharge planner  Infection Control and Prevention measures reviewed  All prior entries were reviewed  Administer medications as ordered  Prognosis: [address]  Discharge planning reviewed  Follow up as outpatient.     Thank you for allowing us to participate in the care of this patient. Please call with questions.     Andra Gipson MD  Pager: [phone] - Office: [phone]

## 2022-09-04 NOTE — ED NOTES
Lab called writer for a Potassium of 2.7. Dr. Chavez Angry notifed. Writer to notify primary RNs.       Turner Homans, RN  09/04/22 3578

## 2022-09-04 NOTE — ED NOTES
Report given to [facility] RN and Yamile Garcia RN  No change in patient status  Continues to rest quietly       Saritha OrozcoG[facility]  09/04/22 [id number]

## 2022-09-04 NOTE — H&P
[address]     Department of Internal Medicine - Staff Internal Medicine Teaching Service          ADMISSION NOTE/HISTORY AND PHYSICAL EXAMINATION   Date: 9/4/2022  Patient Name: Barrera Cazares  Date of admission: 9/4/2022  1:47 AM  YOB: 1995  PCP: Candace Valle  History Obtained From:  patient    CHIEF COMPLAINT     Chief complaint:     HISTORY OF PRESENTING ILLNESS     The patient is a pleasant 32 y.o. female with significant past medical history of type 2 diabetes mellitus, left occipital cavernoma s/p resection 1/08/2020, seizures and post operative right homonymous hemianopia presents with a chief complaint of  fever, throbbing headache 10/10, dry cough, nausea and abdominal pain for one day. Patient tested positive for COVID-19. She works with [employer] and cannot track back sick contacts. Patient reported non bloody diarrhea.      Review of Systems:  General ROS: Completed and except as mentioned above were negative   HEENT ROS: Completed and except as mentioned above were negative   Allergy and Immunology ROS:  Completed and except as mentioned above were negative  Hematological and Lymphatic ROS:  Completed and except as mentioned above were negative  Respiratory ROS:  Completed and except as mentioned above were negative  Cardiovascular ROS:  Completed and except as mentioned above were negative  Gastrointestinal ROS: Completed and except as mentioned above were negative  Genito-Urinary ROS:  Completed and except as mentioned above were negative  Musculoskeletal ROS:  Completed and except as mentioned above were negative  Neurological ROS:  Completed and except as mentioned above were negative  Skin & Dermatological ROS:  Completed and except as mentioned above were negative  Psychological ROS:  Completed and except as mentioned above were negative    PAST MEDICAL HISTORY     Past Medical History:   Diagnosis Date    Cerebral artery occlusion with cerebral infarction [facility])     Cerebral vascular malformation     [email or web address]    Diabetes mellitus ([facility]     Seizure ([facility]     Traumatic hemorrhage of left cerebrum without loss of consciousness ([facility]     @ [employer]       PAST SURGICAL HISTORY     Past Surgical History:   Procedure Laterality Date    BRAIN SURGERY      CRANIOTOMY  01/08/2020    LEFT OCCIPITAL CRANIOTOMY    CRANIOTOMY Left 01/08/2020    LEFT OCCIPITAL CRANIOTOMY, 1ST VASCULAR LESION WITH URBAN NAVIGATION (LATERAL WITH ROMERO BAG, HOLLINGSWORTH HEADHOLDER, MICROSCOPE) performed by Bruce Barros DO at [place] [id number] and Prozac [fluoxetine hcl]    MEDICATIONS PRIOR TO ADMISSION     Prior to Admission medications    Medication Sig Start Date End Date Taking? Authorizing Provider   ibuprofen (ADVIL;MOTRIN) 800 MG tablet Take 1 tablet by mouth 3 times daily (with meals) 8/18/22   Jean Javier MD   gabapentin (NEURONTIN) 400 MG capsule Take 1 capsule by mouth 3 times daily for 30 days.  8/17/22 9/16/22  Mo'Harjeet Gu MD   dapagliflozin (FARXIGA) 5 MG tablet Take 1 tablet by mouth every morning  Patient not taking: No sig reported 6/25/22   Isabella Gotti MD   insulin detemir (LEVEMIR FLEXTOUCH) 100 UNIT/ML injection pen Inject 30 Units into the skin nightly 6/25/22   Isabella Gotti MD   Magnesium Oxide (MAG-OXIDE) 200 MG TABS Take 2 tablets by mouth 2 times daily 6/25/22   Isabella Gotti MD   Insulin Pen Needle 30G X 8 MM MISC 1 each by Does not apply route daily 6/25/22   Isabella Gotti MD   butalbital-acetaminophen-caffeine (FIORICET, ESGIC) -40 MG per tablet Take 1 tablet by mouth every 6 hours as needed for Headaches 6/25/22   Isabella Gotti MD   insulin aspart (NOVOLOG FLEXPEN) 100 UNIT/ML injection pen Inject 5 Units into the skin 3 times daily (before meals) 6/25/22   Isabella Gotti MD   promethazine (PHENERGAN) 12.5 MG tablet Take 1 tablet by mouth 2 times daily as needed for Nausea (moderate to severe headache)  Patient not taking: Reported on 2022   Karmen Pham MD   [facility] 4 MG tablet Take 1 tablet by mouth every 12 hours as needed for Nausea or Vomiting 21   Karmen Pham MD   glucose monitoring kit (FREESTYLE) monitoring kit 1 kit by Does not apply route daily 20   Roma Hidalgo MD   blood glucose monitor strips Test 3 times a day & as needed for symptoms of irregular blood glucose. 20   Roma Hidalgo MD   Lancets MISC 1 each by Does not apply route 3 times daily 20   Roma Hidalgo MD   Alcohol Swabs (ALCOHOL PREP) 70 % PADS 1 pad TID 20   Roma Hidalgo MD   Insulin Pen Needle 30G X 8 MM MISC 1 each by Does not apply route daily 20   Roma Hidalgo MD       SOCIAL HISTORY     Tobacco: 0.3 Pack per day  Alcohol: yes, occasionally  Illicits: marijuana  Occupation: [employer]    FAMILY HISTORY     Family History   Problem Relation Age of Onset    Colon Cancer Mother     No Known Problems Father     No Known Problems Brother     Diabetes Maternal Grandfather        PHYSICAL EXAM     Vitals: BP (!) 127/93   Pulse 95   Temp 98.5 °F (36.9 °C) (Oral)   Resp 21   Wt 146 lb (66.2 kg)   SpO2 99%   BMI 26.70 kg/m²   Tmax: Temp (24hrs), Av.5 °F (36.9 °C), Min:98.5 °F (36.9 °C), Max:98.5 °F (36.9 °C)    Last Body weight:   Wt Readings from Last 3 Encounters:   22 146 lb (66.2 kg)   22 145 lb (65.8 kg)   22 145 lb (65.8 kg)     Body Mass Index : Body mass index is 26.7 kg/m². PHYSICAL EXAMINATION:  Constitutional: ill-appearing, well nourished, 25-29.9 - Overweight 32y.o. year old female who is alert, oriented, cooperative and in moderate apparent distress. Head:normocephalic and atraumatic. EENT:  PERRLA. No conjunctival injections. Septum was midline, mucosa was without erythema, exudates or cobblestoning. No thrush was noted. Neck: Supple without thyromegaly. No elevated JVP. Trachea was midline.   Respiratory: Chest was symmetrical without dullness to percussion. Breath sounds bilaterally were clear to auscultation. There were no wheezes, rhonchi or rales. There is no intercostal retraction or use of accessory muscles. No egophony noted. Cardiovascular: Regular without murmur, clicks, gallops or rubs. Abdomen: Slightly rounded and soft without organomegaly. No rebound, rigidity or guarding was appreciated. Lymphatic: No lymphadenopathy. Musculoskeletal: Normal curvature of the spine. No gross muscle weakness. Extremities:  No lower extremity edema, ulcerations, tenderness, varicosities or erythema. Muscle size, tone and strength are normal.  No involuntary movements are noted. Skin:  Warm and dry. Good color, turgor and pigmentation. No lesions or scars. No cyanosis or clubbing  Neurological/Psychiatric: The patient's general behavior, level of consciousness, thought content and emotional status is normal.          INVESTIGATIONS     Laboratory Testing:     Recent Results (from the past 24 hour(s))   POC Glucose Fingerstick    Collection Time: 09/04/22  2:43 AM   Result Value Ref Range    POC Glucose 301 (H) 65 - 105 mg/dL   POCT Glucose    Collection Time: 09/04/22  3:00 AM   Result Value Ref Range    Glucose 301 mg/dL    QC OK? yes    COVID-19, Rapid    Collection Time: 09/04/22  3:06 AM    Specimen: Nasopharyngeal Swab   Result Value Ref Range    Specimen Description . NASOPHARYNGEAL SWAB     SARS-CoV-2, Rapid DETECTED (A) Not Detected   BMP    Collection Time: 09/04/22  3:07 AM   Result Value Ref Range    Glucose 313 (H) 70 - 99 mg/dL    BUN 14 6 - 20 mg/dL    Creatinine 1.16 (H) 0.50 - 0.90 mg/dL    Calcium 8.9 8.6 - 10.4 mg/dL    Sodium 133 (L) 135 - 144 mmol/L    Potassium 2.7 (LL) 3.7 - 5.3 mmol/L    Chloride 96 (L) 98 - 107 mmol/L    CO2 23 20 - 31 mmol/L    Anion Gap 14 9 - 17 mmol/L    GFR Non-African American 55 (L) >60 mL/min    GFR African American >60 >60 mL/min    GFR Comment         CBC with Auto Differential Collection Time: 09/04/22  3:07 AM   Result Value Ref Range    WBC 3.6 3.5 - 11.3 k/uL    RBC 4.69 3.95 - 5.11 m/uL    Hemoglobin 12.9 11.9 - 15.1 g/dL    Hematocrit 38.3 36.3 - 47.1 %    MCV 81.7 (L) 82.6 - 102.9 fL    MCH 27.5 25.2 - 33.5 pg    MCHC 33.7 28.4 - 34.8 g/dL    RDW 12.1 11.8 - 14.4 %    Platelets 594 (L) 068 - 453 k/uL    MPV 11.8 8.1 - 13.5 fL    NRBC Automated 0.0 0.0 per 100 WBC    Immature Granulocytes 1 (H) 0 %    Seg Neutrophils 75 (H) 36 - 65 %    Lymphocytes 12 (L) 24 - 43 %    Monocytes 11 3 - 12 %    Eosinophils % 0 (L) 1 - 4 %    Basophils 1 0 - 2 %    Absolute Immature Granulocyte 0.04 0.00 - 0.30 k/uL    Segs Absolute 2.69 1.50 - 8.10 k/uL    Absolute Lymph # 0.43 (L) 1.10 - 3.70 k/uL    Absolute Mono # 0.40 0.10 - 1.20 k/uL    Absolute Eos # 0.00 0.00 - 0.44 k/uL    Basophils Absolute 0.04 0.00 - 0.20 k/uL    Morphology ANISOCYTOSIS PRESENT    HCG Qualitative, Serum    Collection Time: 09/04/22  3:07 AM   Result Value Ref Range    hCG Qual NEGATIVE NEGATIVE   Beta-Hydroxybutyrate    Collection Time: 09/04/22  3:07 AM   Result Value Ref Range    Beta-Hydroxybutyrate 0.32 (H) 0.02 - 0.27 mmol/L   Magnesium    Collection Time: 09/04/22  3:07 AM   Result Value Ref Range    Magnesium 1.0 (L) 1.6 - 2.6 mg/dL   EKG 12 Lead    Collection Time: 09/04/22  4:31 AM   Result Value Ref Range    Ventricular Rate 101 BPM    Atrial Rate 101 BPM    P-R Interval 140 ms    QRS Duration 92 ms    Q-T Interval 346 ms    QTc Calculation (Bazett) 448 ms    P Axis 43 degrees    R Axis 62 degrees    T Axis 60 degrees   BLOOD GAS, VENOUS    Collection Time: 09/04/22  6:23 AM   Result Value Ref Range    pH, Sarbjit 7.482 (H) 7.320 - 7.420    pCO2, Sarbjit 30.6 (L) 39 - 55    pO2, Sarbjit 185.0 (H) 30 - 50    HCO3, Venous 22.6 (L) 24 - 30 mmol/L    Positive Base Excess, Sarbjit 0.4 0.0 - 2.0 mmol/L    O2 Sat, Sarbjit 100.0 (H) 60.0 - 85.0 %    Carboxyhemoglobin 4.9 0 - 5 %    Pt Temp 37.0     FIO2 Unknown    Venous Blood Gas, POC Collection Time: 09/04/22  6:23 AM   Result Value Ref Range    pH, Sarbjit 7.432 (H) 7.320 - 7.430    pCO2, Sarbjit 36.8 (L) 41.0 - 51.0 mm Hg    pO2, Sarbjit 54.3 (H) 30.0 - 50.0 mm Hg    HCO3, Venous 24.5 22.0 - 29.0 mmol/L    Positive Base Excess, Sarbjit 0 0.0 - 3.0    O2 Sat, Sarbjit 89 (H) 60.0 - 85.0 %   ELECTROLYTES PLUS    Collection Time: 09/04/22  6:23 AM   Result Value Ref Range    POC Sodium 136 (L) 138 - 146 mmol/L    POC Potassium 4.1 3.5 - 4.5 mmol/L    POC Chloride 100 98 - 107 mmol/L    POC TCO2 25 22 - 30 mmol/L    Anion Gap 12 7 - 16 mmol/L   Hemoglobin and hematocrit, blood    Collection Time: 09/04/22  6:23 AM   Result Value Ref Range    POC Hemoglobin 13.3 12.0 - 16.0 g/dL    POC Hematocrit 39 36 - 46 %   Creatinine W/GFR Point of Care    Collection Time: 09/04/22  6:23 AM   Result Value Ref Range    POC Creatinine 1.31 (H) 0.51 - 1.19 mg/dL    GFR Comment 58 (L) >60 mL/min    GFR Non- 48 (L) >60 mL/min    GFR Comment         CALCIUM, IONIC (POC)    Collection Time: 09/04/22  6:23 AM   Result Value Ref Range    POC Ionized Calcium 1.08 (L) 1.15 - 1.33 mmol/L   POCT urea (BUN)    Collection Time: 09/04/22  6:23 AM   Result Value Ref Range    POC BUN 19 8 - 26 mg/dL   Lactic Acid, POC    Collection Time: 09/04/22  6:23 AM   Result Value Ref Range    POC Lactic Acid 0.90 0.56 - 1.39 mmol/L   POCT Glucose    Collection Time: 09/04/22  6:23 AM   Result Value Ref Range    POC Glucose 324 (H) 74 - 100 mg/dL   Basic Metabolic Panel    Collection Time: 09/04/22  7:35 AM   Result Value Ref Range    Glucose 335 (H) 70 - 99 mg/dL    BUN 20 6 - 20 mg/dL    Creatinine 1.36 (H) 0.50 - 0.90 mg/dL    Calcium 8.2 (L) 8.6 - 10.4 mg/dL    Sodium 137 135 - 144 mmol/L    Potassium 2.8 (LL) 3.7 - 5.3 mmol/L    Chloride 99 98 - 107 mmol/L    CO2 26 20 - 31 mmol/L    Anion Gap 12 9 - 17 mmol/L    GFR Non-African American 46 (L) >60 mL/min    GFR  56 (L) >60 mL/min    GFR Comment         Magnesium Collection Time: 09/04/22  7:35 AM   Result Value Ref Range    Magnesium 1.0 (L) 1.6 - 2.6 mg/dL   Urinalysis with Reflex to Culture    Collection Time: 09/04/22  7:40 AM    Specimen: Urine   Result Value Ref Range    Color, UA Yellow Yellow    Turbidity UA Cloudy (A) Clear    Glucose, Ur 3+ (A) NEGATIVE    Bilirubin Urine NEGATIVE NEGATIVE    Ketones, Urine TRACE (A) NEGATIVE    Specific Gravity, UA 1.023 1.005 - 1.030    Urine Hgb LARGE (A) NEGATIVE    pH, UA 6.0 5.0 - 8.0    Protein, UA TRACE (A) NEGATIVE    Urobilinogen, Urine Normal Normal    Nitrite, Urine NEGATIVE NEGATIVE    Leukocyte Esterase, Urine NEGATIVE NEGATIVE   Microscopic Urinalysis    Collection Time: 09/04/22  7:40 AM   Result Value Ref Range    WBC, UA 10 TO 20 0 - 5 /HPF    RBC, UA 10 TO 20 0 - 2 /HPF    Epithelial Cells UA 2 TO 5 0 - 5 /HPF    Bacteria, UA MODERATE (A) None       Imaging:   XR CHEST (SINGLE VIEW FRONTAL)    Result Date: 9/4/2022  Normal examination. ASSESSMENT & PLAN     ASSESSMENT / PLAN:     Impression:    Hypokalemia  - K is 2.7 at admission. Received 10 IV and 40 oral. Receiving IV fluids. ZEFERINO  - Creatinine 1.16 --> 1.36 Baseline is normal. Most likely prerenal due to hypovolemia.  - On IV fluid. - monitor Is/Os   - avoid nephrotoxic drugs  - Will continue to monitor. COVID-19.   - CXR is normal  - ID consulted, will follow recommendation    Type II diabetes mellitus. - On insulin lantus 30 IU nightly. - High dose insulin sliding scale  - Hypoglycemia protocol initiated    Hx of left occipital cavernoma s/p resection 01/08/2020  Stable    Hx of seizures:   Stable. On Oxcarbazepine 150 mg  On Gabapentin 400 mg TID. DVT ppx: Lovenox   GI ppx: Not indicated    Discharge Planning:  consulted. Will follow up. Celine Crump MD  Internal Medicine Resident, PGY-1  [facility];  [place], [place]  9/4/2022, 11:51 AM

## 2022-09-04 NOTE — PROGRESS NOTES
78-year-old female with past medical history of type 2 diabetes mellitus, left occipital cavernoma s/p resection 1/08/2020, seizures and post operative right homonymous hemianopia is presenting to the hospital with fever, headache, nausea and abdominal pain. Patient tested positive for COVID-19 with normal chest x-ray and normal oxygen saturation. Impression:  Hypokalemia  ZEFERINO  COVID-19  Type II diabetes mellitus  Hx of left occipital cavernoma s/p resection 01/08/2020  Hx of seizures    Plan:  IV fluids. Monitor potassium every 6 hours and replace accordingly. Check CRP. Daily BMP. Abdominal x-ray. Resume home medications. We will continue to monitor.     Electronically signed by Denis Willson MD on 9/4/2022 at 11:10 AM    Denis Willson MD  Resident internal medicine, PGY [facility].  11:10 AM 09/04/22

## 2022-09-04 NOTE — PROGRESS NOTES
Pharmacy Consult      [facility] Dionicio is a 32 y.o. female for whom pharmacy has been consulted to dose Bebtelovimab. Patient Active Problem List   Diagnosis    Bipolar 1 disorder ([facility]    Recurrent depression ([facility]    SAH (subarachnoid hemorrhage) ([facility]    Intraparenchymal hemat[facility] of brain [facility]    Intraparenchymal hemorrhage of brain ([facility]    History of seizures    Cavernoma    History of craniotomy    Occipital subdural bleed (HCC)    Headache    Type 2 diabetes mellitus with hyperglycemia, with long-term current use of insulin (HCC)    Electrolyte abnormality    History of surgery of head    History of seizure    Headache disorder    Bandemia    Acute cystitis without hematuria    MRSA infection    Newly diagnosed diabetes ([facility])-HbA1c of 13.1    Acute CVA (cerebrovascular accident) ([facility]    Dizziness    Overweight (BMI 25.0-29. 9)    Tobacco abuse    Constipation    Anxiety    Hyperglycemia    COVID-19    Hypokalemia    Hypomagnesemia    Seizures (HCC)    ZEFERINO (acute kidney injury) ([facility]    Diarrhea of infectious origin       Allergies:  Latex and Prozac [fluoxetine hcl]     Recent Labs     09/04/22  0623 09/04/22  0735   CREATININE 1.31* 1.36*       Ht/Wt:   Ht Readings from Last 1 Encounters:   08/18/22 5' 2\" (1.575 m)        Wt Readings from Last 1 Encounters:   09/04/22 146 lb (66.2 kg)         Estimated Creatinine Clearance: 54 mL/min (A) (based on SCr of 1.36 mg/dL (H)). Assessment/Plan:    Give 1x dose of bebtelovimab 175 mg IV injection followed by NS flush. Thank you for the consult. Will continue to follow. Aniya Mitchell.  Klaudia Castro, PharmD  9/4/2022 3:01 PM

## 2022-09-04 NOTE — ED PROVIDER NOTES
[facility]   Emergency Department  Faculty Attestation       I performed a history and physical examination of the patient and discussed management with the resident. I reviewed the residents note and agree with the documented findings including all diagnostic interpretations and plan of care. Any areas of disagreement are noted on the chart. I was personally present for the key portions of any procedures. I have documented in the chart those procedures where I was not present during the key portions. I have reviewed the emergency nurses triage note. I agree with the chief complaint, past medical history, past surgical history, allergies, medications, social and family history as documented unless otherwise noted below. Documentation of the HPI, Physical Exam and Medical Decision Making performed by scribdannielle is based on my personal performance of the HPI, PE and MDM. For Physician Assistant/ Nurse Practitioner cases/documentation I have personally evaluated this patient and have completed at least one if not all key elements of the E/M (history, physical exam, and MDM). Additional findings are as noted. Pertinent Comments     Primary Care Physician: Anisa Cifuentes      ED Triage Vitals   BP Temp Temp Source Heart Rate Resp SpO2 Height Weight   09/04/22 0223 09/04/22 0226 09/04/22 0226 09/04/22 0223 09/04/22 0223 09/04/22 0223 -- 09/04/22 0221   118/80 98.5 °F (36.9 °C) Oral (!) 110 19 96 %  146 lb (66.2 kg)        History/Physical:   This is a 32 y.o. female with significant past medical history of diabetes who presents to the ED with fever, feeling ill, and headache for approximately 1 day. On initial resident examination, patient was slightly tachycardic and ill-appearing. On my exam, patient states that she is just not been feeling too well, and thinks that she might of caught something from someone at work. Has been feeling fatigued and slightly nauseated.   On exam, she does appear to feel ill but is nontoxic-appearing. She normocephalic atraumatic. She is slightly tachycardic in the low 100s but regular rhythm. Lungs good auscultation bilaterally. Abdomen with slight tenderness throughout, but no rebound or guarding. She is alert and oriented x4 no focal deficits. MDM/Plan:   Patient with likely viral illness. We will check basic labs, symptomatic treatment, and CoVID-19. Was found to be COVID-positive  Found to have a potassium of 2.7. EKG obtained  Also to be hyperglycemic. Ordered VBG, ketones, and mag levels. Initially plan for potassium replacement and then reevaluation and if potassium was still less than 3 then plan for admit. However there was significant delay in getting labs done. VBG did not show any signs of DKA. Still awaiting magnesium level and repeat potassium level at time of signout. Likely plan for admission. EKG Interpretation    Interpreted by emergency department physician    Clinical Impression: Sinus tachycardia with left atrial enlargement.   No signs of acute ischemia    Dragan Pham MD        Critical Care: None     Dragan Pham MD  Attending Emergency Physician        Dragan Pham MD  09/04/22 [id number]

## 2022-09-04 NOTE — ED NOTES
Patient presents with c/o fever (no temp checked at home), \"I felt hot\" and headache x1 day  Placed on monitor  A/O, friend at bedside  Call light at side     Delfino Manriquez RN  09/04/22 [id number]

## 2022-09-04 NOTE — ED PROVIDER NOTES
[facility]  Emergency Department  Emergency Medicine Resident Sign-out     Care of Elizabeth Phan was assumed from Dr. Dhiraj Pelayo and is being seen for Headache (X1 day) and Fever (Did not check temp but said \"I was hot all over\")  . The patient's initial evaluation and plan have been discussed with the prior provider who initially evaluated the patient. EMERGENCY DEPARTMENT COURSE / MEDICAL DECISION MAKING:       MEDICATIONS GIVEN:  Orders Placed This Encounter   Medications    ketorolac (TORADOL) injection 30 mg    DISCONTD: insulin lispro (HUMALOG) injection vial 0-8 Units    DISCONTD: insulin lispro (HUMALOG) injection vial 0-4 Units    DISCONTD: potassium chloride (KLOR-CON M) extended release tablet 20 mEq    DISCONTD: 0.9 % sodium chloride infusion    DISCONTD: predniSONE (DELTASONE) tablet 102 mg    DISCONTD: ondansetron (ZOFRAN) tablet 24 mg    DISCONTD: etoposide (VEPESID) 86 mg, vinCRIStine (ONCOVIN) 0.68 mg, DOXOrubicin HCl (ADRIAMYCIN) 17 mg in sodium chloride 0.9 % 1,000 mL chemo infusion    DISCONTD: furosemide (LASIX) injection 40 mg    DISCONTD: prochlorperazine (COMPAZINE) tablet 10 mg    DISCONTD: prochlorperazine (COMPAZINE) injection 10 mg    DISCONTD: LORazepam (ATIVAN) tablet 0.5 mg    DISCONTD: LORazepam (ATIVAN) injection 0.5 mg    DISCONTD: cyclophosphamide (CYTOXAN) 1,280 mg in dextrose 5 % 100 mL chemo infusion    potassium bicarb-citric acid (EFFER-K) effervescent tablet 40 mEq    potassium chloride 10 mEq/100 mL IVPB (Peripheral Line)    DISCONTD: insulin lispro (HUMALOG) injection vial 5 Units    0.9 % sodium chloride bolus    magnesium sulfate 2000 mg in 50 mL IVPB premix       LABS / RADIOLOGY:     Results for orders placed or performed during the hospital encounter of 09/04/22   COVID-19, Rapid    Specimen: Nasopharyngeal Swab   Result Value Ref Range    Specimen Description . NASOPHARYNGEAL SWAB     SARS-CoV-2, Rapid DETECTED (A) Not Detected   BMP   Result 5.3 mmol/L    Chloride 99 98 - 107 mmol/L    CO2 26 20 - 31 mmol/L    Anion Gap 12 9 - 17 mmol/L    GFR Non-African American 46 (L) >60 mL/min    GFR  56 (L) >60 mL/min    GFR Comment         Magnesium   Result Value Ref Range    Magnesium 1.0 (L) 1.6 - 2.6 mg/dL   ELECTROLYTES PLUS   Result Value Ref Range    POC Sodium 136 (L) 138 - 146 mmol/L    POC Potassium 4.1 3.5 - 4.5 mmol/L    POC Chloride 100 98 - 107 mmol/L    POC TCO2 25 22 - 30 mmol/L    Anion Gap 12 7 - 16 mmol/L   Hemoglobin and hematocrit, blood   Result Value Ref Range    POC Hemoglobin 13.3 12.0 - 16.0 g/dL    POC Hematocrit 39 36 - 46 %   CALCIUM, IONIC (POC)   Result Value Ref Range    POC Ionized Calcium 1.08 (L) 1.15 - 1.33 mmol/L   Beta-Hydroxybutyrate   Result Value Ref Range    Beta-Hydroxybutyrate 0.32 (H) 0.02 - 0.27 mmol/L   Magnesium   Result Value Ref Range    Magnesium 1.0 (L) 1.6 - 2.6 mg/dL   POCT Glucose   Result Value Ref Range    Glucose 301 mg/dL    QC OK?  yes    POC Glucose Fingerstick   Result Value Ref Range    POC Glucose 301 (H) 65 - 105 mg/dL   Venous Blood Gas, POC   Result Value Ref Range    pH, Sarbjit 7.432 (H) 7.320 - 7.430    pCO2, Sarbjit 36.8 (L) 41.0 - 51.0 mm Hg    pO2, Sarbjit 54.3 (H) 30.0 - 50.0 mm Hg    HCO3, Venous 24.5 22.0 - 29.0 mmol/L    Positive Base Excess, Sarbjit 0 0.0 - 3.0    O2 Sat, Sarbjit 89 (H) 60.0 - 85.0 %   Creatinine W/GFR Point of Care   Result Value Ref Range    POC Creatinine 1.31 (H) 0.51 - 1.19 mg/dL    GFR Comment 58 (L) >60 mL/min    GFR Non- 48 (L) >60 mL/min    GFR Comment         POCT urea (BUN)   Result Value Ref Range    POC BUN 19 8 - 26 mg/dL   Lactic Acid, POC   Result Value Ref Range    POC Lactic Acid 0.90 0.56 - 1.39 mmol/L   POCT Glucose   Result Value Ref Range    POC Glucose 324 (H) 74 - 100 mg/dL   EKG 12 Lead   Result Value Ref Range    Ventricular Rate 101 BPM    Atrial Rate 101 BPM    P-R Interval 140 ms    QRS Duration 92 ms    Q-T Interval 346 ms    QTc Calculation ([place]) 448 ms    P Axis 43 degrees    R Axis 62 degrees    T Axis 60 degrees       XR CHEST (SINGLE VIEW FRONTAL)    Result Date: 9/4/2022  EXAMINATION: ONE XRAY VIEW OF THE CHEST 9/4/2022 2:12 am COMPARISON: 06/21/2022 HISTORY: ORDERING SYSTEM PROVIDED HISTORY: concern for covid TECHNOLOGIST PROVIDED HISTORY: Concerns for COVID concern for covid Reason for Exam: headache fever   upright port FINDINGS: Heart size and pulmonary vasculature are normal.  The lungs are clear and normally expanded. Surrounding osseous and soft tissue structures are unremarkable. Normal examination. XR HAND LEFT (MIN 3 VIEWS)    Result Date: 8/18/2022  EXAMINATION: THREE XRAY VIEWS OF THE LEFT HAND 8/18/2022 6:45 pm COMPARISON: None. HISTORY: Acute pain and bruising after window closed on thumb. FINDINGS: No acute fracture or dislocation. No significant degenerative changes. Soft tissues are unremarkable. No acute osseous abnormality. CT ABDOMEN PELVIS W IV CONTRAST Additional Contrast? None    Result Date: 8/9/2022  EXAMINATION: CT OF THE ABDOMEN AND PELVIS WITH CONTRAST 8/9/2022 6:47 pm TECHNIQUE: CT of the abdomen and pelvis was performed with the administration of intravenous contrast. Multiplanar reformatted images are provided for review. Automated exposure control, iterative reconstruction, and/or weight based adjustment of the mA/kV was utilized to reduce the radiation dose to as low as reasonably achievable.  COMPARISON: 08/15/2017 HISTORY: ORDERING SYSTEM PROVIDED HISTORY: Generalized abdominal pain, worse in periumbilical and right lower quadrant regions TECHNOLOGIST PROVIDED HISTORY: Generalized abdominal pain, worse in periumbilical and right lower quadrant regions Decision Support Exception - unselect if not a suspected or confirmed emergency medical condition->Emergency Medical Condition (MA) Reason for Exam: Generalized abdominal pain, worse in periumbilical and right lower quadrant regions FINDINGS: Lower Chest: The lung bases are clear and the heart size is normal. Organs: The liver, spleen, pancreas, adrenal glands and kidneys are normal. The gallbladder is surgically absent. Bile ducts are not dilated. GI/Bowel: The appendix is normal.  Unopacified bowel loops are unremarkable. No bowel obstruction. Pelvis: Dominant follicle in the right ovary. The ovaries, uterus and urinary bladder are otherwise unremarkable. Peritoneum/Retroperitoneum: There is no adenopathy, free air or free fluid. Bones/Soft Tissues: Tiny fat containing umbilical hernia. No acute bone or soft tissue abnormality. No acute finding in the abdomen or pelvis. The appendix is normal.  The gallbladder is surgically absent. RECENT VITALS:     Temp: 98.5 °F (36.9 °C),  Heart Rate: 95, Resp: 21, BP: (!) 127/93, SpO2: 99 %    This patient is a 32 y.o. Female with concerns of headache, fever, found to be COVID-positive. Overall patient is clinically well. Have a hyperglycemia, hypokalemia, hypomagnesia. Headache slightly improved with symptomatic management. Plan for admission at this time given multiple electrolyte abnormalities. Has been accepted by commendation team.  Awaiting bed assignment. ED Course as of 09/04/22 1534   Sun Sep 04, 2022   0202 Patient has contact with covid + contact [MZ]   0313 XR CHEST (SINGLE VIEW FRONTAL)  IMPRESSION:  Normal examination. [MZ]   0405 Potassium(!!): 2.7  Potassium low, will correct [MZ]   0405 SARS-CoV-2, Rapid(!): DETECTED  Tested positive for covid 19  [MZ]   0436 Will recheck potassium after administration of potassium, if above 3 we will d/c and below 3 will admit [MZ]   0642 pH, Sarbjit(!): 7.482  Elevated pH, likely eliminates DKA [MZ]   0810 Potassium(!!): 2.8  Continues to remain low, will admit   [MZ]      ED Course User Index  [MZ] Barbara Haque MD        OUTSTANDING TASKS / RECOMMENDATIONS:    Bed assignment     FINAL IMPRESSION:     1. COVID-19    2.  Hypokalemia 3. Hyperglycemia        DISPOSITION:         DISPOSITION:  []  Home   []  Nursing Facility   []  Transfer -    [x]  Admission -  Medicine   FOLLOW-UP: [facility]  [address] [phone]    Schedule an appointment as soon as possible for a visit in 2 days  If symptoms worsen     DISCHARGE MEDICATIONS: New Prescriptions    No medications on file          Makeda Hinkle DO  Emergency Medicine Resident  [facility] [place], [place]  Resident  09/04/22 [id number]

## 2022-09-04 NOTE — DISCHARGE INSTRUCTIONS
Please take medications as prescribed  F/u with your PCP in one week  Please keep yourself well hydrated  Please keep taking Mg tablets as prescribed  Please take Levemir 30 U nightly with 5 U pre-meal insulin; f/u with your PCP for better blood glucose control  Please get Bivalent Covid vaccine in 2 months  Isolate till 09/14/22 due to covid

## 2022-09-04 NOTE — ED PROVIDER NOTES
[address] [facility]  Emergency Department Encounter  Emergency Medicine Resident     Pt Ely Carol Askew  MRN: [id number]  Sujeygfjeaneth 1995  Date of evaluation: 9/4/22  PCP:  Giselle Aguilera       Chief Complaint   Patient presents with    Headache     X1 day    Fever     Did not check temp but said \"I was hot all over\"         HISTORY OF PRESENT ILLNESS  (Location/Symptom, Timing/Onset, Context/Setting, Quality, Duration, Modifying Factors, Severity.)      Rosalva Guallpa is a 32 y.o. female who presents with 1 day history of fever and headache. She describes the headache as taking up the entire front of her face and all over the top of her head, rated at a 10 out of 10 pain. It is a throbbing headache. This is not her first episode. The patient is slow to communicate and prefers communicating in 1 or 2 word sentences. She states that she took Tylenol and naproxen which did not help much. She also reports a fever. 2 episodes of vomiting also occurred 1 in the morning and 1 in the evening. She states that she does have a positive exposure to COVID-19. She also states the last time she took her blood sugar was night ago and it was at 135. She has not taken reading today. PAST MEDICAL / SURGICAL / SOCIAL / FAMILY HISTORY      has a past medical history of Cerebral artery occlusion with cerebral infarction [facility]), Cerebral vascular malformation, Diabetes mellitus ([facility] [address] Seizure ([facility] [address] and Traumatic hemorrhage of left cerebrum without loss of consciousness ([facility] [address] has a past surgical history that includes craniotomy (01/08/2020); craniotomy (Left, 01/08/2020); and brain surgery.       Social History     Socioeconomic History    Marital status: Single     Spouse name: Not on file    Number of children: Not on file    Years of education: Not on file    Highest education level: Not on file   Occupational History    Not on file   Tobacco Use    Smoking status: Some Days Packs/day: 0.30     Years: 14.00     Pack years: 4.20     Types: Cigarettes    Smokeless tobacco: Never   Vaping Use    Vaping Use: Unknown   Substance and Sexual Activity    Alcohol use: Yes     Comment: Occassionally    Drug use: Yes     Types: Marijuana [place])    Sexual activity: Yes     Partners: Male   Other Topics Concern    Not on file   Social History Narrative    Not on file     Social Determinants of Health     Financial Resource Strain: Not on file   Food Insecurity: Not on file   Transportation Needs: Not on file   Physical Activity: Not on file   Stress: Not on file   Social Connections: Not on file   Intimate Partner Violence: Not on file   Housing Stability: Not on file       Family History   Problem Relation Age of Onset    Colon Cancer Mother     No Known Problems Father     No Known Problems Brother     Diabetes Maternal Grandfather        Allergies:  Latex and Prozac [fluoxetine hcl]    Home Medications:  Prior to Admission medications    Medication Sig Start Date End Date Taking? Authorizing Provider   ibuprofen (ADVIL;MOTRIN) 800 MG tablet Take 1 tablet by mouth 3 times daily (with meals) 8/18/22   Zbigniew Akins MD   gabapentin (NEURONTIN) 400 MG capsule Take 1 capsule by mouth 3 times daily for 30 days.  8/17/22 9/16/22  Mo'Men Bam Hall MD   dapagliflozin (FARXIGA) 5 MG tablet Take 1 tablet by mouth every morning  Patient not taking: No sig reported 6/25/22   Yesenia Sanchez MD   insulin detemir (LEVEMIR FLEXTOUCH) 100 UNIT/ML injection pen Inject 30 Units into the skin nightly 6/25/22   Yesenia Sanchez MD   Magnesium Oxide (MAG-OXIDE) 200 MG TABS Take 2 tablets by mouth 2 times daily 6/25/22   Yesenia Sanchez MD   Insulin Pen Needle 30G X 8 MM MISC 1 each by Does not apply route daily 6/25/22   Yesenia Sanchez MD   butalbital-acetaminophen-caffeine (FIORICET, ESGIC) -40 MG per tablet Take 1 tablet by mouth every 6 hours as needed for Headaches 6/25/22   Yesenia Sanchez MD   insulin aspart (NOVOLOG FLEXPEN) 100 UNIT/ML injection pen Inject 5 Units into the skin 3 times daily (before meals) 6/25/22   Braeden Cardenas MD   promethazine (PHENERGAN) 12.5 MG tablet Take 1 tablet by mouth 2 times daily as needed for Nausea (moderate to severe headache)  Patient not taking: Reported on 8/17/2022 7/20/21   Sapna Carpio MD   ondansetron [facility]) 4 MG tablet Take 1 tablet by mouth every 12 hours as needed for Nausea or Vomiting 7/20/21   Sapna Carpio MD   glucose monitoring kit (FREESTYLE) monitoring kit 1 kit by Does not apply route daily 2/25/20   Mick Malone MD   blood glucose monitor strips Test 3 times a day & as needed for symptoms of irregular blood glucose. 2/25/20   Mick Malone MD   Lancets MISC 1 each by Does not apply route 3 times daily 2/25/20   Mick Malone MD   Alcohol Swabs (ALCOHOL PREP) 70 % PADS 1 pad TID 2/25/20   Mick Malone MD   Insulin Pen Needle 30G X 8 MM MISC 1 each by Does not apply route daily 2/25/20   Mick Malone MD       REVIEW OF SYSTEMS    (2-9 systems for level 4, 10 or more for level 5)      Review of Systems   Constitutional:  Positive for activity change, chills, diaphoresis and fever. HENT:  Positive for sore throat. Eyes:  Positive for photophobia and visual disturbance. Respiratory:  Positive for chest tightness and shortness of breath. Cardiovascular:  Positive for chest pain. Negative for palpitations and leg swelling. Gastrointestinal:  Positive for nausea and vomiting. Negative for abdominal pain and diarrhea. Genitourinary:  Negative for flank pain. Musculoskeletal:  Positive for myalgias. Allergic/Immunologic: Negative for environmental allergies and food allergies. Neurological:  Positive for dizziness, weakness and light-headedness.      PHYSICAL EXAM   (up to 7 for level 4, 8 or more for level 5)      INITIAL VITALS:   BP (!) 122/96   Pulse 79   Temp 98.3 °F (36.8 °C) (Oral)   Resp 20   Ht 5' 2\" (1.575 m)   Wt 146 lb (66.2 kg)   SpO2 99%   BMI 26.70 kg/m²     Physical Exam  Constitutional:       Appearance: She is ill-appearing and diaphoretic. HENT:      Head: Normocephalic and atraumatic. Right Ear: External ear normal.      Left Ear: External ear normal.      Nose: Nose normal.      Mouth/Throat:      Mouth: Mucous membranes are moist.      Pharynx: Oropharynx is clear. No oropharyngeal exudate or posterior oropharyngeal erythema. Eyes:      Extraocular Movements: Extraocular movements intact. Pupils: Pupils are equal, round, and reactive to light. Cardiovascular:      Rate and Rhythm: Normal rate and regular rhythm. Pulses: Normal pulses. Heart sounds: Normal heart sounds. No murmur heard. No friction rub. Pulmonary:      Effort: Pulmonary effort is normal. No respiratory distress. Breath sounds: Rhonchi present. No wheezing. Comments: Patient coughs with inspiration  Chest:      Chest wall: No tenderness. Abdominal:      General: Abdomen is flat. Bowel sounds are normal.      Palpations: Abdomen is soft. Tenderness: There is no abdominal tenderness. Skin:     General: Skin is warm. Capillary Refill: Capillary refill takes less than 2 seconds. Neurological:      General: No focal deficit present. Mental Status: She is alert and oriented to person, place, and time. Psychiatric:         Mood and Affect: Mood normal.         Behavior: Behavior normal.         Thought Content:  Thought content normal.         Judgment: Judgment normal.       DIFFERENTIAL  DIAGNOSIS     PLAN (LABS / IMAGING / EKG):  Orders Placed This Encounter   Procedures    COVID-19, Rapid    Culture, Urine    XR CHEST (SINGLE VIEW FRONTAL)    XR ABDOMEN (KUB) (SINGLE AP VIEW)    BMP    CBC with Auto Differential    HCG Qualitative, Serum    Urinalysis with Reflex to Culture    Beta-Hydroxybutyrate    BLOOD GAS, VENOUS    Basic Metabolic Panel    Magnesium    ELECTROLYTES PLUS    Hemoglobin and hematocrit, blood    CALCIUM, IONIC (POC)    Beta-Hydroxybutyrate    Magnesium    Basic Metabolic Panel w/ Reflex to MG    CBC with Auto Differential    Microscopic Urinalysis    C-Reactive Protein    Magnesium    Potassium    ADULT DIET;  Regular; 4 carb choices (60 gm/meal)    Cardiac Monitor - ED Only    Vital Signs (Recheck)    Weigh Patient    Vital signs per unit routine    Notify physician    Up with assistance    Up as tolerated    Daily weights    Intake and output    Neurovascular checks    Elevate Head of Bed     HYPOGLYCEMIA TREATMENT: blood glucose LESS THAN 70 mg/dL and patient ALERT and TOLERATING PO    HYPOGLYCEMIA TREATMENT: blood glucose LESS THAN 70 mg/dL and patient NOT ALERT or NPO    Full Code    Inpatient consult to Hospitalist    Consult to Infectious Disease    Inpatient consult to Case Management    Pharmacy to Dose: bebtelovimab for Coviid    Airborne isolation - (e.g., confirmed or rule out - Measles, Varicella, Tuberculosis)    Contact isolation    Droplet Plus Isolation    OT eval and treat    PT evaluation and treat    Initiate Oxygen Therapy Protocol    Nasal Cannula Oxygen    Pulse oximetry, continuous    POCT Glucose    POC Glucose Fingerstick    Venous Blood Gas, POC    Creatinine W/GFR Point of Care    POCT urea (BUN)    Lactic Acid, POC    POCT Glucose    POCT glucose    POCT Glucose    POC Glucose Fingerstick    POC Glucose Fingerstick    POC Glucose Fingerstick    EKG 12 Lead    Saline lock IV    ADMIT TO INPATIENT       MEDICATIONS ORDERED:  Orders Placed This Encounter   Medications    ketorolac (TORADOL) injection 30 mg    DISCONTD: insulin lispro (HUMALOG) injection vial 0-8 Units    DISCONTD: insulin lispro (HUMALOG) injection vial 0-4 Units    DISCONTD: potassium chloride (KLOR-CON M) extended release tablet 20 mEq    DISCONTD: 0.9 % sodium chloride infusion    DISCONTD: predniSONE (DELTASONE) tablet 102 mg    DISCONTD: ondansetron (ZOFRAN) tablet 24 mg    DISCONTD: etoposide (VEPESID) 86 mg, vinCRIStine (ONCOVIN) 0.68 mg, DOXOrubicin HCl (ADRIAMYCIN) 17 mg in sodium chloride 0.9 % 1,000 mL chemo infusion    DISCONTD: furosemide (LASIX) injection 40 mg    DISCONTD: prochlorperazine (COMPAZINE) tablet 10 mg    DISCONTD: prochlorperazine (COMPAZINE) injection 10 mg    DISCONTD: LORazepam (ATIVAN) tablet 0.5 mg    DISCONTD: LORazepam (ATIVAN) injection 0.5 mg    DISCONTD: cyclophosphamide (CYTOXAN) 1,280 mg in dextrose 5 % 100 mL chemo infusion    potassium bicarb-citric acid (EFFER-K) effervescent tablet 40 mEq    potassium chloride 10 mEq/100 mL IVPB (Peripheral Line)    DISCONTD: insulin lispro (HUMALOG) injection vial 5 Units    0.9 % sodium chloride bolus    magnesium sulfate 2000 mg in 50 mL IVPB premix    butalbital-acetaminophen-caffeine (FIORICET, ESGIC) per tablet 1 tablet    gabapentin (NEURONTIN) capsule 400 mg    DISCONTD: insulin detemir (LEVEMIR) injection pen 30 Units     Order Specific Question:   Please select a reason the therapeutic interchange was not accepted:      Answer:   Mary [place] for Pharmacy to Substitute    magnesium oxide (MAG-OX) tablet 400 mg    sodium chloride flush 0.9 % injection 5-40 mL    sodium chloride flush 0.9 % injection 5-40 mL    0.9 % sodium chloride infusion    enoxaparin (LOVENOX) injection 40 mg     Order Specific Question:   Indication of Use     Answer:   Prophylaxis-DVT/PE    OR Linked Order Group     ondansetron (ZOFRAN-ODT) disintegrating tablet 4 mg     ondansetron (ZOFRAN) injection 4 mg    polyethylene glycol (GLYCOLAX) packet 17 g    OR Linked Order Group     acetaminophen (TYLENOL) tablet 650 mg     acetaminophen (TYLENOL) suppository 650 mg    DISCONTD: insulin glargine (LANTUS) injection vial 30 Units    insulin glargine (LANTUS) injection vial 30 Units    0.9% NaCl with KCl 20 mEq infusion    DISCONTD: magnesium sulfate 2000 mg in water 50 mL IVPB    DISCONTD: insulin lispro (HUMALOG) injection vial 0-16 Units    DISCONTD: insulin lispro (HUMALOG) injection vial 0-4 Units    glucose chewable tablet 16 g    OR Linked Order Group     dextrose bolus 10% 125 mL     dextrose bolus 10% 250 mL    glucagon (rDNA) injection 1 mg    dextrose 10 % infusion    OXcarbazepine (TRILEPTAL) tablet 150 mg    insulin lispro (HUMALOG) injection vial 0-8 Units    insulin lispro (HUMALOG) injection vial 0-4 Units    bebtelovimab 175 mg injection     Order Specific Question:   Does this patient qualify for COVID-19 antibody therapy based on criteria for treatment? Answer:   Yes    sodium chloride flush 0.9 % injection 5-40 mL         DDX: COVID-19Pneumonia   Hyperglycemia   Hypokalemia    DIAGNOSTIC RESULTS / EMERGENCY DEPARTMENT COURSE / MDM   LAB RESULTS:  Results for orders placed or performed during the hospital encounter of 09/04/22   COVID-19, Rapid    Specimen: Nasopharyngeal Swab   Result Value Ref Range    Specimen Description . NASOPHARYNGEAL SWAB     SARS-CoV-2, Rapid DETECTED (A) Not Detected   BMP   Result Value Ref Range    Glucose 313 (H) 70 - 99 mg/dL    BUN 14 6 - 20 mg/dL    Creatinine 1.16 (H) 0.50 - 0.90 mg/dL    Calcium 8.9 8.6 - 10.4 mg/dL    Sodium 133 (L) 135 - 144 mmol/L    Potassium 2.7 (LL) 3.7 - 5.3 mmol/L    Chloride 96 (L) 98 - 107 mmol/L    CO2 23 20 - 31 mmol/L    Anion Gap 14 9 - 17 mmol/L    GFR Non-African American 55 (L) >60 mL/min    GFR African American >60 >60 mL/min    GFR Comment         CBC with Auto Differential   Result Value Ref Range    WBC 3.6 3.5 - 11.3 k/uL    RBC 4.69 3.95 - 5.11 m/uL    Hemoglobin 12.9 11.9 - 15.1 g/dL    Hematocrit 38.3 36.3 - 47.1 %    MCV 81.7 (L) 82.6 - 102.9 fL    MCH 27.5 25.2 - 33.5 pg    MCHC 33.7 28.4 - 34.8 g/dL    RDW 12.1 11.8 - 14.4 %    Platelets 768 (L) 008 - 453 k/uL    MPV 11.8 8.1 - 13.5 fL    NRBC Automated 0.0 0.0 per 100 WBC    Immature Granulocytes 1 (H) 0 %    Seg Neutrophils 75 (H) 36 - 65 %    Lymphocytes 12 (L) 24 - 43 %    Monocytes 11 3 - 12 %    Eosinophils % 0 (L) 1 - 4 %    Basophils 1 0 - 2 %    Absolute Immature Granulocyte 0.04 0.00 - 0.30 k/uL    Segs Absolute 2.69 1.50 - 8.10 k/uL    Absolute Lymph # 0.43 (L) 1.10 - 3.70 k/uL    Absolute Mono # 0.40 0.10 - 1.20 k/uL    Absolute Eos # 0.00 0.00 - 0.44 k/uL    Basophils Absolute 0.04 0.00 - 0.20 k/uL    Morphology ANISOCYTOSIS PRESENT    HCG Qualitative, Serum   Result Value Ref Range    hCG Qual NEGATIVE NEGATIVE   Urinalysis with Reflex to Culture    Specimen: Urine   Result Value Ref Range    Color, UA Yellow Yellow    Turbidity UA Cloudy (A) Clear    Glucose, Ur 3+ (A) NEGATIVE    Bilirubin Urine NEGATIVE NEGATIVE    Ketones, Urine TRACE (A) NEGATIVE    Specific Gravity, UA 1.023 1.005 - 1.030    Urine Hgb LARGE (A) NEGATIVE    pH, UA 6.0 5.0 - 8.0    Protein, UA TRACE (A) NEGATIVE    Urobilinogen, Urine Normal Normal    Nitrite, Urine NEGATIVE NEGATIVE    Leukocyte Esterase, Urine NEGATIVE NEGATIVE   Beta-Hydroxybutyrate   Result Value Ref Range    Beta-Hydroxybutyrate 0.37 (H) 0.02 - 0.27 mmol/L   BLOOD GAS, VENOUS   Result Value Ref Range    pH, Sarbjit 7.482 (H) 7.320 - 7.420    pCO2, Sarbjit 30.6 (L) 39 - 55    pO2, Sarbjit 185.0 (H) 30 - 50    HCO3, Venous 22.6 (L) 24 - 30 mmol/L    Positive Base Excess, Sarbjit 0.4 0.0 - 2.0 mmol/L    O2 Sat, Sarbjit 100.0 (H) 60.0 - 85.0 %    Carboxyhemoglobin 4.9 0 - 5 %    Pt Temp 37.0     FIO2 Unknown    Basic Metabolic Panel   Result Value Ref Range    Glucose 335 (H) 70 - 99 mg/dL    BUN 20 6 - 20 mg/dL    Creatinine 1.36 (H) 0.50 - 0.90 mg/dL    Calcium 8.2 (L) 8.6 - 10.4 mg/dL    Sodium 137 135 - 144 mmol/L    Potassium 2.8 (LL) 3.7 - 5.3 mmol/L    Chloride 99 98 - 107 mmol/L    CO2 26 20 - 31 mmol/L    Anion Gap 12 9 - 17 mmol/L    GFR Non-African American 46 (L) >60 mL/min    GFR  56 (L) >60 mL/min    GFR Comment         Magnesium   Result Value Ref Range    Magnesium 1.0 (L) 1.6 - 2.6 mg/dL   ELECTROLYTES PLUS   Result Value Ref Range    POC Sodium 136 (L) 138 - 146 mmol/L    POC Potassium 4.1 3.5 - 4.5 mmol/L    POC Chloride 100 98 - 107 mmol/L    POC TCO2 25 22 - 30 mmol/L    Anion Gap 12 7 - 16 mmol/L   Hemoglobin and hematocrit, blood   Result Value Ref Range    POC Hemoglobin 13.3 12.0 - 16.0 g/dL    POC Hematocrit 39 36 - 46 %   CALCIUM, IONIC (POC)   Result Value Ref Range    POC Ionized Calcium 1.08 (L) 1.15 - 1.33 mmol/L   Beta-Hydroxybutyrate   Result Value Ref Range    Beta-Hydroxybutyrate 0.32 (H) 0.02 - 0.27 mmol/L   Magnesium   Result Value Ref Range    Magnesium 1.0 (L) 1.6 - 2.6 mg/dL   Microscopic Urinalysis   Result Value Ref Range    WBC, UA 10 TO 20 0 - 5 /HPF    RBC, UA 10 TO 20 0 - 2 /HPF    Epithelial Cells UA 2 TO 5 0 - 5 /HPF    Bacteria, UA MODERATE (A) None   C-Reactive Protein   Result Value Ref Range    CRP 17.6 (H) 0.0 - 5.0 mg/L   Magnesium   Result Value Ref Range    Magnesium 1.4 (L) 1.6 - 2.6 mg/dL   Potassium   Result Value Ref Range    Potassium 3.7 3.7 - 5.3 mmol/L   POCT Glucose   Result Value Ref Range    Glucose 301 mg/dL    QC OK?  yes    POC Glucose Fingerstick   Result Value Ref Range    POC Glucose 301 (H) 65 - 105 mg/dL   Venous Blood Gas, POC   Result Value Ref Range    pH, Sarbjit 7.432 (H) 7.320 - 7.430    pCO2, Sarbjit 36.8 (L) 41.0 - 51.0 mm Hg    pO2, Sarbjit 54.3 (H) 30.0 - 50.0 mm Hg    HCO3, Venous 24.5 22.0 - 29.0 mmol/L    Positive Base Excess, Sarbjit 0 0.0 - 3.0    O2 Sat, Sarbjit 89 (H) 60.0 - 85.0 %   Creatinine W/GFR Point of Care   Result Value Ref Range    POC Creatinine 1.31 (H) 0.51 - 1.19 mg/dL    GFR Comment 58 (L) >60 mL/min    GFR Non- 48 (L) >60 mL/min    GFR Comment         POCT urea (BUN)   Result Value Ref Range    POC BUN 19 8 - 26 mg/dL   Lactic Acid, POC   Result Value Ref Range    POC Lactic Acid 0.90 0.56 - 1.39 mmol/L   POCT Glucose   Result Value Ref Range    POC Glucose 324 (H) 74 - 100 mg/dL   POC Glucose Fingerstick   Result Value Ref Range    POC Glucose 275 (H) 65 - 105 mg/dL   POC Glucose 2. 7  Potassium low, will correct [MZ]   0405 SARS-CoV-2, Rapid(!): DETECTED  Tested positive for covid 19  [MZ]   0436 Will recheck potassium after administration of potassium, if above 3 we will d/c and below 3 will admit [MZ]   0642 pH, Sarbjit(!): 7.482  Elevated pH, likely eliminates DKA [MZ]   0810 Potassium(!!): 2.8  Continues to remain low, will admit   [MZ]      ED Course User Index  [MZ] Carter Mccarty MD       FINAL IMPRESSION      1. COVID-19    2. Hypokalemia    3.  Hyperglycemia          DISPOSITION / PLAN     DISPOSITION Admitted 09/04/2022 08:21:54 AM      PATIENT REFERRED TO:  Kevin Valle  [address] [facility] [address] [phone]    Schedule an appointment as soon as possible for a visit in 2 days  If symptoms worsen    DISCHARGE MEDICATIONS:  Current Discharge Medication List          Carter Mccarty MD  Emergency Medicine Resident    (Please note that portions of thisnote were completed with a voice recognition program.  Efforts were made to edit the dictations but occasionally words are mis-transcribed.)       Carter Mccarty MD  Resident  09/04/22 [id number]

## 2022-09-05 ENCOUNTER — APPOINTMENT (OUTPATIENT)
Dept: GENERAL RADIOLOGY | Age: 27
DRG: 137 | End: 2022-09-05
Payer: MEDICARE

## 2022-09-05 LAB
ABSOLUTE EOS #: 0 K/UL (ref 0–0.4)
ABSOLUTE IMMATURE GRANULOCYTE: 0 K/UL (ref 0–0.3)
ABSOLUTE LYMPH #: 1.79 K/UL (ref 1–4.8)
ABSOLUTE MONO #: 0.15 K/UL (ref 0.1–0.8)
ANION GAP SERPL CALCULATED.3IONS-SCNC: 11 MMOL/L (ref 9–17)
BASOPHILS # BLD: 0 % (ref 0–2)
BASOPHILS ABSOLUTE: 0 K/UL (ref 0–0.2)
BUN BLDV-MCNC: 13 MG/DL (ref 6–20)
CALCIUM SERPL-MCNC: 7.1 MG/DL (ref 8.6–10.4)
CHLORIDE BLD-SCNC: 102 MMOL/L (ref 98–107)
CO2: 21 MMOL/L (ref 20–31)
CREAT SERPL-MCNC: 0.9 MG/DL (ref 0.5–0.9)
CULTURE: NORMAL
EOSINOPHILS RELATIVE PERCENT: 0 % (ref 1–4)
GFR AFRICAN AMERICAN: >60 ML/MIN
GFR NON-AFRICAN AMERICAN: >60 ML/MIN
GFR SERPL CREATININE-BSD FRML MDRD: ABNORMAL ML/MIN/{1.73_M2}
GLUCOSE BLD-MCNC: 211 MG/DL (ref 65–105)
GLUCOSE BLD-MCNC: 245 MG/DL (ref 65–105)
GLUCOSE BLD-MCNC: 256 MG/DL (ref 65–105)
GLUCOSE BLD-MCNC: 306 MG/DL (ref 70–99)
GLUCOSE BLD-MCNC: 333 MG/DL (ref 65–105)
HCT VFR BLD CALC: 39.1 % (ref 36.3–47.1)
HEMOGLOBIN: 12.7 G/DL (ref 11.9–15.1)
IMMATURE GRANULOCYTES: 0 %
LYMPHOCYTES # BLD: 47 % (ref 24–44)
MAGNESIUM: 1.2 MG/DL (ref 1.6–2.6)
MAGNESIUM: 1.8 MG/DL (ref 1.6–2.6)
MCH RBC QN AUTO: 27.3 PG (ref 25.2–33.5)
MCHC RBC AUTO-ENTMCNC: 32.5 G/DL (ref 28.4–34.8)
MCV RBC AUTO: 83.9 FL (ref 82.6–102.9)
MONOCYTES # BLD: 4 % (ref 1–7)
MORPHOLOGY: NORMAL
NRBC AUTOMATED: 0 PER 100 WBC
PDW BLD-RTO: 12.4 % (ref 11.8–14.4)
PLATELET # BLD: 95 K/UL (ref 138–453)
PLATELET # BLD: NORMAL K/UL (ref 138–453)
PLATELET, FLUORESCENCE: 97 K/UL (ref 138–453)
PLATELET, IMMATURE FRACTION: 5.3 % (ref 1.1–10.3)
PMV BLD AUTO: 12 FL (ref 8.1–13.5)
POTASSIUM SERPL-SCNC: 3.1 MMOL/L (ref 3.7–5.3)
POTASSIUM SERPL-SCNC: 3.4 MMOL/L (ref 3.7–5.3)
POTASSIUM SERPL-SCNC: 4.2 MMOL/L (ref 3.7–5.3)
RBC # BLD: 4.66 M/UL (ref 3.95–5.11)
SEG NEUTROPHILS: 49 % (ref 36–66)
SEGMENTED NEUTROPHILS ABSOLUTE COUNT: 1.86 K/UL (ref 1.8–7.7)
SODIUM BLD-SCNC: 134 MMOL/L (ref 135–144)
SPECIMEN DESCRIPTION: NORMAL
WBC # BLD: 3.8 K/UL (ref 3.5–11.3)

## 2022-09-05 PROCEDURE — 85049 AUTOMATED PLATELET COUNT: CPT

## 2022-09-05 PROCEDURE — 93005 ELECTROCARDIOGRAM TRACING: CPT

## 2022-09-05 PROCEDURE — 6360000002 HC RX W HCPCS: Performed by: STUDENT IN AN ORGANIZED HEALTH CARE EDUCATION/TRAINING PROGRAM

## 2022-09-05 PROCEDURE — 83735 ASSAY OF MAGNESIUM: CPT

## 2022-09-05 PROCEDURE — 6370000000 HC RX 637 (ALT 250 FOR IP): Performed by: STUDENT IN AN ORGANIZED HEALTH CARE EDUCATION/TRAINING PROGRAM

## 2022-09-05 PROCEDURE — 6360000002 HC RX W HCPCS

## 2022-09-05 PROCEDURE — 71045 X-RAY EXAM CHEST 1 VIEW: CPT

## 2022-09-05 PROCEDURE — 1200000000 HC SEMI PRIVATE

## 2022-09-05 PROCEDURE — 85025 COMPLETE CBC W/AUTO DIFF WBC: CPT

## 2022-09-05 PROCEDURE — 6370000000 HC RX 637 (ALT 250 FOR IP)

## 2022-09-05 PROCEDURE — 36415 COLL VENOUS BLD VENIPUNCTURE: CPT

## 2022-09-05 PROCEDURE — 85055 RETICULATED PLATELET ASSAY: CPT

## 2022-09-05 PROCEDURE — 82947 ASSAY GLUCOSE BLOOD QUANT: CPT

## 2022-09-05 PROCEDURE — 99232 SBSQ HOSP IP/OBS MODERATE 35: CPT | Performed by: INTERNAL MEDICINE

## 2022-09-05 PROCEDURE — APPSS30 APP SPLIT SHARED TIME 16-30 MINUTES: Performed by: NURSE PRACTITIONER

## 2022-09-05 PROCEDURE — 99233 SBSQ HOSP IP/OBS HIGH 50: CPT | Performed by: INTERNAL MEDICINE

## 2022-09-05 PROCEDURE — 84132 ASSAY OF SERUM POTASSIUM: CPT

## 2022-09-05 PROCEDURE — 2580000003 HC RX 258: Performed by: STUDENT IN AN ORGANIZED HEALTH CARE EDUCATION/TRAINING PROGRAM

## 2022-09-05 PROCEDURE — 80048 BASIC METABOLIC PNL TOTAL CA: CPT

## 2022-09-05 RX ORDER — SODIUM CHLORIDE AND POTASSIUM CHLORIDE .9; .15 G/100ML; G/100ML
SOLUTION INTRAVENOUS CONTINUOUS
Status: DISCONTINUED | OUTPATIENT
Start: 2022-09-05 | End: 2022-09-07

## 2022-09-05 RX ORDER — POTASSIUM CHLORIDE 20 MEQ/1
20 TABLET, EXTENDED RELEASE ORAL ONCE
Status: COMPLETED | OUTPATIENT
Start: 2022-09-05 | End: 2022-09-05

## 2022-09-05 RX ORDER — INSULIN LISPRO 100 [IU]/ML
5 INJECTION, SOLUTION INTRAVENOUS; SUBCUTANEOUS
Refills: 1 | Status: DISCONTINUED | OUTPATIENT
Start: 2022-09-05 | End: 2022-09-07 | Stop reason: HOSPADM

## 2022-09-05 RX ORDER — SODIUM CHLORIDE 9 MG/ML
INJECTION, SOLUTION INTRAVENOUS CONTINUOUS
Status: DISCONTINUED | OUTPATIENT
Start: 2022-09-05 | End: 2022-09-05

## 2022-09-05 RX ORDER — MAGNESIUM SULFATE IN WATER 40 MG/ML
2000 INJECTION, SOLUTION INTRAVENOUS ONCE
Status: COMPLETED | OUTPATIENT
Start: 2022-09-05 | End: 2022-09-05

## 2022-09-05 RX ADMIN — POTASSIUM CHLORIDE AND SODIUM CHLORIDE: 900; 150 INJECTION, SOLUTION INTRAVENOUS at 12:25

## 2022-09-05 RX ADMIN — GABAPENTIN 400 MG: 400 CAPSULE ORAL at 21:23

## 2022-09-05 RX ADMIN — OXCARBAZEPINE 150 MG: 150 TABLET, FILM COATED ORAL at 21:23

## 2022-09-05 RX ADMIN — ONDANSETRON 4 MG: 2 INJECTION INTRAMUSCULAR; INTRAVENOUS at 15:16

## 2022-09-05 RX ADMIN — MAGNESIUM GLUCONATE 500 MG ORAL TABLET 400 MG: 500 TABLET ORAL at 09:02

## 2022-09-05 RX ADMIN — INSULIN LISPRO 2 UNITS: 100 INJECTION, SOLUTION INTRAVENOUS; SUBCUTANEOUS at 09:04

## 2022-09-05 RX ADMIN — POTASSIUM CHLORIDE 20 MEQ: 1500 TABLET, EXTENDED RELEASE ORAL at 18:20

## 2022-09-05 RX ADMIN — SODIUM CHLORIDE, PRESERVATIVE FREE 10 ML: 5 INJECTION INTRAVENOUS at 09:05

## 2022-09-05 RX ADMIN — SODIUM CHLORIDE: 9 INJECTION, SOLUTION INTRAVENOUS at 09:04

## 2022-09-05 RX ADMIN — GABAPENTIN 400 MG: 400 CAPSULE ORAL at 13:23

## 2022-09-05 RX ADMIN — MAGNESIUM GLUCONATE 500 MG ORAL TABLET 400 MG: 500 TABLET ORAL at 21:13

## 2022-09-05 RX ADMIN — INSULIN GLARGINE 30 UNITS: 100 INJECTION, SOLUTION SUBCUTANEOUS at 21:11

## 2022-09-05 RX ADMIN — POTASSIUM CHLORIDE AND SODIUM CHLORIDE: 900; 150 INJECTION, SOLUTION INTRAVENOUS at 23:20

## 2022-09-05 RX ADMIN — GABAPENTIN 400 MG: 400 CAPSULE ORAL at 09:02

## 2022-09-05 RX ADMIN — MAGNESIUM GLUCONATE 500 MG ORAL TABLET 400 MG: 500 TABLET ORAL at 01:16

## 2022-09-05 RX ADMIN — INSULIN LISPRO 5 UNITS: 100 INJECTION, SOLUTION INTRAVENOUS; SUBCUTANEOUS at 16:57

## 2022-09-05 RX ADMIN — INSULIN LISPRO 4 UNITS: 100 INJECTION, SOLUTION INTRAVENOUS; SUBCUTANEOUS at 21:21

## 2022-09-05 RX ADMIN — INSULIN LISPRO 5 UNITS: 100 INJECTION, SOLUTION INTRAVENOUS; SUBCUTANEOUS at 12:30

## 2022-09-05 RX ADMIN — MAGNESIUM SULFATE HEPTAHYDRATE 2000 MG: 40 INJECTION, SOLUTION INTRAVENOUS at 13:15

## 2022-09-05 NOTE — PROGRESS NOTES
[facility]  Internal Medicine Teaching Residency Program  Inpatient Daily Progress Note  ______________________________________________________________________________    Patient: Alyssia Rousseau  YOB: 1995   [id number]    Acct: [de-identified]     Room: [address] [place] [address]  Admit date: 2022  Today's date: 22  Number of days in the hospital: 1    SUBJECTIVE   Admitting Diagnosis: COVID-19  CC: Fever, Headache  Pt examined at bedside. Chart & results reviewed. Patient is hemodynamically stable afebrile  Still complaining of mild chest pain  Reported fatigue and malaise  Infectious disease starting monoclonal antibodies    ROS:  Constitutional:  negative for chills, fevers, sweats  Respiratory:  negative for cough, dyspnea on exertion, hemoptysis, shortness of breath, wheezing  Cardiovascular:  negative for chest pain, chest pressure/discomfort, lower extremity edema, palpitations  Gastrointestinal:  negative for abdominal pain, constipation, diarrhea, nausea, vomiting  Neurological:  negative for dizziness, headache  BRIEF HISTORY     The patient is a pleasant 32 y.o. female with significant past medical history of type 2 diabetes mellitus, left occipital cavernoma s/p resection 2020, seizures and post operative right homonymous hemianopia presents with a chief complaint of  fever, throbbing headache 10/10, dry cough, nausea and abdominal pain for one day. Patient tested positive for COVID-19. She works with [employer] and cannot track back sick contacts. Patient reported non bloody diarrhea.      OBJECTIVE     Vital Signs:  BP (!) 122/96   Pulse 79   Temp 98.3 °F (36.8 °C) (Oral)   Resp 20   Ht 5' 2\" (1.575 m)   Wt 146 lb (66.2 kg)   SpO2 99%   BMI 26.70 kg/m²     Temp (24hrs), Av.3 °F (36.8 °C), Min:98 °F (36.7 °C), Max:98.5 °F (36.9 °C)    In: 100   Out: -     Physical Exam:  Constitutional: This is a well developed, well nourished, 25-29.9 - Overweight 32y.o. year old female who is alert, oriented, cooperative and in no apparent distress. Head:normocephalic and atraumatic. EENT:  PERRLA. No conjunctival injections. Septum was midline, mucosa was without erythema, exudates or cobblestoning. No thrush was noted. Neck: Supple without thyromegaly. No elevated JVP. Trachea was midline. Respiratory: Chest was symmetrical without dullness to percussion. Breath sounds bilaterally were clear to auscultation. There were no wheezes, rhonchi or rales. There is no intercostal retraction or use of accessory muscles. No egophony noted. Cardiovascular: Regular without murmur, clicks, gallops or rubs. Abdomen: Slightly rounded and soft without organomegaly. No rebound, rigidity or guarding was appreciated. Lymphatic: No lymphadenopathy. Musculoskeletal: Normal curvature of the spine. No gross muscle weakness. Extremities:  No lower extremity edema, ulcerations, tenderness, varicosities or erythema. Muscle size, tone and strength are normal.  No involuntary movements are noted. Skin:  Warm and dry. Good color, turgor and pigmentation. No lesions or scars.   No cyanosis or clubbing  Neurological/Psychiatric: The patient's general behavior, level of consciousness, thought content and emotional status is normal.        Medications:  Scheduled Medications:    sodium chloride  1,000 mL IntraVENous Once    gabapentin  400 mg Oral TID    magnesium oxide  400 mg Oral BID    sodium chloride flush  5-40 mL IntraVENous 2 times per day    enoxaparin  40 mg SubCUTAneous Daily    insulin glargine  30 Units SubCUTAneous Nightly    OXcarbazepine  150 mg Oral BID    insulin lispro  0-8 Units SubCUTAneous TID     insulin lispro  0-4 Units SubCUTAneous Nightly     Continuous Infusions:    sodium chloride      0.9% NaCl with KCl 20 mEq 100 mL/hr at 09/04/22 2133    dextrose       PRN Medicationsbutalbital-acetaminophen-caffeine, 1 tablet, Q6H PRN  sodium to monitor. COVID-19.   - CXR is normal  - ID consulted, recommended, Monoclonal antibodies: Bebtelovimab. Type II diabetes mellitus. - On insulin lantus 30 IU nightly. - Medium dose insulin sliding scale  - Hypoglycemia protocol initiated     Hx of left occipital cavernoma s/p resection 01/08/2020  Stable     Hx of seizures:   Stable. On Oxcarbazepine 150 mg  On Gabapentin 400 mg TID. DVT ppx: Lovenox   GI ppx: Not indicated     Discharge Planning:  consulted. Will follow up. Iliana Bajwa MD  Internal Medicine Resident, PGY-1  [address] [facility]  9/5/2022, 12:38 AM

## 2022-09-05 NOTE — PROGRESS NOTES
[facility] [address] Patient  Today's Date and Time: 9/5/2022, 9:20 AM    Impression :     COVID 19 Confirmed Infection  Unvaccinated individual  Covid tests:  9-4-22: Positive  DM 2  ZEFERINO  Hypokalemia  Diarrhea    Recommendations:   Antibiotic treatment:  Monitor off antibiotics  Covid Rx:    Remdesivir: Not indicated  Decadron: Not indicated  Actemra: Not indicated  Monoclonal antibodies: Bebtelovimab infused 9-4-22      Medical Decision Making/Summary/Discussion:9/5/2022     Patient admitted with COVID 19 infection    Infection Control Recommendations   [place] Precautions  Airborne isolation  Droplet Isolation  Isolate until 9-14-22    Antimicrobial Stewardship Recommendations     Discontinuation of therapy  Coordination of Outpatient Care:   Estimated Length of IV antimicrobials:TBD  Patient will need Midline Catheter Insertion: TBD  Patient will need PICC line Insertion: No  Patient will need: Home IV , [place],  SNF,  LTAC:TBD  Patient will need outpatient wound care:No    Chief complaint/reason for consultation:   Concern for COVID infection      History of Present Illness:   Gustavo Collins is a 32y.o.-year-old  female who was initially admitted on 9/4/2022. Patient seen at the request of .    INITIAL HISTORY:    Patient presented through ER with complaints of fevers, throbbing headache, dry cough, nausea and abdominal pain x 1 day. She also reported onset of diarrhea    She is unvaccinated for Covid. Is exposed to multiple people at her work for [employer]. Test in ER Covid positive. 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     KUB and CXR with no acute abnormalities noted    Patient admitted because of concerns with COVID 19. headache    CURRENT EVALUATION : 9/5/2022    Afebrile   VS stable    The patient is doing well on room air. No acute issues noted. Patient exhibiting respiratory distress. No  Respiratory secretions: No    Patient receiving supplemental oxygen. No  RR 23-->22  02 sat 98-->100      NEWS Score: 0-4 Low risk group; 5-6: Medium risk group; 7 or above: High risk group  Parameters 3 2 1 0 1 2 3   Age    < 65   ? 65   RR ? 8  9-11 12-20  21-24 ? 25   O2 Sats ? 91 92-93 94-95 ? 96      Suppl O2  Yes  No      SBP ? 90  101-110 111-219   ? 220   HR ? 40  41-50 51-90  111-130 ? 131   Consciousness    Alert   Drowsiness, lethargy, or confusion   Temperature ? 35.0 C (95.0 F)  35.1-36.0 C 95.1-96.9 F 36.1-38.0 C 97.0-100.4 F 38.1-39.0 C 100.5-102.3 F ? 39.1 C ? 102.4 F      NEWS Score:  9-4-22: 3 Low risk for ICU care    Overall Daily Picture:     Improving    Presence of secondary bacterial Infection:    No   Additional antibiotics: No    Labs, X rays reviewed: 9/5/2022    BUN: 20-->13  Cr: 1.36-->0.90  K 2.8-->3.4    WBC: 3.6-->3.8  Hb: 12.9-->12.7  Plat: 134-->95    Absolute Neutrophils: 10.1  Absolute Lymphocytes: 0.43  Neutrophil/Lymphocyte Ratio: 23 High Risk    CRP: 17.6  Ferritin:  LDH:     Pro Calcitonin:      Cultures:  Urine:  9/3/22: No significant growth  Blood:    Sputum :    Wound:      CXR:   9-24-22: Normal CXR  CAT:      Discussed with patient, RN, CC, IM. I have personally reviewed the past medical history, past surgical history, medications, social history, and family history, and I have updated the database accordingly.   Past Medical History:     Past Medical History:   Diagnosis Date    Cerebral artery occlusion with cerebral infarction [facility])     Cerebral vascular malformation     [facility]    Diabetes mellitus ([facility] [address]     Seizure ([facility] [address]     Traumatic hemorrhage of left cerebrum without loss of consciousness ([facility] [address]     @ [facility]       Past Surgical  History:     Past Surgical History:   Procedure Laterality Date    BRAIN SURGERY      CRANIOTOMY  01/08/2020    LEFT OCCIPITAL CRANIOTOMY    CRANIOTOMY Left 01/08/2020    LEFT OCCIPITAL CRANIOTOMY, 1ST VASCULAR LESION WITH URBAN NAVIGATION (LATERAL WITH ROMERO BAG, HOLLINGSWORTH HEADHOLDER, MICROSCOPE) performed by Layla Petty DO at [facility] OR       Medications:      sodium chloride  1,000 mL IntraVENous Once    gabapentin  400 mg Oral TID    magnesium oxide  400 mg Oral BID    sodium chloride flush  5-40 mL IntraVENous 2 times per day    enoxaparin  40 mg SubCUTAneous Daily    insulin glargine  30 Units SubCUTAneous Nightly    OXcarbazepine  150 mg Oral BID    insulin lispro  0-8 Units SubCUTAneous TID WC    insulin lispro  0-4 Units SubCUTAneous Nightly       Social History:     Social History     Socioeconomic History    Marital status: Single     Spouse name: Not on file    Number of children: Not on file    Years of education: Not on file    Highest education level: Not on file   Occupational History    Not on file   Tobacco Use    Smoking status: Some Days     Packs/day: 0.30     Years: 14.00     Pack years: 4.20     Types: Cigarettes    Smokeless tobacco: Never   Vaping Use    Vaping Use: Unknown   Substance and Sexual Activity    Alcohol use: Yes     Comment: Occassionally    Drug use: Yes     Types: Marijuana Dayo Ariel)    Sexual activity: Yes     Partners: Male   Other Topics Concern    Not on file   Social History Narrative    Not on file     Social Determinants of Health     Financial Resource Strain: Not on file   Food Insecurity: Not on file   Transportation Needs: Not on file   Physical Activity: Not on file   Stress: Not on file   Social Connections: Not on file   Intimate Partner Violence: Not on file   Housing Stability: Not on file       Family History:     Family History   Problem Relation Age of Onset    Colon Cancer Mother     No Known Problems Father     No Known Problems Brother     Diabetes Maternal Grandfather         Allergies:   Latex and Prozac [fluoxetine hcl]     Review of Systems:       Constitutional: Fevers no -Laboratory:   I have independently reviewed/ordered the following labs:    CBC with Differential:   Recent Labs     09/04/22  0307 09/05/22  0839   WBC 3.6 3.8   HGB 12.9 12.7   HCT 38.3 39.1   * 95*   LYMPHOPCT 12* PENDING   MONOPCT 11 PENDING     BMP:   Recent Labs     09/04/22  0307 09/04/22  0623 09/04/22  0735 09/04/22  1419 09/04/22 2020 09/05/22  0256   *  --  137  --   --   --    K 2.7*  --  2.8*  --  3.7 4.2   CL 96*  --  99  --   --   --    CO2 23  --  26  --   --   --    BUN 14  --  20  --   --   --    CREATININE 1.16* 1.31* 1.36*  --   --   --    MG 1.0*  --  1.0* 1.4*  --   --      Hepatic Function Panel: No results for input(s): PROT, LABALBU, BILIDIR, IBILI, BILITOT, ALKPHOS, ALT, AST in the last 72 hours. No results for input(s): RPR in the last 72 hours. No results for input(s): HIV in the last 72 hours. No results for input(s): BC in the last 72 hours. Lab Results   Component Value Date/Time    MUCUS NOT REPORTED 02/28/2020 07:32 PM    RBC 4.66 09/05/2022 08:39 AM    TRICHOMONAS NOT REPORTED 02/28/2020 07:32 PM    WBC 3.8 09/05/2022 08:39 AM    YEAST NOT REPORTED 02/28/2020 07:32 PM    TURBIDITY Cloudy 09/04/2022 07:40 AM     Lab Results   Component Value Date/Time    CREATININE 1.36 09/04/2022 07:35 AM    CREATININE 1.31 09/04/2022 06:23 AM    GLUCOSE 335 09/04/2022 07:35 AM       Medical Decision Making-Imaging:     EXAMINATION:   ONE XRAY VIEW OF THE CHEST       9/4/2022 2:12 am       COMPARISON:   06/21/2022       HISTORY:   ORDERING SYSTEM PROVIDED HISTORY: concern for covid   TECHNOLOGIST PROVIDED HISTORY:   Concerns for COVID   concern for covid   Reason for Exam: headache fever   upright port       FINDINGS:   Heart size and pulmonary vasculature are normal.  The lungs are clear and   normally expanded. Surrounding osseous and soft tissue structures are   unremarkable. Impression   Normal examination.          Medical Decision [id number]: Medical Decision Making-Other:     Note:  Labs, medications, radiologic studies were reviewed with personal review of films  Large amounts of data were reviewed  Discussed with nursing Staff, Discharge planner  Infection Control and Prevention measures reviewed  All prior entries were reviewed  Administer medications as ordered  Prognosis: [address]  Discharge planning reviewed  Follow up as outpatient. Thank you for allowing us to participate in the care of this patient. Please call with questions. Umer Ulloa, APRN - CNP  Pager: [phone] - Office: [phone]      ATTESTATION:    I have discussed the case, including pertinent history and exam findings with the APRN. I have evaluated the  History, physical findings and pictures of the patient and the key elements of the encounter have been performed by me. I have reviewed the laboratory data, other diagnostic studies and discussed them with the APRN. I have updated the medical record where necessary. I agree with the assessment, plan and orders as documented by the APRN.     Holly Hinkle MD.

## 2022-09-05 NOTE — CARE COORDINATION
09/05/22 1748   Service Assessment   Cognition Alert   History Provided By Patient   Primary Caregiver Self   Support Systems Family Members   Patient's Healthcare Decision Maker is: Legal Next of Tiffany Garg   PCP Verified by CM Yes   Last Visit to PCP Within last 3 months   Prior Functional Level Independent in ADLs/IADLs   Current Functional Level Independent in ADLs/IADLs   Can patient return to prior living arrangement Yes   Ability to make needs known: Good   Family able to assist with home care needs: Yes   Financial Resources Medicaid   Social/Functional History   Lives With Family   Type of [address] Two level;Bed/Bath upstairs   Home Access Stairs to enter without rails   Entrance Stairs - Number of Steps 5   Receives Help From Family;Friend(s)   ADL Assistance Independent   Homemaking Assistance Independent   Ambulation Assistance Independent   Transfer Assistance Independent   Discharge Planning   Type of [facility] Family Members   Current Services Prior To Admission None   Potential Assistance Needed N/A   Potential Assistance Purchasing Medications No   Type of [address] None   Patient expects to be discharged to: [facility] Discharge   Mode of Transport at Discharge Other (see comment)  (can call someone for a ride home)       Plan is to return home with family and friends for support. Can call someone for transportation.

## 2022-09-06 PROBLEM — R56.9 SEIZURES (HCC): Chronic | Status: ACTIVE | Noted: 2022-09-04

## 2022-09-06 PROBLEM — N17.9 AKI (ACUTE KIDNEY INJURY) (HCC): Chronic | Status: ACTIVE | Noted: 2022-09-04

## 2022-09-06 PROBLEM — A09 DIARRHEA OF INFECTIOUS ORIGIN: Chronic | Status: ACTIVE | Noted: 2022-09-04

## 2022-09-06 PROBLEM — D69.6 THROMBOCYTOPENIA (HCC): Status: ACTIVE | Noted: 2022-09-06

## 2022-09-06 PROBLEM — E87.6 HYPOKALEMIA: Chronic | Status: ACTIVE | Noted: 2022-09-04

## 2022-09-06 PROBLEM — E83.42 HYPOMAGNESEMIA: Chronic | Status: ACTIVE | Noted: 2022-09-04

## 2022-09-06 LAB
ABSOLUTE EOS #: 0.12 K/UL (ref 0–0.44)
ABSOLUTE IMMATURE GRANULOCYTE: <0.03 K/UL (ref 0–0.3)
ABSOLUTE LYMPH #: 2.94 K/UL (ref 1.1–3.7)
ABSOLUTE MONO #: 0.35 K/UL (ref 0.1–1.2)
ANION GAP SERPL CALCULATED.3IONS-SCNC: 10 MMOL/L (ref 9–17)
BASOPHILS # BLD: 1 % (ref 0–2)
BASOPHILS ABSOLUTE: 0.03 K/UL (ref 0–0.2)
BUN BLDV-MCNC: 11 MG/DL (ref 6–20)
C-PEPTIDE: 2.7 NG/ML (ref 1.1–4.4)
CALCIUM SERPL-MCNC: 7.9 MG/DL (ref 8.6–10.4)
CHLORIDE BLD-SCNC: 108 MMOL/L (ref 98–107)
CO2: 25 MMOL/L (ref 20–31)
CREAT SERPL-MCNC: 0.75 MG/DL (ref 0.5–0.9)
EKG ATRIAL RATE: 101 BPM
EKG ATRIAL RATE: 83 BPM
EKG P AXIS: 43 DEGREES
EKG P AXIS: 45 DEGREES
EKG P-R INTERVAL: 138 MS
EKG P-R INTERVAL: 140 MS
EKG Q-T INTERVAL: 346 MS
EKG Q-T INTERVAL: 352 MS
EKG QRS DURATION: 82 MS
EKG QRS DURATION: 92 MS
EKG QTC CALCULATION (BAZETT): 413 MS
EKG QTC CALCULATION (BAZETT): 448 MS
EKG R AXIS: 62 DEGREES
EKG R AXIS: 86 DEGREES
EKG T AXIS: 60 DEGREES
EKG T AXIS: 73 DEGREES
EKG VENTRICULAR RATE: 101 BPM
EKG VENTRICULAR RATE: 83 BPM
EOSINOPHILS RELATIVE PERCENT: 2 % (ref 1–4)
GFR AFRICAN AMERICAN: >60 ML/MIN
GFR NON-AFRICAN AMERICAN: >60 ML/MIN
GFR SERPL CREATININE-BSD FRML MDRD: ABNORMAL ML/MIN/{1.73_M2}
GLUCOSE BLD-MCNC: 122 MG/DL (ref 70–99)
GLUCOSE BLD-MCNC: 153 MG/DL (ref 65–105)
HCT VFR BLD CALC: 41 % (ref 36.3–47.1)
HEMOGLOBIN: 13.4 G/DL (ref 11.9–15.1)
IMMATURE GRANULOCYTES: 0 %
LYMPHOCYTES # BLD: 60 % (ref 24–43)
MAGNESIUM: 1.4 MG/DL (ref 1.6–2.6)
MCH RBC QN AUTO: 27.3 PG (ref 25.2–33.5)
MCHC RBC AUTO-ENTMCNC: 32.7 G/DL (ref 28.4–34.8)
MCV RBC AUTO: 83.7 FL (ref 82.6–102.9)
MONOCYTES # BLD: 7 % (ref 3–12)
NRBC AUTOMATED: 0 PER 100 WBC
PDW BLD-RTO: 12.2 % (ref 11.8–14.4)
PLATELET # BLD: 117 K/UL (ref 138–453)
PMV BLD AUTO: 12.1 FL (ref 8.1–13.5)
POTASSIUM SERPL-SCNC: 3.5 MMOL/L (ref 3.7–5.3)
RBC # BLD: 4.9 M/UL (ref 3.95–5.11)
SEG NEUTROPHILS: 30 % (ref 36–65)
SEGMENTED NEUTROPHILS ABSOLUTE COUNT: 1.48 K/UL (ref 1.5–8.1)
SODIUM BLD-SCNC: 143 MMOL/L (ref 135–144)
WBC # BLD: 4.9 K/UL (ref 3.5–11.3)

## 2022-09-06 PROCEDURE — 85025 COMPLETE CBC W/AUTO DIFF WBC: CPT

## 2022-09-06 PROCEDURE — 2580000003 HC RX 258: Performed by: STUDENT IN AN ORGANIZED HEALTH CARE EDUCATION/TRAINING PROGRAM

## 2022-09-06 PROCEDURE — 80048 BASIC METABOLIC PNL TOTAL CA: CPT

## 2022-09-06 PROCEDURE — 86022 PLATELET ANTIBODIES: CPT

## 2022-09-06 PROCEDURE — 85045 AUTOMATED RETICULOCYTE COUNT: CPT

## 2022-09-06 PROCEDURE — 82947 ASSAY GLUCOSE BLOOD QUANT: CPT

## 2022-09-06 PROCEDURE — 99232 SBSQ HOSP IP/OBS MODERATE 35: CPT | Performed by: INTERNAL MEDICINE

## 2022-09-06 PROCEDURE — 84681 ASSAY OF C-PEPTIDE: CPT

## 2022-09-06 PROCEDURE — 6360000002 HC RX W HCPCS: Performed by: STUDENT IN AN ORGANIZED HEALTH CARE EDUCATION/TRAINING PROGRAM

## 2022-09-06 PROCEDURE — 6370000000 HC RX 637 (ALT 250 FOR IP): Performed by: STUDENT IN AN ORGANIZED HEALTH CARE EDUCATION/TRAINING PROGRAM

## 2022-09-06 PROCEDURE — 36415 COLL VENOUS BLD VENIPUNCTURE: CPT

## 2022-09-06 PROCEDURE — 83735 ASSAY OF MAGNESIUM: CPT

## 2022-09-06 PROCEDURE — 1200000000 HC SEMI PRIVATE

## 2022-09-06 PROCEDURE — 6370000000 HC RX 637 (ALT 250 FOR IP)

## 2022-09-06 RX ORDER — POTASSIUM CHLORIDE 20 MEQ/1
40 TABLET, EXTENDED RELEASE ORAL 2 TIMES DAILY WITH MEALS
Status: COMPLETED | OUTPATIENT
Start: 2022-09-06 | End: 2022-09-06

## 2022-09-06 RX ORDER — MAGNESIUM SULFATE IN WATER 40 MG/ML
2000 INJECTION, SOLUTION INTRAVENOUS ONCE
Status: COMPLETED | OUTPATIENT
Start: 2022-09-06 | End: 2022-09-06

## 2022-09-06 RX ADMIN — MAGNESIUM GLUCONATE 500 MG ORAL TABLET 400 MG: 500 TABLET ORAL at 08:39

## 2022-09-06 RX ADMIN — POTASSIUM CHLORIDE 40 MEQ: 1500 TABLET, EXTENDED RELEASE ORAL at 12:16

## 2022-09-06 RX ADMIN — GABAPENTIN 400 MG: 400 CAPSULE ORAL at 21:49

## 2022-09-06 RX ADMIN — ACETAMINOPHEN 650 MG: 325 TABLET ORAL at 15:36

## 2022-09-06 RX ADMIN — INSULIN LISPRO 5 UNITS: 100 INJECTION, SOLUTION INTRAVENOUS; SUBCUTANEOUS at 12:16

## 2022-09-06 RX ADMIN — ACETAMINOPHEN 650 MG: 325 TABLET ORAL at 08:39

## 2022-09-06 RX ADMIN — INSULIN LISPRO 5 UNITS: 100 INJECTION, SOLUTION INTRAVENOUS; SUBCUTANEOUS at 17:44

## 2022-09-06 RX ADMIN — MAGNESIUM SULFATE HEPTAHYDRATE 2000 MG: 40 INJECTION, SOLUTION INTRAVENOUS at 12:15

## 2022-09-06 RX ADMIN — INSULIN LISPRO 5 UNITS: 100 INJECTION, SOLUTION INTRAVENOUS; SUBCUTANEOUS at 08:52

## 2022-09-06 RX ADMIN — POTASSIUM CHLORIDE 40 MEQ: 1500 TABLET, EXTENDED RELEASE ORAL at 21:49

## 2022-09-06 RX ADMIN — OXCARBAZEPINE 150 MG: 150 TABLET, FILM COATED ORAL at 21:49

## 2022-09-06 RX ADMIN — ACETAMINOPHEN 650 MG: 325 TABLET ORAL at 22:17

## 2022-09-06 RX ADMIN — MAGNESIUM GLUCONATE 500 MG ORAL TABLET 400 MG: 500 TABLET ORAL at 21:49

## 2022-09-06 RX ADMIN — GABAPENTIN 400 MG: 400 CAPSULE ORAL at 15:36

## 2022-09-06 RX ADMIN — GABAPENTIN 400 MG: 400 CAPSULE ORAL at 08:39

## 2022-09-06 RX ADMIN — SODIUM CHLORIDE, PRESERVATIVE FREE 10 ML: 5 INJECTION INTRAVENOUS at 21:50

## 2022-09-06 RX ADMIN — BENZOCAINE AND MENTHOL 1 LOZENGE: 15; 3.6 LOZENGE ORAL at 22:17

## 2022-09-06 RX ADMIN — OXCARBAZEPINE 150 MG: 150 TABLET, FILM COATED ORAL at 08:39

## 2022-09-06 ASSESSMENT — PAIN SCALES - GENERAL: PAINLEVEL_OUTOF10: 6

## 2022-09-06 ASSESSMENT — PAIN DESCRIPTION - PAIN TYPE: TYPE: ACUTE PAIN

## 2022-09-06 ASSESSMENT — PAIN DESCRIPTION - LOCATION: LOCATION: ABDOMEN

## 2022-09-06 ASSESSMENT — PAIN DESCRIPTION - ORIENTATION: ORIENTATION: LEFT

## 2022-09-06 NOTE — PROGRESS NOTES
[facility] [address] 19 Patient  Today's Date and Time: 9/6/2022, 9:29 AM    Impression :     COVID 19 Confirmed Infection  Unvaccinated individual  Covid tests:  9-4-22: Positive  DM 2  ZEFREINO  Hypokalemia  Diarrhea    Recommendations:   Antibiotic treatment:  Monitor off antibiotics  Covid Rx:    Remdesivir: Not indicated  Decadron: Not indicated  Actemra: Not indicated  Monoclonal antibodies: Bebtelovimab requested and administered on 9-4-22      Medical Decision Making/Summary/Discussion:9/6/2022     Patient admitted with COVID 19 infection    Infection Control Recommendations   [place] Precautions  Airborne isolation  Droplet Isolation  Isolate until 9-14-22    Antimicrobial Stewardship Recommendations     Discontinuation of therapy  Coordination of Outpatient Care:   Estimated Length of IV antimicrobials:TBD  Patient will need Midline Catheter Insertion: TBD  Patient will need PICC line Insertion: No  Patient will need: Home IV , [place],  SNF,  LTAC:TBD  Patient will need outpatient wound care:No    Chief complaint/reason for consultation:   Concern for COVID infection      History of Present Illness:   Subhash Costa is a 32y.o.-year-old  female who was initially admitted on 9/4/2022. Patient seen at the request of .    INITIAL HISTORY:    Patient presented through ER with complaints of fevers, throbbing headache, dry cough, nausea and abdominal pain x 1 day. She also reported onset of diarrhea    She is unvaccinated for Covid. Is exposed to multiple people at her work for [employer]. Test in ER Covid positive. 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     Patient admitted because of concerns with COVID 19. headache    CURRENT EVALUATION : 9/6/2022    Afebrile   VS stable    Patient was not in the mood to talk to anybody while rounding. Replies yes to certain questions. Patient states that she still experiencing nausea chills and headache. Patient is currently breathing on room air. Patient exhibiting respiratory distress. No  Respiratory secretions: No    Patient receiving supplemental oxygen. No  RR 23--> 16  02 sat 98--> 96        NEWS Score: 0-4 Low risk group; 5-6: Medium risk group; 7 or above: High risk group  Parameters 3 2 1 0 1 2 3   Age    < 65   ? 65   RR ? 8  9-11 12-20  21-24 ? 25   O2 Sats ? 91 92-93 94-95 ? 96      Suppl O2  Yes  No      SBP ? 90  101-110 111-219   ? 220   HR ? 40  41-50 51-90  111-130 ? 131   Consciousness    Alert   Drowsiness, lethargy, or confusion   Temperature ? 35.0 C (95.0 F)  35.1-36.0 C 95.1-96.9 F 36.1-38.0 C 97.0-100.4 F 38.1-39.0 C 100.5-102.3 F ? 39.1 C ? 102.4 F      NEWS Score:  --22: 3 Low risk for ICU care    Overall Daily Picture: Worsening    Presence of secondary bacterial Infection:    No   Additional antibiotics: No    Labs, X rays reviewed: 2022    BUN: 20--> 11  Cr: 1.36--> 0.75  K 2.8--> 3.1  M.0-->1.4      WBC: 3.6--> 4.9  Hb: 12.9--> 13.4  Plat: 134--> 117    Absolute Neutrophils: 1.48  Absolute Lymphocytes: 2.94  Neutrophil/Lymphocyte Ratio: 0.50    CRP: 17.6  Ferritin:  LDH:     Pro Calcitonin:      Cultures:  Urine:    Blood:    Sputum :    Wound:      CXR:   22: Normal CXR  CAT:      Discussed with patient, RN, CC, IM. I have personally reviewed the past medical history, past surgical history, medications, social history, and family history, and I have updated the database accordingly.   Past Medical History:     Past Medical History:   Diagnosis Date    Cerebral artery occlusion with cerebral infarction [facility])     Cerebral vascular malformation     [facility]    Diabetes mellitus ([facility] [address]     Seizure ([facility] [address]     Traumatic hemorrhage of left cerebrum without loss of consciousness ([facility] [address]     @ [facility]       Past Surgical  History:     Past Surgical History:   Procedure Laterality Date    BRAIN SURGERY      CRANIOTOMY  01/08/2020    LEFT OCCIPITAL CRANIOTOMY    CRANIOTOMY Left 01/08/2020    LEFT OCCIPITAL CRANIOTOMY, 1ST VASCULAR LESION WITH [employer] NAVIGATION (LATERAL WITH ROMERO BAG, HOLLINGSWORTH HEADHOLDER, MICROSCOPE) performed by Donny Peña DO at [facility] OR       Medications:      potassium chloride  40 mEq Oral BID WC    magnesium sulfate  2,000 mg IntraVENous Once    insulin lispro  5 Units SubCUTAneous TID WC    sodium chloride  1,000 mL IntraVENous Once    gabapentin  400 mg Oral TID    magnesium oxide  400 mg Oral BID    sodium chloride flush  5-40 mL IntraVENous 2 times per day    enoxaparin  40 mg SubCUTAneous Daily    insulin glargine  30 Units SubCUTAneous Nightly    OXcarbazepine  150 mg Oral BID    insulin lispro  0-8 Units SubCUTAneous TID WC    insulin lispro  0-4 Units SubCUTAneous Nightly       Social History:     Social History     Socioeconomic History    Marital status: Single     Spouse name: Not on file    Number of children: Not on file    Years of education: Not on file    Highest education level: Not on file   Occupational History    Not on file   Tobacco Use    Smoking status: Some Days     Packs/day: 0.30     Years: 14.00     Pack years: 4.20     Types: Cigarettes    Smokeless tobacco: Never   Vaping Use    Vaping Use: Unknown   Substance and Sexual Activity    Alcohol use: Yes     Comment: Occassionally    Drug use: Yes     Types: Marijuana Aurora Postin)    Sexual activity: Yes     Partners: Male   Other Topics Concern    Not on file   Social History Narrative    Not on file     Social Determinants of Health     Financial Resource Strain: Not on file   Food Insecurity: Not on file   Transportation Needs: Not on file   Physical Activity: Not on file   Stress: Not on file   Social Connections: Not on file   Intimate Partner Violence: Not on file   Housing Stability: Not on file       Family History:     Family History   Problem Relation Age of Onset    Colon Cancer Mother     No Known Problems Father     No Known Problems Brother     Diabetes Maternal Grandfather         Allergies:   Latex and Prozac [fluoxetine hcl]     Review of Systems:       Constitutional: Fevers no chills. No systemic complaints  Head: Severe headaches  Eyes: has right homonymous hemianopia   ENT: No sore throat or runny nose. . No hearing loss, tinnitus or vertigo. Cardiovascular: No chest pain or palpitations. No Shortness of breath. No MCCORMACK  Lung: No Shortness of breath or cough. No sputum production  Abdomen: Nausea, abdominal pain. No vomiting, diarrhea. . No cramps. Genitourinary: No increased urinary frequency, or dysuria. No hematuria. No suprapubic or CVA pain  Musculoskeletal: No muscle aches or pains. No joint effusions, swelling or deformities  Hematologic: No bleeding or bruising. Neurologic: No headache, weakness, numbness, or tingling. Integument: No rash, no ulcers. Psychiatric: No depression. Endocrine: No polyuria, no polydipsia, no polyphagia. Physical Examination :   Patient Vitals for the past 8 hrs:   BP Temp Temp src Pulse Resp SpO2   09/06/22 0741 (!) 123/95 98.1 °F (36.7 °C) Temporal 95 16 96 %     General Appearance: Awake, alert. Looks ill  Head:  Normocephalic, no trauma  Eyes: Pupils equal, round, reactive to light; sclera anicteric; conjunctivae pink. No embolic phenomena. Has Rt homonymous hemianopsia  ENT: Oropharynx clear, without erythema, exudate, or thrush. No tenderness of sinuses. Mouth/throat: mucosa pink and moist. No lesions. Dentition in good repair. Neck:Supple, without lymphadenopathy. Thyroid normal, No bruits. Pulmonary/Chest: Clear to auscultation, without wheezes, rales, or rhonchi. No dullness to percussion. Cardiovascular: Regular rate and rhythm without murmurs, rubs, or gallops. Abdomen: Soft, non tender.  Bowel sounds normal. No organomegaly  All four Extremities: No cyanosis, clubbing, edema, or effusions. Neurologic: No gross sensory or motor deficits. Skin: Warm and dry with good turgor. No signs of peripheral arterial or venous insufficiency. No ulcerations. No open wounds. Medical Decision Making -Laboratory:   I have independently reviewed/ordered the following labs:    CBC with Differential:   Recent Labs     09/05/22  0839 09/05/22  1533 09/06/22  0649   WBC 3.8  --  4.9   HGB 12.7  --  13.4   HCT 39.1  --  41.0   PLT 95* See Reflexed IPF Result 117*   LYMPHOPCT 47*  --  60*   MONOPCT 4  --  7     BMP:   Recent Labs     09/05/22  0839 09/05/22  1533 09/06/22  0649   *  --  143   K 3.4* 3.1* 3.5*     --  108*   CO2 21  --  25   BUN 13  --  11   CREATININE 0.90  --  0.75   MG 1.2* 1.8 1.4*     Hepatic Function Panel: No results for input(s): PROT, LABALBU, BILIDIR, IBILI, BILITOT, ALKPHOS, ALT, AST in the last 72 hours. No results for input(s): RPR in the last 72 hours. No results for input(s): HIV in the last 72 hours. No results for input(s): BC in the last 72 hours. Lab Results   Component Value Date/Time    MUCUS NOT REPORTED 02/28/2020 07:32 PM    RBC 4.90 09/06/2022 06:49 AM    TRICHOMONAS NOT REPORTED 02/28/2020 07:32 PM    WBC 4.9 09/06/2022 06:49 AM    YEAST NOT REPORTED 02/28/2020 07:32 PM    TURBIDITY Cloudy 09/04/2022 07:40 AM     Lab Results   Component Value Date/Time    CREATININE 0.75 09/06/2022 06:49 AM    GLUCOSE 122 09/06/2022 06:49 AM       Medical Decision Making-Imaging:     EXAMINATION:   ONE XRAY VIEW OF THE CHEST       9/4/2022 2:12 am       COMPARISON:   06/21/2022       HISTORY:   ORDERING SYSTEM PROVIDED HISTORY: concern for covid   TECHNOLOGIST PROVIDED HISTORY:   Concerns for COVID   concern for covid   Reason for Exam: headache fever   upright port       FINDINGS:   Heart size and pulmonary vasculature are normal.  The lungs are clear and   normally expanded. Surrounding osseous and soft tissue structures are   unremarkable.            Impression Normal examination. Medical Decision [id number]:       Medical Decision Making-Other:     Note:  Labs, medications, radiologic studies were reviewed with personal review of films  Large amounts of data were reviewed  Discussed with nursing Staff, Discharge planner  Infection Control and Prevention measures reviewed  All prior entries were reviewed  Administer medications as ordered  Prognosis: [address]  Discharge planning reviewed  Follow up as outpatient. Thank you for allowing us to participate in the care of this patient. Please call with questions. Thomas Bueno DPM  Podiatric Medicine & Surgery, PGY-1  [address]:    I have discussed the case, including pertinent history and exam findings with the residents and students. I have seen and examined the patient and the key elements of the encounter have been performed by me. I was present when the student obtained his information or examined the patient. I have reviewed the laboratory data, other diagnostic studies and discussed them with the residents. I have updated the medical record where necessary. I agree with the assessment, plan and orders as documented by the resident/ student.     Darwin Wheeler MD.

## 2022-09-06 NOTE — PROGRESS NOTES
[facility]  Internal Medicine Teaching Residency Program  Inpatient Daily Progress Note  ______________________________________________________________________________    Patient: Barrera Cazares  YOB: 1995   QBE:[id number]    Acct: [de-identified]     Room: [address] [place] [address]  Admit date: 2022  Today's date: 22  Number of days in the hospital: 2    SUBJECTIVE   Admitting Diagnosis: COVID-19  CC: Fever, Headache  Pt examined at bedside. Chart & results reviewed. Patient is hemodynamically stable afebrile  Patient is in room air saturating well  Still complaining of mild chest pain  Reported fatigue and malaise  Infectious disease started monoclonal antibodies    ROS:  Constitutional:  negative for chills, fevers, sweats  Respiratory:  mild nonproductive cough, no dyspnea on exertion, hemoptysis, shortness of breath, wheezing  Cardiovascular:  negative for chest pain, chest pressure/discomfort, lower extremity edema, palpitations  Gastrointestinal:  negative for abdominal pain, constipation, diarrhea, nausea, vomiting  Neurological:  negative for dizziness, headache  BRIEF HISTORY     The patient is a pleasant 32 y.o. female with significant past medical history of type 2 diabetes mellitus, left occipital cavernoma s/p resection 2020, seizures and post operative right homonymous hemianopia presents with a chief complaint of  fever, throbbing headache 10/10, dry cough, nausea and abdominal pain for one day. Patient tested positive for COVID-19. She works with [employer] and cannot track back sick contacts. Patient reported non bloody diarrhea. OBJECTIVE     Vital Signs:  BP (!) 123/95   Pulse 95   Temp 98.1 °F (36.7 °C) (Temporal)   Resp 16   Ht 5' 2\" (1.575 m)   Wt 146 lb (66.2 kg)   SpO2 96%   BMI 26.70 kg/m²     Temp (24hrs), Av.1 °F (36.7 °C), Min:98.1 °F (36.7 °C), Max:98.1 °F (36.7 °C)    No intake/output data recorded.     Physical Exam:  Constitutional: This is a well developed, well nourished, 25-29.9 - Overweight 32y.o. year old female who is alert, oriented, cooperative and in no apparent distress. Head:normocephalic and atraumatic. Respiratory: Chest was symmetrical without dullness to percussion. Breath sounds bilaterally were clear to auscultation. There were no wheezes, rhonchi or rales. There is no intercostal retraction or use of accessory muscles. Cardiovascular: Regular without murmur, clicks, gallops or rubs. Abdomen: Slightly rounded and soft without organomegaly. No rebound, rigidity or guarding was appreciated. Extremities:  No lower extremity edema, ulcerations, tenderness, varicosities or erythema.         Medications:  Scheduled Medications:    potassium chloride  40 mEq Oral BID WC    magnesium sulfate  2,000 mg IntraVENous Once    insulin lispro  5 Units SubCUTAneous TID WC    sodium chloride  1,000 mL IntraVENous Once    gabapentin  400 mg Oral TID    magnesium oxide  400 mg Oral BID    sodium chloride flush  5-40 mL IntraVENous 2 times per day    enoxaparin  40 mg SubCUTAneous Daily    insulin glargine  30 Units SubCUTAneous Nightly    OXcarbazepine  150 mg Oral BID    insulin lispro  0-8 Units SubCUTAneous TID WC    insulin lispro  0-4 Units SubCUTAneous Nightly     Continuous Infusions:    0.9% NaCl with KCl 20 mEq 125 mL/hr at 09/05/22 2320    sodium chloride      dextrose       PRN Medicationsbutalbital-acetaminophen-caffeine, 1 tablet, Q6H PRN  sodium chloride flush, 5-40 mL, PRN  sodium chloride, , PRN  ondansetron, 4 mg, Q8H PRN   Or  ondansetron, 4 mg, Q6H PRN  polyethylene glycol, 17 g, Daily PRN  acetaminophen, 650 mg, Q6H PRN   Or  acetaminophen, 650 mg, Q6H PRN  glucose, 4 tablet, PRN  dextrose bolus, 125 mL, PRN   Or  dextrose bolus, 250 mL, PRN  glucagon (rDNA), 1 mg, PRN  dextrose, , Continuous PRN      Diagnostic Labs:  CBC:   Recent Labs     09/04/22  0307 09/05/22  0839 09/05/22  1533 last 24 hours. Glucose level runs in the 200s to 300s  -C-peptide is normal  - Hypoglycemia protocol initiated    ZEFERINO  - Creatinine 1.16 --> 1.36 at admission, Baseline is normal. Most likely prerenal due to hypovolemia. Resolved  - On IV fluid. - monitor Is/Os   - avoid nephrotoxic drugs  - Will continue to monitor. Hx of left occipital cavernoma s/p resection 01/08/2020  Stable     Hx of seizures:   Stable. On Oxcarbazepine 150 mg  On Gabapentin 400 mg TID. DVT ppx: Lovenox   GI ppx: Not indicated     Discharge Planning:  consulted. Will follow up. Luana Mcarthur MD  Internal Medicine Resident, PGY-1  [facility];  [place], [place]  9/6/2022, 1:59 PM

## 2022-09-07 VITALS
RESPIRATION RATE: 16 BRPM | DIASTOLIC BLOOD PRESSURE: 94 MMHG | BODY MASS INDEX: 26.87 KG/M2 | WEIGHT: 146 LBS | SYSTOLIC BLOOD PRESSURE: 141 MMHG | HEIGHT: 62 IN | OXYGEN SATURATION: 95 % | HEART RATE: 78 BPM | TEMPERATURE: 98.8 F

## 2022-09-07 LAB
ABSOLUTE EOS #: 0.11 K/UL (ref 0–0.44)
ABSOLUTE IMMATURE GRANULOCYTE: <0.03 K/UL (ref 0–0.3)
ABSOLUTE LYMPH #: 3.48 K/UL (ref 1.1–3.7)
ABSOLUTE MONO #: 0.52 K/UL (ref 0.1–1.2)
ABSOLUTE RETIC #: 0.04 M/UL (ref 0.03–0.08)
ANION GAP SERPL CALCULATED.3IONS-SCNC: 12 MMOL/L (ref 9–17)
BASOPHILS # BLD: 0 % (ref 0–2)
BASOPHILS ABSOLUTE: 0.03 K/UL (ref 0–0.2)
BUN BLDV-MCNC: 11 MG/DL (ref 6–20)
CALCIUM SERPL-MCNC: 8.9 MG/DL (ref 8.6–10.4)
CHLORIDE BLD-SCNC: 101 MMOL/L (ref 98–107)
CO2: 24 MMOL/L (ref 20–31)
CREAT SERPL-MCNC: 0.74 MG/DL (ref 0.5–0.9)
EOSINOPHILS RELATIVE PERCENT: 2 % (ref 1–4)
GFR AFRICAN AMERICAN: >60 ML/MIN
GFR NON-AFRICAN AMERICAN: >60 ML/MIN
GFR SERPL CREATININE-BSD FRML MDRD: ABNORMAL ML/MIN/{1.73_M2}
GLUCOSE BLD-MCNC: 141 MG/DL (ref 65–105)
GLUCOSE BLD-MCNC: 149 MG/DL (ref 65–105)
GLUCOSE BLD-MCNC: 330 MG/DL (ref 65–105)
GLUCOSE BLD-MCNC: 336 MG/DL (ref 70–99)
HCT VFR BLD CALC: 43.2 % (ref 36.3–47.1)
HEMOGLOBIN: 14.9 G/DL (ref 11.9–15.1)
HEPARIN INDUCED PLATELET ANTIBODY: 0.16 O.D. (ref 0–0.4)
IMMATURE GRANULOCYTES: 0 %
IMMATURE RETIC FRACT: 1.4 % (ref 2.7–18.3)
LYMPHOCYTES # BLD: 49 % (ref 24–43)
MAGNESIUM: 1.3 MG/DL (ref 1.6–2.6)
MCH RBC QN AUTO: 28.4 PG (ref 25.2–33.5)
MCHC RBC AUTO-ENTMCNC: 34.5 G/DL (ref 28.4–34.8)
MCV RBC AUTO: 82.4 FL (ref 82.6–102.9)
MONOCYTES # BLD: 7 % (ref 3–12)
NRBC AUTOMATED: 0 PER 100 WBC
PDW BLD-RTO: 12 % (ref 11.8–14.4)
PLATELET # BLD: 117 K/UL (ref 138–453)
PMV BLD AUTO: 12.4 FL (ref 8.1–13.5)
POTASSIUM SERPL-SCNC: 5.1 MMOL/L (ref 3.7–5.3)
RBC # BLD: 5.24 M/UL (ref 3.95–5.11)
RBC # BLD: ABNORMAL 10*6/UL
RETIC %: 0.8 % (ref 0.5–1.9)
RETIC HEMOGLOBIN: 32.2 PG (ref 28.2–35.7)
SEG NEUTROPHILS: 42 % (ref 36–65)
SEGMENTED NEUTROPHILS ABSOLUTE COUNT: 3 K/UL (ref 1.5–8.1)
SODIUM BLD-SCNC: 137 MMOL/L (ref 135–144)
SURGICAL PATHOLOGY REPORT: NORMAL
WBC # BLD: 7.2 K/UL (ref 3.5–11.3)

## 2022-09-07 PROCEDURE — 6370000000 HC RX 637 (ALT 250 FOR IP)

## 2022-09-07 PROCEDURE — 83735 ASSAY OF MAGNESIUM: CPT

## 2022-09-07 PROCEDURE — 85025 COMPLETE CBC W/AUTO DIFF WBC: CPT

## 2022-09-07 PROCEDURE — 82947 ASSAY GLUCOSE BLOOD QUANT: CPT

## 2022-09-07 PROCEDURE — 97162 PT EVAL MOD COMPLEX 30 MIN: CPT

## 2022-09-07 PROCEDURE — 80048 BASIC METABOLIC PNL TOTAL CA: CPT

## 2022-09-07 PROCEDURE — 97530 THERAPEUTIC ACTIVITIES: CPT

## 2022-09-07 PROCEDURE — 6360000002 HC RX W HCPCS: Performed by: STUDENT IN AN ORGANIZED HEALTH CARE EDUCATION/TRAINING PROGRAM

## 2022-09-07 PROCEDURE — 6370000000 HC RX 637 (ALT 250 FOR IP): Performed by: STUDENT IN AN ORGANIZED HEALTH CARE EDUCATION/TRAINING PROGRAM

## 2022-09-07 PROCEDURE — 99232 SBSQ HOSP IP/OBS MODERATE 35: CPT | Performed by: INTERNAL MEDICINE

## 2022-09-07 PROCEDURE — 6360000002 HC RX W HCPCS

## 2022-09-07 PROCEDURE — 36415 COLL VENOUS BLD VENIPUNCTURE: CPT

## 2022-09-07 RX ORDER — GLUCOSAMINE HCL/CHONDROITIN SU 500-400 MG
CAPSULE ORAL
Qty: 100 STRIP | Refills: 11 | Status: SHIPPED | OUTPATIENT
Start: 2022-09-07 | End: 2022-09-07 | Stop reason: HOSPADM

## 2022-09-07 RX ORDER — MAGNESIUM SULFATE IN WATER 40 MG/ML
2000 INJECTION, SOLUTION INTRAVENOUS ONCE
Status: COMPLETED | OUTPATIENT
Start: 2022-09-07 | End: 2022-09-07

## 2022-09-07 RX ADMIN — ENOXAPARIN SODIUM 40 MG: 100 INJECTION SUBCUTANEOUS at 09:20

## 2022-09-07 RX ADMIN — GABAPENTIN 400 MG: 400 CAPSULE ORAL at 09:21

## 2022-09-07 RX ADMIN — MAGNESIUM GLUCONATE 500 MG ORAL TABLET 400 MG: 500 TABLET ORAL at 09:20

## 2022-09-07 RX ADMIN — OXCARBAZEPINE 150 MG: 150 TABLET, FILM COATED ORAL at 09:21

## 2022-09-07 RX ADMIN — INSULIN LISPRO 5 UNITS: 100 INJECTION, SOLUTION INTRAVENOUS; SUBCUTANEOUS at 13:09

## 2022-09-07 RX ADMIN — GABAPENTIN 400 MG: 400 CAPSULE ORAL at 14:33

## 2022-09-07 RX ADMIN — POTASSIUM CHLORIDE AND SODIUM CHLORIDE: 900; 150 INJECTION, SOLUTION INTRAVENOUS at 04:19

## 2022-09-07 RX ADMIN — MAGNESIUM SULFATE HEPTAHYDRATE 2000 MG: 40 INJECTION, SOLUTION INTRAVENOUS at 12:46

## 2022-09-07 RX ADMIN — INSULIN LISPRO 6 UNITS: 100 INJECTION, SOLUTION INTRAVENOUS; SUBCUTANEOUS at 13:09

## 2022-09-07 NOTE — PROGRESS NOTES
[facility]  Internal Medicine Teaching Residency Program  Inpatient Daily Progress Note  ______________________________________________________________________________    Patient: Salvador Baker  YOB: 1995   [id number]    Acct: [de-identified]     Room: [address] [place] [address]  Admit date: 2022  Today's date: 22  Number of days in the hospital: 3    SUBJECTIVE   Admitting Diagnosis: COVID-19  CC: covid, hyperglycemia  Pt examined at bedside. Chart & results reviewed. Patient doing well, hemodynamically stable, saturating well on room air  Labs showed magnesium 1.3; replacing with 2 g mag; discharged on oral magnesium tablets  CBC showing platelets 381; HIT antibodies negative; number cytopenia likely secondary to COVID infection  Stable for discharge    ROS:  Constitutional:  negative for chills, fevers, sweats  Respiratory:  negative for cough, dyspnea on exertion, hemoptysis, shortness of breath, wheezing  Cardiovascular:  negative for chest pain, chest pressure/discomfort, lower extremity edema, palpitations  Gastrointestinal:  negative for abdominal pain, constipation, diarrhea, nausea, vomiting  Neurological:  negative for dizziness, headache  BRIEF HISTORY        The patient is a pleasant 32 y.o. female with significant past medical history of type 2 diabetes mellitus, left occipital cavernoma s/p resection 2020, seizures and post operative right homonymous hemianopia presents with a chief complaint of  fever, throbbing headache 10/10, dry cough, nausea and abdominal pain for one day. Patient tested positive for COVID-19. She works with [employer] and cannot track back sick contacts. Patient reported non bloody diarrhea.      OBJECTIVE     Vital Signs:  BP (!) 141/94   Pulse 78   Temp 98.8 °F (37.1 °C) (Temporal)   Resp 16   Ht 5' 2\" (1.575 m)   Wt 146 lb (66.2 kg)   SpO2 95%   BMI 26.70 kg/m²     Temp (24hrs), Av.5 °F (36.9 °C), Min:98.2 °F (36.8 °C), Max:98.8 °F (37.1 °C)    No intake/output data recorded. Physical Exam:  Constitutional: This is a well developed, well nourished, 25-29.9 - Overweight 32y.o. year old female who is alert, oriented, cooperative and in no apparent distress. Head:normocephalic and atraumatic. EENT:  PERRLA. No thrush was noted. Neck: Supple without thyromegaly. No elevated JVP. Respiratory: Chest was symmetrical without dullness to percussion. Breath sounds bilaterally were clear to auscultation. There were no wheezes, rhonchi or rales. Cardiovascular: Regular without murmur, clicks, gallops or rubs. Abdomen: Slightly rounded and soft without organomegaly. No rebound, rigidity or guarding was appreciated. Lymphatic: No lymphadenopathy. Extremities:  No lower extremity edema, ulcerations, varicosities or erythema. Muscle size, tone and strength are normal.  No involuntary movements are noted. Skin:  Warm and dry. Good color, turgor and pigmentation. No lesions or scars.   No cyanosis or clubbing  Neurological/Psychiatric: The patient's general behavior, level of consciousness, thought content and emotional status is normal.        Medications:  Scheduled Medications:    magnesium sulfate  2,000 mg IntraVENous Once    insulin lispro  5 Units SubCUTAneous TID WC    sodium chloride  1,000 mL IntraVENous Once    gabapentin  400 mg Oral TID    magnesium oxide  400 mg Oral BID    sodium chloride flush  5-40 mL IntraVENous 2 times per day    enoxaparin  40 mg SubCUTAneous Daily    insulin glargine  30 Units SubCUTAneous Nightly    OXcarbazepine  150 mg Oral BID    insulin lispro  0-8 Units SubCUTAneous TID WC    insulin lispro  0-4 Units SubCUTAneous Nightly     Continuous Infusions:    sodium chloride      dextrose       PRN Medicationsbenzocaine-menthol, 1 lozenge, Q2H PRN  butalbital-acetaminophen-caffeine, 1 tablet, Q6H PRN  sodium chloride flush, 5-40 mL, PRN  sodium chloride, , PRN  ondansetron, 4 mg, Q8H PRN   Or  ondansetron, 4 mg, Q6H PRN  polyethylene glycol, 17 g, Daily PRN  acetaminophen, 650 mg, Q6H PRN   Or  acetaminophen, 650 mg, Q6H PRN  glucose, 4 tablet, PRN  dextrose bolus, 125 mL, PRN   Or  dextrose bolus, 250 mL, PRN  glucagon (rDNA), 1 mg, PRN  dextrose, , Continuous PRN        Diagnostic Labs:  CBC:   Recent Labs     09/05/22  0839 09/05/22  1533 09/06/22  0649 09/07/22  1053   WBC 3.8  --  4.9 7.2   RBC 4.66  --  4.90 5.24*   HGB 12.7  --  13.4 14.9   HCT 39.1  --  41.0 43.2   MCV 83.9  --  83.7 82.4*   RDW 12.4  --  12.2 12.0   PLT 95* See Reflexed IPF Result 117* 117*     BMP:   Recent Labs     09/05/22  0839 09/05/22  1533 09/06/22  0649 09/07/22  1053   *  --  143 137   K 3.4* 3.1* 3.5* 5.1     --  108* 101   CO2 21  --  25 24   BUN 13  --  11 11   CREATININE 0.90  --  0.75 0.74     BNP: No results for input(s): BNP in the last 72 hours. PT/INR: No results for input(s): PROTIME, INR in the last 72 hours. APTT: No results for input(s): APTT in the last 72 hours. CARDIAC ENZYMES: No results for input(s): CKMB, CKMBINDEX, TROPONINI in the last 72 hours. Invalid input(s): CKTOTAL;3  FASTING LIPID PANEL:  Lab Results   Component Value Date    CHOL 188 06/22/2022    HDL 27 (L) 06/22/2022    TRIG 406 (H) 06/22/2022     LIVER PROFILE: No results for input(s): AST, ALT, ALB, BILIDIR, BILITOT, ALKPHOS in the last 72 hours. MICROBIOLOGY:   Lab Results   Component Value Date/Time    CULTURE NO SIGNIFICANT GROWTH 09/04/2022 08:53 AM       Imaging:    XR CHEST (SINGLE VIEW FRONTAL)    Result Date: 9/4/2022  Normal examination. XR ABDOMEN (KUB) (SINGLE AP VIEW)    Result Date: 9/4/2022  Unremarkable abdominal radiographs without acute abnormality. XR CHEST PORTABLE    Result Date: 9/5/2022  No acute cardiopulmonary process.        ASSESSMENT & PLAN     ASSESSMENT / PLAN:     Principal Problem:    COVID-19  Active Problems:    Hypokalemia    Hypomagnesemia    Seizures ([address] VIJAY (acute kidney injury) ([facility] [address]    Diarrhea of infectious origin    Thrombocytopenia ([facility] [address]    Type 2 diabetes mellitus with hyperglycemia, with long-term current use of insulin ([facility] [address]  Resolved Problems:    * No resolved hospital problems. *    COVID-19 pneumonia; saturating well on room air. Received bebtelovimab 09/04/22. Stable for discharge from ID stand point. Bivalent covid vaccine in 2 months. Isolate until 09/14/22  Diabetes mellitus type 2; continue Lantus 30 units nightly; 5 units Premeal; sliding scale; POCT glucose checks and hypoglycemia protocol. HypoMg - replace with 2 g mag. Discharge on oral Mg tablets  Vijay - resolved; discontinued fluids  Hypokalemia - resolved  Thrombocytopenia - platelets stable: HIT negative; likely secondary to covid. DVT ppx : lovenox  GI ppx: not indicated    PT/OT: on board  Discharge Planning / Antionette [place]: plan for discharge home    Chaparro Aiken MD  Internal Medicine Resident, PGY-2  [facility];  [place], [place]  9/7/2022, 1:06 PM

## 2022-09-07 NOTE — PROGRESS NOTES
[facility] - Progress Note  COVID 19 Patient  Today's Date and Time: 9/7/2022, 8:30 AM    Impression :     COVID 19 Confirmed Infection  Unvaccinated individual  Covid tests:  9-4-22: Positive  DM 2  ZEFERINO  Hypokalemia  Diarrhea    Recommendations:   Antibiotic treatment:  Monitor off antibiotics  Covid Rx:    Remdesivir: Not indicated  Decadron: Not indicated  Actemra: Not indicated  Monoclonal antibodies: Bebtelovimab requested and administered on 9-4-22  ID wise stable. No additional treatment anticipated. She could go home to convalesce when stable medically. She should be scheduled for Bivalent Covid vaccine in 2 months. Medical Decision Making/Summary/Discussion:9/7/2022     Patient admitted with COVID 19 infection    Infection Control Recommendations   [place] Precautions  Airborne isolation  Droplet Isolation  Isolate until 9-14-22    Antimicrobial Stewardship Recommendations     Discontinuation of therapy  Coordination of Outpatient Care:   Estimated Length of IV antimicrobials:TBD  Patient will need Midline Catheter Insertion: TBD  Patient will need PICC line Insertion: No  Patient will need: Home IV , [place],  SNF,  LTAC:TBD  Patient will need outpatient wound care:No    Chief complaint/reason for consultation:   Concern for COVID infection      History of Present Illness:   Dragan Owens is a 32y.o.-year-old  female who was initially admitted on 9/4/2022. Patient seen at the request of .    INITIAL HISTORY:    Patient presented through ER with complaints of fevers, throbbing headache, dry cough, nausea and abdominal pain x 1 day. She also reported onset of diarrhea    She is unvaccinated for Covid. Is exposed to multiple people at her work for [employer]. Test in ER Covid positive.     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 Patient admitted because of concerns with COVID 19. headache    CURRENT EVALUATION : 2022    Afebrile   VS stable    Patient was up and moving around the room today. Patient states that she is feeling much better compared to when she was admitted to the hospital.  Patient still complains of headaches and body aches. Patient denies any nausea, vomiting, shortness of breath, diarrhea    Patient was wondering when she would be able to get out of the hospital.  It was explained to the patient that an isolation period of 10 days is needed before she is cleared. Patient expressed understanding. Patient is currently breathing on room air. Patient exhibiting respiratory distress. No  Respiratory secretions: No    Patient receiving supplemental oxygen. No  RR 23--> 16  02 sat 98--> 96    NEWS Score: 0-4 Low risk group; 5-6: Medium risk group; 7 or above: High risk group  Parameters 3 2 1 0 1 2 3   Age    < 65   ? 65   RR ? 8  9-11 12-20  21-24 ? 25   O2 Sats ? 91 92-93 94-95 ? 96      Suppl O2  Yes  No      SBP ? 90  101-110 111-219   ? 220   HR ? 40  41-50 51-90  111-130 ? 131   Consciousness    Alert   Drowsiness, lethargy, or confusion   Temperature ? 35.0 C (95.0 F)  35.1-36.0 C 95.1-96.9 F 36.1-38.0 C 97.0-100.4 F 38.1-39.0 C 100.5-102.3 F ? 39.1 C ? 102.4 F      NEWS Score:  9-4-22: 3 Low risk for ICU care    Overall Daily Picture:     Improving    Presence of secondary bacterial Infection:    No   Additional antibiotics: No    Labs, X rays reviewed: 2022  No labs drawn prior to today's visit. We will monitor labs once they are drawn.     BUN: 20--> 11  Cr: 1.36--> 0.75  K 2.8--> 3.1  M.0-->1.4      WBC: 3.6--> 4.9  Hb: 12.9--> 13.4  Plat: 134--> 117    Absolute Neutrophils: 1.48  Absolute Lymphocytes: 2.94  Neutrophil/Lymphocyte Ratio: 0.50    CRP: 17.6  Ferritin:  LDH:     Pro Calcitonin:    Cultures:  Urine:    Blood:    Sputum :    Wound:      CXR:   22: Normal CXR  CAT:    Discussed with patient, RN, CC, IM. I have personally reviewed the past medical history, past surgical history, medications, social history, and family history, and I have updated the database accordingly.     9/5 9/4    Past Medical History:     Past Medical History:   Diagnosis Date    Cerebral artery occlusion with cerebral infarction [facility])     Cerebral vascular malformation     [email or web address]    Diabetes mellitus ([facility] [address]     Seizure ([facility] [address]     Traumatic hemorrhage of left cerebrum without loss of consciousness ([facility] [address]     @ [employer]       Past Surgical  History:     Past Surgical History:   Procedure Laterality Date    BRAIN SURGERY      CRANIOTOMY  01/08/2020    LEFT OCCIPITAL CRANIOTOMY    CRANIOTOMY Left 01/08/2020    LEFT OCCIPITAL CRANIOTOMY, 1ST VASCULAR LESION WITH [employer] NAVIGATION (LATERAL WITH ROMERO BAG, HOLLINGSWORTH HEADHOLDER, MICROSCOPE) performed by April Belcher DO at [facility] OR       Medications:      insulin lispro  5 Units SubCUTAneous TID WC    sodium chloride  1,000 mL IntraVENous Once    gabapentin  400 mg Oral TID    magnesium oxide  400 mg Oral BID    sodium chloride flush  5-40 mL IntraVENous 2 times per day    enoxaparin  40 mg SubCUTAneous Daily    insulin glargine  30 Units SubCUTAneous Nightly    OXcarbazepine  150 mg Oral BID    insulin lispro  0-8 Units SubCUTAneous TID WC    insulin lispro  0-4 Units SubCUTAneous Nightly       Social History:     Social History     Socioeconomic History    Marital status: Single     Spouse name: Not on file    Number of children: Not on file    Years of education: Not on file    Highest education level: Not on file   Occupational History    Not on file   Tobacco Use    Smoking status: Some Days     Packs/day: 0.30     Years: 14.00     Pack years: 4.20     Types: Cigarettes    Smokeless tobacco: Never   Vaping Use    Vaping Use: Unknown   Substance and Sexual Activity    Alcohol use: Yes     Comment: Occassionally    Drug use: Yes     Types: Marijuana Irma Kugel)    Sexual activity: Yes     Partners: Male   Other Topics Concern    Not on file   Social History Narrative    Not on file     Social Determinants of Health     Financial Resource Strain: Not on file   Food Insecurity: Not on file   Transportation Needs: Not on file   Physical Activity: Not on file   Stress: Not on file   Social Connections: Not on file   Intimate Partner Violence: Not on file   Housing Stability: Not on file       Family History:     Family History   Problem Relation Age of Onset    Colon Cancer Mother     No Known Problems Father     No Known Problems Brother     Diabetes Maternal Grandfather         Allergies:   Latex and Prozac [fluoxetine hcl]     Review of Systems:       Constitutional: Fevers no chills. No systemic complaints  Head: Severe headaches  Eyes: has right homonymous hemianopia   ENT: No sore throat or runny nose. . No hearing loss, tinnitus or vertigo. Cardiovascular: No chest pain or palpitations. No Shortness of breath. No MCCORMACK  Lung: No Shortness of breath or cough. No sputum production  Abdomen: Nausea, abdominal pain. No vomiting, diarrhea. . No cramps. Genitourinary: No increased urinary frequency, or dysuria. No hematuria. No suprapubic or CVA pain  Musculoskeletal: No muscle aches or pains. No joint effusions, swelling or deformities  Hematologic: No bleeding or bruising. Neurologic: No headache, weakness, numbness, or tingling. Integument: No rash, no ulcers. Psychiatric: No depression. Endocrine: No polyuria, no polydipsia, no polyphagia. Physical Examination :   Patient Vitals for the past 8 hrs:   BP Temp Temp src Pulse Resp SpO2   09/07/22 0754 (!) 141/94 98.8 °F (37.1 °C) Temporal 78 16 95 %     General Appearance: Awake, alert. Looks ill  Head:  Normocephalic, no trauma  Eyes: Pupils equal, round, reactive to light; sclera anicteric; conjunctivae pink. No embolic phenomena.  Has Rt homonymous hemianopsia  ENT: Oropharynx clear, without erythema, exudate, or thrush. No tenderness of sinuses. Mouth/throat: mucosa pink and moist. No lesions. Dentition in good repair. Neck:Supple, without lymphadenopathy. Thyroid normal, No bruits. Pulmonary/Chest: Clear to auscultation, without wheezes, rales, or rhonchi. No dullness to percussion. Cardiovascular: Regular rate and rhythm without murmurs, rubs, or gallops. Abdomen: Soft, non tender. Bowel sounds normal. No organomegaly  All four Extremities: No cyanosis, clubbing, edema, or effusions. Neurologic: No gross sensory or motor deficits. Skin: Warm and dry with good turgor. No signs of peripheral arterial or venous insufficiency. No ulcerations. No open wounds. Medical Decision Making -Laboratory:   I have independently reviewed/ordered the following labs:    CBC with Differential:   Recent Labs     09/05/22  0839 09/05/22  1533 09/06/22  0649   WBC 3.8  --  4.9   HGB 12.7  --  13.4   HCT 39.1  --  41.0   PLT 95* See Reflexed IPF Result 117*   LYMPHOPCT 47*  --  60*   MONOPCT 4  --  7     BMP:   Recent Labs     09/05/22  0839 09/05/22  1533 09/06/22  0649   *  --  143   K 3.4* 3.1* 3.5*     --  108*   CO2 21  --  25   BUN 13  --  11   CREATININE 0.90  --  0.75   MG 1.2* 1.8 1.4*     Hepatic Function Panel: No results for input(s): PROT, LABALBU, BILIDIR, IBILI, BILITOT, ALKPHOS, ALT, AST in the last 72 hours. No results for input(s): RPR in the last 72 hours. No results for input(s): HIV in the last 72 hours. No results for input(s): BC in the last 72 hours.   Lab Results   Component Value Date/Time    MUCUS NOT REPORTED 02/28/2020 07:32 PM    RBC 4.90 09/06/2022 06:49 AM    TRICHOMONAS NOT REPORTED 02/28/2020 07:32 PM    WBC 4.9 09/06/2022 06:49 AM    YEAST NOT REPORTED 02/28/2020 07:32 PM    TURBIDITY Cloudy 09/04/2022 07:40 AM     Lab Results   Component Value Date/Time    CREATININE 0.75 09/06/2022 06:49 AM    GLUCOSE 122 09/06/2022 06:49 AM       Medical Decision Making-Imaging: 9/5  Narrative   EXAMINATION:   ONE XRAY VIEW OF THE CHEST       9/5/2022 7:14 am       COMPARISON:   Chest radiograph performed 09/04/2022. HISTORY:   ORDERING SYSTEM PROVIDED HISTORY: right sided chest pain   TECHNOLOGIST PROVIDED HISTORY:   right sided chest pain       FINDINGS:   There is no acute consolidation or effusion. There is no pneumothorax. The   mediastinal structures are unremarkable. The upper abdomen is unremarkable. The extrathoracic soft tissues are unremarkable. There is no acute osseous   abnormality. Impression   No acute cardiopulmonary process. 9/4  EXAMINATION:   ONE XRAY VIEW OF THE CHEST       9/4/2022 2:12 am       COMPARISON:   06/21/2022       HISTORY:   ORDERING SYSTEM PROVIDED HISTORY: concern for covid   TECHNOLOGIST PROVIDED HISTORY:   Concerns for COVID   concern for covid   Reason for Exam: headache fever   upright port       FINDINGS:   Heart size and pulmonary vasculature are normal.  The lungs are clear and   normally expanded. Surrounding osseous and soft tissue structures are   unremarkable. Impression   Normal examination. Medical Decision Hveemc-Ggtittun-Fzlcd:       Medical Decision Making-Other:     Note:  Labs, medications, radiologic studies were reviewed with personal review of films  Large amounts of data were reviewed  Discussed with nursing Staff, Discharge planner  Infection Control and Prevention measures reviewed  All prior entries were reviewed  Administer medications as ordered  Prognosis: [facility]  Discharge planning reviewed  Follow up as outpatient. Thank you for allowing us to participate in the care of this patient. Please call with questions. Thomas Bueno DPM  Podiatric Medicine & Surgery, PGY-1  [address]:    I have discussed the case, including pertinent history and exam findings with the residents and students.  I have seen and examined the patient and the key elements of the encounter have been performed by me. I was present when the student obtained his information or examined the patient. I have reviewed the laboratory data, other diagnostic studies and discussed them with the residents. I have updated the medical record where necessary. I agree with the assessment, plan and orders as documented by the resident/ student.     Leonel Delacruz MD.

## 2022-09-07 NOTE — PROGRESS NOTES
Physical Therapy  Facility/Department: [address] ORTHO/MED SURG  Physical Therapy Initial Assessment    Name: Markie Watt  : 1995  MRN: [id number]  Date of Service: 2022  Chief Complaint   Patient presents with    Headache     X1 day    Fever     Did not check temp but said \"I was hot all over\"      Discharge Recommendations:  No therapy recommended at discharge     Patient Diagnosis(es): The primary encounter diagnosis was COVID-19. Diagnoses of Hypokalemia and Hyperglycemia were also pertinent to this visit. Past Medical History:  has a past medical history of Cerebral artery occlusion with cerebral infarction [facility]), Cerebral vascular malformation, Diabetes mellitus ([facility] [address] Seizure ([facility] [address] and Traumatic hemorrhage of left cerebrum without loss of consciousness ([facility] [address] Past Surgical History:  has a past surgical history that includes craniotomy (2020); craniotomy (Left, 2020); and brain surgery. Assessment   Assessment: The pt ambulated 50 ft without a device x SBA. She had no c/o fatigue, dizziness or pain with mobilization No further PT intervention is needed at this time  Therapy Prognosis: Good  Decision Making: Medium Complexity  Requires PT Follow-Up: No  Activity Tolerance  Activity Tolerance: Patient tolerated evaluation without incident     Plan   Plan  Plan: Discharge with evaluation only  Safety Devices  Type of Devices: Nurse notified, Left in bed, Call light within reach     Restrictions  Position Activity Restriction  Other position/activity restrictions:  Up with assist    + covid     Subjective   General  Patient assessed for rehabilitation services?: Yes  Response To Previous Treatment: Not applicable  Family / Caregiver Present: No  Follows Commands: Within Functional Limits  Subjective  Subjective: Pt reports abd pain, chest pain of 6/10         Social/Functional History  Social/Functional History  Lives With: Significant other  Type of Home: House  Home Layout: One level  Home Access: Stairs to enter without rails  Entrance Stairs - Number of Steps: 3  Bathroom Shower/Tub: Tub/Shower unit  Bathroom Toilet: Standard  Home Equipment:  (No DME at baseline)  Has the patient had two or more falls in the past year or any fall with injury in the past year?: No  Receives Help From: Family  ADL Assistance: Independent  Homemaking Assistance: Independent  Ambulation Assistance: Independent  Transfer Assistance: Independent  Occupation: Full time employment  Leisure & Hobbies: Likes to draw, write, music, swim  Vision/Hearing  Vision  Vision: Within Functional Limits  Hearing  Hearing: Within functional limits    Cognition   Orientation  Overall Orientation Status: Within Functional Limits  Cognition  Overall Cognitive Status: WFL     Objective   Heart Rate: 78  Heart Rate Source: Monitor  BP:   BP Location: Right upper arm  BP Method: Automatic  Patient Position: Semi fowlers  MAP (Calculated): 109.67  Resp: 16  SpO2: 95 %  O2 Device: None (Room air)     AROM RLE (degrees)  RLE AROM: WNL  AROM LLE (degrees)  LLE AROM : WNL  AROM RUE (degrees)  RUE AROM : WNL  AROM LUE (degrees)  LUE AROM : WNL  Strength RLE  Strength RLE: WNL  Strength LLE  Strength LLE: WNL  Strength RUE  Strength RUE: WNL  Strength LUE  Strength LUE: WNL        Bed mobility  Supine to Sit: Stand by assistance  Sit to Supine: Stand by assistance  Scooting: Stand by assistance  Transfers  Sit to Stand: Stand by assistance  Stand to sit: Stand by assistance  Ambulation  Surface: level tile  Device: No Device  Assistance: Stand by assistance  Distance: amb 50 ft with a RW x SBA     Balance  Posture: Good  Sitting - Static: Good  Sitting - Dynamic: Good  Standing - Static: Good  Standing - Dynamic: Good     OutComes Score     AM-PAC Score  AM-PAC Inpatient Mobility Raw Score : 24 (09/07/22 0820)  AM-PAC Inpatient T-Scale Score : 61.14 (09/07/22 0820)  Mobility Inpatient CMS 0-100% Score: 0 (09/07/22 0820)  Mobility Inpatient CMS G-Code Modifier : [address] (09/07/22 0820)     Tinneti Score       Goals  Short Term Goals  Time Frame for Short term goals: No PT needs at this time     DC  PT     Education  Patient Education  Education Given To: Patient  Education Provided: Role of Therapy;Plan of Care  Education Method: Verbal  Barriers to Learning: None  Education Outcome: Verbalized understanding      Therapy Time   Individual Concurrent Group Co-treatment   Time In 0745         Time Out 0805         Minutes [address], PT

## 2022-09-07 NOTE — PROGRESS NOTES
CLINICAL PHARMACY NOTE: MEDS TO BEDS    Total # of Prescriptions Filled: 1   The following medications were delivered to the patient:  True metrix test strips     Additional Documentation:

## 2022-09-08 ENCOUNTER — CARE COORDINATION (OUTPATIENT)
Dept: CASE MANAGEMENT | Age: 27
End: 2022-09-08

## 2022-09-08 LAB — PATHOLOGIST REVIEW: NORMAL

## 2022-09-08 NOTE — CARE COORDINATION
[address] Transitions Initial Follow Up Call    Call within 2 business days of discharge: Yes    Patient: Jennifer Lynch Patient : 1995   MRN: [id number]  Reason for Admission: COVID  Discharge Date: 22 RARS: Readmission Risk Score: 16.2      Last Discharge [facility]       Date Complaint Diagnosis Description Type Department Provider    22 Headache; Fever COVID-19 . .. ED to Hosp-Admission (Discharged) (ADMITTED) Z 2C Al Jenkins MD; Lizzie Reyes. .. Attempted initial 24 hour transitional call to patient. Left VM to return call directly to [phone]. 1st attempt.      Facility: [facility]    Non-face-to-face services provided:  Obtained and reviewed discharge summary and/or continuity of care documents        Follow Up  Future Appointments   Date Time Provider Althea Mann   2022  2:00 PM Jose Armenta MD Neuro [facility] Jessica RN

## 2022-09-09 ENCOUNTER — CARE COORDINATION (OUTPATIENT)
Dept: CASE MANAGEMENT | Age: 27
End: 2022-09-09

## 2022-09-10 NOTE — DISCHARGE SUMMARY
[address]     Department of Internal Medicine - Staff Internal Medicine Teaching Service    INPATIENT DISCHARGE SUMMARY      Patient Identification:  Kay Jacobs is a 32 y.o. female. :  1995  MRN: [id number]     Acct: [de-identified]   PCP: Candace Valle  Admit Date:  2022  Discharge date and time: 2022  5:36 PM   Attending Provider: No att. providers found                                     [address] Problem Lists:  Principal Problem:    COVID-19  Active Problems:    Hypokalemia    Hypomagnesemia    Seizures ([address]    ZEFERINO (acute kidney injury) ([address]    Diarrhea of infectious origin    Thrombocytopenia ([address]    Type 2 diabetes mellitus with hyperglycemia, with long-term current use of insulin ([address]  Resolved Problems:    * No resolved hospital problems. *      HOSPITAL STAY     Brief Inpatient course:   Kay Jacobs is a 32 y.o. female with significant past medical history of type 2 diabetes mellitus, left occipital cavernoma s/p resection 2020, seizures and post operative right homonymous hemianopia who was admitted for the management of COVID-19, presented to the emergency department with a chief complaint of  fever, throbbing headache 10/10, dry cough, nausea and abdominal pain for one day. Patient tested positive for COVID-19. She works with [employer] and cannot track back sick contacts. Patient reported non bloody diarrhea. Patient received monoclonal antibody for COVID and was advised to take COVID-vaccine 2 months after Covid infection. Patient was advised to isolate until . Patient platelet was low but HIT antibody was negative. Patient has poorly controlled insulin-dependent diabetes type 2. Last A1c was 14 which had worsened in the last few months. Patient had not been compliant with her insulins.   Insulin was adjusted in the hospital.  Over the course of the stay at the hospital, patient glucose level was high and patient received insulin and was educated about her diabetic control. She was advised to follow-up with her PCP. Supplies given for insulin and test strips. Patient was stable on discharge    Procedures/ Significant Interventions:    None    Consults:     Consults:     Final Specialist Recommendations/Findings:   IP CONSULT TO HOSPITALIST  IP CONSULT TO INFECTIOUS DISEASES  IP CONSULT TO CASE MANAGEMENT  PHARMACY TO DOSE MEDICATION      Any Hospital Acquired Infections: none    Discharge Functional Status:  stable    DISCHARGE PLAN     Disposition: home    Patient Instructions:   Discharge Medication List as of 9/7/2022  2:39 PM        START taking these medications    Details   benzocaine-menthol (CEPACOL SORE THROAT) 15-3.6 MG lozenge Take 1 lozenge by mouth every 2 hours as needed for Sore Throat, Disp-84 lozenge, R-0Normal      !! blood glucose test strips (ASCENSIA AUTODISC VI;ONE TOUCH ULTRA TEST VI) strip DAILY Starting Wed 9/7/2022, Disp-100 each, R-3, NormalAs needed. !! - Potential duplicate medications found. Please discuss with provider. CONTINUE these medications which have NOT CHANGED    Details   ibuprofen (ADVIL;MOTRIN) 800 MG tablet Take 1 tablet by mouth 3 times daily (with meals), Disp-10 tablet, R-0Print      gabapentin (NEURONTIN) 400 MG capsule Take 1 capsule by mouth 3 times daily for 30 days. , Disp-90 capsule, R-3Normal      dapagliflozin (FARXIGA) 5 MG tablet Take 1 tablet by mouth every morning, Disp-30 tablet, R-0Normal      insulin detemir (LEVEMIR FLEXTOUCH) 100 UNIT/ML injection pen Inject 30 Units into the skin nightly, Disp-5 pen, R-3Normal      Magnesium Oxide (MAG-OXIDE) 200 MG TABS Take 2 tablets by mouth 2 times daily, Disp-120 tablet, R-0Normal      !!  Insulin Pen Needle 30G X 8 MM MISC DAILY Starting Sat 6/25/2022, Disp-100 each, R-0, Normal      butalbital-acetaminophen-caffeine (FIORICET, ESGIC) -40 MG per tablet Take 1 tablet by mouth every 6 hours as needed for Headaches, Disp-12 tablet, R-0Print      insulin aspart (NOVOLOG FLEXPEN) 100 UNIT/ML injection pen Inject 5 Units into the skin 3 times daily (before meals), Disp-5 pen, R-1Normal      glucose monitoring kit (FREESTYLE) monitoring kit DAILY Starting Tue 2/25/2020, Disp-1 kit, R-0, Print      !! blood glucose monitor strips Test 3 times a day & as needed for symptoms of irregular blood glucose., Disp-100 strip, R-3, Print      Lancets MISC 3 TIMES DAILY Starting Tue 2/25/2020, Disp-600 each, R-1, Print      Alcohol Swabs (ALCOHOL PREP) 70 % PADS Disp-100 each, R-3, Print1 pad TID      !! Insulin Pen Needle 30G X 8 MM MISC DAILY Starting Tue 2/25/2020, Disp-100 each, R-3, Print       !! - Potential duplicate medications found. Please discuss with provider.         STOP taking these medications       OXcarbazepine (TRILEPTAL) 150 MG tablet Comments:   Reason for Stopping:         atorvastatin (LIPITOR) 40 MG tablet Comments:   Reason for Stopping:         promethazine (PHENERGAN) 12.5 MG tablet Comments:   Reason for Stopping:         ondansetron (ZOFRAN) 4 MG tablet Comments:   Reason for Stopping:               Activity: activity as tolerated    Diet: diabetic diet    Follow-up:    Humberto Valle  [address]  [phone]    Schedule an appointment as soon as possible for a visit in 2 days  If symptoms worsen      Patient Instructions: Please take medications as prescribed  F/u with your PCP in one week  Please keep yourself well hydrated  Please keep taking Mg tablets as prescribed  Please take Levemir 30 U nightly with 5 U pre-meal insulin; f/u with your PCP for better blood glucose control  Please get Bivalent Covid vaccine in 2 months  Isolate till 09/14/22 due to covid  Follow up labs: None  Follow up imaging: None    Note that over 30 minutes was spent in preparing discharge papers, discussing discharge with patient, medication review, etc.      Luda Landaverde MD, MD  Internal Medicine Resident, PGY-11  [facility];  [place], [place]  9/9/2022, 10:49 PM

## 2022-10-06 ENCOUNTER — HOSPITAL ENCOUNTER (OUTPATIENT)
Dept: DIABETES SERVICES | Age: 27
Setting detail: THERAPIES SERIES
Discharge: HOME | End: 2022-10-06
Payer: MEDICARE

## 2022-10-06 PROCEDURE — G0108 DIAB MANAGE TRN  PER INDIV: HCPCS

## 2022-10-06 RX ORDER — BLOOD-GLUCOSE,RECEIVER,CONT
EACH MISCELLANEOUS
COMMUNITY
Start: 2022-08-30

## 2022-10-06 RX ORDER — BLOOD-GLUCOSE SENSOR
EACH MISCELLANEOUS
COMMUNITY
Start: 2022-08-30

## 2022-10-06 RX ORDER — BLOOD-GLUCOSE TRANSMITTER
EACH MISCELLANEOUS
COMMUNITY
Start: 2022-08-30

## 2022-10-06 RX ORDER — POTASSIUM CHLORIDE 750 MG/1
1 CAPSULE, EXTENDED RELEASE ORAL DAILY
COMMUNITY
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
## 2022-10-07 ENCOUNTER — HOSPITAL ENCOUNTER (OUTPATIENT)
Dept: MRI IMAGING | Age: 27
Discharge: HOME | End: 2022-10-09
Payer: MEDICARE

## 2022-10-07 DIAGNOSIS — Z98.890 STATUS POST CRANIOTOMY: ICD-10-CM

## 2022-10-07 PROCEDURE — 70551 MRI BRAIN STEM W/O DYE: CPT

## 2022-10-21 ENCOUNTER — HOSPITAL ENCOUNTER (EMERGENCY)
Age: 27
Discharge: HOME | End: 2022-10-21
Attending: EMERGENCY MEDICINE
Payer: MEDICARE

## 2022-10-21 VITALS
SYSTOLIC BLOOD PRESSURE: 150 MMHG | HEART RATE: 97 BPM | TEMPERATURE: 96.9 F | BODY MASS INDEX: 27.6 KG/M2 | OXYGEN SATURATION: 98 % | DIASTOLIC BLOOD PRESSURE: 111 MMHG | RESPIRATION RATE: 16 BRPM | HEIGHT: 62 IN | WEIGHT: 150 LBS

## 2022-10-21 DIAGNOSIS — K08.89 PAIN, DENTAL: Primary | ICD-10-CM

## 2022-10-21 LAB
BACTERIA: ABNORMAL
BILIRUBIN URINE: NEGATIVE
CASTS UA: ABNORMAL /LPF (ref 0–8)
CHP ED QC CHECK: YES
COLOR: YELLOW
EPITHELIAL CELLS UA: ABNORMAL /HPF (ref 0–5)
GLUCOSE BLD-MCNC: 138 MG/DL
GLUCOSE BLD-MCNC: 138 MG/DL (ref 65–105)
GLUCOSE URINE: ABNORMAL
HCG(URINE) PREGNANCY TEST: NEGATIVE
KETONES, URINE: NEGATIVE
LEUKOCYTE ESTERASE, URINE: NEGATIVE
NITRITE, URINE: NEGATIVE
PH UA: 7 (ref 5–8)
PROTEIN UA: NEGATIVE
RBC UA: ABNORMAL /HPF (ref 0–4)
SPECIFIC GRAVITY UA: 1.01 (ref 1–1.03)
TURBIDITY: ABNORMAL
URINE HGB: NEGATIVE
UROBILINOGEN, URINE: NORMAL
WBC UA: ABNORMAL /HPF (ref 0–5)

## 2022-10-21 PROCEDURE — 82947 ASSAY GLUCOSE BLOOD QUANT: CPT

## 2022-10-21 PROCEDURE — 81025 URINE PREGNANCY TEST: CPT

## 2022-10-21 PROCEDURE — 81001 URINALYSIS AUTO W/SCOPE: CPT

## 2022-10-21 PROCEDURE — 99283 EMERGENCY DEPT VISIT LOW MDM: CPT

## 2022-10-21 PROCEDURE — 6370000000 HC RX 637 (ALT 250 FOR IP): Performed by: STUDENT IN AN ORGANIZED HEALTH CARE EDUCATION/TRAINING PROGRAM

## 2022-10-21 RX ORDER — LIDOCAINE 4 G/G
1 PATCH TOPICAL ONCE
Status: DISCONTINUED | OUTPATIENT
Start: 2022-10-21 | End: 2022-10-21 | Stop reason: HOSPADM

## 2022-10-21 RX ORDER — IBUPROFEN 400 MG/1
400 TABLET ORAL EVERY 6 HOURS PRN
Qty: 30 TABLET | Refills: 0 | Status: SHIPPED | OUTPATIENT
Start: 2022-10-21 | End: 2022-11-15

## 2022-10-21 RX ORDER — IBUPROFEN 800 MG/1
800 TABLET ORAL ONCE
Status: COMPLETED | OUTPATIENT
Start: 2022-10-21 | End: 2022-10-21

## 2022-10-21 RX ORDER — AMOXICILLIN 500 MG/1
500 CAPSULE ORAL 2 TIMES DAILY
Qty: 14 CAPSULE | Refills: 0 | Status: SHIPPED | OUTPATIENT
Start: 2022-10-21 | End: 2022-10-28

## 2022-10-21 RX ADMIN — IBUPROFEN 800 MG: 800 TABLET, FILM COATED ORAL at 10:19

## 2022-10-21 ASSESSMENT — PAIN SCALES - GENERAL: PAINLEVEL_OUTOF10: 9

## 2022-10-21 NOTE — ED NOTES
Per patient request, writer reserved 10/24/22 11:00am [facility] [address] appointment. Writer faxed face & referral sheets, received fax confirmation sheet.       SAMIRA Medina  10/21/22 0155

## 2022-10-21 NOTE — ED PROVIDER NOTES
[facility]  Emergency Department  Faculty Attestation     I performed a history and physical examination of the patient and discussed management with the resident. I reviewed the residents note and agree with the documented findings and plan of care. Any areas of disagreement are noted on the chart. I was personally present for the key portions of any procedures. I have documented in the chart those procedures where I was not present during the key portions. I have reviewed the emergency nurses triage note. I agree with the chief complaint, past medical history, past surgical history, allergies, medications, social and family history as documented unless otherwise noted below. For Physician Assistant/ Nurse Practitioner cases/documentation I have personally evaluated this patient and have completed at least one if not all key elements of the E/M (history, physical exam, and MDM). Additional findings are as noted. Primary Care Physician:  Humberto Valle    Screenings:  [unfilled]    CHIEF COMPLAINT       Chief Complaint   Patient presents with    Dental Pain     Left, frontal, upper dental pain, states tooth needs pulled out    Neck Pain     Left side, \"lump\" on neck    Back Pain     \"Spine hurts\"       RECENT VITALS:   Temp: 96.9 °F (36.1 °C),  Heart Rate: 97, Resp: 16, BP: (!) 150/111    LABS:  Labs Reviewed - No data to display    Radiology  No orders to display         Attending Physician Additional  Notes  Is a variety complaints including left upper tooth ache, left earache, left posterior chain lymph node which is tender, low back pain. The low back pain has been present for some time and worse with with lifting. There is been no recent trauma. No fevers chills or sweats. No bowel or bladder incontinence or retention. No lower extremity weakness. No paresthesias or radiation of symptoms. She is not using analgesics other than gabapentin.   She has some tooth sensitivity and gum swelling of the affected tooth. There is pain worse with temperature exposures. There is no facial swelling or sinusitis symptoms. She has left earache but no change in hearing. On exam she is uncomfortable, slightly per tensive, afebrile, vital signs otherwise normal.  GCS is 15. Oropharynx is moist that lesion. The left upper tooth has gingivitis and tenderness to palpation. Ear exam not performed due to lack of otoscope at this time. She has a tender slightly enlarged left posterior lymph node but no other cervical lymphadenopathy. There is mild midline spine tenderness. Normal motor strength and no hyperreflexia. Impression is odontalgia, likely pulpitis, otalgia, reactive lymphadenopathy, lumbar strain. Plan is analgesics such as Tylenol/Motrin, antibiotics, ear exam, follow-up to dentist and PCP, consider work note, return precautions. [employer].  Ngoc Neal MD, [facility]  Attending Emergency  Physician                Esperanza Schmidt MD  10/21/22 [id number]

## 2022-10-21 NOTE — ED PROVIDER NOTES
[address]  ED  Emergency Department Encounter  EmergencyMedicine Resident     Pt Kaila Askew  MRN: [id number]  [place] 1995  Date of evaluation: 10/21/22  PCP:  Giselle Aguilera       Chief Complaint   Patient presents with    Dental Pain     Left, frontal, upper dental pain, states tooth needs pulled out    Neck Pain     Left side, \"lump\" on neck    Back Pain     \"Spine hurts\"       HISTORY OF PRESENT ILLNESS  (Location/Symptom, Timing/Onset, Context/Setting, Quality, Duration, Modifying Factors, Severity.)      Diana Nevarez is a 32 y.o. female who presents with dental pain for the past month. She states her dental pain is accompanied with pain on chewing. She has been taking Tylenol regularly without symptom improvement. She was seen by dentist 1 month ago but states they did not prescribe her anything for pain. She is also endorsing nausea and musculoskeletal back pain with history of recent heavy lifting as well as some increased diarrhea. She denies drainage from the tooth, chest pain, shortness of breath, abdominal pain, vomiting, fevers, chills, numbness, tingling, weakness, other trauma. PAST MEDICAL / SURGICAL / SOCIAL / FAMILY HISTORY      has a past medical history of Cerebral artery occlusion with cerebral infarction [facility]), Cerebral vascular malformation, Diabetes mellitus ([facility] [address] Seizure ([facility] [address] and Traumatic hemorrhage of left cerebrum without loss of consciousness ([facility] [address] has a past surgical history that includes craniotomy (01/08/2020); craniotomy (Left, 01/08/2020); and brain surgery.       Social History     Socioeconomic History    Marital status: Single     Spouse name: Not on file    Number of children: Not on file    Years of education: Not on file    Highest education level: Not on file   Occupational History    Not on file   Tobacco Use    Smoking status: Some Days     Packs/day: 0.30     Years: 14.00     Pack years: 4.20 Types: Cigarettes    Smokeless tobacco: Never   Vaping Use    Vaping Use: Unknown   Substance and Sexual Activity    Alcohol use: Yes     Comment: Occassionally    Drug use: Yes     Types: Marijuana Jessica Forte)    Sexual activity: Yes     Partners: Male   Other Topics Concern    Not on file   Social History Narrative    Not on file     Social Determinants of Health     Financial Resource Strain: Not on file   Food Insecurity: Not on file   Transportation Needs: Not on file   Physical Activity: Not on file   Stress: Not on file   Social Connections: Not on file   Intimate Partner Violence: Not on file   Housing Stability: Not on file       Family History   Problem Relation Age of Onset    Colon Cancer Mother     No Known Problems Father     No Known Problems Brother     Diabetes Maternal Grandfather        Allergies:  Latex and Prozac [fluoxetine hcl]    Home Medications:  Prior to Admission medications    Medication Sig Start Date End Date Taking? Authorizing Provider   ibuprofen (ADVIL;MOTRIN) 400 MG tablet Take 1 tablet by mouth every 6 hours as needed for Pain 10/21/22  Yes Hira Dixon MD   benzocaine (ORAJEL) 20 % GEL mucosal gel Use as needed topically on the gums 10/21/22  Yes Hira Dixon MD   potassium chloride (MICRO-K) 10 MEQ extended release capsule Take 1 tablet by mouth daily    Historical Provider, MD   Multiple Vitamins-Minerals (MULTIVITAMIN ADULT, MINERALS, PO) Take 1 capsule by mouth    Historical Provider, MD   Continuous Blood Gluc  (DEXCOM G6 ) [address] USE AS DIRECTED 8/30/22   Historical Provider, MD   Continuous Blood Gluc Sensor (DEXCOM G6 SENSOR) MISC USE AS DIRECTED REPLACE EVERY 10 DAYS 8/30/22   Historical Provider, MD   Continuous Blood Gluc Transmit (DEXCOM G6 TRANSMITTER) MISC USE AS DIRECTED every 3 MONTHS 8/30/22   Historical Provider, MD   blood glucose test strips (ASCENSIA AUTODISC VI;ONE TOUCH ULTRA TEST VI) strip 1 each by In Vitro route daily As needed. 9/7/22   Mojgan Etienne MD   gabapentin (NEURONTIN) 400 MG capsule Take 1 capsule by mouth 3 times daily for 30 days. 8/17/22 9/16/22  Shantanu Quarles MD   dapagliflozin (FARXIGA) 5 MG tablet Take 1 tablet by mouth every morning  Patient not taking: No sig reported 6/25/22   Aniceto De La Cruz MD   insulin detemir (LEVEMIR FLEXTOUCH) 100 UNIT/ML injection pen Inject 30 Units into the skin nightly 6/25/22   Aniceto De La Cruz MD   Magnesium Oxide (MAG-OXIDE) 200 MG TABS Take 2 tablets by mouth 2 times daily 6/25/22   Aniceto De La Cruz MD   Insulin Pen Needle 30G X 8 MM MISC 1 each by Does not apply route daily 6/25/22   Aniceto De La Cruz MD   butalbital-acetaminophen-caffeine (FIORICET, ESGIC) -40 MG per tablet Take 1 tablet by mouth every 6 hours as needed for Headaches 6/25/22   Aniceto De La Cruz MD   insulin aspart (NOVOLOG FLEXPEN) 100 UNIT/ML injection pen Inject 5 Units into the skin 3 times daily (before meals) 6/25/22   Aniceto De La Cruz MD   glucose monitoring kit (FREESTYLE) monitoring kit 1 kit by Does not apply route daily 2/25/20   Nuvia Cruz MD   blood glucose monitor strips Test 3 times a day & as needed for symptoms of irregular blood glucose. 2/25/20   Nuvia Cruz MD   Lancets MISC 1 each by Does not apply route 3 times daily 2/25/20   Nuvia Cruz MD   Alcohol Swabs (ALCOHOL PREP) 70 % PADS 1 pad TID 2/25/20   Nuvia Cruz MD   Insulin Pen Needle 30G X 8 MM MISC 1 each by Does not apply route daily 2/25/20   Nuvia Cruz MD       REVIEW OF SYSTEMS    (2-9 systems for level 4, 10 or more for level 5)      Review of Systems   Constitutional:  Positive for chills. Negative for fatigue and fever. HENT:  Positive for dental problem. Negative for congestion, rhinorrhea and sore throat. Eyes:  Negative for visual disturbance. Respiratory:  Negative for cough and shortness of breath. Cardiovascular:  Negative for chest pain. Gastrointestinal:  Positive for nausea.  Negative for abdominal pain, diarrhea and vomiting. Genitourinary:  Positive for dysuria. Negative for flank pain, hematuria, vaginal bleeding and vaginal discharge. Musculoskeletal:  Positive for back pain. Negative for arthralgias, gait problem, myalgias, neck pain and neck stiffness. Skin:  Negative for rash. Neurological:  Negative for dizziness, tremors, syncope, weakness, light-headedness, numbness and headaches. All other systems reviewed and are negative. PHYSICAL EXAM   (up to 7 for level 4, 8 or more for level 5)      INITIAL VITALS:   BP (!) 150/111   Pulse 97   Temp 96.9 °F (36.1 °C) (Oral)   Resp 16   Ht 5' 2\" (1.575 m)   Wt 150 lb (68 kg)   SpO2 98%   BMI 27.44 kg/m²     Physical Exam  Vitals reviewed. Constitutional:       General: She is not in acute distress. Appearance: She is not toxic-appearing. HENT:      Head: Normocephalic. Mouth/Throat:      Mouth: Mucous membranes are moist.      Pharynx: Oropharynx is clear. Comments: Left upper tooth with erythema along gingival line. No abscess or drainable pocket of fluid. No mastoid tenderness. Eyes:      Extraocular Movements: Extraocular movements intact. Pupils: Pupils are equal, round, and reactive to light. Cardiovascular:      Rate and Rhythm: Normal rate and regular rhythm. Pulses: Normal pulses. Heart sounds: Normal heart sounds. Pulmonary:      Effort: Pulmonary effort is normal.      Breath sounds: Normal breath sounds. No decreased breath sounds, wheezing, rhonchi or rales. Abdominal:      Palpations: Abdomen is soft. Tenderness: There is no abdominal tenderness. There is no guarding or rebound. Musculoskeletal:         General: Normal range of motion. Cervical back: Normal range of motion and neck supple. Skin:     Capillary Refill: Capillary refill takes less than 2 seconds. Neurological:      General: No focal deficit present.       Mental Status: She is alert and oriented to person, place, and time. Motor: No weakness. DIFFERENTIAL  DIAGNOSIS     PLAN (LABS / IMAGING / EKG):  Orders Placed This Encounter   Procedures    Urinalysis with Reflex to Culture    PREGNANCY, URINE    Microscopic Urinalysis    LAB RESULT    POCT Glucose    POC Glucose Fingerstick       MEDICATIONS ORDERED:  Orders Placed This Encounter   Medications    ibuprofen (ADVIL;MOTRIN) tablet 800 mg    DISCONTD: lidocaine 4 % external patch 1 patch    ibuprofen (ADVIL;MOTRIN) 400 MG tablet     Sig: Take 1 tablet by mouth every 6 hours as needed for Pain     Dispense:  30 tablet     Refill:  0    benzocaine (ORAJEL) 20 % GEL mucosal gel     Sig: Use as needed topically on the gums     Dispense:  9 g     Refill:  0    amoxicillin (AMOXIL) 500 MG capsule     Sig: Take 1 capsule by mouth 2 times daily for 7 days     Dispense:  14 capsule     Refill:  0       DDX: Dental infection versus dental abscess versus Ludewig's versus musculoskeletal back pain versus compression fracture    DIAGNOSTIC RESULTS / EMERGENCY DEPARTMENT COURSE / MDM   LAB RESULTS:  Results for orders placed or performed during the hospital encounter of 10/21/22   Urinalysis with Reflex to Culture    Specimen: Urine   Result Value Ref Range    Color, UA Yellow Yellow    Turbidity UA Cloudy (A) Clear    Glucose, Ur 1+ (A) NEGATIVE    Bilirubin Urine NEGATIVE NEGATIVE    Ketones, Urine NEGATIVE NEGATIVE    Specific Gravity, UA 1.013 1.005 - 1.030    Urine Hgb NEGATIVE NEGATIVE    pH, UA 7.0 5.0 - 8.0    Protein, UA NEGATIVE NEGATIVE    Urobilinogen, Urine Normal Normal    Nitrite, Urine NEGATIVE NEGATIVE    Leukocyte Esterase, Urine NEGATIVE NEGATIVE   PREGNANCY, URINE   Result Value Ref Range    HCG(Urine) Pregnancy Test NEGATIVE NEGATIVE   Microscopic Urinalysis   Result Value Ref Range    WBC, UA 2 TO 5 0 - 5 /HPF    RBC, UA 10 TO 20 0 - 4 /HPF    Casts UA  0 - 8 /LPF     0 TO 2 HYALINE Reference range defined for non-centrifuged specimen. Epithelial Cells UA 2 TO 5 0 - 5 /HPF    Bacteria, UA FEW (A) None   POCT Glucose   Result Value Ref Range    Glucose 138 mg/dL    QC OK? Yes    POC Glucose Fingerstick   Result Value Ref Range    POC Glucose 138 (H) 65 - 105 mg/dL       IMPRESSION: Presents for primary concern of dental pain. This pain began a month ago, she was evaluated by dentist but states he was never prescribed any. Pain is been consistent since that time. She is in no drainage from the wound, and has been able to tolerate p.o. intake without any difficulty. Patient also states she has paraspinal back pain after some heavy lifting that she did the other day and is accompanied by nausea. Overall patient is well-appearing. Patient does have left upper dental pain with mild erythema, no discharge or drainage. No abscess. Will prescribe amoxicillin for possible dental infection and gave scheduled appointment for dental follow-up. Patient without focal neurodeficits, does have paraspinal lumbar tenderness, no midline tenderness. Full strength in bilateral upper and lower extremities. No red flag symptoms, no fevers, no urinary retention, no numbness tingling or weakness, no trauma or falls. No mastoid tenderness, no neck pain. Patient will be discharged with antibiotics for dental infection and analgesia. Discussed the need for follow up with primary care provider in the next few days for re-evaluation. Discussed return precautions at length. Patient voiced understanding and agreement with plan. EMERGENCY DEPARTMENT COURSE:  ED Course as of 10/29/22 2325   Fri Oct 21, 2022   1040 Patient is requesting that we \"test her urine\". She denies any urinary symptoms including dysuria, hematuria, pelvic pain, abdominal pain. She has had increased frequency noted in the ED. We will test urine pregnancy,  UA and POC glucose [JG]      ED Course User Index  [JG] Ana Sarmiento MD       CONSULTS:  None    FINAL IMPRESSION      1.  Pain, dental          DISPOSITION / PLAN     DISPOSITION Decision To Discharge 10/21/2022 11:47:33 AM      PATIENT REFERRED TO:  Marisol Valle  [address] [phone]    Schedule an appointment as soon as possible for a visit       [facility] ED  [address] [address]  [phone]  Go to   If symptoms worsen    [address]  [address]  [phone]  Go to   Monday at 11 am    DISCHARGE MEDICATIONS:  Discharge Medication List as of 10/21/2022 12:40 PM        START taking these medications    Details   benzocaine (ORAJEL) 20 % GEL mucosal gel Use as needed topically on the gums, Disp-9 g, R-0Print      amoxicillin (AMOXIL) 500 MG capsule Take 1 capsule by mouth 2 times daily for 7 days, Disp-14 capsule, R-0Print             La Negron MD  Emergency Medicine Resident    (Please note that portions of thisnote were completed with a voice recognition program.  Efforts were made to edit the dictations but occasionally words are mis-transcribed.)       La Negron MD  Resident  10/29/22 2726

## 2022-10-21 NOTE — DISCHARGE INSTRUCTIONS
Follow up at your scheduled dental appointment on Monday. Take your medication as indicated and prescribed. If you are given an antibiotic, then make sure you get the prescription filled and take the antibiotics until finished. Drink plenty of water while taking the antibiotics. Avoid drinking alcohol or drinks that have caffeine in it while taking antibiotics. For pain use ibuprofen (Motrin / Advil) or acetaminophen (Tylenol), unless prescribed medications that have acetaminophen in it. You can take over the counter acetaminophen tablets (1 - 2 tablets of the 500-mg strength every 6 hours) or ibuprofen tablets (2 tablets every 4 hours). PLEASE RETURN TO THE EMERGENCY DEPARTMENT IMMEDIATELY for worsening symptoms, swelling to your face, redness on your face, drainage from the tooth, or if you develop any concerning symptoms such as: high fever not relieved by acetaminophen (Tylenol) and/or ibuprofen (Motrin / Advil), chills, shortness of breath, chest pain, feeling of your heart fluttering or racing, persistent nausea and/or vomiting, vomiting up blood, blood in your stool, numbness, loss of consciousness, weakness or tingling in the arms or legs or change in color of the extremities, changes in mental status, persistent headache, blurry vision, loss of bladder / bowel control, unable to follow up with your physician, or other any other care or concern.

## 2022-10-21 NOTE — ED NOTES
Pt arrived to ED alert and oriented x4 via triage. Pt c/o dental and back pain. Pt reports, left, frontal, upper dental pain, states that she needs a tooth removed. Pt reports that she attempted to call her dentist but states \"they picked up and hung up or something\". Pt reports that she has a \"lump\" on the left side of her neck, states pain at site. Pt c/o pain in her spine, denies injury. Pt denies having been around anyone suspected to have COVID-19 or anyone that has been sick, denies recent travel outside the [facility] or [address]. RR even and unlabored. NAD noted. Whiteboard updated. Will continue with plan of care.      Zainab Swan RN  10/21/22 [id number]

## 2022-10-29 ASSESSMENT — ENCOUNTER SYMPTOMS
ABDOMINAL PAIN: 0
NAUSEA: 1
VOMITING: 0
DIARRHEA: 0
COUGH: 0
SHORTNESS OF BREATH: 0
RHINORRHEA: 0
BACK PAIN: 1
SORE THROAT: 0

## 2022-11-15 ENCOUNTER — APPOINTMENT (OUTPATIENT)
Dept: GENERAL RADIOLOGY | Age: 27
End: 2022-11-15
Payer: MEDICARE

## 2022-11-15 ENCOUNTER — HOSPITAL ENCOUNTER (EMERGENCY)
Age: 27
Discharge: HOME | End: 2022-11-15
Attending: EMERGENCY MEDICINE
Payer: MEDICARE

## 2022-11-15 VITALS
HEIGHT: 62 IN | DIASTOLIC BLOOD PRESSURE: 95 MMHG | WEIGHT: 150 LBS | RESPIRATION RATE: 16 BRPM | BODY MASS INDEX: 27.6 KG/M2 | TEMPERATURE: 97.9 F | OXYGEN SATURATION: 99 % | SYSTOLIC BLOOD PRESSURE: 132 MMHG | HEART RATE: 135 BPM

## 2022-11-15 DIAGNOSIS — M25.512 ACUTE PAIN OF LEFT SHOULDER: ICD-10-CM

## 2022-11-15 DIAGNOSIS — K08.89 PAIN, DENTAL: ICD-10-CM

## 2022-11-15 DIAGNOSIS — S22.32XA CLOSED FRACTURE OF ONE RIB OF LEFT SIDE, INITIAL ENCOUNTER: Primary | ICD-10-CM

## 2022-11-15 PROCEDURE — 71046 X-RAY EXAM CHEST 2 VIEWS: CPT

## 2022-11-15 PROCEDURE — 6370000000 HC RX 637 (ALT 250 FOR IP)

## 2022-11-15 PROCEDURE — 99283 EMERGENCY DEPT VISIT LOW MDM: CPT

## 2022-11-15 PROCEDURE — 73030 X-RAY EXAM OF SHOULDER: CPT

## 2022-11-15 RX ORDER — OXYCODONE HYDROCHLORIDE AND ACETAMINOPHEN 5; 325 MG/1; MG/1
1 TABLET ORAL ONCE
Status: COMPLETED | OUTPATIENT
Start: 2022-11-15 | End: 2022-11-15

## 2022-11-15 RX ORDER — IBUPROFEN 600 MG/1
600 TABLET ORAL EVERY 6 HOURS PRN
Qty: 40 TABLET | Refills: 0 | Status: SHIPPED | OUTPATIENT
Start: 2022-11-15

## 2022-11-15 RX ORDER — OXYCODONE HYDROCHLORIDE AND ACETAMINOPHEN 5; 325 MG/1; MG/1
1 TABLET ORAL EVERY 6 HOURS PRN
Qty: 8 TABLET | Refills: 0 | Status: SHIPPED | OUTPATIENT
Start: 2022-11-15 | End: 2022-11-17

## 2022-11-15 RX ADMIN — OXYCODONE HYDROCHLORIDE AND ACETAMINOPHEN 1 TABLET: 5; 325 TABLET ORAL at 09:45

## 2022-11-15 ASSESSMENT — ENCOUNTER SYMPTOMS
FACIAL SWELLING: 1
BACK PAIN: 1
VOICE CHANGE: 0
EYE PAIN: 0
SORE THROAT: 0
PHOTOPHOBIA: 0
ABDOMINAL PAIN: 0
CHEST TIGHTNESS: 0
SHORTNESS OF BREATH: 0
EYE DISCHARGE: 0

## 2022-11-15 ASSESSMENT — PAIN DESCRIPTION - LOCATION: LOCATION: SHOULDER;RIB CAGE

## 2022-11-15 ASSESSMENT — PAIN DESCRIPTION - ORIENTATION: ORIENTATION: LEFT

## 2022-11-15 ASSESSMENT — PAIN SCALES - GENERAL: PAINLEVEL_OUTOF10: 10

## 2022-11-15 ASSESSMENT — PAIN - FUNCTIONAL ASSESSMENT: PAIN_FUNCTIONAL_ASSESSMENT: 0-10
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
## 2022-11-15 NOTE — Clinical Note
Marcel Domínguez was seen and treated in our emergency department on 11/15/2022. She may return to work on 11/17/2022. If you have any questions or concerns, please don't hesitate to call.       Rah Amin MD
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
## 2022-11-15 NOTE — DISCHARGE INSTRUCTIONS
You have been seen in the emergency department for dental pain, shoulder pain, rib pain. Really there is no signs or symptoms of infection to your mouth or jaw, and recommend you follow-up with a dentist for your loose teeth. We advise that you do continue taking the clindamycin antibiotic that you were prescribed when [facility]. We would advise you to call a dentist immediately if you start having purulent discharge, bleeding, jaw pain, inability to move your jaw. We have taken an x-ray of your shoulder and there are no fractures or acute injuries. Taken a chest x-ray which was unremarkable, however, we Inc. it is very possible that you have a clinical rib fracture on the left side. We have provided you with Percocet, you may take 1 pill every 6 hours for pain control. Recall that you should not drive while on narcotic medications. If your pain worsens, increases or you begin experiencing difficulty breathing or shortness of breath please return to the emergency department. We advise that you follow-up with your primary care physician in 4 days for a post ED visit. Please call them sooner if your symptoms do not begin to resolve.

## 2022-11-15 NOTE — ED NOTES
Patient in to the ER again for dental pain and physician requesting SW set her up with an emergency dental appointment. Patient tells RN she has not been able to get in to a dentist.  1031 Karmen Aguilera notes that this patient was set up with an appointment at the [address] [place] [address] on 10/24/2022 at 11am at her last ER visit on 10/21/0202. Patient tells SW that she didn't go to this appointment and gives no reason. It was explained to patient that we cannot reschedule her, but she was provided with a dental clinic list.  Elisha Reid.  [address], [address] Jonah Aguilera, [facility]  11/15/22 7210

## 2023-08-08 ENCOUNTER — TELEPHONE (OUTPATIENT)
Dept: NEUROLOGY | Age: 28
End: 2023-08-08

## 2023-08-08 NOTE — TELEPHONE ENCOUNTER
Patient came in today for her Craniotomy she had done by Dr Rodriguez Pate 2020    She is saying she has Headaches: possible seizures; very off balanced; and light sensitivity    She would like to know if another MRI can be ordered as she is scared and stresses of brain bleeding. Please advise if you would like her to follow up with neuro surg or if she should be seen  by neurology. Or to go to the ER    Thanks.

## 2023-09-06 ENCOUNTER — HOSPITAL ENCOUNTER (EMERGENCY)
Age: 28
Discharge: HOME | End: 2023-09-06
Attending: EMERGENCY MEDICINE
Payer: MEDICAID

## 2023-09-06 VITALS
OXYGEN SATURATION: 99 % | SYSTOLIC BLOOD PRESSURE: 122 MMHG | DIASTOLIC BLOOD PRESSURE: 88 MMHG | BODY MASS INDEX: 24.84 KG/M2 | WEIGHT: 135 LBS | HEART RATE: 83 BPM | HEIGHT: 62 IN | TEMPERATURE: 98.1 F | RESPIRATION RATE: 18 BRPM

## 2023-09-06 DIAGNOSIS — B37.31 VAGINAL CANDIDIASIS: ICD-10-CM

## 2023-09-06 DIAGNOSIS — R10.84 GENERALIZED ABDOMINAL PAIN: Primary | ICD-10-CM

## 2023-09-06 DIAGNOSIS — R73.9 HYPERGLYCEMIA: ICD-10-CM

## 2023-09-06 LAB
ALBUMIN SERPL-MCNC: 3.2 G/DL (ref 3.5–5.2)
ALBUMIN/GLOB SERPL: 1.6 {RATIO} (ref 1–2.5)
ALP SERPL-CCNC: 138 U/L (ref 35–104)
ALT SERPL-CCNC: 16 U/L (ref 5–33)
ANION GAP SERPL CALCULATED.3IONS-SCNC: 14 MMOL/L (ref 9–17)
AST SERPL-CCNC: 12 U/L
B-OH-BUTYR SERPL-MCNC: 0.17 MMOL/L (ref 0.02–0.27)
BASOPHILS # BLD: 0.09 K/UL (ref 0–0.2)
BASOPHILS NFR BLD: 1 % (ref 0–2)
BILIRUB SERPL-MCNC: 0.3 MG/DL (ref 0.3–1.2)
BILIRUB UR QL STRIP: NEGATIVE
BUN SERPL-MCNC: 23 MG/DL (ref 6–20)
CALCIUM SERPL-MCNC: 7.4 MG/DL (ref 8.6–10.4)
CANDIDA SPECIES: POSITIVE
CASTS #/AREA URNS LPF: NORMAL /LPF (ref 0–8)
CHLORIDE SERPL-SCNC: 97 MMOL/L (ref 98–107)
CLARITY UR: CLEAR
CO2 SERPL-SCNC: 20 MMOL/L (ref 20–31)
COLOR UR: YELLOW
CREAT SERPL-MCNC: 0.8 MG/DL (ref 0.5–0.9)
EOSINOPHIL # BLD: 0.3 K/UL (ref 0–0.44)
EOSINOPHILS RELATIVE PERCENT: 3 % (ref 1–4)
EPI CELLS #/AREA URNS HPF: NORMAL /HPF (ref 0–5)
ERYTHROCYTE [DISTWIDTH] IN BLOOD BY AUTOMATED COUNT: 11.9 % (ref 11.8–14.4)
GARDNERELLA VAGINALIS: POSITIVE
GFR SERPL CREATININE-BSD FRML MDRD: >60 ML/MIN/1.73M2
GLUCOSE BLD-MCNC: 231 MG/DL (ref 65–105)
GLUCOSE BLD-MCNC: 399 MG/DL (ref 65–105)
GLUCOSE SERPL-MCNC: 571 MG/DL (ref 70–99)
GLUCOSE UR STRIP-MCNC: ABNORMAL MG/DL
HCG SERPL QL: NEGATIVE
HCT VFR BLD AUTO: 39.1 % (ref 36.3–47.1)
HGB BLD-MCNC: 12.9 G/DL (ref 11.9–15.1)
HGB UR QL STRIP.AUTO: ABNORMAL
IMM GRANULOCYTES # BLD AUTO: 0.04 K/UL (ref 0–0.3)
IMM GRANULOCYTES NFR BLD: 0 %
KETONES UR STRIP-MCNC: NEGATIVE MG/DL
LACTIC ACID, WHOLE BLOOD: 2.8 MMOL/L (ref 0.7–2.1)
LEUKOCYTE ESTERASE UR QL STRIP: NEGATIVE
LIPASE SERPL-CCNC: 33 U/L (ref 13–60)
LYMPHOCYTES NFR BLD: 2.41 K/UL (ref 1.1–3.7)
LYMPHOCYTES RELATIVE PERCENT: 22 % (ref 24–43)
MCH RBC QN AUTO: 27.2 PG (ref 25.2–33.5)
MCHC RBC AUTO-ENTMCNC: 33 G/DL (ref 28.4–34.8)
MCV RBC AUTO: 82.5 FL (ref 82.6–102.9)
MONOCYTES NFR BLD: 0.55 K/UL (ref 0.1–1.2)
MONOCYTES NFR BLD: 5 % (ref 3–12)
NEUTROPHILS NFR BLD: 69 % (ref 36–65)
NEUTS SEG NFR BLD: 7.48 K/UL (ref 1.5–8.1)
NITRITE UR QL STRIP: NEGATIVE
NRBC BLD-RTO: 0 PER 100 WBC
PH UR STRIP: 6 [PH] (ref 5–8)
PLATELET # BLD AUTO: 180 K/UL (ref 138–453)
PMV BLD AUTO: 12 FL (ref 8.1–13.5)
POTASSIUM SERPL-SCNC: 3.2 MMOL/L (ref 3.7–5.3)
PROT SERPL-MCNC: 5.2 G/DL (ref 6.4–8.3)
PROT UR STRIP-MCNC: NEGATIVE MG/DL
RBC # BLD AUTO: 4.74 M/UL (ref 3.95–5.11)
RBC # BLD: ABNORMAL 10*6/UL
RBC #/AREA URNS HPF: NORMAL /HPF (ref 0–4)
SODIUM SERPL-SCNC: 131 MMOL/L (ref 135–144)
SOURCE: ABNORMAL
SP GR UR STRIP: 1.02 (ref 1–1.03)
TRICHOMONAS: NEGATIVE
TROPONIN I SERPL HS-MCNC: 6 NG/L (ref 0–14)
UROBILINOGEN UR STRIP-ACNC: NORMAL EU/DL (ref 0–1)
WBC #/AREA URNS HPF: NORMAL /HPF (ref 0–5)
WBC OTHER # BLD: 10.9 K/UL (ref 3.5–11.3)

## 2023-09-06 PROCEDURE — 85014 HEMATOCRIT: CPT

## 2023-09-06 PROCEDURE — 81001 URINALYSIS AUTO W/SCOPE: CPT

## 2023-09-06 PROCEDURE — 96372 THER/PROPH/DIAG INJ SC/IM: CPT

## 2023-09-06 PROCEDURE — 99284 EMERGENCY DEPT VISIT MOD MDM: CPT

## 2023-09-06 PROCEDURE — 82330 ASSAY OF CALCIUM: CPT

## 2023-09-06 PROCEDURE — 84484 ASSAY OF TROPONIN QUANT: CPT

## 2023-09-06 PROCEDURE — 87480 CANDIDA DNA DIR PROBE: CPT

## 2023-09-06 PROCEDURE — 87510 GARDNER VAG DNA DIR PROBE: CPT

## 2023-09-06 PROCEDURE — 87660 TRICHOMONAS VAGIN DIR PROBE: CPT

## 2023-09-06 PROCEDURE — 87591 N.GONORRHOEAE DNA AMP PROB: CPT

## 2023-09-06 PROCEDURE — 6370000000 HC RX 637 (ALT 250 FOR IP): Performed by: STUDENT IN AN ORGANIZED HEALTH CARE EDUCATION/TRAINING PROGRAM

## 2023-09-06 PROCEDURE — 84703 CHORIONIC GONADOTROPIN ASSAY: CPT

## 2023-09-06 PROCEDURE — 82803 BLOOD GASES ANY COMBINATION: CPT

## 2023-09-06 PROCEDURE — 83690 ASSAY OF LIPASE: CPT

## 2023-09-06 PROCEDURE — 96361 HYDRATE IV INFUSION ADD-ON: CPT

## 2023-09-06 PROCEDURE — 6360000002 HC RX W HCPCS: Performed by: STUDENT IN AN ORGANIZED HEALTH CARE EDUCATION/TRAINING PROGRAM

## 2023-09-06 PROCEDURE — 83605 ASSAY OF LACTIC ACID: CPT

## 2023-09-06 PROCEDURE — 82010 KETONE BODYS QUAN: CPT

## 2023-09-06 PROCEDURE — 80053 COMPREHEN METABOLIC PANEL: CPT

## 2023-09-06 PROCEDURE — 2580000003 HC RX 258: Performed by: STUDENT IN AN ORGANIZED HEALTH CARE EDUCATION/TRAINING PROGRAM

## 2023-09-06 PROCEDURE — 96360 HYDRATION IV INFUSION INIT: CPT

## 2023-09-06 PROCEDURE — 80051 ELECTROLYTE PANEL: CPT

## 2023-09-06 PROCEDURE — 87491 CHLMYD TRACH DNA AMP PROBE: CPT

## 2023-09-06 PROCEDURE — 82947 ASSAY GLUCOSE BLOOD QUANT: CPT

## 2023-09-06 PROCEDURE — 85025 COMPLETE CBC W/AUTO DIFF WBC: CPT

## 2023-09-06 PROCEDURE — 84520 ASSAY OF UREA NITROGEN: CPT

## 2023-09-06 PROCEDURE — 93005 ELECTROCARDIOGRAM TRACING: CPT | Performed by: STUDENT IN AN ORGANIZED HEALTH CARE EDUCATION/TRAINING PROGRAM

## 2023-09-06 RX ORDER — 0.9 % SODIUM CHLORIDE 0.9 %
1000 INTRAVENOUS SOLUTION INTRAVENOUS ONCE
Status: COMPLETED | OUTPATIENT
Start: 2023-09-06 | End: 2023-09-06

## 2023-09-06 RX ORDER — INSULIN LISPRO 100 [IU]/ML
10 INJECTION, SOLUTION INTRAVENOUS; SUBCUTANEOUS ONCE
Status: DISCONTINUED | OUTPATIENT
Start: 2023-09-06 | End: 2023-09-06 | Stop reason: HOSPADM

## 2023-09-06 RX ORDER — DICYCLOMINE HYDROCHLORIDE 10 MG/ML
20 INJECTION INTRAMUSCULAR ONCE
Status: COMPLETED | OUTPATIENT
Start: 2023-09-06 | End: 2023-09-06

## 2023-09-06 RX ORDER — INSULIN LISPRO 100 [IU]/ML
20 INJECTION, SOLUTION INTRAVENOUS; SUBCUTANEOUS ONCE
Status: COMPLETED | OUTPATIENT
Start: 2023-09-06 | End: 2023-09-06

## 2023-09-06 RX ORDER — VENLAFAXINE HYDROCHLORIDE 75 MG/1
CAPSULE, EXTENDED RELEASE ORAL
COMMUNITY
Start: 2023-07-25

## 2023-09-06 RX ORDER — FLUCONAZOLE 150 MG/1
150 TABLET ORAL ONCE
Qty: 1 TABLET | Refills: 0 | Status: SHIPPED | OUTPATIENT
Start: 2023-09-06 | End: 2023-09-06

## 2023-09-06 RX ADMIN — POTASSIUM BICARBONATE 40 MEQ: 782 TABLET, EFFERVESCENT ORAL at 19:48

## 2023-09-06 RX ADMIN — SODIUM CHLORIDE 1000 ML: 9 INJECTION, SOLUTION INTRAVENOUS at 17:47

## 2023-09-06 RX ADMIN — INSULIN LISPRO 20 UNITS: 100 INJECTION, SOLUTION INTRAVENOUS; SUBCUTANEOUS at 19:47

## 2023-09-06 RX ADMIN — DICYCLOMINE HYDROCHLORIDE 20 MG: 10 INJECTION, SOLUTION INTRAMUSCULAR at 17:50

## 2023-09-06 ASSESSMENT — PAIN SCALES - GENERAL: PAINLEVEL_OUTOF10: 9

## 2023-09-06 NOTE — ED PROVIDER NOTES
none  Conduction: normal  ST Segments: no acute change  T Waves: no acute change  Q Waves: none    Clinical Impression: no acute changes and normal EKG    Medical Decision Making  Amount and/or Complexity of Data Reviewed  Labs: ordered. ECG/medicine tests: ordered. Risk  Prescription drug management. (Please note that portions of this note were completed with a voice recognition program.  Efforts were made to edit the dictations but occasionally words are mis-transcribed.)    Naif Bonilla.  Paul Mascorro MD, [facility]  Attending Emergency Medicine Physician         Cathi Arriaga MD  09/06/23 3434

## 2023-09-06 NOTE — ED PROVIDER NOTES
[address] [place] [address] ED  Emergency Department Encounter  Emergency Medicine Resident     Pt David Benavidez  MRN: [id number]  [address] [facility] [place] 1995  Date of evaluation: 9/6/23  PCP:  Trena Valle  Note Started: 5:34 PM EDT      CHIEF COMPLAINT       Chief Complaint   Patient presents with    Hyperglycemia       HISTORY OF PRESENT ILLNESS  (Location/Symptom, Timing/Onset, Context/Setting, Quality, Duration, Modifying Factors, Severity.)      Edelmira Felton is a 25 y.o. female who presents with abdominal pain, nausea vomiting, upper respiratory tract infection symptoms. Patient does have a history of diabetes. She had a glucose of greater than 600 upon her EMS arrival, here point-of-care glucose shows greater than 700. Patient is complaining of some mild chest pain. Patient is not complaining of any fevers chills. Patient is not complaining of any lower extremity swelling. Patient does not have a history of coronary artery disease, she is not on any anticoagulation or antiplatelet agents. PAST MEDICAL / SURGICAL / SOCIAL / FAMILY HISTORY      has a past medical history of Cerebral artery occlusion with cerebral infarction [facility]), Cerebral vascular malformation, Diabetes mellitus ([address]), Seizure ([address]), and Traumatic hemorrhage of left cerebrum without loss of consciousness ([address] has a past surgical history that includes craniotomy (01/08/2020); craniotomy (Left, 01/08/2020); and brain surgery.       Social History     Socioeconomic History    Marital status: Single     Spouse name: Not on file    Number of children: Not on file    Years of education: Not on file    Highest education level: Not on file   Occupational History    Not on file   Tobacco Use    Smoking status: Every Day     Packs/day: 0.30     Years: 14.00     Pack years: 4.20     Types: Cigarettes    Smokeless tobacco: Never   Vaping Use    Vaping Use: Unknown   Substance and Sexual Activity    Alcohol use: Yes     Comment: follow-up. [KK]   2024 POC Glucose(!): 399 [KK]   2040 Patient repeat glucose is less than 250. Patient does have candidiasis, will give 1 dose of Diflucan. Patient is otherwise stable, no acute concerns at this time, will discharge with outpatient follow-up. [KK]   u Sep 07, 2023   0312 Vital signs reviewed at discharge and were within normal limits for the patient. Patient was symptomatically improved and had no new complaints. Patient verbalized understanding of the plan. Patient was given follow up instructions and ER return precautions. Discharge paperwork was reviewed with the patient. Patient is appropriate for discharge and outpatient follow up. [KK]      ED Course User Index  [KK] Aurea Miranda DO       FINAL IMPRESSION      1. Generalized abdominal pain    2. Hyperglycemia    3.  Vaginal candidiasis          DISPOSITION / PLAN     DISPOSITION Decision To Discharge 09/06/2023 07:50:40 PM      PATIENT REFERRED TO:  [facility] ED  [address]  [phone]  Go to   As needed      DISCHARGE MEDICATIONS:  Discharge Medication List as of 9/6/2023  7:51 PM          Jonnathan Gonsales DO  Emergency Medicine Resident    (Please note that portions of this note were completed with a voice recognition program.  Efforts were made to edit the dictations but occasionally words are mis-transcribed.)        Aurea Miranda DO  Resident  09/07/23 9791

## 2023-09-06 NOTE — DISCHARGE INSTRUCTIONS
You have been seen in the ER today for hyperglycemia and abdominal pain. If you begin to experience any symptoms such as chest pain shortness of breath nausea vomiting dizziness drowsiness abdominal pain loss of consciousness or any other symptoms you find concerning please return to the ED for follow-up evaluation. If you have been given pain medication please take them only as prescribed. Do not take more medication than prescribed at any given time. Please follow-up with your primary care provider within 3-5 days for continued care, sooner if you have concerns.

## 2023-09-06 NOTE — ED NOTES
The following labs were labeled with appropriate pt sticker and tubed to lab:     [] Blue     [] Lavender   [] on ice  [] Green/yellow  [] Green/black [] on ice  [] Ailyn Marilin  [] on ice  [] Yellow  [] Red  [] Type/ Screen  [] ABG  [] VBG    [] COVID-19 swab    [] Rapid  [] PCR  [] Flu swab  [] Peds Viral Panel     [] Urine Sample  [] Fecal Sample  [x] Pelvic Cultures  [] Blood Cultures  [] X 2  [] STREP Cultures       Katelyn Cobb RN  09/06/23 6182

## 2023-09-06 NOTE — ED NOTES
Pt resting on cot, RR even and NL, a/o x4. No distress noted at this time. Pt states she is feeling a lot better. Call light within reach. Family at bedside. Will continue with plan of care.      Bhavesh Cavazos RN  09/06/23 1869

## 2023-09-06 NOTE — ED NOTES
Pt to ED via [facility]1 with abdominal pain, hyperglycemia, headache, congestion, chest pain  Pt hx of DM. States she \"felt like\" her blood sugar was high so she went to clinic to get it check. BS at clinic read \"high. \" Blood sugar by EMS read \"high. \" Pt reports her
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
## 2023-09-06 NOTE — ED NOTES
Report given to [facility], all questions addressed     Candace Obrien RN  09/06/23 6213

## 2023-09-07 LAB
BUN BLD-MCNC: 24 MG/DL (ref 8–26)
C TRACH DNA SPEC QL PROBE+SIG AMP: NEGATIVE
CA-I BLD-SCNC: 1.18 MMOL/L (ref 1.15–1.33)
CHLORIDE BLD-SCNC: 95 MMOL/L (ref 98–107)
CO2 BLD CALC-SCNC: 26 MMOL/L (ref 22–30)
EGFR, POC: NORMAL ML/MIN/1.73M2
EKG ATRIAL RATE: 76 BPM
EKG P AXIS: 23 DEGREES
EKG P-R INTERVAL: 132 MS
EKG Q-T INTERVAL: 392 MS
EKG QRS DURATION: 98 MS
EKG QTC CALCULATION (BAZETT): 441 MS
EKG R AXIS: 75 DEGREES
EKG T AXIS: 55 DEGREES
EKG VENTRICULAR RATE: 76 BPM
GLUCOSE BLD-MCNC: >700 MG/DL (ref 74–100)
HCO3 VENOUS: 26.7 MMOL/L (ref 22–29)
HCT VFR BLD AUTO: 42 % (ref 36–46)
N GONORRHOEA DNA SPEC QL PROBE+SIG AMP: NEGATIVE
O2 SAT, VEN: 90.2 % (ref 60–85)
PCO2, VEN: 39.7 MM HG (ref 41–51)
PH VENOUS: 7.44 (ref 7.32–7.43)
PO2, VEN: 57.2 MM HG (ref 30–50)
POC ANION GAP: 11 MMOL/L (ref 7–16)
POC CREATININE: NORMAL MG/DL (ref 0.51–1.19)
POC HEMOGLOBIN (CALC): 14.4 G/DL (ref 12–16)
POC LACTIC ACID: 2.4 MMOL/L (ref 0.56–1.39)
POSITIVE BASE EXCESS, VEN: 2.3 MMOL/L (ref 0–3)
POTASSIUM BLD-SCNC: 3.7 MMOL/L (ref 3.5–4.5)
SODIUM BLD-SCNC: 131 MMOL/L (ref 138–146)
SPECIMEN DESCRIPTION: NORMAL

## 2023-09-07 PROCEDURE — 93010 ELECTROCARDIOGRAM REPORT: CPT | Performed by: INTERNAL MEDICINE

## 2024-03-28 ENCOUNTER — HOSPITAL ENCOUNTER (EMERGENCY)
Age: 29
Discharge: HOME | End: 2024-03-28
Attending: EMERGENCY MEDICINE
Payer: MEDICAID

## 2024-03-28 VITALS
TEMPERATURE: 97.7 F | HEART RATE: 99 BPM | SYSTOLIC BLOOD PRESSURE: 134 MMHG | DIASTOLIC BLOOD PRESSURE: 94 MMHG | RESPIRATION RATE: 16 BRPM | OXYGEN SATURATION: 100 %

## 2024-03-28 DIAGNOSIS — S91.332A PUNCTURE WOUND OF LEFT FOOT, INITIAL ENCOUNTER: Primary | ICD-10-CM

## 2024-03-28 PROCEDURE — 99283 EMERGENCY DEPT VISIT LOW MDM: CPT

## 2024-03-28 PROCEDURE — 6370000000 HC RX 637 (ALT 250 FOR IP)

## 2024-03-28 RX ORDER — ACETAMINOPHEN 325 MG/1
650 TABLET ORAL ONCE
Status: COMPLETED | OUTPATIENT
Start: 2024-03-28 | End: 2024-03-28

## 2024-03-28 RX ORDER — ACETAMINOPHEN 500 MG
500 TABLET ORAL 4 TIMES DAILY PRN
Qty: 15 TABLET | Refills: 0 | Status: SHIPPED | OUTPATIENT
Start: 2024-03-28

## 2024-03-28 RX ORDER — LEVOFLOXACIN 500 MG/1
500 TABLET, FILM COATED ORAL ONCE
Status: COMPLETED | OUTPATIENT
Start: 2024-03-28 | End: 2024-03-28

## 2024-03-28 RX ORDER — LEVOFLOXACIN 500 MG/1
500 TABLET, FILM COATED ORAL DAILY
Qty: 5 TABLET | Refills: 0 | Status: SHIPPED | OUTPATIENT
Start: 2024-03-28 | End: 2024-04-02

## 2024-03-28 RX ORDER — LEVOFLOXACIN 500 MG/1
500 TABLET, FILM COATED ORAL DAILY
Qty: 5 TABLET | Refills: 0 | Status: SHIPPED | OUTPATIENT
Start: 2024-03-28 | End: 2024-03-28

## 2024-03-28 RX ORDER — ACETAMINOPHEN 500 MG
500 TABLET ORAL 4 TIMES DAILY PRN
Qty: 15 TABLET | Refills: 0 | Status: SHIPPED | OUTPATIENT
Start: 2024-03-28 | End: 2024-03-28

## 2024-03-28 RX ADMIN — ACETAMINOPHEN 650 MG: 325 TABLET ORAL at 02:05

## 2024-03-28 RX ADMIN — LEVOFLOXACIN 500 MG: 500 TABLET, FILM COATED ORAL at 02:32

## 2024-03-28 ASSESSMENT — PAIN - FUNCTIONAL ASSESSMENT: PAIN_FUNCTIONAL_ASSESSMENT: 0-10

## 2024-03-28 ASSESSMENT — PAIN SCALES - GENERAL: PAINLEVEL_OUTOF10: 7

## 2024-03-28 NOTE — ED PROVIDER NOTES
[facility]     Emergency Department     Faculty Attestation    I performed a history and physical examination of the patient and discussed management with the resident. I have reviewed and agree with the resident’s findings including all diagnostic interpretations, and treatment plans as written. Any areas of disagreement are noted on the chart. I was personally present for the key portions of any procedures. I have documented in the chart those procedures where I was not present during the key portions. I have reviewed the emergency nurses triage note. I agree with the chief complaint, past medical history, past surgical history, allergies, medications, social and family history as documented unless otherwise noted below. Documentation of the HPI, Physical Exam and Medical Decision Making performed by [email or web address] is based on my personal performance of the HPI, PE and MDM. For Physician Assistant/ Nurse Practitioner cases/documentation I have personally evaluated this patient and have completed at least one if not all key elements of the E/M (history, physical exam, and MDM). Additional findings are as noted.    Note Started: 2:07 AM EDT     29 yo F stepped on staple L foot, did not go through shoe, dT current,   PE vss gcs 15   Superficial puncture L sole /// nv intact L foot,     -wound care, antibiotic started,     EKG Interpretation    Interpreted by me      CRITICAL CARE: There was a high probability of clinically significant/life threatening deterioration in this patient's condition which required my urgent intervention.  Total critical care time was 0 minutes.  This excludes any time for separately reportable procedures.       Roni Muniz DO  03/28/24 0210       Roni Stanton DO  03/28/24 0344

## 2024-03-28 NOTE — DISCHARGE INSTRUCTIONS
You are seen in the emergency department for puncture wound your foot.  You are started on a short course of antibiotics which you should take until they are completely gone.  You should follow-up with your primary care provider that was set up for you to reevaluate and make sure this is healing properly.  You can use Tylenol as needed for pain.

## 2024-03-28 NOTE — ED NOTES
Pt arrived to ED through triage with c/o of foot pain  Pt stated she was working on her house when she stepped on a staple around 10pm   Pt stated she has a hx of HTN, seizure and stroke  Pt stated she is complaint with daily meds  Pt VS charted below  Pt appears to be in no acute distress at this time

## 2024-03-28 NOTE — ED PROVIDER NOTES
[facility] ED  Emergency Department Encounter  Emergency Medicine Resident     Pt Name:Gala Askew  MRN: [id number]  Birthdate 1995  Date of evaluation: 3/28/24  PCP:  No primary care provider on file.  Note Started: 1:45 AM EDT      CHIEF COMPLAINT       Chief Complaint   Patient presents with    Foot Injury       HISTORY OF PRESENT ILLNESS  (Location/Symptom, Timing/Onset, Context/Setting, Quality, Duration, Modifying Factors, Severity.)      Gala Askew is a 28 y.o. female who presents with puncture wound to foot on a staple last night.  Patient says stuck in her skin but she was able to pull it out.  Denies any significant bleeding.  Continues to have pain.  Tetanus shot was updated 8 months ago.  She otherwise has no complaints.    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       has a past surgical history that includes craniotomy (01/08/2020); craniotomy (Left, 01/08/2020); and brain surgery.      Social History     Socioeconomic History    Marital status: Single     Spouse name: Not on file    Number of children: Not on file    Years of education: Not on file    Highest education level: Not on file   Occupational History    Not on file   Tobacco Use    Smoking status: Every Day     Current packs/day: 0.30     Average packs/day: 0.3 packs/day for 14.0 years (4.2 ttl pk-yrs)     Types: Cigarettes    Smokeless tobacco: Never   Vaping Use    Vaping Use: Unknown   Substance and Sexual Activity    Alcohol use: Yes     Comment: Occassionally    Drug use: Yes     Types: Marijuana (Weed)    Sexual activity: Yes     Partners: Male   Other Topics Concern    Not on file   Social History Narrative    Not on file     Social Determinants of Health

## 2024-07-30 ENCOUNTER — HOSPITAL ENCOUNTER (EMERGENCY)
Age: 29
Discharge: HOME | End: 2024-07-31
Attending: EMERGENCY MEDICINE
Payer: MEDICAID

## 2024-07-30 DIAGNOSIS — E11.65 HYPERGLYCEMIA DUE TO DIABETES MELLITUS (HCC): ICD-10-CM

## 2024-07-30 DIAGNOSIS — N30.01 ACUTE CYSTITIS WITH HEMATURIA: Primary | ICD-10-CM

## 2024-07-30 LAB
ALBUMIN SERPL-MCNC: 4.1 G/DL (ref 3.5–5.2)
ALBUMIN/GLOB SERPL: 1 {RATIO} (ref 1–2.5)
ALP SERPL-CCNC: 187 U/L (ref 35–104)
ALT SERPL-CCNC: 58 U/L (ref 10–35)
ANION GAP SERPL CALCULATED.3IONS-SCNC: 15 MMOL/L (ref 9–16)
AST SERPL-CCNC: 51 U/L (ref 10–35)
BACTERIA URNS QL MICRO: ABNORMAL
BILIRUB SERPL-MCNC: 0.3 MG/DL (ref 0–1.2)
BILIRUB UR QL STRIP: NEGATIVE
BUN SERPL-MCNC: 15 MG/DL (ref 6–20)
CALCIUM SERPL-MCNC: 9.3 MG/DL (ref 8.6–10.4)
CASTS #/AREA URNS LPF: ABNORMAL /LPF (ref 0–8)
CHLORIDE SERPL-SCNC: 98 MMOL/L (ref 98–107)
CLARITY UR: CLEAR
CO2 SERPL-SCNC: 23 MMOL/L (ref 20–31)
COLOR UR: YELLOW
CREAT SERPL-MCNC: 1.4 MG/DL (ref 0.5–0.9)
EPI CELLS #/AREA URNS HPF: ABNORMAL /HPF (ref 0–5)
ERYTHROCYTE [DISTWIDTH] IN BLOOD BY AUTOMATED COUNT: 12.5 % (ref 11.8–14.4)
GFR, ESTIMATED: 52 ML/MIN/1.73M2
GLUCOSE BLD-MCNC: 335 MG/DL (ref 65–105)
GLUCOSE SERPL-MCNC: 301 MG/DL (ref 74–99)
GLUCOSE UR STRIP-MCNC: ABNORMAL MG/DL
HCG SERPL QL: NEGATIVE
HCT VFR BLD AUTO: 46.5 % (ref 36.3–47.1)
HGB BLD-MCNC: 15.3 G/DL (ref 11.9–15.1)
HGB UR QL STRIP.AUTO: NEGATIVE
KETONES UR STRIP-MCNC: NEGATIVE MG/DL
LEUKOCYTE ESTERASE UR QL STRIP: ABNORMAL
LIPASE SERPL-CCNC: 31 U/L (ref 13–60)
MCH RBC QN AUTO: 27.7 PG (ref 25.2–33.5)
MCHC RBC AUTO-ENTMCNC: 32.9 G/DL (ref 28.4–34.8)
MCV RBC AUTO: 84.2 FL (ref 82.6–102.9)
NITRITE UR QL STRIP: NEGATIVE
NRBC BLD-RTO: 0 PER 100 WBC
PH UR STRIP: 5.5 [PH] (ref 5–8)
PLATELET # BLD AUTO: 263 K/UL (ref 138–453)
PMV BLD AUTO: 11 FL (ref 8.1–13.5)
POTASSIUM SERPL-SCNC: 3.7 MMOL/L (ref 3.7–5.3)
PROT SERPL-MCNC: 7.1 G/DL (ref 6.6–8.7)
PROT UR STRIP-MCNC: NEGATIVE MG/DL
RBC # BLD AUTO: 5.52 M/UL (ref 3.95–5.11)
RBC #/AREA URNS HPF: ABNORMAL /HPF (ref 0–4)
SODIUM SERPL-SCNC: 136 MMOL/L (ref 136–145)
SP GR UR STRIP: 1.02 (ref 1–1.03)
UROBILINOGEN UR STRIP-ACNC: NORMAL EU/DL (ref 0–1)
WBC #/AREA URNS HPF: ABNORMAL /HPF (ref 0–5)
WBC OTHER # BLD: 13.9 K/UL (ref 3.5–11.3)

## 2024-07-30 PROCEDURE — 96375 TX/PRO/DX INJ NEW DRUG ADDON: CPT

## 2024-07-30 PROCEDURE — 81001 URINALYSIS AUTO W/SCOPE: CPT

## 2024-07-30 PROCEDURE — 85027 COMPLETE CBC AUTOMATED: CPT

## 2024-07-30 PROCEDURE — 82947 ASSAY GLUCOSE BLOOD QUANT: CPT

## 2024-07-30 PROCEDURE — 84703 CHORIONIC GONADOTROPIN ASSAY: CPT

## 2024-07-30 PROCEDURE — 96361 HYDRATE IV INFUSION ADD-ON: CPT

## 2024-07-30 PROCEDURE — 6370000000 HC RX 637 (ALT 250 FOR IP)

## 2024-07-30 PROCEDURE — 80053 COMPREHEN METABOLIC PANEL: CPT

## 2024-07-30 PROCEDURE — 6360000002 HC RX W HCPCS

## 2024-07-30 PROCEDURE — 96374 THER/PROPH/DIAG INJ IV PUSH: CPT

## 2024-07-30 PROCEDURE — 99284 EMERGENCY DEPT VISIT MOD MDM: CPT

## 2024-07-30 PROCEDURE — 2580000003 HC RX 258

## 2024-07-30 PROCEDURE — 83690 ASSAY OF LIPASE: CPT

## 2024-07-30 RX ORDER — MAGNESIUM HYDROXIDE/ALUMINUM HYDROXICE/SIMETHICONE 120; 1200; 1200 MG/30ML; MG/30ML; MG/30ML
30 SUSPENSION ORAL
Status: COMPLETED | OUTPATIENT
Start: 2024-07-30 | End: 2024-07-30

## 2024-07-30 RX ORDER — KETOROLAC TROMETHAMINE 15 MG/ML
15 INJECTION, SOLUTION INTRAMUSCULAR; INTRAVENOUS ONCE
Status: COMPLETED | OUTPATIENT
Start: 2024-07-30 | End: 2024-07-30

## 2024-07-30 RX ORDER — 0.9 % SODIUM CHLORIDE 0.9 %
1000 INTRAVENOUS SOLUTION INTRAVENOUS ONCE
Status: COMPLETED | OUTPATIENT
Start: 2024-07-30 | End: 2024-07-31

## 2024-07-30 RX ORDER — ONDANSETRON 2 MG/ML
4 INJECTION INTRAMUSCULAR; INTRAVENOUS ONCE
Status: COMPLETED | OUTPATIENT
Start: 2024-07-30 | End: 2024-07-30

## 2024-07-30 RX ADMIN — ALUMINUM HYDROXIDE, MAGNESIUM HYDROXIDE, AND SIMETHICONE 30 ML: 200; 200; 20 SUSPENSION ORAL at 22:22

## 2024-07-30 RX ADMIN — SODIUM CHLORIDE 1000 ML: 9 INJECTION, SOLUTION INTRAVENOUS at 22:23

## 2024-07-30 RX ADMIN — KETOROLAC TROMETHAMINE 15 MG: 15 INJECTION, SOLUTION INTRAMUSCULAR; INTRAVENOUS at 22:22

## 2024-07-30 RX ADMIN — ONDANSETRON 4 MG: 2 INJECTION INTRAMUSCULAR; INTRAVENOUS at 22:22

## 2024-07-30 ASSESSMENT — ENCOUNTER SYMPTOMS
SHORTNESS OF BREATH: 0
BLOOD IN STOOL: 0
NAUSEA: 1
VOMITING: 0
DIARRHEA: 1
ABDOMINAL DISTENTION: 0
ABDOMINAL PAIN: 1

## 2024-07-30 ASSESSMENT — PAIN DESCRIPTION - LOCATION: LOCATION: ABDOMEN

## 2024-07-30 ASSESSMENT — PAIN SCALES - GENERAL: PAINLEVEL_OUTOF10: 10

## 2024-07-31 VITALS
HEART RATE: 81 BPM | TEMPERATURE: 98.4 F | DIASTOLIC BLOOD PRESSURE: 87 MMHG | OXYGEN SATURATION: 99 % | RESPIRATION RATE: 19 BRPM | SYSTOLIC BLOOD PRESSURE: 126 MMHG

## 2024-07-31 LAB — GLUCOSE BLD-MCNC: 202 MG/DL (ref 65–105)

## 2024-07-31 PROCEDURE — 82947 ASSAY GLUCOSE BLOOD QUANT: CPT

## 2024-07-31 PROCEDURE — 6370000000 HC RX 637 (ALT 250 FOR IP)

## 2024-07-31 RX ORDER — CEPHALEXIN 500 MG/1
500 CAPSULE ORAL 2 TIMES DAILY
Qty: 14 CAPSULE | Refills: 0 | Status: SHIPPED | OUTPATIENT
Start: 2024-07-31 | End: 2024-08-07

## 2024-07-31 RX ORDER — ONDANSETRON 4 MG/1
4 TABLET, ORALLY DISINTEGRATING ORAL 3 TIMES DAILY PRN
Qty: 12 TABLET | Refills: 0 | Status: SHIPPED | OUTPATIENT
Start: 2024-07-31

## 2024-07-31 RX ORDER — CEPHALEXIN 500 MG/1
500 CAPSULE ORAL ONCE
Status: COMPLETED | OUTPATIENT
Start: 2024-07-31 | End: 2024-07-31

## 2024-07-31 RX ORDER — INSULIN LISPRO 100 [IU]/ML
10 INJECTION, SOLUTION INTRAVENOUS; SUBCUTANEOUS ONCE
Status: DISCONTINUED | OUTPATIENT
Start: 2024-07-31 | End: 2024-07-31 | Stop reason: HOSPADM

## 2024-07-31 RX ADMIN — CEPHALEXIN 500 MG: 500 CAPSULE ORAL at 01:45

## 2024-07-31 NOTE — ED PROVIDER NOTES
Abdominal:      General: Abdomen is flat.      Palpations: Abdomen is soft.      Tenderness: There is no abdominal tenderness. There is no right CVA tenderness, left CVA tenderness, guarding or rebound.      Hernia: No hernia is present.   Musculoskeletal:      Cervical back: No rigidity.      Right lower leg: No edema.      Left lower leg: No edema.   Skin:     Capillary Refill: Capillary refill takes less than 2 seconds.   Neurological:      Mental Status: She is alert and oriented to person, place, and time.      GCS: GCS eye subscore is 4. GCS verbal subscore is 5. GCS motor subscore is 6.      Sensory: No sensory deficit.      Motor: No weakness.       DDX/DIAGNOSTIC RESULTS / EMERGENCY DEPARTMENT COURSE / MDM     Medical Decision Making  29-year-old female presenting with abdominal pain, diarrhea, nausea.  Also complaining of headache.  Blood sugar was initially over the detectable limit yesterday, in the 300s here.  Will give fluids, will give abdominal labs.  Abdomen is benign, no guarding, no tenderness on examination.  She denies vaginal bleeding or discharge.  No suspicion for obstruction/perforation, no suspicion for cholecystitis - patient is status post cholecystectomy.  No suspicion for appendicitis, no tenderness in right lower quadrant.  Low suspicion for pregnancy, will obtain hCG.  Some suspicion for worsening UTI, patient does not have any CVA tenderness bilaterally, states that she is taking her Macrobid - will obtain UA.  Will treat pain, anticipate that this is nausea/vomiting secondary to hyperglycemia.    Amount and/or Complexity of Data Reviewed  Labs: ordered. Decision-making details documented in ED Course.    Risk  OTC drugs.  Prescription drug management.      EMERGENCY DEPARTMENT COURSE:  ED Course as of 07/31/24 0304   Tue Jul 30, 2024 2158 BP(!): 139/93 [KJ]   2158 Pulse: 82 [KJ]   2158 Respirations: 17 [KJ]   2158 SpO2: 98 % [KJ]   2158 Temp: 98.4 °F (36.9 °C) [KJ]   2214 POC  Glucose(!): 335 [KJ]   2303 WBC(!): 13.9  Hemoconcentrated versus worsening UTI, will obtain UA [KJ]   Wed Jul 31, 2024   0132 Bacteria, UA(!): FEW  Ongoing UTI, will take off Macrobid - will put on Keflex [KJ]   0133 AST(!): 51  Mild elevation [KJ]   0134 ALT(!): 58 [KJ]   0134 Lipase: 31 [KJ]   0134 Preg, Serum: NEGATIVE [KJ]      ED Course User Index  [KJ] Salvatore Zavala MD   Patient discharged with return precautions, Rx for Keflex, Rx for ondansetron and instructed to follow-up with her endocrinologist.      IMAGING:  No orders to display       MEDICATIONS GIVEN TO PATIENT THIS ENCOUNTER:  Orders Placed This Encounter   Medications    sodium chloride 0.9 % bolus 1,000 mL    ondansetron (ZOFRAN) injection 4 mg    aluminum & magnesium hydroxide-simethicone (MAALOX) 200-200-20 MG/5ML suspension 30 mL    ketorolac (TORADOL) injection 15 mg    insulin lispro (HUMALOG,ADMELOG) injection vial 10 Units    cephALEXin (KEFLEX) capsule 500 mg     Order Specific Question:   Antimicrobial Indications     Answer:   Urinary Tract Infection    cephALEXin (KEFLEX) 500 MG capsule     Sig: Take 1 capsule by mouth 2 times daily for 7 days     Dispense:  14 capsule     Refill:  0    ondansetron (ZOFRAN-ODT) 4 MG disintegrating tablet     Sig: Take 1 tablet by mouth 3 times daily as needed for Nausea or Vomiting     Dispense:  12 tablet     Refill:  0       PROCEDURES:  Procedures     CONSULTS:  None    CRITICAL CARE:  There was significant risk of life threatening deterioration of patient's condition requiring my direct management. Critical care time 0 minutes, excluding any documented procedures.    FINAL IMPRESSION      1. Acute cystitis with hematuria    2. Hyperglycemia due to diabetes mellitus (HCC)          DISPOSITION / PLAN     DISPOSITION Decision To Discharge 07/31/2024 01:32:59 AM      PATIENT REFERRED TO:  [facility] ED  [address] [address]  [phone]  Go to   If symptoms

## 2024-07-31 NOTE — ED PROVIDER NOTES
[facility]     Emergency Department     Faculty Attestation    I performed a history and physical examination of the patient and discussed management with the resident. I reviewed the resident’s note and agree with the documented findings including all diagnostic interpretations and plan of care. Any areas of disagreement are noted on the chart. I was personally present for the key portions of any procedures. I have documented in the chart those procedures where I was not present during the key portions. I have reviewed the emergency nurses triage note. I agree with the chief complaint, past medical history, past surgical history, allergies, medications, social and family history as documented unless otherwise noted below. Documentation of the HPI, Physical Exam and Medical Decision Making performed by milad is based on my personal performance of the HPI, PE and MDM. For Physician Assistant/ Nurse Practitioner cases/documentation I have personally evaluated this patient and have completed at least one if not all key elements of the E/M (history, physical exam, and MDM). Additional findings are as noted.    Primary Care Physician: No primary care provider on file.    Note Started: 2:17 AM EDT     VITAL SIGNS:   oral temperature is 98.4 °F (36.9 °C). Her blood pressure is 126/87 and her pulse is 81. Her respiration is 19 and oxygen saturation is 99%.      Medical Decision Making  Abdominal pain nausea vomiting.  Currently on Macrobid for treatment of UTI for the past 5 days.  Has had difficulties with controlling her blood sugar during this period and has not been able to eat or drink much secondary to the nausea and vomiting.  On examination no acute distress appears uncomfortable.  Cardiac exam regular rate and rhythm no murmurs rubs gallops, pulmonary clear bilaterally abdomen is soft, mild diffuse tenderness no rebound no guarding.  Impression abdominal

## 2024-07-31 NOTE — ED PROVIDER NOTES
Handoff taken on the following patient from prior Attending Physician:    Pt Name: Gala Askew    PCP:  No primary care provider on file.         Attestation    I was available and discussed any additional care issues that arose and coordinated the management plans with the resident(s) caring for the patient during my duty period. Any areas of disagreement with resident’s documentation of care or procedures are noted on the chart. I was personally present for the key portions of any/all procedures during my duty period. I have documented in the chart those procedures where I was not present during the key portions.      Patient 29-year-old with history of diabetes recent UTI on nitrofurantoin 5 days has failed treatment symptoms more consistent with Danilo will need cephalexin p.o. challenge and nausea medications upon discharge     Sharan Greene DO  07/31/24 0057

## 2024-07-31 NOTE — DISCHARGE INSTRUCTIONS
You were seen in the emergency room for ongoing nausea and vomiting and abdominal pain.  Your sugar was mildly elevated, you were given insulin in the emergency department.  There is evidence of an ongoing UTI.  Stop taking the Macrobid, you are being discharged on Keflex.  Continue taking this twice daily.  You are also being discharged on Zofran, only take it as needed for nausea and vomiting.    Continue taking all your other medications as prescribed, stop taking the Macrobid however.    Schedule appointment to be seen by your endocrinologist as soon as possible.  If you are unable to be seen by your endocrinologist, schedule appointment to be seen by the endocrinologist at the number below for further modifications to your diabetes regimen.    Return to the emergency room if you have worsening abdominal pain, fever/chills, worsening nausea/vomiting, bloody bowel movements/blood in vomit, or if you have any other acute medical concern.

## 2024-09-06 ENCOUNTER — OFFICE VISIT (OUTPATIENT)
Dept: NEUROSURGERY | Age: 29
End: 2024-09-06
Payer: MEDICAID

## 2024-09-06 VITALS
HEART RATE: 111 BPM | HEIGHT: 62 IN | SYSTOLIC BLOOD PRESSURE: 134 MMHG | BODY MASS INDEX: 24.84 KG/M2 | WEIGHT: 135 LBS | DIASTOLIC BLOOD PRESSURE: 93 MMHG

## 2024-09-06 DIAGNOSIS — E11.9 TYPE 2 DIABETES MELLITUS WITHOUT COMPLICATION, WITHOUT LONG-TERM CURRENT USE OF INSULIN (HCC): ICD-10-CM

## 2024-09-06 DIAGNOSIS — Q28.3 CEREBRAL CAVERNOMA: Primary | ICD-10-CM

## 2024-09-06 DIAGNOSIS — R59.0 POSTERIOR CERVICAL LYMPHADENOPATHY: ICD-10-CM

## 2024-09-06 PROCEDURE — 99204 OFFICE O/P NEW MOD 45 MIN: CPT

## 2024-10-11 ENCOUNTER — HOSPITAL ENCOUNTER (OUTPATIENT)
Dept: CT IMAGING | Age: 29
Discharge: HOME | End: 2024-10-13
Payer: MEDICAID

## 2024-10-11 ENCOUNTER — HOSPITAL ENCOUNTER (OUTPATIENT)
Dept: MRI IMAGING | Age: 29
Discharge: HOME | End: 2024-10-13
Payer: MEDICAID

## 2024-10-11 DIAGNOSIS — R59.0 POSTERIOR CERVICAL LYMPHADENOPATHY: ICD-10-CM

## 2024-10-11 DIAGNOSIS — Q28.3 CEREBRAL CAVERNOMA: ICD-10-CM

## 2024-10-11 DIAGNOSIS — E11.9 TYPE 2 DIABETES MELLITUS WITHOUT COMPLICATION, WITHOUT LONG-TERM CURRENT USE OF INSULIN (HCC): ICD-10-CM

## 2024-10-11 LAB
EGFR, POC: >90 ML/MIN/1.73M2
POC CREATININE: 0.8 MG/DL (ref 0.51–1.19)

## 2024-10-11 PROCEDURE — 82565 ASSAY OF CREATININE: CPT

## 2024-10-11 PROCEDURE — 6360000004 HC RX CONTRAST MEDICATION

## 2024-10-11 PROCEDURE — 70450 CT HEAD/BRAIN W/O DYE: CPT

## 2024-10-11 PROCEDURE — A9576 INJ PROHANCE MULTIPACK: HCPCS

## 2024-10-11 PROCEDURE — 70553 MRI BRAIN STEM W/O & W/DYE: CPT

## 2024-10-11 RX ADMIN — GADOTERIDOL 12 ML: 279.3 INJECTION, SOLUTION INTRAVENOUS at 14:15

## 2024-11-04 ENCOUNTER — HOSPITAL ENCOUNTER (EMERGENCY)
Age: 29
Discharge: HOME | End: 2024-11-04
Attending: EMERGENCY MEDICINE
Payer: MEDICAID

## 2024-11-04 VITALS
OXYGEN SATURATION: 98 % | BODY MASS INDEX: 22.46 KG/M2 | SYSTOLIC BLOOD PRESSURE: 130 MMHG | RESPIRATION RATE: 16 BRPM | HEART RATE: 90 BPM | TEMPERATURE: 98.7 F | DIASTOLIC BLOOD PRESSURE: 87 MMHG | WEIGHT: 122.8 LBS

## 2024-11-04 DIAGNOSIS — L02.91 ABSCESS: Primary | ICD-10-CM

## 2024-11-04 PROCEDURE — 6370000000 HC RX 637 (ALT 250 FOR IP)

## 2024-11-04 PROCEDURE — 96372 THER/PROPH/DIAG INJ SC/IM: CPT

## 2024-11-04 PROCEDURE — 99284 EMERGENCY DEPT VISIT MOD MDM: CPT

## 2024-11-04 PROCEDURE — 6360000002 HC RX W HCPCS

## 2024-11-04 RX ORDER — DOXYCYCLINE HYCLATE 100 MG
100 TABLET ORAL ONCE
Status: COMPLETED | OUTPATIENT
Start: 2024-11-04 | End: 2024-11-04

## 2024-11-04 RX ORDER — DOXYCYCLINE HYCLATE 100 MG
100 TABLET ORAL 2 TIMES DAILY
Qty: 14 TABLET | Refills: 0 | Status: SHIPPED | OUTPATIENT
Start: 2024-11-04 | End: 2024-11-11

## 2024-11-04 RX ORDER — KETOROLAC TROMETHAMINE 30 MG/ML
30 INJECTION, SOLUTION INTRAMUSCULAR; INTRAVENOUS ONCE
Status: DISCONTINUED | OUTPATIENT
Start: 2024-11-04 | End: 2024-11-04

## 2024-11-04 RX ORDER — KETOROLAC TROMETHAMINE 30 MG/ML
30 INJECTION, SOLUTION INTRAMUSCULAR; INTRAVENOUS ONCE
Status: COMPLETED | OUTPATIENT
Start: 2024-11-04 | End: 2024-11-04

## 2024-11-04 RX ADMIN — DOXYCYCLINE HYCLATE 100 MG: 100 TABLET, COATED ORAL at 20:20

## 2024-11-04 RX ADMIN — KETOROLAC TROMETHAMINE 30 MG: 30 INJECTION, SOLUTION INTRAMUSCULAR; INTRAVENOUS at 20:20

## 2024-11-04 ASSESSMENT — PAIN DESCRIPTION - DESCRIPTORS: DESCRIPTORS: SORE

## 2024-11-04 ASSESSMENT — PAIN DESCRIPTION - LOCATION: LOCATION: FACE

## 2024-11-04 ASSESSMENT — PAIN SCALES - GENERAL: PAINLEVEL_OUTOF10: 10

## 2024-11-04 ASSESSMENT — PAIN - FUNCTIONAL ASSESSMENT: PAIN_FUNCTIONAL_ASSESSMENT: 0-10

## 2024-11-05 NOTE — ED PROVIDER NOTES
[facility] ED  Emergency Department Encounter  Emergency Medicine Resident     Pt Name:Gala Askew  MRN: [id number]  Birthdate 1995  Date of evaluation: 11/4/24  PCP:  Berna Weaver APRN - NP  Note Started: 8:08 PM EST      CHIEF COMPLAINT       Chief Complaint   Patient presents with    Abscess       HISTORY OF PRESENT ILLNESS  (Location/Symptom, Timing/Onset, Context/Setting, Quality, Duration, Modifying Factors, Severity.)      Gala Askew is a 29 y.o. female who presents with abscess.  Patient states that she noticed an abscess on her chin about a month ago.  She has been using antibiotic ointment to prevent it from getting worse.  States in the past 2 days is getting worse.  Patient reports it is slightly itchy.    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       has a past surgical history that includes craniotomy (01/08/2020); craniotomy (Left, 01/08/2020); and brain surgery.      Social History     Socioeconomic History    Marital status: Single     Spouse name: Not on file    Number of children: Not on file    Years of education: Not on file    Highest education level: Not on file   Occupational History    Not on file   Tobacco Use    Smoking status: Every Day     Current packs/day: 1.00     Average packs/day: 1 pack/day for 14.8 years (14.8 ttl pk-yrs)     Types: Cigarettes     Start date: 2010    Smokeless tobacco: Never   Vaping Use    Vaping status: Never Used   Substance and Sexual Activity    Alcohol use: Yes     Comment: Occassionally    Drug use: Yes     Types: Marijuana (Weed)    Sexual activity: Yes     Partners: Male   Other Topics Concern    Not on file   Social History Narrative    Not on file     Social Determinants of

## 2024-11-05 NOTE — ED PROVIDER NOTES
[facility]     Emergency Department     Faculty Attestation    I performed a history and physical examination of the patient and discussed management with the resident. I reviewed the resident's note and agree with the documented findings and plan of care. Any areas of disagreement are noted on the chart. I was personally present for the key portions of any procedures. I have documented in the chart those procedures where I was not present during the key portions. I have reviewed the emergency nurses triage note. I agree with the chief complaint, past medical history, past surgical history, allergies, medications, social and family history as documented unless otherwise noted below. For Physician Assistant/ Nurse Practitioner cases/documentation I have personally evaluated this patient and have completed at least one if not all key elements of the E/M (history, physical exam, and MDM). Additional findings are as noted.    Superficial abscess left side of the mentum.  No intraoral lesions.  Patient denies any chance of being pregnant states she is currently on her menstrual period.     Mukesh Luciano MD  11/04/24 2019

## 2024-11-05 NOTE — ED NOTES
Pt to ED via private auto with complaints of pain on her chin. Pt states that she noticed an abscess on her chin about a month ago. Pt states that she has been using antibiotic ointment since to prevent it from getting worse. Pt states the last 2 days has been getting worse and decided to come in. Pt states that the sight is very itchy. Pt is A&Ox4, respirations are even, non-labored.      Vitals completed in triage.

## 2025-01-11 ENCOUNTER — APPOINTMENT (OUTPATIENT)
Dept: CT IMAGING | Age: 30
End: 2025-01-11
Payer: COMMERCIAL

## 2025-01-11 ENCOUNTER — HOSPITAL ENCOUNTER (EMERGENCY)
Age: 30
Discharge: HOME | End: 2025-01-11
Attending: EMERGENCY MEDICINE
Payer: COMMERCIAL

## 2025-01-11 VITALS
OXYGEN SATURATION: 100 % | DIASTOLIC BLOOD PRESSURE: 82 MMHG | TEMPERATURE: 97.7 F | SYSTOLIC BLOOD PRESSURE: 117 MMHG | HEART RATE: 98 BPM | RESPIRATION RATE: 16 BRPM

## 2025-01-11 DIAGNOSIS — R51.9 ACUTE NONINTRACTABLE HEADACHE, UNSPECIFIED HEADACHE TYPE: Primary | ICD-10-CM

## 2025-01-11 LAB
ANION GAP SERPL CALCULATED.3IONS-SCNC: 10 MMOL/L (ref 9–16)
BACTERIA URNS QL MICRO: ABNORMAL
BASOPHILS # BLD: 0.11 K/UL (ref 0–0.2)
BASOPHILS NFR BLD: 1 % (ref 0–2)
BILIRUB UR QL STRIP: NEGATIVE
BUN SERPL-MCNC: 14 MG/DL (ref 6–20)
CALCIUM SERPL-MCNC: 9.5 MG/DL (ref 8.6–10.4)
CASTS #/AREA URNS LPF: ABNORMAL /LPF (ref 0–8)
CHLORIDE SERPL-SCNC: 103 MMOL/L (ref 98–107)
CLARITY UR: ABNORMAL
CO2 SERPL-SCNC: 25 MMOL/L (ref 20–31)
COLOR UR: YELLOW
CREAT SERPL-MCNC: 0.8 MG/DL (ref 0.6–0.9)
EOSINOPHIL # BLD: 0.46 K/UL (ref 0–0.44)
EOSINOPHILS RELATIVE PERCENT: 4 % (ref 1–4)
EPI CELLS #/AREA URNS HPF: ABNORMAL /HPF (ref 0–5)
ERYTHROCYTE [DISTWIDTH] IN BLOOD BY AUTOMATED COUNT: 12.5 % (ref 11.8–14.4)
GFR, ESTIMATED: >90 ML/MIN/1.73M2
GLUCOSE BLD-MCNC: 154 MG/DL (ref 65–105)
GLUCOSE SERPL-MCNC: 158 MG/DL (ref 74–99)
GLUCOSE UR STRIP-MCNC: ABNORMAL MG/DL
HCG SERPL QL: NEGATIVE
HCT VFR BLD AUTO: 46.1 % (ref 36.3–47.1)
HGB BLD-MCNC: 15.4 G/DL (ref 11.9–15.1)
HGB UR QL STRIP.AUTO: NEGATIVE
IMM GRANULOCYTES # BLD AUTO: 0.03 K/UL (ref 0–0.3)
IMM GRANULOCYTES NFR BLD: 0 %
KETONES UR STRIP-MCNC: NEGATIVE MG/DL
LEUKOCYTE ESTERASE UR QL STRIP: NEGATIVE
LYMPHOCYTES NFR BLD: 4.62 K/UL (ref 1.1–3.7)
LYMPHOCYTES RELATIVE PERCENT: 40 % (ref 24–43)
MCH RBC QN AUTO: 27.6 PG (ref 25.2–33.5)
MCHC RBC AUTO-ENTMCNC: 33.4 G/DL (ref 28.4–34.8)
MCV RBC AUTO: 82.8 FL (ref 82.6–102.9)
MONOCYTES NFR BLD: 0.71 K/UL (ref 0.1–1.2)
MONOCYTES NFR BLD: 6 % (ref 3–12)
NEUTROPHILS NFR BLD: 49 % (ref 36–65)
NEUTS SEG NFR BLD: 5.71 K/UL (ref 1.5–8.1)
NITRITE UR QL STRIP: NEGATIVE
NRBC BLD-RTO: 0 PER 100 WBC
PH UR STRIP: 5.5 [PH] (ref 5–8)
PLATELET # BLD AUTO: 268 K/UL (ref 138–453)
PMV BLD AUTO: 10.9 FL (ref 8.1–13.5)
POTASSIUM SERPL-SCNC: 4.1 MMOL/L (ref 3.7–5.3)
PROT UR STRIP-MCNC: NEGATIVE MG/DL
RBC # BLD AUTO: 5.57 M/UL (ref 3.95–5.11)
RBC #/AREA URNS HPF: ABNORMAL /HPF (ref 0–4)
SODIUM SERPL-SCNC: 138 MMOL/L (ref 136–145)
SP GR UR STRIP: 1.01 (ref 1–1.03)
UROBILINOGEN UR STRIP-ACNC: NORMAL EU/DL (ref 0–1)
WBC #/AREA URNS HPF: ABNORMAL /HPF (ref 0–5)
WBC OTHER # BLD: 11.6 K/UL (ref 3.5–11.3)

## 2025-01-11 PROCEDURE — 96376 TX/PRO/DX INJ SAME DRUG ADON: CPT | Performed by: EMERGENCY MEDICINE

## 2025-01-11 PROCEDURE — 81001 URINALYSIS AUTO W/SCOPE: CPT

## 2025-01-11 PROCEDURE — 82947 ASSAY GLUCOSE BLOOD QUANT: CPT

## 2025-01-11 PROCEDURE — 70450 CT HEAD/BRAIN W/O DYE: CPT

## 2025-01-11 PROCEDURE — 80048 BASIC METABOLIC PNL TOTAL CA: CPT

## 2025-01-11 PROCEDURE — 99284 EMERGENCY DEPT VISIT MOD MDM: CPT | Performed by: EMERGENCY MEDICINE

## 2025-01-11 PROCEDURE — 96374 THER/PROPH/DIAG INJ IV PUSH: CPT | Performed by: EMERGENCY MEDICINE

## 2025-01-11 PROCEDURE — 85025 COMPLETE CBC W/AUTO DIFF WBC: CPT

## 2025-01-11 PROCEDURE — 84703 CHORIONIC GONADOTROPIN ASSAY: CPT

## 2025-01-11 PROCEDURE — 93005 ELECTROCARDIOGRAM TRACING: CPT

## 2025-01-11 PROCEDURE — 6360000002 HC RX W HCPCS

## 2025-01-11 PROCEDURE — 96375 TX/PRO/DX INJ NEW DRUG ADDON: CPT | Performed by: EMERGENCY MEDICINE

## 2025-01-11 RX ORDER — METOCLOPRAMIDE HYDROCHLORIDE 5 MG/ML
10 INJECTION INTRAMUSCULAR; INTRAVENOUS ONCE
Status: COMPLETED | OUTPATIENT
Start: 2025-01-11 | End: 2025-01-11

## 2025-01-11 RX ORDER — DIPHENHYDRAMINE HYDROCHLORIDE 50 MG/ML
25 INJECTION INTRAMUSCULAR; INTRAVENOUS ONCE
Status: COMPLETED | OUTPATIENT
Start: 2025-01-11 | End: 2025-01-11

## 2025-01-11 RX ORDER — PROCHLORPERAZINE EDISYLATE 5 MG/ML
10 INJECTION INTRAMUSCULAR; INTRAVENOUS ONCE
Status: DISCONTINUED | OUTPATIENT
Start: 2025-01-11 | End: 2025-01-11 | Stop reason: HOSPADM

## 2025-01-11 RX ORDER — KETOROLAC TROMETHAMINE 15 MG/ML
15 INJECTION, SOLUTION INTRAMUSCULAR; INTRAVENOUS ONCE
Status: COMPLETED | OUTPATIENT
Start: 2025-01-11 | End: 2025-01-11

## 2025-01-11 RX ADMIN — DIPHENHYDRAMINE HYDROCHLORIDE 25 MG: 50 INJECTION INTRAMUSCULAR; INTRAVENOUS at 17:13

## 2025-01-11 RX ADMIN — METOCLOPRAMIDE 10 MG: 5 INJECTION, SOLUTION INTRAMUSCULAR; INTRAVENOUS at 17:13

## 2025-01-11 RX ADMIN — KETOROLAC TROMETHAMINE 15 MG: 15 INJECTION, SOLUTION INTRAMUSCULAR; INTRAVENOUS at 16:55

## 2025-01-11 RX ADMIN — DIPHENHYDRAMINE HYDROCHLORIDE 25 MG: 50 INJECTION INTRAMUSCULAR; INTRAVENOUS at 15:24

## 2025-01-11 ASSESSMENT — LIFESTYLE VARIABLES
HOW MANY STANDARD DRINKS CONTAINING ALCOHOL DO YOU HAVE ON A TYPICAL DAY: PATIENT DOES NOT DRINK
HOW OFTEN DO YOU HAVE A DRINK CONTAINING ALCOHOL: NEVER

## 2025-01-11 NOTE — ED PROVIDER NOTES
[facility]     Emergency Department     Faculty Attestation    I performed a history and physical examination of the patient and discussed management with the resident. I reviewed the resident’s note and agree with the documented findings and plan of care. Any areas of disagreement are noted on the chart. I was personally present for the key portions of any procedures. I have documented in the chart those procedures where I was not present during the key portions. I have reviewed the emergency nurses triage note. I agree with the chief complaint, past medical history, past surgical history, allergies, medications, social and family history as documented unless otherwise noted below. For Physician Assistant/ Nurse Practitioner cases/documentation I have personally evaluated this patient and have completed at least one if not all key elements of the E/M (history, physical exam, and MDM). Additional findings are as noted.    3:51 PM DAWNA Luciano MD  Attending Emergency  Physician         EKG Interpretation    Interpreted by emergency department physician    Rhythm: normal sinus   Rate: normal/90  Axis: normal/73  Ectopy: none  Conduction: normal  ST Segments: no acute change  T Waves: no acute change  Q Waves: none    Clinical Impression: Normal EKG    Mukesh Luciano, III              Mukesh Luciano MD  01/11/25 1552    History of migraines, this headache feels like a migraine, patient states she normally has flashing lights with her migraines, no meningeal signs, neuro is intact, no ataxia or nystagmus, cerebellar testing normal.       Mukesh Luciano MD  01/11/25 7760

## 2025-01-11 NOTE — DISCHARGE INSTRUCTIONS
You were seen today in the emergency department for your headache.We now feel you are safe for discharge home.    Please return to the emergency department immediately if develop any new or worsening concerns including chest pain, shortness of breath, abdominal pain, nausea, vomiting, diarrhea, weakness, loss consciousness, fever, chills, or any other concerns.    Please call your PCP and schedule appointment within the next 24 to 48 hours for follow-up.

## 2025-01-11 NOTE — ED NOTES
Pt presents to ED with c/o changed vision (seeing floaters), hyperglycemia, and headache.  Pt reports symptoms started 3 days ago when she began moving into her new home.  Pt's blood sugar on arrival 157.  Patient alert and oriented x4, talking in complete sentences. Respirations even and unlabored. Call light in reach, all needs met at this time.

## 2025-01-11 NOTE — ED PROVIDER NOTES
[facility] EMERGENCY DEPARTMENT  Emergency Department Encounter  Emergency Medicine Resident     Pt Name:Gala Askew  MRN: [id number]  Birthdate 1995  Date of evaluation: 1/11/25  PCP:  Berna Weaver APRN - NP  Note Started: 4:02 PM EST      CHIEF COMPLAINT       Chief Complaint   Patient presents with    Headache    Eye Problem     \"Seeing floaters\"       HISTORY OF PRESENT ILLNESS  (Location/Symptom, Timing/Onset, Context/Setting, Quality, Duration, Modifying Factors, Severity.)      Gala Askew is a 29 y.o. female who presents with head ache.  Patient states she has history of migraine type headaches, also has history of stroke.  States she has had a headache for the last 2 to 3 days, not the worst headache of her life however it is associated with some \"flashing lights\" in the periphery of her right sided vision.  She states it makes it hard to see.  States she has had these \"flashing lights\" before.  Denies any trauma to the head, focal weakness, numbness, tingling, chest pain, shortness of breath, abdominal pain, nausea, vomiting.  She does state that her blood sugars have been high despite using her insulin.    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       has a past surgical history that includes craniotomy (01/08/2020); craniotomy (Left, 01/08/2020); and brain surgery.      Social History     Socioeconomic History    Marital status: Single     Spouse name: Not on file    Number of children: Not on file    Years of education: Not on file    Highest education level: Not on file   Occupational History    Not on file   Tobacco Use    Smoking status: Every Day     Current packs/day: 1.00     Average packs/day: 1 pack/day for 15.0 years (15.0  MISC 1 each by Does not apply route 3 times daily 2/25/20   Emory Olivarez MD   Alcohol Swabs (ALCOHOL PREP) 70 % PADS 1 pad TID 2/25/20   Emory Olivarez MD   Insulin Pen Needle 30G X 8 MM MISC 1 each by Does not apply route daily 2/25/20   Emory Olivarez MD         REVIEW OF SYSTEMS       Review of Systems  See HPI  PHYSICAL EXAM      INITIAL VITALS:   /82   Pulse 98   Temp 97.7 °F (36.5 °C)   Resp 16   SpO2 100%     Physical Exam  Constitutional:       Appearance: Normal appearance.   Eyes:      Extraocular Movements: Extraocular movements intact.      Pupils: Pupils are equal, round, and reactive to light.   Cardiovascular:      Rate and Rhythm: Normal rate and regular rhythm.      Pulses: Normal pulses.      Heart sounds: Normal heart sounds.   Pulmonary:      Effort: Pulmonary effort is normal.      Breath sounds: Normal breath sounds.   Abdominal:      General: Abdomen is flat.      Palpations: Abdomen is soft.      Tenderness: There is no abdominal tenderness.   Skin:     General: Skin is warm and dry.      Capillary Refill: Capillary refill takes less than 2 seconds.   Neurological:      General: No focal deficit present.      Mental Status: She is alert and oriented to person, place, and time. Mental status is at baseline.      Cranial Nerves: No cranial nerve deficit.      Sensory: No sensory deficit.      Motor: No weakness.      Coordination: Coordination normal.           DDX/DIAGNOSTIC RESULTS / EMERGENCY DEPARTMENT COURSE / MDM     Medical Decision Making  Gala Askew is a 29 y.o. female who presents with headache, \"flashing lights\".  Patient is GCS 15, nontoxic appearing, not in acute distress, speaking full sentences, able to ambulate under their own power.  Patient does have history of migraine type headaches.  Believe this is likely migraine although have to consider cerebral pathology.  Patient does have history of a stroke however has no focal deficits on exam.  Will obtain CT

## 2025-01-11 NOTE — ED NOTES
Pt came to nurses station requesting to leave as she \"felt weird and anxious\" after receiving the Reglan. Writer walked pt back to room and reassessed V/S. Pt tachycardic with 114 HR. Writer provided pt with crackers and pt stated she's feeling better, agreeing to stay.

## 2025-01-12 LAB
EKG ATRIAL RATE: 90 BPM
EKG P AXIS: 27 DEGREES
EKG P-R INTERVAL: 132 MS
EKG Q-T INTERVAL: 358 MS
EKG QRS DURATION: 76 MS
EKG QTC CALCULATION (BAZETT): 437 MS
EKG R AXIS: 73 DEGREES
EKG T AXIS: 51 DEGREES
EKG VENTRICULAR RATE: 90 BPM

## 2025-05-14 ENCOUNTER — OFFICE VISIT (OUTPATIENT)
Dept: NEUROSURGERY | Age: 30
End: 2025-05-14
Payer: MEDICAID

## 2025-05-14 VITALS
HEART RATE: 84 BPM | HEIGHT: 62 IN | BODY MASS INDEX: 24.11 KG/M2 | SYSTOLIC BLOOD PRESSURE: 116 MMHG | DIASTOLIC BLOOD PRESSURE: 73 MMHG | WEIGHT: 131 LBS

## 2025-05-14 DIAGNOSIS — Q28.3 CEREBRAL CAVERNOMA: Primary | ICD-10-CM

## 2025-05-14 PROCEDURE — 99213 OFFICE O/P EST LOW 20 MIN: CPT | Performed by: NEUROLOGICAL SURGERY

## 2025-05-14 RX ORDER — ALBUTEROL SULFATE 90 UG/1
2 INHALANT RESPIRATORY (INHALATION) EVERY 6 HOURS PRN
COMMUNITY
Start: 2024-11-05

## 2025-05-14 RX ORDER — METHOCARBAMOL 750 MG/1
TABLET ORAL
COMMUNITY
Start: 2025-03-31

## 2025-05-14 RX ORDER — DULAGLUTIDE 0.75 MG/.5ML
0.75 INJECTION, SOLUTION SUBCUTANEOUS
COMMUNITY
Start: 2025-04-24

## 2025-05-14 RX ORDER — FLUTICASONE PROPIONATE 50 MCG
SPRAY, SUSPENSION (ML) NASAL
COMMUNITY

## 2025-05-14 RX ORDER — FAMOTIDINE 20 MG/1
TABLET, FILM COATED ORAL
COMMUNITY

## 2025-05-14 RX ORDER — LISINOPRIL 5 MG/1
5 TABLET ORAL DAILY
COMMUNITY
Start: 2025-03-12

## 2025-05-14 RX ORDER — GLIMEPIRIDE 2 MG/1
4 TABLET ORAL DAILY
COMMUNITY
Start: 2024-10-01

## 2025-05-14 RX ORDER — CETIRIZINE HYDROCHLORIDE 10 MG/1
10 TABLET ORAL EVERY MORNING
COMMUNITY
Start: 2024-09-05

## 2025-05-14 RX ORDER — CLINDAMYCIN PHOSPHATE 10 MG/G
GEL TOPICAL
COMMUNITY
Start: 2025-03-31

## 2025-05-14 RX ORDER — PRENATAL VIT 91/IRON/FOLIC/DHA 28-975-200
1 COMBINATION PACKAGE (EA) ORAL DAILY
COMMUNITY
Start: 2025-01-16

## 2025-05-14 RX ORDER — PIOGLITAZONE 45 MG/1
45 TABLET ORAL DAILY
COMMUNITY
Start: 2025-04-24
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
## 2025-06-18 ENCOUNTER — HOSPITAL ENCOUNTER (OUTPATIENT)
Age: 30
Discharge: HOME | End: 2025-06-18
Payer: MEDICAID

## 2025-06-18 LAB
ALBUMIN SERPL-MCNC: 4.2 G/DL
ALBUMIN/GLOB SERPL: 1.6 {RATIO}
ALP SERPL-CCNC: 168 U/L
ALT SERPL-CCNC: 35 U/L
ANION GAP SERPL CALCULATED.3IONS-SCNC: 12 MMOL/L
AST SERPL-CCNC: 29 U/L
BILIRUB SERPL-MCNC: 0.4 MG/DL
BUN SERPL-MCNC: 8 MG/DL
CALCIUM SERPL-MCNC: 9.2 MG/DL
CHLORIDE SERPL-SCNC: 97 MMOL/L (ref 98–107)
CHOLEST SERPL-MCNC: 179 MG/DL
CHOLESTEROL/HDL RATIO: 6
CO2 SERPL-SCNC: 28 MMOL/L (ref 20–31)
CREAT SERPL-MCNC: 0.9 MG/DL
CREAT UR-MCNC: 43.1 MG/DL (ref 28–217)
ERYTHROCYTE [DISTWIDTH] IN BLOOD BY AUTOMATED COUNT: 12.7 %
EST. AVERAGE GLUCOSE BLD GHB EST-MCNC: 217 MG/DL
GFR, ESTIMATED: 87 ML/MIN/1.73M2
GLUCOSE SERPL-MCNC: 290 MG/DL
HBA1C MFR BLD: 9.2 %
HCT VFR BLD AUTO: 43.2 %
HDLC SERPL-MCNC: 30 MG/DL
HGB BLD-MCNC: 14.1 G/DL
LDLC SERPL CALC-MCNC: 95 MG/DL
MCH RBC QN AUTO: 27.2 PG
MCHC RBC AUTO-ENTMCNC: 32.6 G/DL
MCV RBC AUTO: 83.4 FL
MICROALBUMIN UR-MCNC: <12 MG/L (ref 0–20)
MICROALBUMIN/CREAT UR-RTO: NORMAL MCG/MG CREAT (ref 0–25)
NRBC BLD-RTO: 0 PER 100 WBC
PLATELET # BLD AUTO: 260 K/UL
PMV BLD AUTO: 11.9 FL
POTASSIUM SERPL-SCNC: 3.7 MMOL/L (ref 3.7–5.3)
PROT SERPL-MCNC: 6.9 G/DL
RBC # BLD AUTO: 5.18 M/UL
SODIUM SERPL-SCNC: 137 MMOL/L (ref 136–145)
TRIGL SERPL-MCNC: 271 MG/DL
TSH SERPL DL<=0.05 MIU/L-ACNC: 2.1 UIU/ML
VLDLC SERPL CALC-MCNC: 54 MG/DL
WBC OTHER # BLD: 14.4 K/UL

## 2025-06-18 PROCEDURE — 85027 COMPLETE CBC AUTOMATED: CPT

## 2025-06-18 PROCEDURE — 82043 UR ALBUMIN QUANTITATIVE: CPT

## 2025-06-18 PROCEDURE — 83036 HEMOGLOBIN GLYCOSYLATED A1C: CPT

## 2025-06-18 PROCEDURE — 80053 COMPREHEN METABOLIC PANEL: CPT

## 2025-06-18 PROCEDURE — 82570 ASSAY OF URINE CREATININE: CPT

## 2025-06-18 PROCEDURE — 80061 LIPID PANEL: CPT

## 2025-06-18 PROCEDURE — 84443 ASSAY THYROID STIM HORMONE: CPT

## 2025-06-19 ENCOUNTER — HOSPITAL ENCOUNTER (EMERGENCY)
Age: 30
Discharge: HOME | End: 2025-06-19
Attending: EMERGENCY MEDICINE
Payer: MEDICAID

## 2025-06-19 VITALS
BODY MASS INDEX: 23.55 KG/M2 | SYSTOLIC BLOOD PRESSURE: 116 MMHG | HEIGHT: 62 IN | RESPIRATION RATE: 19 BRPM | DIASTOLIC BLOOD PRESSURE: 88 MMHG | WEIGHT: 128 LBS | OXYGEN SATURATION: 100 % | TEMPERATURE: 98.1 F | HEART RATE: 97 BPM

## 2025-06-19 DIAGNOSIS — R73.9 ELEVATED BLOOD SUGAR: Primary | ICD-10-CM

## 2025-06-19 DIAGNOSIS — R59.0 CERVICAL ADENOPATHY: ICD-10-CM

## 2025-06-19 LAB — GLUCOSE BLD-MCNC: 168 MG/DL (ref 65–105)

## 2025-06-19 PROCEDURE — 99283 EMERGENCY DEPT VISIT LOW MDM: CPT

## 2025-06-19 PROCEDURE — 82947 ASSAY GLUCOSE BLOOD QUANT: CPT

## 2025-06-19 PROCEDURE — 6370000000 HC RX 637 (ALT 250 FOR IP)

## 2025-06-19 PROCEDURE — G0108 DIAB MANAGE TRN  PER INDIV: HCPCS

## 2025-06-19 RX ORDER — IBUPROFEN 400 MG/1
400 TABLET, FILM COATED ORAL ONCE
Status: COMPLETED | OUTPATIENT
Start: 2025-06-19 | End: 2025-06-19

## 2025-06-19 RX ADMIN — IBUPROFEN 400 MG: 400 TABLET ORAL at 09:15

## 2025-06-19 ASSESSMENT — PAIN DESCRIPTION - ONSET: ONSET: ON-GOING

## 2025-06-19 ASSESSMENT — PAIN - FUNCTIONAL ASSESSMENT: PAIN_FUNCTIONAL_ASSESSMENT: 0-10

## 2025-06-19 ASSESSMENT — PAIN DESCRIPTION - FREQUENCY: FREQUENCY: INTERMITTENT

## 2025-06-19 ASSESSMENT — LIFESTYLE VARIABLES
HOW OFTEN DO YOU HAVE A DRINK CONTAINING ALCOHOL: NEVER
HOW MANY STANDARD DRINKS CONTAINING ALCOHOL DO YOU HAVE ON A TYPICAL DAY: PATIENT DOES NOT DRINK

## 2025-06-19 ASSESSMENT — PAIN SCALES - GENERAL: PAINLEVEL_OUTOF10: 6

## 2025-06-19 ASSESSMENT — PAIN DESCRIPTION - ORIENTATION: ORIENTATION: LEFT

## 2025-06-19 ASSESSMENT — PAIN DESCRIPTION - LOCATION: LOCATION: NECK
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
## 2025-06-19 NOTE — DISCHARGE INSTRUCTIONS
You were seen today in the emergency department for your blood sugar.  We now feel you are safe for discharge home.  We strongly recommend you see your PCP as soon as possible for the enlarged lymph nodes    Please return to the emergency department immediately if develop any new or worsening concerns including chest pain, shortness of breath, abdominal pain, nausea, vomiting, diarrhea, weakness, loss consciousness, fever, chills, or any other concerns.    Please call your PCP and schedule appointment within the next 24 to 48 hours for follow-up.

## 2025-06-19 NOTE — PROGRESS NOTES
Diabetes Education Note  Received phone call from  in the ER about pt visit to ER for blood sugar check. Pt has a CGM but unable to read # d/t not having reader. Asked  to put in an order for dm ed. (Order put in for OP but not inpatient. Writer still saw pt bc of need to d/c her)  Writer spoke with pt. Pt seen by diabetes education in 2022. Has a PCP and possible endo on [address]. States she has been staying with friend who's been in Neuro ICU for several days. Has a reader for her CGM but no way to charge it (lost charging cords). Pt states has sensors, blood glucose monitor and medications that she needs. Just felt funny and wanted her blood sugar checked because she didn't have what she needed here. Has to get a cab to go home.   Writer gave pt charging cords and another reader. However, needs to get the serial # for sensor she's wearing to connect. Pt states she has that or will pair up with a new sensor. Bs at admission 290 @ 9:47 pm last night, and 168 @ 7:44 am today. Pt states she takes Tuojeo and glimiperide at home. Denies further needs, she is ready to be d/c/ed from ER. Receptive to fu with OP DM Education. Gave writer contact information.  Vanita Valenzuela RD, LD
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
## 2025-08-23 ENCOUNTER — HOSPITAL ENCOUNTER (EMERGENCY)
Age: 30
Discharge: HOME | End: 2025-08-23
Attending: EMERGENCY MEDICINE
Payer: COMMERCIAL

## 2025-08-23 VITALS
DIASTOLIC BLOOD PRESSURE: 104 MMHG | RESPIRATION RATE: 16 BRPM | OXYGEN SATURATION: 100 % | HEART RATE: 92 BPM | TEMPERATURE: 98.1 F | SYSTOLIC BLOOD PRESSURE: 141 MMHG

## 2025-08-23 DIAGNOSIS — T24.232A: Primary | ICD-10-CM

## 2025-08-23 PROCEDURE — 6370000000 HC RX 637 (ALT 250 FOR IP)

## 2025-08-23 PROCEDURE — 99283 EMERGENCY DEPT VISIT LOW MDM: CPT

## 2025-08-23 PROCEDURE — 2500000003 HC RX 250 WO HCPCS

## 2025-08-23 PROCEDURE — 16020 DRESS/DEBRID P-THICK BURN S: CPT

## 2025-08-23 RX ORDER — ACETAMINOPHEN 500 MG
500 TABLET ORAL EVERY 6 HOURS PRN
Qty: 56 TABLET | Refills: 0 | Status: SHIPPED | OUTPATIENT
Start: 2025-08-23 | End: 2025-09-06

## 2025-08-23 RX ORDER — IBUPROFEN 400 MG/1
400 TABLET, FILM COATED ORAL ONCE
Status: COMPLETED | OUTPATIENT
Start: 2025-08-23 | End: 2025-08-23

## 2025-08-23 RX ORDER — SILVER SULFADIAZINE 10 MG/G
CREAM TOPICAL ONCE
Status: COMPLETED | OUTPATIENT
Start: 2025-08-23 | End: 2025-08-23

## 2025-08-23 RX ORDER — SILVER SULFADIAZINE 10 MG/G
CREAM TOPICAL 2 TIMES DAILY
Qty: 45 G | Refills: 0 | Status: SHIPPED | OUTPATIENT
Start: 2025-08-23

## 2025-08-23 RX ORDER — ACETAMINOPHEN 325 MG/1
650 TABLET ORAL ONCE
Status: COMPLETED | OUTPATIENT
Start: 2025-08-23 | End: 2025-08-23

## 2025-08-23 RX ORDER — IBUPROFEN 400 MG/1
400 TABLET, FILM COATED ORAL EVERY 6 HOURS PRN
Qty: 56 TABLET | Refills: 0 | Status: SHIPPED | OUTPATIENT
Start: 2025-08-23 | End: 2025-09-06

## 2025-08-23 RX ADMIN — ACETAMINOPHEN 650 MG: 325 TABLET ORAL at 16:44

## 2025-08-23 RX ADMIN — SILVER SULFADIAZINE: 10 CREAM TOPICAL at 16:45

## 2025-08-23 RX ADMIN — IBUPROFEN 400 MG: 400 TABLET ORAL at 16:44

## 2025-08-23 ASSESSMENT — PAIN DESCRIPTION - LOCATION: LOCATION: LEG

## 2025-08-23 ASSESSMENT — PAIN SCALES - GENERAL
PAINLEVEL_OUTOF10: 10
PAINLEVEL_OUTOF10: 10

## 2025-08-23 ASSESSMENT — PAIN - FUNCTIONAL ASSESSMENT: PAIN_FUNCTIONAL_ASSESSMENT: 0-10

## (undated) DEVICE — DISPOSABLE IRRIGATION BIPOLAR CORD, M1000 TYPE: Brand: KIRWAN

## (undated) DEVICE — DRAPE MICSCP W117XL305CM DIA65MM LENS W VARI LENS2 FOR LEICA

## (undated) DEVICE — COVER US PRB W15XL120CM W/ GEL RUBBERBAND TAPE STRP FLD GEN

## (undated) DEVICE — E-Z CLEAN, NON-STICK, PTFE COATED, ELECTROSURGICAL BLADE ELECTRODE, 2.75 INCH (7 CM): Brand: MEGADYNE

## (undated) DEVICE — POSITIONER,HEAD,MULTIRING,36CS: Brand: MEDLINE

## (undated) DEVICE — DISPOSABLE TUBING SET AND EXTENDER FILTER TUBING

## (undated) DEVICE — CODMAN® SURGICAL PATTIES 1/2" X 1/2" (1.27CM X 1.27CM): Brand: CODMAN®

## (undated) DEVICE — BLADE ES ELASTOMERIC COAT INSUL DURABLE BEND UPTO 90DEG

## (undated) DEVICE — EMESIS BASIN: Brand: DEROYAL

## (undated) DEVICE — CODMAN® SURGICAL PATTIES 1/4" X 1/4" (0.64CM X 0.64CM): Brand: CODMAN®

## (undated) DEVICE — MARKER,SKIN,WI/RULER AND LABELS: Brand: MEDLINE

## (undated) DEVICE — 2.3MM TAPERED ROUTER

## (undated) DEVICE — DRESSING TRNSPAR W5XL4.5IN FLM SHT SEMIPERMEABLE WIND

## (undated) DEVICE — Device

## (undated) DEVICE — BATTERY PACK 2 FOR QUIKDRIVE: Brand: UNIVERSAL NEURO 2, QUIKDRIVE

## (undated) DEVICE — CODMAN® SURGICAL PATTIES 1/2" X 3" (1.27CM X 7.62CM): Brand: CODMAN®

## (undated) DEVICE — PAD,NON-ADHERENT,3X8,STERILE,LF,1/PK: Brand: MEDLINE

## (undated) DEVICE — SVMMC CRANI PK

## (undated) DEVICE — DRAIN SURG L0.375IN SIL CHN FLAT FULL FLOATED RADPQ CLS WND

## (undated) DEVICE — SUTURE VCRL SZ 2-0 L18IN ABSRB VLT L26MM SH 1/2 CIR J775D

## (undated) DEVICE — 3M™ IOBAN™ 2 ANTIMICROBIAL INCISE DRAPE 6650EZ: Brand: IOBAN™ 2

## (undated) DEVICE — ZYPHR DISPOSABLE CRANIAL PERFORATOR, LARGE 14/11MM: Brand: ZYPHR

## (undated) DEVICE — E-Z CLEAN, NON-STICK, PTFE COATED, ELECTROSURGICAL BLADE ELECTRODE, MODIFIED EXTENDED INSULATION, 2.5 INCH (6.35 CM): Brand: MEGADYNE

## (undated) DEVICE — E-Z CLEAN, NON-STICK, PTFE COATED, ELECTROSURGICAL NEEDLE ELECTRODE, MODIFIED EXTENDED INSULATION, 2.75 INCH (7 CM): Brand: MEGADYNE

## (undated) DEVICE — GLOVE SURG SZ 8 L12IN FNGR THK79MIL GRN LTX FREE

## (undated) DEVICE — COVER,MAYO STAND,STERILE: Brand: MEDLINE

## (undated) DEVICE — SUTURE NRLN SZ 4-0 L18IN NONABSORBABLE BLK L13MM TF 1/2 CIR C584D

## (undated) DEVICE — CONNECTOR TBNG WHT PLAS SUCT STR 5IN1 LTWT W/ M CONN

## (undated) DEVICE — GLOVE ORANGE PI 7   MSG9070

## (undated) DEVICE — GLOVE SURG SZ 65 THK91MIL LTX FREE SYN POLYISOPRENE

## (undated) DEVICE — SUTURE VCRL SZ 2-0 L27IN ABSRB UD L36MM CT-1 1/2 CIR JJ42G

## (undated) DEVICE — SUTURE PROL SZ 3-0 L18IN NONABSORBABLE BLU L19MM PS-2 3/8 8687H

## (undated) DEVICE — TOWEL,OR,DSP,ST,BLUE,DLX,XR,4/PK,20PK/CS: Brand: MEDLINE

## (undated) DEVICE — 3.0MM PRECISION NEURO (MATCH HEAD)

## (undated) DEVICE — TOTAL TRAY, 16FR 10ML SIL FOLEY, URN: Brand: MEDLINE

## (undated) DEVICE — CONTAINER,SPECIMEN,4OZ,OR STRL: Brand: MEDLINE

## (undated) DEVICE — CRANIOTOMY DRAPE, STERILE: Brand: MEDLINE

## (undated) DEVICE — DRAPE,REIN 53X77,STERILE: Brand: MEDLINE